# Patient Record
Sex: MALE | Race: WHITE | NOT HISPANIC OR LATINO | Employment: OTHER | URBAN - METROPOLITAN AREA
[De-identification: names, ages, dates, MRNs, and addresses within clinical notes are randomized per-mention and may not be internally consistent; named-entity substitution may affect disease eponyms.]

---

## 2017-05-23 ENCOUNTER — TELEPHONE (OUTPATIENT)
Dept: HEMATOLOGY/ONCOLOGY | Facility: CLINIC | Age: 57
End: 2017-05-23

## 2017-05-24 ENCOUNTER — TELEPHONE (OUTPATIENT)
Dept: HEMATOLOGY/ONCOLOGY | Facility: CLINIC | Age: 57
End: 2017-05-24

## 2017-05-24 DIAGNOSIS — I25.10 CORONARY ARTERY DISEASE, ANGINA PRESENCE UNSPECIFIED, UNSPECIFIED VESSEL OR LESION TYPE, UNSPECIFIED WHETHER NATIVE OR TRANSPLANTED HEART: Primary | ICD-10-CM

## 2017-05-24 RX ORDER — WARFARIN SODIUM 5 MG/1
5 TABLET ORAL DAILY
Qty: 30 TABLET | Refills: 2 | Status: SHIPPED | OUTPATIENT
Start: 2017-05-24 | End: 2021-02-22 | Stop reason: SDUPTHER

## 2017-05-24 RX ORDER — FENOFIBRATE 145 MG/1
TABLET, FILM COATED ORAL
Refills: 1 | COMMUNITY
Start: 2017-03-13 | End: 2020-01-28 | Stop reason: SDUPTHER

## 2017-05-24 RX ORDER — WARFARIN SODIUM 5 MG/1
TABLET ORAL
Refills: 2 | COMMUNITY
Start: 2017-04-27 | End: 2017-05-24 | Stop reason: SDUPTHER

## 2017-05-24 RX ORDER — DEXTROSE/VITAMIN D3 4 G-400
TABLET,CHEWABLE ORAL
Refills: 5 | COMMUNITY
Start: 2017-05-01

## 2017-05-24 RX ORDER — PEN NEEDLE, DIABETIC 31 GX5/16"
NEEDLE, DISPOSABLE MISCELLANEOUS
Refills: 4 | COMMUNITY
Start: 2017-03-07 | End: 2020-06-01 | Stop reason: SDUPTHER

## 2017-05-24 RX ORDER — INSULIN DETEMIR 100 [IU]/ML
INJECTION, SOLUTION SUBCUTANEOUS
Refills: 5 | COMMUNITY
Start: 2017-04-13 | End: 2019-12-26

## 2017-05-24 RX ORDER — CITALOPRAM 20 MG/1
TABLET, FILM COATED ORAL
Refills: 1 | COMMUNITY
Start: 2017-05-01 | End: 2019-12-26

## 2017-05-24 RX ORDER — ATORVASTATIN CALCIUM 80 MG/1
80 TABLET, FILM COATED ORAL DAILY
Refills: 1 | COMMUNITY
Start: 2017-03-07 | End: 2020-05-05 | Stop reason: SDUPTHER

## 2017-05-24 NOTE — TELEPHONE ENCOUNTER
Pt called wondering about rx refill request of warfarin. We received request and i routed it to nurse this morning.     Pt has been out for 3 days and needs rx     Please call pt once refill is sent.

## 2017-06-14 ENCOUNTER — TELEPHONE (OUTPATIENT)
Dept: HEMATOLOGY/ONCOLOGY | Facility: CLINIC | Age: 57
End: 2017-06-14

## 2017-06-14 RX ORDER — WARFARIN SODIUM 5 MG/1
5 TABLET ORAL
COMMUNITY
End: 2019-04-05 | Stop reason: SDUPTHER

## 2017-06-14 RX ORDER — WARFARIN SODIUM 5 MG/1
7.5 TABLET ORAL
COMMUNITY
End: 2018-01-05 | Stop reason: SDUPTHER

## 2017-07-29 LAB
INR PPP: 2.6
PROTHROMBIN TIME: 26.6 SEC (ref 9–11.5)

## 2017-08-01 ENCOUNTER — TELEPHONE (OUTPATIENT)
Dept: HEMATOLOGY/ONCOLOGY | Facility: CLINIC | Age: 57
End: 2017-08-01

## 2017-09-08 LAB
INR PPP: 2 (ref 0.8–1.2)
PROTHROMBIN TIME: 22.9 SECOND(S) (ref 12.3–14.7)

## 2017-09-20 ENCOUNTER — TELEPHONE (OUTPATIENT)
Dept: HEMATOLOGY/ONCOLOGY | Facility: CLINIC | Age: 57
End: 2017-09-20

## 2017-09-20 NOTE — TELEPHONE ENCOUNTER
----- Message from Ac Smith MD sent at 9/20/2017  4:53 AM CDT -----  INR is fine. No change. Recheck in one month.

## 2017-09-20 NOTE — TELEPHONE ENCOUNTER
----- Message from Princess Zavala sent at 9/20/2017 10:13 AM CDT -----  PAtient called in returning your call from this morning (see telephone encounters)   Please contact pt back at [phone]

## 2017-10-10 ENCOUNTER — OFFICE VISIT (OUTPATIENT)
Dept: HEMATOLOGY/ONCOLOGY | Facility: CLINIC | Age: 57
End: 2017-10-10
Payer: MEDICARE

## 2017-10-10 VITALS
TEMPERATURE: 98 F | WEIGHT: 240.69 LBS | HEIGHT: 70 IN | SYSTOLIC BLOOD PRESSURE: 133 MMHG | HEART RATE: 65 BPM | RESPIRATION RATE: 18 BRPM | DIASTOLIC BLOOD PRESSURE: 79 MMHG | BODY MASS INDEX: 34.46 KG/M2

## 2017-10-10 DIAGNOSIS — D68.51 HETEROZYGOUS FACTOR V LEIDEN MUTATION: Chronic | ICD-10-CM

## 2017-10-10 PROCEDURE — 99214 OFFICE O/P EST MOD 30 MIN: CPT | Mod: ,,, | Performed by: INTERNAL MEDICINE

## 2017-10-10 RX ORDER — HUMAN INSULIN 100 [USP'U]/ML
25 INJECTION, SUSPENSION SUBCUTANEOUS
COMMUNITY
Start: 2017-10-09 | End: 2020-02-11 | Stop reason: CLARIF

## 2017-10-10 RX ORDER — METFORMIN HYDROCHLORIDE 500 MG/1
TABLET ORAL
COMMUNITY
Start: 2017-09-21 | End: 2019-12-03 | Stop reason: SDUPTHER

## 2017-10-10 RX ORDER — BUSPIRONE HYDROCHLORIDE 10 MG/1
TABLET ORAL
COMMUNITY
Start: 2017-08-17 | End: 2019-12-26

## 2017-10-10 RX ORDER — SYRINGE AND NEEDLE,INSULIN,1ML 29 G X1/2"
SYRINGE, EMPTY DISPOSABLE MISCELLANEOUS
COMMUNITY
Start: 2017-10-09 | End: 2020-03-31 | Stop reason: SDUPTHER

## 2017-10-10 NOTE — PROGRESS NOTES
"                                                         PROGRESS NOTE    Subjective:       Patient ID: Luis Resendiz is a 57 y.o. male.    Chief Complaint:  Follow-up and Results (labs in epic and media)  follow up hypercoag syndroms    History of Present Illness:   Luis Resendiz is a 57 y.o. male who presents for routine follow up of hypercoag syndrome.  No new complaints.  No bleeding, no sob, no chest pain.       Family and Social history reviewed and is unchanged from 8/12/2013      ROS:  Review of Systems   Constitutional: Negative for fever and unexpected weight change.   HENT: Negative for nosebleeds.    Respiratory: Negative for chest tightness and shortness of breath.    Cardiovascular: Negative for chest pain.   Gastrointestinal: Negative for abdominal pain and blood in stool.   Genitourinary: Negative for hematuria.   Skin: Negative for rash.   Hematological: Does not bruise/bleed easily.          Current Outpatient Prescriptions:     alprazolam (XANAX) 0.5 MG tablet, Take 1 tablet (0.5 mg total) by mouth 3 (three) times daily., Disp: , Rfl:     aspirin 325 MG tablet, Take 325 mg by mouth once daily., Disp: , Rfl:     atenolol (TENORMIN) 50 MG tablet, Take 75 mg by mouth 2 (two) times daily. , Disp: , Rfl:     atorvastatin (LIPITOR) 40 MG tablet, , Disp: , Rfl: 1    BD INSULIN PEN NEEDLE UF MINI 31 gauge x 3/16" Ndle, , Disp: , Rfl: 4    fenofibrate (TRICOR) 145 MG tablet, , Disp: , Rfl: 1    glimepiride (AMARYL) 4 MG tablet, Take 4 mg by mouth before breakfast., Disp: , Rfl:     lisinopril (PRINIVIL,ZESTRIL) 2.5 MG tablet, Take 2.5 mg by mouth once daily., Disp: , Rfl:     metformin (GLUCOPHAGE) 500 MG tablet, , Disp: , Rfl:     NOVOLIN 70/30 100 unit/mL (70-30) injection, , Disp: , Rfl:     TRUEPLUS INSULIN 0.5 mL 29 gauge x 1/2" Syrg, , Disp: , Rfl: 5    ULTRA COMFORT INSULIN SYRINGE 1 mL 29 gauge x 1/2" Syrg, , Disp: , Rfl:     warfarin (COUMADIN) 5 MG tablet, Take 7.5 mg by " "mouth every Tuesday, Thursday, Saturday, Sunday., Disp: , Rfl:     busPIRone (BUSPAR) 10 MG tablet, , Disp: , Rfl:     citalopram (CELEXA) 10 MG tablet, Take 1 tablet (10 mg total) by mouth once daily., Disp: , Rfl:     citalopram (CELEXA) 20 MG tablet, , Disp: , Rfl: 1    fenofibrate 120 mg Tab, Take 145 mg by mouth once daily. , Disp: , Rfl:     hydrocodone-acetaminophen 5-325mg (NORCO) 5-325 mg per tablet, Take 1 tablet by mouth every 4 (four) hours as needed for Pain., Disp: 40 tablet, Rfl: 0    insulin aspart (NOVOLOG) 100 unit/mL injection, Inject into the skin 3 (three) times daily before meals. Per sliding scale, Disp: , Rfl:     insulin detemir (LEVEMIR) 100 unit/mL injection, Inject 22 Units into the skin every evening., Disp: , Rfl:     insulin lispro (HUMALOG) 100 unit/mL injection, Inject into the skin 3 (three) times daily before meals. Sliding scale, Disp: , Rfl:     LEVEMIR FLEXTOUCH 100 unit/mL (3 mL) InPn pen, , Disp: , Rfl: 5    omega-3 acid ethyl esters (LOVAZA) 1 gram capsule, Take 2 capsules (2 g total) by mouth 2 (two) times daily., Disp: 120 capsule, Rfl: 11    oxycodone-acetaminophen (PERCOCET) 5-325 mg per tablet, Take 1 tablet by mouth every 8 (eight) hours as needed for Pain., Disp: 12 tablet, Rfl: 0    ranolazine (RANEXA) 500 MG Tb12, Take 500 mg by mouth 2 (two) times daily., Disp: , Rfl:     thiamine 100 MG tablet, Take 1 tablet (100 mg total) by mouth once daily., Disp: 3 tablet, Rfl: 0    warfarin (COUMADIN) 5 MG tablet, Take 1 tablet (5 mg total) by mouth Daily., Disp: 30 tablet, Rfl: 2    warfarin (COUMADIN) 5 MG tablet, Take 5 mg by mouth every Mon, Wed, Fri., Disp: , Rfl:         Objective:       Physical Examination:     /79   Pulse 65   Temp 98.4 °F (36.9 °C)   Resp 18   Ht 5' 10" (1.778 m)   Wt 109.2 kg (240 lb 11.2 oz)   BMI 34.54 kg/m²     Physical Exam   Constitutional: He is oriented to person, place, and time. He appears well-developed and " well-nourished.   HENT:   Head: Normocephalic and atraumatic.   Right Ear: External ear normal.   Left Ear: External ear normal.   Mouth/Throat: Oropharynx is clear and moist.   Eyes: Conjunctivae are normal. Pupils are equal, round, and reactive to light. No scleral icterus.   Neck: Normal range of motion. Neck supple.   Cardiovascular: Normal rate, regular rhythm and normal heart sounds.  Exam reveals no gallop and no friction rub.    No murmur heard.  Pulmonary/Chest: Effort normal and breath sounds normal. No respiratory distress. He has no rales. He exhibits no tenderness.   Abdominal: Soft. Bowel sounds are normal. He exhibits no distension and no mass. There is no hepatosplenomegaly. There is no tenderness. There is no rebound and no guarding.   Musculoskeletal: He exhibits no edema.   Lymphadenopathy:        Head (right side): No tonsillar adenopathy present.        Head (left side): No tonsillar adenopathy present.     He has no cervical adenopathy.     He has no axillary adenopathy.        Right: No supraclavicular adenopathy present.        Left: No supraclavicular adenopathy present.   Neurological: He is alert and oriented to person, place, and time.   Psychiatric: He has a normal mood and affect. His behavior is normal. Judgment and thought content normal.   Vitals reviewed.      Labs:   No results found for this or any previous visit (from the past 336 hour(s)).  CMP  Sodium   Date Value Ref Range Status   10/13/2015 140 136 - 145 mmol/L Final     Potassium   Date Value Ref Range Status   10/13/2015 4.6 3.5 - 5.1 mmol/L Final     Chloride   Date Value Ref Range Status   10/13/2015 108 95 - 110 mmol/L Final     CO2   Date Value Ref Range Status   10/13/2015 25 23 - 29 mmol/L Final     Glucose   Date Value Ref Range Status   10/13/2015 193 (H) 70 - 110 mg/dL Final     BUN, Bld   Date Value Ref Range Status   10/13/2015 34 (H) 6 - 20 mg/dL Final     Creatinine   Date Value Ref Range Status   10/13/2015  0.9 0.5 - 1.4 mg/dL Final   06/21/2013 1.1 0.5 - 1.4 mg/dL Final     Calcium   Date Value Ref Range Status   10/13/2015 9.1 8.7 - 10.5 mg/dL Final   06/21/2013 9.3 8.7 - 10.5 mg/dL Final     Total Protein   Date Value Ref Range Status   10/12/2015 7.0 6.0 - 8.4 g/dL Final     Albumin   Date Value Ref Range Status   10/12/2015 2.4 (L) 3.5 - 5.2 g/dL Final     Total Bilirubin   Date Value Ref Range Status   10/12/2015 0.6 0.1 - 1.0 mg/dL Final     Comment:     For infants and newborns, interpretation of results should be based  on gestational age, weight and in agreement with clinical  observations.  Premature Infant recommended reference ranges:  Up to 24 hours.............<8.0 mg/dL  Up to 48 hours............<12.0 mg/dL  3-5 days..................<15.0 mg/dL  6-29 days.................<15.0 mg/dL       Alkaline Phosphatase   Date Value Ref Range Status   10/12/2015 43 (L) 55 - 135 U/L Final     AST   Date Value Ref Range Status   10/12/2015 146 (H) 10 - 40 U/L Final     ALT   Date Value Ref Range Status   10/12/2015 61 (H) 10 - 44 U/L Final     Anion Gap   Date Value Ref Range Status   10/13/2015 7 (L) 8 - 16 mmol/L Final   06/21/2013 13 5 - 15 meq/L Final     eGFR if    Date Value Ref Range Status   10/13/2015 >60 >60 mL/min/1.73 m^2 Final     eGFR if non    Date Value Ref Range Status   10/13/2015 >60 >60 mL/min/1.73 m^2 Final     Comment:     Calculation used to obtain the estimated glomerular filtration  rate (eGFR) is the CKD-EPI equation. Since race is unknown   in our information system, the eGFR values for   -American and Non--American patients are given   for each creatinine result.       No results found for: CEA  No results found for: PSA        Assessment/Plan:     Problem List Items Addressed This Visit     Heterozygous factor V Leiden mutation (Chronic)     Patient is doing well on coumadin at this time and INR has been in range.  Patient continues on  7.5mg MWF and 5mg TThSS.  No bleeding.  Continue this dose.            Other Visit Diagnoses    None.         Discussion:   High complexity due to high risk meds.   Return in about 6 months (around 4/10/2018).      Electronically signed by Ac Sofia

## 2017-10-10 NOTE — ASSESSMENT & PLAN NOTE
Patient is doing well on coumadin at this time and INR has been in range.  Patient continues on 7.5mg MWF and 5mg TThSS.  No bleeding.  Continue this dose.

## 2017-10-10 NOTE — LETTER
October 10, 2017      Luis Barboza MD  [address]  [address]  [facility] [place] [facility] [address]           [facility]  [address]  [address]  [place] [address]  Phone: [phone]  Fax: [phone]          Patient: Luis Resendiz   MR Number: [id number]   YOB: 1960   Date of Visit: 10/10/2017       Dear Dr. Luis Barboza:    Thank you for referring Luis Resendiz to me for evaluation. Attached you will find relevant portions of my assessment and plan of care.    If you have questions, please do not hesitate to call me. I look forward to following Luis Resendiz along with you.    Sincerely,    Ac Smith MD    Enclosure  CC:  No Recipients    If you would like to receive this communication electronically, please contact [email or web address] or [phone] to request more information on [facility] access.    For providers and/or their staff who would like to refer a patient to [facility], please contact us through our one-stop-shop provider referral line, [facility], at [phone].    If you feel you have received this communication in error or would no longer like to receive these types of communications, please e-mail [email or web address]

## 2017-10-18 LAB
INR PPP: 2.4
PROTHROMBIN TIME: 24.5 SEC (ref 9–11.5)

## 2017-11-16 LAB
INR PPP: 2.1
PROTHROMBIN TIME: 21.4 SEC (ref 9–11.5)

## 2017-11-17 ENCOUNTER — TELEPHONE (OUTPATIENT)
Dept: HEMATOLOGY/ONCOLOGY | Facility: CLINIC | Age: 57
End: 2017-11-17

## 2017-12-13 ENCOUNTER — TELEPHONE (OUTPATIENT)
Dept: HEMATOLOGY/ONCOLOGY | Facility: CLINIC | Age: 57
End: 2017-12-13

## 2017-12-13 NOTE — TELEPHONE ENCOUNTER
----- Message from Ac Smith MD sent at 12/13/2017  2:14 PM CST -----  Please call the patient regarding his abnormal result. No change. Recheck in one month.

## 2017-12-15 LAB
INR PPP: 1.6
PROTHROMBIN TIME: 16.2 SEC (ref 9–11.5)

## 2018-01-05 DIAGNOSIS — D68.59 HYPERCOAGULABLE STATE: Primary | ICD-10-CM

## 2018-01-08 RX ORDER — WARFARIN SODIUM 5 MG/1
7.5 TABLET ORAL
Qty: 45 TABLET | Refills: 3 | Status: SHIPPED | OUTPATIENT
Start: 2018-01-08 | End: 2018-06-07 | Stop reason: SDUPTHER

## 2018-01-24 LAB
INR PPP: 2.1
PROTHROMBIN TIME: 21.5 SEC (ref 9–11.5)

## 2018-01-25 ENCOUNTER — TELEPHONE (OUTPATIENT)
Dept: HEMATOLOGY/ONCOLOGY | Facility: CLINIC | Age: 58
End: 2018-01-25

## 2018-01-25 NOTE — TELEPHONE ENCOUNTER
----- Message from Mary Koo sent at 1/24/2018  4:30 PM CST -----  Patient called in requesting nurse please call him with BW results that were done yesterday at [facility]. Please advise and contact patient at [phone]. Thanks

## 2018-02-20 ENCOUNTER — TELEPHONE (OUTPATIENT)
Dept: HEMATOLOGY/ONCOLOGY | Facility: CLINIC | Age: 58
End: 2018-02-20

## 2018-02-20 NOTE — TELEPHONE ENCOUNTER
----- Message from Ac Smith MD sent at 2/20/2018  9:10 AM CST -----  Please call the patient regarding his abnormal result. No changes, repeat in one month.    I spoke to the patient on 1/25 with this result. He will recheck his INR 1 month from this lab.

## 2018-02-27 ENCOUNTER — TELEPHONE (OUTPATIENT)
Dept: HEMATOLOGY/ONCOLOGY | Facility: CLINIC | Age: 58
End: 2018-02-27

## 2018-02-27 DIAGNOSIS — D68.51 HETEROZYGOUS FACTOR V LEIDEN MUTATION: Primary | ICD-10-CM

## 2018-03-12 ENCOUNTER — TELEPHONE (OUTPATIENT)
Dept: HEMATOLOGY/ONCOLOGY | Facility: CLINIC | Age: 58
End: 2018-03-12

## 2018-03-12 NOTE — TELEPHONE ENCOUNTER
----- Message from Ac Smith MD sent at 3/12/2018  9:28 AM CDT -----  Please call the patient regarding his abnormal result. No change. Recheck in one month.      Spoke to patient with directions for coumadin

## 2018-04-18 ENCOUNTER — TELEPHONE (OUTPATIENT)
Dept: HEMATOLOGY/ONCOLOGY | Facility: CLINIC | Age: 58
End: 2018-04-18

## 2018-04-18 NOTE — TELEPHONE ENCOUNTER
----- Message from Ac Smith MD sent at 4/18/2018  9:00 AM CDT -----  Please call the patient regarding his abnormal result. No change, recheck in one month.      Spoke to patient. INR is 2.2 no changes on coumadin . Recheck INR in 4 weeks

## 2018-05-24 ENCOUNTER — TELEPHONE (OUTPATIENT)
Dept: HEMATOLOGY/ONCOLOGY | Facility: CLINIC | Age: 58
End: 2018-05-24

## 2018-05-24 LAB
INR PPP: 1.9
PROTHROMBIN TIME: 19.1 SEC (ref 9–11.5)

## 2018-05-24 NOTE — TELEPHONE ENCOUNTER
----- Message from Ac Smith MD sent at 5/24/2018  8:23 AM CDT -----  Please call the patient regarding his abnormal result. No change, recheck in one month.

## 2018-06-07 DIAGNOSIS — D68.59 HYPERCOAGULABLE STATE: ICD-10-CM

## 2018-06-07 RX ORDER — WARFARIN SODIUM 5 MG/1
7.5 TABLET ORAL
Qty: 45 TABLET | Refills: 3 | Status: SHIPPED | OUTPATIENT
Start: 2018-06-08 | End: 2018-10-18 | Stop reason: SDUPTHER

## 2018-06-07 NOTE — TELEPHONE ENCOUNTER
----- Message from Uma Diez sent at 6/7/2018 12:08 PM CDT -----  Pt called to ask nurse to call him about labs he had done 2 weeks ago at [facility].  Also needs refill of warfarin sent to [facility] on [address].    # [phone]    Thanks,  Uma      Spoke to patient with coumadin instructions. Recheck around 6/23/18. He voiced understanding.

## 2018-07-02 ENCOUNTER — TELEPHONE (OUTPATIENT)
Dept: HEMATOLOGY/ONCOLOGY | Facility: CLINIC | Age: 58
End: 2018-07-02

## 2018-07-02 NOTE — TELEPHONE ENCOUNTER
----- Message from Ac Smith MD sent at 7/2/2018  5:46 AM CDT -----  Please call the patient regarding his abnormal result. No change.  Recheck in one month.      Left message

## 2018-07-31 ENCOUNTER — TELEPHONE (OUTPATIENT)
Dept: HEMATOLOGY/ONCOLOGY | Facility: CLINIC | Age: 58
End: 2018-07-31

## 2018-07-31 NOTE — TELEPHONE ENCOUNTER
----- Message from Ac Smith MD sent at 7/31/2018  8:09 AM CDT -----  Please call the patient regarding his abnormal result. No change.  Recheck one month.      Left message about INR and continue dame dose of coumadin

## 2018-08-30 ENCOUNTER — TELEPHONE (OUTPATIENT)
Dept: HEMATOLOGY/ONCOLOGY | Facility: CLINIC | Age: 58
End: 2018-08-30

## 2018-08-30 NOTE — TELEPHONE ENCOUNTER
----- Message from Ac Smith MD sent at 8/29/2018  4:16 PM CDT -----  Please call the patient regarding his abnormal result. No change. Recheck one month.        Spoke to patient. He will cont same dose coumadin and recheck INR in 4 weeks.

## 2018-09-27 ENCOUNTER — TELEPHONE (OUTPATIENT)
Dept: HEMATOLOGY/ONCOLOGY | Facility: CLINIC | Age: 58
End: 2018-09-27

## 2018-09-27 NOTE — TELEPHONE ENCOUNTER
----- Message from Ac Smith MD sent at 9/27/2018  1:43 PM CDT -----  Please call the patient regarding his abnormal result. Double dose for 2 days then the same.  Repeat test in 2 weeks.      INR is 1.6. Patient is to double dose x 2 days then resume same dose and recheck his INR I 2 weeks. Patient notified.

## 2018-10-18 DIAGNOSIS — D68.59 HYPERCOAGULABLE STATE: ICD-10-CM

## 2018-10-18 RX ORDER — WARFARIN SODIUM 5 MG/1
7.5 TABLET ORAL
Qty: 45 TABLET | Refills: 3 | Status: SHIPPED | OUTPATIENT
Start: 2018-10-19 | End: 2020-02-13 | Stop reason: SDUPTHER

## 2018-10-18 NOTE — TELEPHONE ENCOUNTER
----- Message from Uma Diez sent at 10/18/2018  2:11 PM CDT -----  Pt had labs done and would like a nurse to call them back with results. Michael, last Wednesday    CB# [phone]      Thanks,  Uma      Spoke to Luis about INR which was 1.9. He will continue same dose of 7.5mg 3 days a week and 5mg the other 4 days. Recheck INR in 4 weeks. Refill for coumadin 5mg sent to  to sign.

## 2018-12-10 ENCOUNTER — TELEPHONE (OUTPATIENT)
Dept: HEMATOLOGY/ONCOLOGY | Facility: CLINIC | Age: 58
End: 2018-12-10

## 2018-12-10 NOTE — TELEPHONE ENCOUNTER
----- Message from Uma Diez sent at 12/10/2018  9:22 AM CST -----  Pt had labs done and would like a nurse to call them back with results.  [facility]  PT/INR    # [phone]      Thanks,  Uma      I called Anita. The last INR was on 11/20 and was at 2.5. I told him to continue same dose which is 7.5mg M, W, F and 5mg other days. He was asked to recheck @ 12/20 this month. He voiced understanding

## 2018-12-21 ENCOUNTER — TELEPHONE (OUTPATIENT)
Dept: HEMATOLOGY/ONCOLOGY | Facility: CLINIC | Age: 58
End: 2018-12-21

## 2018-12-21 NOTE — TELEPHONE ENCOUNTER
Spoke to Luis. Hels coumadin times 1 day then resumed same dose. INR was 3.2 and he is taking 7.5mg M,W,F and 5 mg other days. Recheck INR 2 weeks.  He voiced understanding

## 2019-01-03 ENCOUNTER — TELEPHONE (OUTPATIENT)
Dept: HEMATOLOGY/ONCOLOGY | Facility: CLINIC | Age: 59
End: 2019-01-03

## 2019-01-03 NOTE — TELEPHONE ENCOUNTER
----- Message from Ac Smith MD sent at 1/3/2019  7:39 AM CST -----  Please call the patient regarding his abnormal result. Hold one day then same.  Recheck in one month.      Spoke to Luis with the above info.INR 3.2. Hold dose 1 day then resume at same dose which is 7.5mg on M,W, F and 5mg on other days.  Also scheduled him for a follow up. 1/24/19 at 1300.

## 2019-02-12 ENCOUNTER — TELEPHONE (OUTPATIENT)
Dept: HEMATOLOGY/ONCOLOGY | Facility: CLINIC | Age: 59
End: 2019-02-12

## 2019-02-12 NOTE — TELEPHONE ENCOUNTER
Spoke to Luis. He will continue same dose of coumadin and will recheck in 4 weeks. INR is at 1.9. He takes 7.5mg M, W, F, and 5 mg the other days. He will recheck his INR in 4 weeks. Clinic appt is this Thursday.

## 2019-02-14 ENCOUNTER — OFFICE VISIT (OUTPATIENT)
Dept: HEMATOLOGY/ONCOLOGY | Facility: CLINIC | Age: 59
End: 2019-02-14
Payer: MEDICARE

## 2019-02-14 VITALS
BODY MASS INDEX: 33.79 KG/M2 | HEART RATE: 63 BPM | TEMPERATURE: 98 F | RESPIRATION RATE: 20 BRPM | WEIGHT: 235.5 LBS | DIASTOLIC BLOOD PRESSURE: 84 MMHG | SYSTOLIC BLOOD PRESSURE: 143 MMHG

## 2019-02-14 DIAGNOSIS — D68.51 HETEROZYGOUS FACTOR V LEIDEN MUTATION: Chronic | ICD-10-CM

## 2019-02-14 PROCEDURE — 3008F BODY MASS INDEX DOCD: CPT | Mod: ,,, | Performed by: INTERNAL MEDICINE

## 2019-02-14 PROCEDURE — 3079F DIAST BP 80-89 MM HG: CPT | Mod: ,,, | Performed by: INTERNAL MEDICINE

## 2019-02-14 PROCEDURE — 3008F PR BODY MASS INDEX (BMI) DOCUMENTED: ICD-10-PCS | Mod: ,,, | Performed by: INTERNAL MEDICINE

## 2019-02-14 PROCEDURE — 99213 PR OFFICE/OUTPT VISIT, EST, LEVL III, 20-29 MIN: ICD-10-PCS | Mod: ,,, | Performed by: INTERNAL MEDICINE

## 2019-02-14 PROCEDURE — 99213 OFFICE O/P EST LOW 20 MIN: CPT | Mod: ,,, | Performed by: INTERNAL MEDICINE

## 2019-02-14 PROCEDURE — 3077F PR MOST RECENT SYSTOLIC BLOOD PRESSURE >= 140 MM HG: ICD-10-PCS | Mod: ,,, | Performed by: INTERNAL MEDICINE

## 2019-02-14 PROCEDURE — 3079F PR MOST RECENT DIASTOLIC BLOOD PRESSURE 80-89 MM HG: ICD-10-PCS | Mod: ,,, | Performed by: INTERNAL MEDICINE

## 2019-02-14 PROCEDURE — 3077F SYST BP >= 140 MM HG: CPT | Mod: ,,, | Performed by: INTERNAL MEDICINE

## 2019-02-14 NOTE — PROGRESS NOTES
"                                                         PROGRESS NOTE    Subjective:       Patient ID: Luis Resendiz is a 58 y.o. male.    Chief Complaint:  Follow-up and Results  follow up hypercoag syndroms    History of Present Illness:   Luis Resendiz is a 58 y.o. male who presents for routine follow up of hypercoag syndrome.  Patient has no new complaints at this time.  No bleeding.      Family and Social history reviewed and is unchanged from 8/12/2013      ROS:  Review of Systems   Constitutional: Negative for fever and unexpected weight change.   HENT: Negative for nosebleeds.    Respiratory: Negative for chest tightness and shortness of breath.    Cardiovascular: Negative for chest pain.   Gastrointestinal: Negative for abdominal pain and blood in stool.   Genitourinary: Negative for hematuria.   Skin: Negative for rash.   Hematological: Does not bruise/bleed easily.          Current Outpatient Medications:     alprazolam (XANAX) 0.5 MG tablet, Take 1 tablet (0.5 mg total) by mouth 3 (three) times daily., Disp: , Rfl:     atenolol (TENORMIN) 50 MG tablet, Take 75 mg by mouth 2 (two) times daily. , Disp: , Rfl:     atorvastatin (LIPITOR) 40 MG tablet, , Disp: , Rfl: 1    fenofibrate (TRICOR) 145 MG tablet, , Disp: , Rfl: 1    glimepiride (AMARYL) 4 MG tablet, Take 4 mg by mouth before breakfast., Disp: , Rfl:     insulin aspart (NOVOLOG) 100 unit/mL injection, Inject into the skin 3 (three) times daily before meals. Per sliding scale, Disp: , Rfl:     lisinopril (PRINIVIL,ZESTRIL) 2.5 MG tablet, Take 2.5 mg by mouth once daily., Disp: , Rfl:     metformin (GLUCOPHAGE) 500 MG tablet, , Disp: , Rfl:     aspirin 325 MG tablet, Take 325 mg by mouth once daily., Disp: , Rfl:     BD INSULIN PEN NEEDLE UF MINI 31 gauge x 3/16" Ndle, , Disp: , Rfl: 4    busPIRone (BUSPAR) 10 MG tablet, , Disp: , Rfl:     citalopram (CELEXA) 10 MG tablet, Take 1 tablet (10 mg total) by mouth once daily., " "Disp: , Rfl:     citalopram (CELEXA) 20 MG tablet, , Disp: , Rfl: 1    fenofibrate 120 mg Tab, Take 145 mg by mouth once daily. , Disp: , Rfl:     hydrocodone-acetaminophen 5-325mg (NORCO) 5-325 mg per tablet, Take 1 tablet by mouth every 4 (four) hours as needed for Pain., Disp: 40 tablet, Rfl: 0    insulin detemir (LEVEMIR) 100 unit/mL injection, Inject 22 Units into the skin every evening., Disp: , Rfl:     insulin lispro (HUMALOG) 100 unit/mL injection, Inject into the skin 3 (three) times daily before meals. Sliding scale, Disp: , Rfl:     LEVEMIR FLEXTOUCH 100 unit/mL (3 mL) InPn pen, , Disp: , Rfl: 5    NOVOLIN 70/30 100 unit/mL (70-30) injection, , Disp: , Rfl:     omega-3 acid ethyl esters (LOVAZA) 1 gram capsule, Take 2 capsules (2 g total) by mouth 2 (two) times daily., Disp: 120 capsule, Rfl: 11    oxycodone-acetaminophen (PERCOCET) 5-325 mg per tablet, Take 1 tablet by mouth every 8 (eight) hours as needed for Pain., Disp: 12 tablet, Rfl: 0    ranolazine (RANEXA) 500 MG Tb12, Take 500 mg by mouth 2 (two) times daily., Disp: , Rfl:     thiamine 100 MG tablet, Take 1 tablet (100 mg total) by mouth once daily., Disp: 3 tablet, Rfl: 0    TRUEPLUS INSULIN 0.5 mL 29 gauge x 1/2" Syrg, , Disp: , Rfl: 5    ULTRA COMFORT INSULIN SYRINGE 1 mL 29 gauge x 1/2" Syrg, , Disp: , Rfl:     warfarin (COUMADIN) 5 MG tablet, Take 1 tablet (5 mg total) by mouth Daily., Disp: 30 tablet, Rfl: 2    warfarin (COUMADIN) 5 MG tablet, Take 5 mg by mouth every Mon, Wed, Fri., Disp: , Rfl:     warfarin (COUMADIN) 5 MG tablet, Take 1.5 tablets (7.5 mg total) by mouth every Mon, Wed, Fri. And 5mg on the other 4 days, Disp: 45 tablet, Rfl: 3        Objective:       Physical Examination:     BP (!) 143/84   Pulse 63   Temp 98.3 °F (36.8 °C)   Resp 20   Wt 106.8 kg (235 lb 8 oz)   BMI 33.79 kg/m²     Physical Exam   Constitutional: He is oriented to person, place, and time. He appears well-developed and " well-nourished.   HENT:   Head: Normocephalic and atraumatic.   Right Ear: External ear normal.   Left Ear: External ear normal.   Mouth/Throat: Oropharynx is clear and moist.   Eyes: Conjunctivae are normal. Pupils are equal, round, and reactive to light. No scleral icterus.   Neck: Normal range of motion. Neck supple.   Cardiovascular: Normal rate, regular rhythm and normal heart sounds. Exam reveals no gallop and no friction rub.   No murmur heard.  Pulmonary/Chest: Effort normal and breath sounds normal. No respiratory distress. He has no rales. He exhibits no tenderness.   Abdominal: Soft. Bowel sounds are normal. He exhibits no distension and no mass. There is no hepatosplenomegaly. There is no tenderness. There is no rebound and no guarding.   Musculoskeletal: He exhibits no edema.   Lymphadenopathy:        Head (right side): No tonsillar adenopathy present.        Head (left side): No tonsillar adenopathy present.     He has no cervical adenopathy.     He has no axillary adenopathy.        Right: No supraclavicular adenopathy present.        Left: No supraclavicular adenopathy present.   Neurological: He is alert and oriented to person, place, and time.   Psychiatric: He has a normal mood and affect. His behavior is normal. Judgment and thought content normal.   Vitals reviewed.      Labs:   No results found for this or any previous visit (from the past 336 hour(s)).  CMP  Sodium   Date Value Ref Range Status   10/13/2015 140 136 - 145 mmol/L Final     Potassium   Date Value Ref Range Status   10/13/2015 4.6 3.5 - 5.1 mmol/L Final     Chloride   Date Value Ref Range Status   10/13/2015 108 95 - 110 mmol/L Final     CO2   Date Value Ref Range Status   10/13/2015 25 23 - 29 mmol/L Final     Glucose   Date Value Ref Range Status   10/13/2015 193 (H) 70 - 110 mg/dL Final     BUN, Bld   Date Value Ref Range Status   10/13/2015 34 (H) 6 - 20 mg/dL Final     Creatinine   Date Value Ref Range Status   10/13/2015 0.9  0.5 - 1.4 mg/dL Final   06/21/2013 1.1 0.5 - 1.4 mg/dL Final     Calcium   Date Value Ref Range Status   10/13/2015 9.1 8.7 - 10.5 mg/dL Final   06/21/2013 9.3 8.7 - 10.5 mg/dL Final     Total Protein   Date Value Ref Range Status   10/12/2015 7.0 6.0 - 8.4 g/dL Final     Albumin   Date Value Ref Range Status   10/12/2015 2.4 (L) 3.5 - 5.2 g/dL Final     Total Bilirubin   Date Value Ref Range Status   10/12/2015 0.6 0.1 - 1.0 mg/dL Final     Comment:     For infants and newborns, interpretation of results should be based  on gestational age, weight and in agreement with clinical  observations.  Premature Infant recommended reference ranges:  Up to 24 hours.............<8.0 mg/dL  Up to 48 hours............<12.0 mg/dL  3-5 days..................<15.0 mg/dL  6-29 days.................<15.0 mg/dL       Alkaline Phosphatase   Date Value Ref Range Status   10/12/2015 43 (L) 55 - 135 U/L Final     AST   Date Value Ref Range Status   10/12/2015 146 (H) 10 - 40 U/L Final     ALT   Date Value Ref Range Status   10/12/2015 61 (H) 10 - 44 U/L Final     Anion Gap   Date Value Ref Range Status   10/13/2015 7 (L) 8 - 16 mmol/L Final   06/21/2013 13 5 - 15 meq/L Final     eGFR if    Date Value Ref Range Status   10/13/2015 >60 >60 mL/min/1.73 m^2 Final     eGFR if non    Date Value Ref Range Status   10/13/2015 >60 >60 mL/min/1.73 m^2 Final     Comment:     Calculation used to obtain the estimated glomerular filtration  rate (eGFR) is the CKD-EPI equation. Since race is unknown   in our information system, the eGFR values for   -American and Non--American patients are given   for each creatinine result.       No results found for: CEA  No results found for: PSA        Assessment/Plan:     Problem List Items Addressed This Visit     Heterozygous factor V Leiden mutation (Chronic)     Patient is doing well at this time and remains on coumadin.  He is currently taking 7.5mg MWF and 5mg  TThSS.  No bleeding is noted, INR currently 1.9.  Will continue this dose and continue monthly monitoring.  Will get labs from Dr. Barboza to evaluate CBC.                 Discussion:   High complexity due to high risk meds.   Follow-up in about 1 year (around 2/14/2020).      Electronically signed by Ac Sofia

## 2019-02-14 NOTE — ASSESSMENT & PLAN NOTE
Patient is doing well at this time and remains on coumadin.  He is currently taking 7.5mg MWF and 5mg TThSS.  No bleeding is noted, INR currently 1.9.  Will continue this dose and continue monthly monitoring.  Will get labs from Dr. Barboza to evaluate CBC.

## 2019-02-14 NOTE — LETTER
February 14, 2019      Luis Barboza MD  [address]  [address]  [facility]  [place] [address]           [facility] - Hematology Oncology  [address]  [address]  [place] [address]  Phone: [phone]  Fax: [phone]          Patient: Luis Resendiz   MR Number: [id number]   YOB: 1960   Date of Visit: 2/14/2019       Dear Dr. Luis Barboza:    Thank you for referring Luis Resendiz to me for evaluation. Attached you will find relevant portions of my assessment and plan of care.    If you have questions, please do not hesitate to call me. I look forward to following Luis Resendiz along with you.    Sincerely,    Ac Smith MD    Enclosure  CC:  No Recipients    If you would like to receive this communication electronically, please contact [email or web address] or [phone] to request more information on [facility] [employer] Link access.    For providers and/or their staff who would like to refer a patient to [facility], please contact us through our one-stop-shop provider referral line, [facility], at [phone].    If you feel you have received this communication in error or would no longer like to receive these types of communications, please e-mail [email or web address]

## 2019-03-14 ENCOUNTER — TELEPHONE (OUTPATIENT)
Dept: HEMATOLOGY/ONCOLOGY | Facility: CLINIC | Age: 59
End: 2019-03-14

## 2019-03-14 NOTE — TELEPHONE ENCOUNTER
----- Message from Uma Diez sent at 3/14/2019  1:59 PM CDT -----  Pt had labs done and would like a nurse to call them back with results.    CB# [phone]      Thanks,  Uma      Spoke to Luis. He is to continue same dose of coumadin and will recheck his INR in 4 weeks.

## 2019-04-01 DIAGNOSIS — D68.51 HETEROZYGOUS FACTOR V LEIDEN MUTATION: Primary | Chronic | ICD-10-CM

## 2019-04-01 RX ORDER — WARFARIN SODIUM 5 MG/1
TABLET ORAL
Qty: 45 TABLET | Refills: 1 | Status: SHIPPED | OUTPATIENT
Start: 2019-04-01 | End: 2019-04-05 | Stop reason: SDUPTHER

## 2019-04-05 DIAGNOSIS — D68.51 HETEROZYGOUS FACTOR V LEIDEN MUTATION: Primary | ICD-10-CM

## 2019-04-05 RX ORDER — WARFARIN SODIUM 5 MG/1
5 TABLET ORAL
Qty: 45 TABLET | Refills: 2 | Status: SHIPPED | OUTPATIENT
Start: 2019-04-05 | End: 2021-02-22 | Stop reason: SDUPTHER

## 2019-04-05 RX ORDER — WARFARIN SODIUM 5 MG/1
TABLET ORAL
Qty: 45 TABLET | Refills: 2 | Status: SHIPPED | OUTPATIENT
Start: 2019-04-05 | End: 2021-02-22 | Stop reason: SDUPTHER

## 2019-04-23 ENCOUNTER — TELEPHONE (OUTPATIENT)
Dept: HEMATOLOGY/ONCOLOGY | Facility: CLINIC | Age: 59
End: 2019-04-23

## 2019-04-23 LAB
INR PPP: 2.1
PROTHROMBIN TIME: 21 SEC (ref 9–11.5)

## 2019-04-23 NOTE — TELEPHONE ENCOUNTER
----- Message from Ac Smith MD sent at 4/23/2019  6:08 AM CDT -----  Please call the patient regarding his abnormal result. No change. Recheck in one month.

## 2019-05-02 ENCOUNTER — TELEPHONE (OUTPATIENT)
Dept: HEMATOLOGY/ONCOLOGY | Facility: CLINIC | Age: 59
End: 2019-05-02

## 2019-05-02 NOTE — TELEPHONE ENCOUNTER
----- Message from Uma Diez sent at 5/2/2019 10:40 AM CDT -----  Pt had labs done and would like a nurse to call them back with results.  [facility] last thursday  CB# [phone]      Thanks,  Uma

## 2019-05-29 LAB
INR PPP: 1.9
PROTHROMBIN TIME: 18.9 SEC (ref 9–11.5)

## 2019-05-30 ENCOUNTER — TELEPHONE (OUTPATIENT)
Dept: HEMATOLOGY/ONCOLOGY | Facility: CLINIC | Age: 59
End: 2019-05-30

## 2019-05-30 NOTE — TELEPHONE ENCOUNTER
----- Message from Uma Diez sent at 5/30/2019 10:06 AM CDT -----  Pt had labs done and would like a nurse to call them back with results.  Michael  @ 5/28  #   [phone]    Thanks,  Uma      Spoke to Luis. INR is at 1.9. Continue same dose of 7.5mg on M,W,F and 5mg on the other days. rechck INR in 4 weeks. He voiced understanding

## 2019-06-10 DIAGNOSIS — D68.51 HETEROZYGOUS FACTOR V LEIDEN MUTATION: Chronic | ICD-10-CM

## 2019-06-11 DIAGNOSIS — D68.51 HETEROZYGOUS FACTOR V LEIDEN MUTATION: Chronic | ICD-10-CM

## 2019-06-11 RX ORDER — WARFARIN SODIUM 5 MG/1
TABLET ORAL
Qty: 45 TABLET | Refills: 1 | Status: SHIPPED | OUTPATIENT
Start: 2019-06-11 | End: 2021-04-26

## 2019-06-11 RX ORDER — WARFARIN SODIUM 5 MG/1
TABLET ORAL
Qty: 45 TABLET | Refills: 1 | Status: SHIPPED | OUTPATIENT
Start: 2019-06-11 | End: 2021-04-26 | Stop reason: SDUPTHER

## 2019-07-04 LAB
INR PPP: 1.9
PROTHROMBIN TIME: 18.8 SEC (ref 9–11.5)

## 2019-07-08 ENCOUNTER — TELEPHONE (OUTPATIENT)
Dept: HEMATOLOGY/ONCOLOGY | Facility: CLINIC | Age: 59
End: 2019-07-08

## 2019-07-08 NOTE — TELEPHONE ENCOUNTER
Called patient let him know inr is good  Recheck inr in 1 month ----- Message from Ac Smith MD sent at 7/6/2019 10:35 AM CDT -----  Please call the patient regarding his abnormal result. No change,  Recheck in one month.

## 2019-07-08 NOTE — TELEPHONE ENCOUNTER
----- Message from Ac Smith MD sent at 7/6/2019 10:35 AM CDT -----  Please call the patient regarding his abnormal result. No change,  Recheck in one month.      spoke to Luis. Same dose. Recheck 1 month

## 2019-08-17 LAB
INR PPP: 2.8
PROTHROMBIN TIME: 27.8 SEC (ref 9–11.5)

## 2019-08-19 ENCOUNTER — TELEPHONE (OUTPATIENT)
Dept: HEMATOLOGY/ONCOLOGY | Facility: CLINIC | Age: 59
End: 2019-08-19

## 2019-08-19 NOTE — TELEPHONE ENCOUNTER
----- Message from Ac Smith MD sent at 8/19/2019  9:23 AM CDT -----  Please call the patient regarding his abnormal result. No changes.  Recheck next month.        Spoke to Manuel with coumadin instructions. 2.8 INR cont same dose of coumadin. 7.5 on M,W, F and 5mg other days. Recheck INR 4 weeks.

## 2019-09-25 LAB
INR PPP: 1.7
PROTHROMBIN TIME: 17.2 SEC (ref 9–11.5)

## 2019-10-03 ENCOUNTER — TELEPHONE (OUTPATIENT)
Dept: FAMILY MEDICINE | Facility: CLINIC | Age: 59
End: 2019-10-03

## 2019-10-03 NOTE — TELEPHONE ENCOUNTER
----- Message from Savanah Rowell sent at 10/3/2019 10:28 AM CDT -----  Contact: SASKIA  The patient saw Marianna about 3 weeks ago. The patient said his back is getting worse. Last night it would hurt when he would take a deep breath. He was doing good with his back and this happen last night . pts # [phone]

## 2019-10-03 NOTE — TELEPHONE ENCOUNTER
Please call pt and ask if he's having any SOB, cough, chest pain and where exactly pain is located- does it hurt more with movement or just a deep breath? If he's having pain with a deep breath then I'd be concerned about PE given his history but if this is just his normal MSK back pain then recommend xrays and can refer to PT

## 2019-10-03 NOTE — TELEPHONE ENCOUNTER
"After hours:  2nd message.   Patient calling back about back pain - pain is below right shoulder.  Cannot take a deep breath.  Shallow breathing.   "Flairs up" every now and then.  Has not see a pain dr or ortho for several years.   Taking tylenol for pain.  Cannot take anything else due to blood thinners that he is on.    Asking for pain medication.     Patient instructed to answer the phone tomorrow because Marianna tried calling back today and he did not answer.    "

## 2019-10-04 ENCOUNTER — TELEPHONE (OUTPATIENT)
Dept: HEMATOLOGY/ONCOLOGY | Facility: CLINIC | Age: 59
End: 2019-10-04

## 2019-10-04 NOTE — TELEPHONE ENCOUNTER
----- Message from Dona Cole sent at 10/4/2019  2:42 PM CDT -----  The patient called to get the results of his PT INR from last week. Please call him back at [phone].      Patient notified to continue same dose coumadin. Recheck INR in 4 weeks

## 2019-10-04 NOTE — TELEPHONE ENCOUNTER
Spoke with pt - states his pain is only when he takes a deep breath. I let him know that Marianna is concerned about a PE and he needs to go to the ER to be evaluated. Pt was very reluctant and mentioned multiple times that he does not want to go due to cost. I expressed the importance of imaging to rule out something severe and pt agrees to go today and call for f/u

## 2019-11-04 ENCOUNTER — TELEPHONE (OUTPATIENT)
Dept: HEMATOLOGY/ONCOLOGY | Facility: CLINIC | Age: 59
End: 2019-11-04

## 2019-11-04 DIAGNOSIS — D68.51 HETEROZYGOUS FACTOR V LEIDEN MUTATION: Primary | ICD-10-CM

## 2019-11-04 DIAGNOSIS — Z79.01 LONG TERM (CURRENT) USE OF ANTICOAGULANTS: ICD-10-CM

## 2019-11-05 LAB
INR PPP: 1.7
PROTHROMBIN TIME: 17.9 SEC (ref 9–11.5)

## 2019-11-06 ENCOUNTER — TELEPHONE (OUTPATIENT)
Dept: HEMATOLOGY/ONCOLOGY | Facility: CLINIC | Age: 59
End: 2019-11-06

## 2019-11-06 NOTE — TELEPHONE ENCOUNTER
----- Message from Ac Smith MD sent at 11/5/2019  2:12 PM CST -----  Please call the patient regarding his abnormal result.  Double today and tomorrow then same.  Repeat one month.        Spoke to Anita. He is to double his dose x 2 days. 15mg today and 10 mg tomorrow. Then back to the same dose and recheck his INR in 4 weeks

## 2019-11-06 NOTE — TELEPHONE ENCOUNTER
----- Message from Ac Smith MD sent at 11/5/2019  2:12 PM CST -----  Please call the patient regarding his abnormal result.  Double today and tomorrow then same.  Repeat one month.

## 2019-11-18 ENCOUNTER — TELEPHONE (OUTPATIENT)
Dept: FAMILY MEDICINE | Facility: CLINIC | Age: 59
End: 2019-11-18

## 2019-11-18 DIAGNOSIS — I10 ESSENTIAL HYPERTENSION: ICD-10-CM

## 2019-11-18 DIAGNOSIS — E11.9 TYPE 2 DIABETES MELLITUS WITHOUT COMPLICATION, WITHOUT LONG-TERM CURRENT USE OF INSULIN: ICD-10-CM

## 2019-11-18 DIAGNOSIS — F41.9 ANXIETY: Primary | ICD-10-CM

## 2019-11-18 RX ORDER — ALPRAZOLAM 0.5 MG/1
0.5 TABLET ORAL 3 TIMES DAILY
Qty: 90 TABLET | Refills: 1 | Status: SHIPPED | OUTPATIENT
Start: 2019-11-18 | End: 2019-11-20

## 2019-11-18 RX ORDER — LISINOPRIL 2.5 MG/1
2.5 TABLET ORAL DAILY
Qty: 90 TABLET | Refills: 0 | Status: SHIPPED | OUTPATIENT
Start: 2019-11-18 | End: 2020-01-28 | Stop reason: SDUPTHER

## 2019-11-18 RX ORDER — GLIMEPIRIDE 4 MG/1
4 TABLET ORAL 2 TIMES DAILY
Qty: 180 TABLET | Refills: 0 | Status: SHIPPED | OUTPATIENT
Start: 2019-11-18 | End: 2020-02-11 | Stop reason: SDUPTHER

## 2019-11-18 NOTE — TELEPHONE ENCOUNTER
----- Message from [facility], [facility] sent at 11/18/2019  2:29 PM CST -----  Needs refill on Lisinopril 2.5mg and Xanax 1mg. [facility] [address]. Also will be out of Glimeperide and wants to be sure he has refills on that.

## 2019-11-20 RX ORDER — ALPRAZOLAM 1 MG/1
1 TABLET ORAL 2 TIMES DAILY
Qty: 60 TABLET | Refills: 2 | Status: SHIPPED | OUTPATIENT
Start: 2019-11-20 | End: 2020-02-11 | Stop reason: SDUPTHER

## 2019-11-20 NOTE — TELEPHONE ENCOUNTER
----- Message from Trav Shirley sent at 11/20/2019  3:19 PM CST -----  Pt has questions about his hydrocodone amount and mg  Pt [phone]

## 2019-11-20 NOTE — TELEPHONE ENCOUNTER
Spoke with pt, it wasn't norco he needs his xanax sent in correctly. Rx set up for you to send in.

## 2019-12-03 RX ORDER — METFORMIN HYDROCHLORIDE 500 MG/1
1000 TABLET ORAL 2 TIMES DAILY WITH MEALS
Qty: 360 TABLET | Refills: 1 | Status: SHIPPED | OUTPATIENT
Start: 2019-12-03 | End: 2020-05-05 | Stop reason: SDUPTHER

## 2019-12-03 NOTE — TELEPHONE ENCOUNTER
----- Message from Marianna Valderrama sent at 12/3/2019 11:39 AM CST -----  Refill for Metformin. [facility] [address]. Pt #[phone]

## 2019-12-11 ENCOUNTER — TELEPHONE (OUTPATIENT)
Dept: HEMATOLOGY/ONCOLOGY | Facility: CLINIC | Age: 59
End: 2019-12-11

## 2019-12-11 NOTE — TELEPHONE ENCOUNTER
----- Message from Ac Smith MD sent at 12/11/2019 11:30 AM CST -----  Chelsie, increase his dose by 25% and recheck in 2 weeks.        Will increase to 7.5mg every day and recheck INR in 2 weeks

## 2019-12-13 ENCOUNTER — TELEPHONE (OUTPATIENT)
Dept: FAMILY MEDICINE | Facility: CLINIC | Age: 59
End: 2019-12-13

## 2019-12-13 DIAGNOSIS — Z79.899 ENCOUNTER FOR LONG-TERM (CURRENT) USE OF OTHER MEDICATIONS: Primary | ICD-10-CM

## 2019-12-13 DIAGNOSIS — E11.42 DIABETIC POLYNEUROPATHY ASSOCIATED WITH TYPE 2 DIABETES MELLITUS: ICD-10-CM

## 2019-12-26 ENCOUNTER — OFFICE VISIT (OUTPATIENT)
Dept: FAMILY MEDICINE | Facility: CLINIC | Age: 59
End: 2019-12-26
Payer: MEDICARE

## 2019-12-26 ENCOUNTER — TELEPHONE (OUTPATIENT)
Dept: HEMATOLOGY/ONCOLOGY | Facility: CLINIC | Age: 59
End: 2019-12-26

## 2019-12-26 VITALS
BODY MASS INDEX: 33.41 KG/M2 | HEART RATE: 80 BPM | WEIGHT: 233.38 LBS | HEIGHT: 70 IN | SYSTOLIC BLOOD PRESSURE: 134 MMHG | DIASTOLIC BLOOD PRESSURE: 78 MMHG

## 2019-12-26 DIAGNOSIS — E78.5 DYSLIPIDEMIA: ICD-10-CM

## 2019-12-26 DIAGNOSIS — I25.10 CORONARY ARTERY DISEASE INVOLVING NATIVE CORONARY ARTERY OF NATIVE HEART WITHOUT ANGINA PECTORIS: Chronic | ICD-10-CM

## 2019-12-26 DIAGNOSIS — E11.69 DM TYPE 2 WITH DIABETIC DYSLIPIDEMIA: Primary | ICD-10-CM

## 2019-12-26 DIAGNOSIS — D68.51 HETEROZYGOUS FACTOR V LEIDEN MUTATION: Chronic | ICD-10-CM

## 2019-12-26 DIAGNOSIS — E78.5 DM TYPE 2 WITH DIABETIC DYSLIPIDEMIA: Primary | ICD-10-CM

## 2019-12-26 DIAGNOSIS — M51.36 DDD (DEGENERATIVE DISC DISEASE), LUMBAR: ICD-10-CM

## 2019-12-26 PROCEDURE — 3008F PR BODY MASS INDEX (BMI) DOCUMENTED: ICD-10-PCS | Mod: S$GLB,,, | Performed by: NURSE PRACTITIONER

## 2019-12-26 PROCEDURE — 3075F PR MOST RECENT SYSTOLIC BLOOD PRESS GE 130-139MM HG: ICD-10-PCS | Mod: S$GLB,,, | Performed by: NURSE PRACTITIONER

## 2019-12-26 PROCEDURE — 99214 OFFICE O/P EST MOD 30 MIN: CPT | Mod: S$GLB,,, | Performed by: NURSE PRACTITIONER

## 2019-12-26 PROCEDURE — 99214 PR OFFICE/OUTPT VISIT, EST, LEVL IV, 30-39 MIN: ICD-10-PCS | Mod: S$GLB,,, | Performed by: NURSE PRACTITIONER

## 2019-12-26 PROCEDURE — 3075F SYST BP GE 130 - 139MM HG: CPT | Mod: S$GLB,,, | Performed by: NURSE PRACTITIONER

## 2019-12-26 PROCEDURE — 3078F PR MOST RECENT DIASTOLIC BLOOD PRESSURE < 80 MM HG: ICD-10-PCS | Mod: S$GLB,,, | Performed by: NURSE PRACTITIONER

## 2019-12-26 PROCEDURE — 3078F DIAST BP <80 MM HG: CPT | Mod: S$GLB,,, | Performed by: NURSE PRACTITIONER

## 2019-12-26 PROCEDURE — 3008F BODY MASS INDEX DOCD: CPT | Mod: S$GLB,,, | Performed by: NURSE PRACTITIONER

## 2019-12-26 RX ORDER — TIZANIDINE 4 MG/1
4 TABLET ORAL NIGHTLY PRN
Qty: 30 TABLET | Refills: 2 | Status: SHIPPED | OUTPATIENT
Start: 2019-12-26 | End: 2019-12-26

## 2019-12-26 RX ORDER — TIZANIDINE 4 MG/1
4 TABLET ORAL NIGHTLY PRN
Qty: 30 TABLET | Refills: 2 | Status: SHIPPED | OUTPATIENT
Start: 2019-12-26 | End: 2020-01-05

## 2019-12-26 RX ORDER — DULOXETIN HYDROCHLORIDE 30 MG/1
30 CAPSULE, DELAYED RELEASE ORAL 2 TIMES DAILY
COMMUNITY
End: 2021-09-15

## 2019-12-26 NOTE — TELEPHONE ENCOUNTER
----- Message from Ac Smith MD sent at 12/26/2019  9:55 AM CST -----  Please call the patient regarding his abnormal result. Hold coumadin.  Recheck tomorrow. Any bleeding?

## 2019-12-27 ENCOUNTER — TELEPHONE (OUTPATIENT)
Dept: HEMATOLOGY/ONCOLOGY | Facility: CLINIC | Age: 59
End: 2019-12-27

## 2020-01-02 ENCOUNTER — TELEPHONE (OUTPATIENT)
Dept: FAMILY MEDICINE | Facility: CLINIC | Age: 60
End: 2020-01-02

## 2020-01-02 NOTE — TELEPHONE ENCOUNTER
We have that he is taking both metformin and glimeperide, not glipizide twice a day so AM and PM. This has nothing to do with seeing blood in his urine and he needs to be seen for hematuria- also ask when his last INR was drawn

## 2020-01-02 NOTE — TELEPHONE ENCOUNTER
Patient had labs drawn today. Encouraged to be seen in clinic for hematuria. Verbalized understanding.

## 2020-01-02 NOTE — TELEPHONE ENCOUNTER
----- Message from Trav Shirley sent at 1/2/2020  3:05 PM CST -----  Pt is needing to know should he be taking metformin or glipizide and at night he takes insulin   Pt had blood in urine wants to know should he take the medication at 4pm and midnight   Pt [phone]

## 2020-01-03 ENCOUNTER — TELEPHONE (OUTPATIENT)
Dept: HEMATOLOGY/ONCOLOGY | Facility: CLINIC | Age: 60
End: 2020-01-03

## 2020-01-03 LAB
ALBUMIN SERPL-MCNC: 4.3 G/DL (ref 3.6–5.1)
ALBUMIN/GLOB SERPL: 1.5 (CALC) (ref 1–2.5)
ALP SERPL-CCNC: 43 U/L (ref 40–115)
ALT SERPL-CCNC: 54 U/L (ref 9–46)
AST SERPL-CCNC: 53 U/L (ref 10–35)
BILIRUB SERPL-MCNC: 0.8 MG/DL (ref 0.2–1.2)
BUN SERPL-MCNC: 15 MG/DL (ref 7–25)
BUN/CREAT SERPL: ABNORMAL (CALC) (ref 6–22)
CALCIUM SERPL-MCNC: 9.2 MG/DL (ref 8.6–10.3)
CHLORIDE SERPL-SCNC: 102 MMOL/L (ref 98–110)
CHOLEST SERPL-MCNC: 181 MG/DL
CHOLEST/HDLC SERPL: 3.8 (CALC)
CO2 SERPL-SCNC: 24 MMOL/L (ref 20–32)
CREAT SERPL-MCNC: 0.96 MG/DL (ref 0.7–1.25)
GFRSERPLBLD MDRD-ARVRAT: 85 ML/MIN/1.73M2
GLOBULIN SER CALC-MCNC: 2.9 G/DL (CALC) (ref 1.9–3.7)
GLUCOSE SERPL-MCNC: 180 MG/DL (ref 65–139)
HBA1C MFR BLD: 6.6 % OF TOTAL HGB
HDLC SERPL-MCNC: 48 MG/DL
LDLC SERPL CALC-MCNC: 96 MG/DL (CALC)
NONHDLC SERPL-MCNC: 133 MG/DL (CALC)
POTASSIUM SERPL-SCNC: 5.4 MMOL/L (ref 3.5–5.3)
PROT SERPL-MCNC: 7.2 G/DL (ref 6.1–8.1)
SODIUM SERPL-SCNC: 138 MMOL/L (ref 135–146)
TRIGL SERPL-MCNC: 256 MG/DL

## 2020-01-03 NOTE — TELEPHONE ENCOUNTER
Returned the call of the patient and instructed him that his INR is 2.2.  He said that Chelsie already told him how to take his Coumadin.  4 days take 7.5 mg and 3 days take 5 mg.

## 2020-01-06 ENCOUNTER — TELEPHONE (OUTPATIENT)
Dept: HEMATOLOGY/ONCOLOGY | Facility: CLINIC | Age: 60
End: 2020-01-06

## 2020-01-06 ENCOUNTER — TELEPHONE (OUTPATIENT)
Dept: FAMILY MEDICINE | Facility: CLINIC | Age: 60
End: 2020-01-06

## 2020-01-06 NOTE — TELEPHONE ENCOUNTER
Spoke to patient with results verbatim per Dr Barboza. Verbalized understanding. Remind me created for lab.

## 2020-01-06 NOTE — TELEPHONE ENCOUNTER
----- Message from Ac Smith MD sent at 1/6/2020 11:42 AM CST -----  Please call the patient regarding his abnormal result. Recheck in one month.      spoke to Luis and let him know results of INR and confirmed  medication doses. &.5mg 4 days a  Week and 5mg the other 3 days. Recheck INR in 4 weeks. He voiced understanding

## 2020-01-06 NOTE — TELEPHONE ENCOUNTER
----- Message from Ac Smith MD sent at 1/6/2020 11:42 AM CST -----  Please call the patient regarding his abnormal result. Recheck in one month.

## 2020-01-21 ENCOUNTER — TELEPHONE (OUTPATIENT)
Dept: HEMATOLOGY/ONCOLOGY | Facility: CLINIC | Age: 60
End: 2020-01-21

## 2020-01-21 NOTE — TELEPHONE ENCOUNTER
Manuel called today and said his hemrroids are bleeding and he is wants to hold his warfarin for 1 day to see if it helps stop the bleeding. I asked him to go get his INR rechecked( last one was 1/2/20)  and he said he would in the next few days. He takes 7.5 mg warfarin 4 days a week and 5mg the other 3 days each week. I advised him that he should go see a Gastro doctor about it and he said he did have a colonoscopy many years ago by Dr. Cason at [facility]. Manuel said everything was fine at that time. He said he needs to wait to see a GI doctor due to finances. So Manuel is going to hold his warfarin for 1 day and then go have his INR rechecked in next few days to see that level. He will keep me informed of the situation.

## 2020-01-24 ENCOUNTER — TELEPHONE (OUTPATIENT)
Dept: HEMATOLOGY/ONCOLOGY | Facility: CLINIC | Age: 60
End: 2020-01-24

## 2020-01-24 NOTE — TELEPHONE ENCOUNTER
Pt called back and I informed him that his INR is 1.8. He is to stay on same dose of 5mg 3 days a week and 7 1/2 4 days a week and then recheck his labs in 1 month

## 2020-01-24 NOTE — TELEPHONE ENCOUNTER
----- Message from Ac Smith MD sent at 1/24/2020  1:43 PM CST -----  Please call the patient regarding his abnormal result. Same dose.  Recheck in one month

## 2020-01-24 NOTE — TELEPHONE ENCOUNTER
----- Message from Ac Smith MD sent at 1/24/2020  1:43 PM CST -----  Please call the patient regarding his abnormal result. Same dose.  Recheck in one month        Left message for patient to continue same dose of coumadin and recheck INR in 4 weeks

## 2020-01-28 DIAGNOSIS — I10 ESSENTIAL HYPERTENSION: ICD-10-CM

## 2020-01-28 DIAGNOSIS — E78.5 DYSLIPIDEMIA: Primary | ICD-10-CM

## 2020-01-28 RX ORDER — LISINOPRIL 2.5 MG/1
2.5 TABLET ORAL DAILY
Qty: 90 TABLET | Refills: 0 | Status: SHIPPED | OUTPATIENT
Start: 2020-01-28 | End: 2020-02-11 | Stop reason: SDUPTHER

## 2020-01-28 RX ORDER — FENOFIBRATE 145 MG/1
145 TABLET, FILM COATED ORAL DAILY
Qty: 90 TABLET | Refills: 1 | Status: SHIPPED | OUTPATIENT
Start: 2020-01-28 | End: 2020-08-12 | Stop reason: SDUPTHER

## 2020-01-28 NOTE — TELEPHONE ENCOUNTER
----- Message from Trav Shirley sent at 1/28/2020 10:02 AM CST -----  Refills on lisinopril  , fenofierate,   Pharm [facility] [address]  Pt [phone]

## 2020-02-11 ENCOUNTER — TELEPHONE (OUTPATIENT)
Dept: FAMILY MEDICINE | Facility: CLINIC | Age: 60
End: 2020-02-11

## 2020-02-11 DIAGNOSIS — E11.9 TYPE 2 DIABETES MELLITUS WITHOUT COMPLICATION, WITHOUT LONG-TERM CURRENT USE OF INSULIN: ICD-10-CM

## 2020-02-11 DIAGNOSIS — I10 ESSENTIAL HYPERTENSION: ICD-10-CM

## 2020-02-11 RX ORDER — LISINOPRIL 2.5 MG/1
2.5 TABLET ORAL DAILY
Qty: 90 TABLET | Refills: 1 | Status: SHIPPED | OUTPATIENT
Start: 2020-02-11 | End: 2020-08-12 | Stop reason: DRUGHIGH

## 2020-02-11 RX ORDER — GLIMEPIRIDE 4 MG/1
4 TABLET ORAL 2 TIMES DAILY
Qty: 180 TABLET | Refills: 1 | Status: SHIPPED | OUTPATIENT
Start: 2020-02-11 | End: 2020-05-05 | Stop reason: SDUPTHER

## 2020-02-11 RX ORDER — ALPRAZOLAM 1 MG/1
1 TABLET ORAL 2 TIMES DAILY
Qty: 60 TABLET | Refills: 2 | Status: SHIPPED | OUTPATIENT
Start: 2020-02-15 | End: 2020-05-06 | Stop reason: SDUPTHER

## 2020-02-11 NOTE — TELEPHONE ENCOUNTER
----- Message from Daisha Sequeira sent at 2/11/2020  9:35 AM CST -----  Pt needs refill on lisinopril, glymperide, xanax, and his insulin which is no longer covered. they want him to use humulin instead of novolin  [facility] [address]  [phone]

## 2020-02-11 NOTE — TELEPHONE ENCOUNTER
Spoke with pt - refused to bring bottles back, states he lives in [place] and was just in [place] yesterday. States he will bring them to the next visit in March.   Lisinopril 2.5mg QD  Glimepiride 4mg BID  Xanax 1mg BID     No longer covering 70/30 insulin. Using 32 units at night, but has been skipping the day time dose some time due to price. If he does have to take it in the day time he only uses about 15 units. Wants to replace with Humulin.

## 2020-02-13 DIAGNOSIS — D68.59 HYPERCOAGULABLE STATE: ICD-10-CM

## 2020-02-13 RX ORDER — WARFARIN SODIUM 5 MG/1
7.5 TABLET ORAL
Qty: 45 TABLET | Refills: 3 | Status: SHIPPED | OUTPATIENT
Start: 2020-02-14 | End: 2021-04-21 | Stop reason: SDUPTHER

## 2020-02-24 ENCOUNTER — TELEPHONE (OUTPATIENT)
Dept: HEMATOLOGY/ONCOLOGY | Facility: CLINIC | Age: 60
End: 2020-02-24

## 2020-02-24 NOTE — TELEPHONE ENCOUNTER
----- Message from Ac Smith MD sent at 2/24/2020  7:42 AM CST -----  Please call the patient regarding his abnormal result. No change. Recheck 4 weeks.        Continue same dose and recheck INR in 4 weeks

## 2020-02-24 NOTE — TELEPHONE ENCOUNTER
----- Message from Ac Smith MD sent at 2/24/2020  7:42 AM CST -----  Please call the patient regarding his abnormal result. No change. Recheck 4 weeks.

## 2020-03-04 ENCOUNTER — TELEPHONE (OUTPATIENT)
Dept: FAMILY MEDICINE | Facility: CLINIC | Age: 60
End: 2020-03-04

## 2020-03-04 RX ORDER — TIZANIDINE 4 MG/1
8 TABLET ORAL NIGHTLY PRN
Qty: 180 TABLET | Refills: 1 | Status: SHIPPED | OUTPATIENT
Start: 2020-03-04 | End: 2020-03-14

## 2020-03-04 RX ORDER — ATENOLOL 50 MG/1
75 TABLET ORAL 2 TIMES DAILY
Qty: 135 TABLET | Refills: 1 | Status: SHIPPED | OUTPATIENT
Start: 2020-03-04 | End: 2020-06-01 | Stop reason: SDUPTHER

## 2020-03-04 NOTE — TELEPHONE ENCOUNTER
----- Message from Nirmal Ayala sent at 3/4/2020  3:41 PM CST -----  Contact: Luis Astrid  Needs refill on Atenelol 50 MG  Send to [facility] in [place]  Pt# [phone]  Tizanidine will needs to be changed on the directions because 1 at night time hasn't been helping and he's been doubling on it at night. For the next refill he will need the dosage to be changed to take 2 at night.

## 2020-03-30 ENCOUNTER — TELEPHONE (OUTPATIENT)
Dept: HEMATOLOGY/ONCOLOGY | Facility: CLINIC | Age: 60
End: 2020-03-30

## 2020-03-30 NOTE — TELEPHONE ENCOUNTER
----- Message from Ac Smith MD sent at 3/30/2020  7:14 AM CDT -----  Please call the patient regarding his abnormal result. Hold for one day then the same.  Recheck in one monht.          spoke to Luis and he is to hold coumadin x 1 day for his INR of 3.0. Then he will resume same dose of 7.5mg 4 days a week alternating with 5mg the other 3 days a week. He is to recheck his INR in 4 weeks.

## 2020-03-31 DIAGNOSIS — E11.9 TYPE 2 DIABETES MELLITUS WITHOUT COMPLICATION, WITHOUT LONG-TERM CURRENT USE OF INSULIN: Primary | ICD-10-CM

## 2020-03-31 RX ORDER — SYRINGE AND NEEDLE,INSULIN,1ML 29 G X1/2"
25 SYRINGE, EMPTY DISPOSABLE MISCELLANEOUS 2 TIMES DAILY
Qty: 180 EACH | Refills: 3 | Status: SHIPPED | OUTPATIENT
Start: 2020-03-31 | End: 2020-06-01 | Stop reason: SDUPTHER

## 2020-03-31 NOTE — TELEPHONE ENCOUNTER
----- Message from Nirmal Ayala sent at 3/31/2020  3:26 PM CDT -----  Contact: Luis Resendiz  Pt needs a refill on his on his syringe needles for his insulin  [facility] to [facility] on in [place]  Pt# [phone]

## 2020-04-07 ENCOUNTER — TELEPHONE (OUTPATIENT)
Dept: FAMILY MEDICINE | Facility: CLINIC | Age: 60
End: 2020-04-07

## 2020-04-07 NOTE — TELEPHONE ENCOUNTER
"Per Dr Barboza: "postpone until May". Spoke to son, Luis, with this information. Updated remind me. Also encouraged use of portal and MyChart. He said that his dad doesn't have internet and has a flip phone, asked if he would be able to set it up for him if he had a smart phone and he said yes. Son mentioned to me that he has his own MyChart and is familiar with it. Informed him of option for virtual visit for his dad if need be and warranted during this time. Said he will look into setting up for his dad if needed.  "

## 2020-04-13 ENCOUNTER — OFFICE VISIT (OUTPATIENT)
Dept: HEMATOLOGY/ONCOLOGY | Facility: CLINIC | Age: 60
End: 2020-04-13
Payer: MEDICARE

## 2020-04-13 DIAGNOSIS — D68.51 HETEROZYGOUS FACTOR V LEIDEN MUTATION: Chronic | ICD-10-CM

## 2020-04-13 PROCEDURE — 99441 PR PHYSICIAN TELEPHONE EVALUATION 5-10 MIN: CPT | Mod: 95,,, | Performed by: INTERNAL MEDICINE

## 2020-04-13 PROCEDURE — 99441 PR PHYSICIAN TELEPHONE EVALUATION 5-10 MIN: ICD-10-PCS | Mod: 95,,, | Performed by: INTERNAL MEDICINE

## 2020-04-13 NOTE — PROGRESS NOTES
Subjective:       Patient ID: Luis Resendiz is a 59 y.o. male.    Chief Complaint: hypercoag syndrome.     HPI:  Patient is doing well and has no new complaints at this time.     All medications and past medical and surgical history have been reviewed.     The patient location is: Home  Visit type: Virtual visit with audio due to covid 19 pandemic crisis    Review of patient's allergies indicates:   Allergen Reactions    Ancef [cefazolin] Anxiety       ROS  GEN:   normal without any fever, night sweats or weight loss  HEENT:  normal with no HA's,  changes in vision  CV:   normal with no CP, SOB  PULM:  normal with no SOB, cough  GI:   normal with no abdominal pain, nausea, vomiting  :   normal with no hematuria, dysuria  SKIN:   normal with no rash      Previous FAMHX and SOCHX information reviewed and remains unchanged         Objective:        Physical Exam  There were no vitals taken for this visit.  Deferred.           All lab results and imaging results have been reviewed and discussed with the patient  No results found for this or any previous visit (from the past 336 hour(s)).  CMP  Sodium   Date Value Ref Range Status   02/15/2020 145 136 - 145 mmol/L Final     Potassium   Date Value Ref Range Status   02/15/2020 5.0 3.5 - 5.1 mmol/L Final     Chloride   Date Value Ref Range Status   02/15/2020 109 95 - 110 mmol/L Final     CO2   Date Value Ref Range Status   02/15/2020 27 22 - 31 mmol/L Final     Glucose   Date Value Ref Range Status   02/15/2020 158 (H) 70 - 110 mg/dL Final     Comment:     The ADA recommends the following guidelines for fasting glucose:  Normal:       less than 100 mg/dL  Prediabetes:  100 mg/dL to 125 mg/dL  Diabetes:     126 mg/dL or higher       BUN, Bld   Date Value Ref Range Status   02/15/2020 21 9 - 21 mg/dL Final     Creatinine   Date Value Ref Range Status   02/15/2020 1.09 0.50 - 1.40 mg/dL Final   06/21/2013 1.1 0.5 - 1.4 mg/dL Final     Calcium   Date Value Ref Range  Status   02/15/2020 9.2 8.4 - 10.2 mg/dL Final   06/21/2013 9.3 8.7 - 10.5 mg/dL Final     Total Protein   Date Value Ref Range Status   02/15/2020 7.9 6.0 - 8.4 g/dL Final     Albumin   Date Value Ref Range Status   02/15/2020 4.6 3.5 - 5.2 g/dL Final     Total Bilirubin   Date Value Ref Range Status   02/15/2020 0.5 0.2 - 1.3 mg/dL Final     Alkaline Phosphatase   Date Value Ref Range Status   02/15/2020 69 38 - 145 U/L Final     AST   Date Value Ref Range Status   02/15/2020 46 17 - 59 U/L Final     ALT   Date Value Ref Range Status   02/15/2020 54 (H) 0 - 50 U/L Final     Anion Gap   Date Value Ref Range Status   02/15/2020 9 8 - 16 mmol/L Final   06/21/2013 13 5 - 15 meq/L Final     eGFR if    Date Value Ref Range Status   02/15/2020 >60 >60 mL/min/1.73 m^2 Final     eGFR if non    Date Value Ref Range Status   02/15/2020 >60 >60 mL/min/1.73 m^2 Final     Comment:     Calculation used to obtain the estimated glomerular filtration  rate (eGFR) is the CKD-EPI equation.          Total time spent with patient: 7 minutes.   Assessment:      1. Heterozygous factor V Leiden mutation      Problem List Items Addressed This Visit     Heterozygous factor V Leiden mutation (Chronic)     Patient is doing well and has no new issues at this time.  I discussed with him that his INR is now 3.0.  Will continue coumadin and continue to make adjustments as needed.  Will see him again with me in six months.                 Plan:   As Above in Assessment      The plan was discussed with the patient and all questions/concerns have been answered to the patient's satisfaction.          Thank you for allowing me to participate in this pleasant patient's care. Please call with any questions or concerns.

## 2020-05-04 ENCOUNTER — TELEPHONE (OUTPATIENT)
Dept: FAMILY MEDICINE | Facility: CLINIC | Age: 60
End: 2020-05-04

## 2020-05-04 NOTE — TELEPHONE ENCOUNTER
----- Message from Ame Wolf sent at 5/4/2020  1:38 PM CDT -----  vm @ 1:37 needing refills no details left  # [phone]

## 2020-05-05 ENCOUNTER — OFFICE VISIT (OUTPATIENT)
Dept: FAMILY MEDICINE | Facility: CLINIC | Age: 60
End: 2020-05-05
Payer: MEDICARE

## 2020-05-05 VITALS — SYSTOLIC BLOOD PRESSURE: 115 MMHG | DIASTOLIC BLOOD PRESSURE: 86 MMHG

## 2020-05-05 DIAGNOSIS — E66.01 MORBID (SEVERE) OBESITY DUE TO EXCESS CALORIES: ICD-10-CM

## 2020-05-05 DIAGNOSIS — I25.10 CORONARY ARTERY DISEASE INVOLVING NATIVE CORONARY ARTERY OF NATIVE HEART WITHOUT ANGINA PECTORIS: Primary | Chronic | ICD-10-CM

## 2020-05-05 DIAGNOSIS — E11.69 DM TYPE 2 WITH DIABETIC DYSLIPIDEMIA: ICD-10-CM

## 2020-05-05 DIAGNOSIS — F33.41 RECURRENT MAJOR DEPRESSIVE DISORDER, IN PARTIAL REMISSION: ICD-10-CM

## 2020-05-05 DIAGNOSIS — Z12.5 SPECIAL SCREENING FOR MALIGNANT NEOPLASM OF PROSTATE: ICD-10-CM

## 2020-05-05 DIAGNOSIS — E78.2 MIXED HYPERLIPIDEMIA: ICD-10-CM

## 2020-05-05 DIAGNOSIS — E11.649 TYPE 2 DIABETES MELLITUS WITH HYPOGLYCEMIA WITHOUT COMA, WITH LONG-TERM CURRENT USE OF INSULIN: ICD-10-CM

## 2020-05-05 DIAGNOSIS — Z95.1 S/P CABG (CORONARY ARTERY BYPASS GRAFT): Chronic | ICD-10-CM

## 2020-05-05 DIAGNOSIS — F13.20 SEDATIVE, HYPNOTIC OR ANXIOLYTIC DEPENDENCE, UNCOMPLICATED: ICD-10-CM

## 2020-05-05 DIAGNOSIS — E78.5 DM TYPE 2 WITH DIABETIC DYSLIPIDEMIA: ICD-10-CM

## 2020-05-05 DIAGNOSIS — E11.9 TYPE 2 DIABETES MELLITUS WITHOUT COMPLICATION, WITHOUT LONG-TERM CURRENT USE OF INSULIN: ICD-10-CM

## 2020-05-05 DIAGNOSIS — D68.51 HETEROZYGOUS FACTOR V LEIDEN MUTATION: Chronic | ICD-10-CM

## 2020-05-05 DIAGNOSIS — Z79.4 TYPE 2 DIABETES MELLITUS WITH HYPOGLYCEMIA WITHOUT COMA, WITH LONG-TERM CURRENT USE OF INSULIN: ICD-10-CM

## 2020-05-05 PROCEDURE — 3074F SYST BP LT 130 MM HG: CPT | Mod: 95,,, | Performed by: FAMILY MEDICINE

## 2020-05-05 PROCEDURE — 3044F PR MOST RECENT HEMOGLOBIN A1C LEVEL <7.0%: ICD-10-PCS | Mod: 95,,, | Performed by: FAMILY MEDICINE

## 2020-05-05 PROCEDURE — 3044F HG A1C LEVEL LT 7.0%: CPT | Mod: 95,,, | Performed by: FAMILY MEDICINE

## 2020-05-05 PROCEDURE — 3074F PR MOST RECENT SYSTOLIC BLOOD PRESSURE < 130 MM HG: ICD-10-PCS | Mod: 95,,, | Performed by: FAMILY MEDICINE

## 2020-05-05 PROCEDURE — 99442 PR PHYSICIAN TELEPHONE EVALUATION 11-20 MIN: ICD-10-PCS | Mod: 95,,, | Performed by: FAMILY MEDICINE

## 2020-05-05 PROCEDURE — 99442 PR PHYSICIAN TELEPHONE EVALUATION 11-20 MIN: CPT | Mod: 95,,, | Performed by: FAMILY MEDICINE

## 2020-05-05 PROCEDURE — 3079F DIAST BP 80-89 MM HG: CPT | Mod: 95,,, | Performed by: FAMILY MEDICINE

## 2020-05-05 PROCEDURE — 3079F PR MOST RECENT DIASTOLIC BLOOD PRESSURE 80-89 MM HG: ICD-10-PCS | Mod: 95,,, | Performed by: FAMILY MEDICINE

## 2020-05-05 RX ORDER — ATORVASTATIN CALCIUM 80 MG/1
80 TABLET, FILM COATED ORAL DAILY
Qty: 90 TABLET | Refills: 1 | Status: SHIPPED | OUTPATIENT
Start: 2020-05-05 | End: 2020-11-10 | Stop reason: SDUPTHER

## 2020-05-05 RX ORDER — GLIMEPIRIDE 4 MG/1
4 TABLET ORAL 2 TIMES DAILY
Qty: 180 TABLET | Refills: 1 | Status: SHIPPED | OUTPATIENT
Start: 2020-05-05 | End: 2020-11-10 | Stop reason: SDUPTHER

## 2020-05-05 RX ORDER — METFORMIN HYDROCHLORIDE 500 MG/1
1000 TABLET ORAL 2 TIMES DAILY WITH MEALS
Qty: 360 TABLET | Refills: 1 | Status: SHIPPED | OUTPATIENT
Start: 2020-05-05 | End: 2020-06-01 | Stop reason: SDUPTHER

## 2020-05-05 NOTE — PROGRESS NOTES
Subjective:        The chief complaint leading to consultation is:  Diabetes mellitus  The patient location is:  Home  Visit type: Virtual visit with synchronous audio/video or audio only  This was a phone conversation in lieu of in-person visit due to the coronavirus emergency. Patient acknowledged and agreed to the telephone encounter.     This is a telemedicine visit audio only for a 59-year-old male with diabetes mellitus.  He states he is self isolating at home due to the COVID-19 virus crisis.  His groceries are delivered to him by friends and family.    Patient states he uses the 70/30 insulin 15 u q  Am 30 u q  Pm.  He has had only 1 hypoglycemic spell recently yearly is blood sugars are in the 90s in the morning all way up to 170s postprandial.    He sees Dr. Crow cardiology had a recent stress test which was deemed normal.  He continues atorvastatin 80 mg daily for hyperlipidemia.  He denies any recent chest pains or shortness of breath.  He denies upper respiratory infection symptoms such as fever cough or shortness of breath.      Past Surgical History:   Procedure Laterality Date    BACK SURGERY      cabg      CARDIAC SURGERY      FOOT SURGERY      FRACTURE SURGERY      ROTATOR CUFF REPAIR Left      Past Medical History:   Diagnosis Date    Anticoagulant long-term use     Arthritis     Cervical spine pain     Clotting disorder     Coronary artery disease     Diabetes mellitus     Encounter for blood transfusion     Hypertension      Family History   Problem Relation Age of Onset    Heart disease Mother     Cancer Mother     Heart disease Father     Alcohol abuse Father     No Known Problems Sister     No Known Problems Brother     No Known Problems Daughter     No Known Problems Son         Social History:   Marital Status:   Alcohol History:  reports that he drinks alcohol.  Tobacco History:  reports that he quit smoking about 25 years ago. He has a 0.25 pack-year  "smoking history. He has never used smokeless tobacco.  Drug History:  reports that he does not use drugs.    Review of patient's allergies indicates:   Allergen Reactions    Ancef [cefazolin] Anxiety       Current Outpatient Medications   Medication Sig Dispense Refill    ALPRAZolam (XANAX) 1 MG tablet Take 1 tablet (1 mg total) by mouth 2 (two) times daily. 60 tablet 2    aspirin 325 MG tablet Take 325 mg by mouth once daily.      atenoloL (TENORMIN) 50 MG tablet Take 1.5 tablets (75 mg total) by mouth 2 (two) times daily. 2 tabs in AM and 1 tab in  tablet 1    atorvastatin (LIPITOR) 80 MG tablet Take 80 mg by mouth once daily.   1    BD INSULIN PEN NEEDLE UF MINI 31 gauge x 3/16" Ndle   4    DULoxetine (CYMBALTA) 30 MG capsule Take 30 mg by mouth 2 (two) times daily.      fenofibrate (TRICOR) 145 MG tablet Take 1 tablet (145 mg total) by mouth once daily. 90 tablet 1    glimepiride (AMARYL) 4 MG tablet Take 1 tablet (4 mg total) by mouth 2 (two) times daily. 180 tablet 1    HYDROcodone-acetaminophen (NORCO) 5-325 mg per tablet Take 1 tablet by mouth every 6 (six) hours as needed for Pain. 9 tablet 0    insulin NPH-insulin regular, 70/30, (HUMULIN 70/30 U-100 INSULIN) 100 unit/mL (70-30) injection Inject 20-25 Units into the skin 2 (two) times daily. 1 vial 5    lisinopril (PRINIVIL,ZESTRIL) 2.5 MG tablet Take 1 tablet (2.5 mg total) by mouth once daily. 90 tablet 1    metFORMIN (GLUCOPHAGE) 500 MG tablet Take 2 tablets (1,000 mg total) by mouth 2 (two) times daily with meals. 360 tablet 1    omega-3 acid ethyl esters (LOVAZA) 1 gram capsule Take 2 capsules (2 g total) by mouth 2 (two) times daily. 120 capsule 11    thiamine 100 MG tablet Take 1 tablet (100 mg total) by mouth once daily. (Patient not taking: Reported on 12/26/2019) 3 tablet 0    TRUEPLUS INSULIN 0.5 mL 29 gauge x 1/2" Syrg   5    ULTRA COMFORT INSULIN SYRINGE 1 mL 29 gauge x 1/2" Syrg Inject 25 Units as directed 2 (two) times " daily. 180 each 3    warfarin (COUMADIN) 5 MG tablet Take 1 tablet (5 mg total) by mouth Daily. 30 tablet 2    warfarin (COUMADIN) 5 MG tablet TAKE 1 & 1/2 TABLETS BY MOUTH ON MONDAY, WEDNESDAY AND FRIDAY AND TAKE 1 TABLET ON SATURDAY, SUNDAY, TUESDAY AND THURSDAY 45 tablet 2    warfarin (COUMADIN) 5 MG tablet Take 1 tablet (5 mg total) by mouth every Mon, Wed, Fri. 1 1/2 tablets the other days of week for 7.5mg 45 tablet 2    warfarin (COUMADIN) 5 MG tablet TAKE 1 & 1/2 (ONE & ONE-HALF) TABLETS BY MOUTH ON MODAY, WEDNESDAY, AND FRIDAY. TAKE 1 TABLET ON SATURDAY, SUNDAY, TUESDAY AND THURSDAY 45 tablet 1    warfarin (COUMADIN) 5 MG tablet TAKE 1 & 1/2 (ONE & ONE-HALF) TABLETS BY MOUTH ON MODAY, WEDNESDAY, AND FRIDAY. TAKE 1 TABLET ON SATURDAY, SUNDAY, TUESDAY AND THURSDAY 45 tablet 1    warfarin (COUMADIN) 5 MG tablet Take 1.5 tablets (7.5 mg total) by mouth every Mon, Wed, Fri. And 5mg on the other 4 days 45 tablet 3     No current facility-administered medications for this visit.        Review of Systems   Constitutional: Negative for appetite change, chills, fatigue, fever and unexpected weight change.   HENT: Negative for congestion, ear pain and trouble swallowing.    Eyes: Negative for pain, discharge and visual disturbance.   Respiratory: Negative for apnea, cough, shortness of breath and wheezing.    Cardiovascular: Negative for chest pain and leg swelling.        Saw Dr Crow for stress test-did OK   Gastrointestinal: Negative for abdominal pain, blood in stool, constipation, diarrhea, nausea and vomiting.   Endocrine: Negative for cold intolerance, heat intolerance and polydipsia.   Genitourinary: Positive for hematuria (hematuria when INR was elevated 4 mo  ago). Negative for dysuria, testicular pain and urgency.   Musculoskeletal: Negative for gait problem, joint swelling and myalgias.   Neurological: Negative for dizziness, seizures and numbness (feet have been numb x yrs, hands get numb).    Psychiatric/Behavioral: Negative for agitation, behavioral problems and hallucinations. The patient is not nervous/anxious.          Objective:        Physical Exam:   Physical Exam         Assessment:       No diagnosis found.  Plan:   There are no diagnoses linked to this encounter.  No follow-ups on file.    Total time spent with patient:  20 min    Each patient to whom he or she provides medical services by telemedicine is:  (1) informed of the relationship between the physician and patient and the respective role of any other health care provider with respect to management of the patient; and (2) notified that he or she may decline to receive medical services by telemedicine and may withdraw from such care at any time.    This note was created using [employer] voice recognition software that occasionally misinterprets phrases or words. Established Patient - Audio Only Telehealth Visit                             This service was not originating from a related E/M service provided within the previous 7 days nor will  to an E/M service or procedure within the next 24 hours or my soonest available appointment.  Prevailing standard of care was able to be met in this audio-only visit.

## 2020-05-06 ENCOUNTER — TELEPHONE (OUTPATIENT)
Dept: HEMATOLOGY/ONCOLOGY | Facility: CLINIC | Age: 60
End: 2020-05-06

## 2020-05-06 ENCOUNTER — TELEPHONE (OUTPATIENT)
Dept: FAMILY MEDICINE | Facility: CLINIC | Age: 60
End: 2020-05-06

## 2020-05-06 RX ORDER — ALPRAZOLAM 1 MG/1
1 TABLET ORAL 2 TIMES DAILY
Qty: 60 TABLET | Refills: 2 | Status: SHIPPED | OUTPATIENT
Start: 2020-05-06 | End: 2020-06-01 | Stop reason: SDUPTHER

## 2020-05-06 NOTE — TELEPHONE ENCOUNTER
----- Message from Trav Shirley sent at 5/6/2020 11:11 AM CDT -----  Xanax  Pharm [facility]   Pt [phone]

## 2020-05-06 NOTE — TELEPHONE ENCOUNTER
----- Message from Luis Barboza MD sent at 5/5/2020  8:51 PM CDT -----  Call patient set up of 3 months office visit with Marianna with labs.

## 2020-05-06 NOTE — TELEPHONE ENCOUNTER
----- Message from Ac Smith MD sent at 5/6/2020  6:54 AM CDT -----  Please call the patient regarding his abnormal result. No change.  Repeat one month        INR is 2.0 . He is to continue 7.5mg 4 days weekly and 5mg daily on the other 3 days each week. Repeat INR in 4 weeks

## 2020-05-28 ENCOUNTER — TELEPHONE (OUTPATIENT)
Dept: FAMILY MEDICINE | Facility: CLINIC | Age: 60
End: 2020-05-28

## 2020-05-28 NOTE — TELEPHONE ENCOUNTER
Spoke to patient. States that dr Barboza told him he could wait until prior to next ov for lab. Updated remind me.

## 2020-05-28 NOTE — TELEPHONE ENCOUNTER
----- Message from [facility], RT sent at 1/6/2020 10:41 AM CST -----  Regarding: Lab due  Luis Barboza MD on 1/4/2020 at 3:38 PM CST  Call patient.  Blood sugar was 180, A1c was fine at 6.6, kidney function are normal liver enzymes are slightly elevated, cholesterol looks good at 181 triglycerides slightly elevated  256, is time to reduce the carbs in bread in your diet.  Reduce potatoes rice and pasta.  Recheck A1c and CMP in 3 months.

## 2020-06-01 DIAGNOSIS — E78.5 DM TYPE 2 WITH DIABETIC DYSLIPIDEMIA: ICD-10-CM

## 2020-06-01 DIAGNOSIS — E11.69 DM TYPE 2 WITH DIABETIC DYSLIPIDEMIA: ICD-10-CM

## 2020-06-01 DIAGNOSIS — E11.9 TYPE 2 DIABETES MELLITUS WITHOUT COMPLICATION, WITHOUT LONG-TERM CURRENT USE OF INSULIN: ICD-10-CM

## 2020-06-01 RX ORDER — SYRINGE AND NEEDLE,INSULIN,1ML 29 G X1/2"
25 SYRINGE, EMPTY DISPOSABLE MISCELLANEOUS 2 TIMES DAILY
Qty: 180 EACH | Refills: 3 | Status: SHIPPED | OUTPATIENT
Start: 2020-06-01 | End: 2021-06-14 | Stop reason: SDUPTHER

## 2020-06-01 RX ORDER — PEN NEEDLE, DIABETIC 31 GX5/16"
1 NEEDLE, DISPOSABLE MISCELLANEOUS DAILY
Qty: 100 EACH | Refills: 1 | Status: SHIPPED | OUTPATIENT
Start: 2020-06-01

## 2020-06-01 RX ORDER — METFORMIN HYDROCHLORIDE 500 MG/1
1000 TABLET ORAL 2 TIMES DAILY WITH MEALS
Qty: 360 TABLET | Refills: 1 | Status: SHIPPED | OUTPATIENT
Start: 2020-06-01 | End: 2020-11-16 | Stop reason: SDUPTHER

## 2020-06-01 RX ORDER — ATENOLOL 50 MG/1
75 TABLET ORAL 2 TIMES DAILY
Qty: 135 TABLET | Refills: 1 | Status: SHIPPED | OUTPATIENT
Start: 2020-06-01 | End: 2020-06-03 | Stop reason: SDUPTHER

## 2020-06-01 RX ORDER — ALPRAZOLAM 1 MG/1
1 TABLET ORAL 2 TIMES DAILY
Qty: 60 TABLET | Refills: 2 | Status: SHIPPED | OUTPATIENT
Start: 2020-06-01 | End: 2020-08-27 | Stop reason: SDUPTHER

## 2020-06-01 NOTE — TELEPHONE ENCOUNTER
----- Message from Nirmal Ayala sent at 6/1/2020  3:38 PM CDT -----  Contact: Luis Griffin refill on Atenolol 50mg, Metformin 500 Mg, and Insulin needles 31 gauge 6mm 15/64 ##He needs 1 half ML syringe##   he says anything longer than the 1 half is too long for his when he sticks himself as if he's drawing blood.   Send [facility] in [place]  Pt# [phone]

## 2020-06-03 ENCOUNTER — TELEPHONE (OUTPATIENT)
Dept: FAMILY MEDICINE | Facility: CLINIC | Age: 60
End: 2020-06-03

## 2020-06-03 RX ORDER — ATENOLOL 50 MG/1
75 TABLET ORAL 2 TIMES DAILY
Qty: 270 TABLET | Refills: 1 | Status: SHIPPED | OUTPATIENT
Start: 2020-06-03 | End: 2021-04-20

## 2020-06-03 NOTE — TELEPHONE ENCOUNTER
----- Message from Marianna Valderrama sent at 6/3/2020 11:21 AM CDT -----  [facility]  [facility] to [facility] the Atenolol 50 mg for 270 to make it for [facility] days. [facility] @[phone]

## 2020-06-03 NOTE — TELEPHONE ENCOUNTER
----- Message from Dahlia Smith MA sent at 6/3/2020 11:08 AM CDT -----  [facility] pharmacy called in to verify the correct dosing instructions on the following medication.  They state 2 different instructions were sent with Rx    Atenolol       [facility] - [phone]

## 2020-06-03 NOTE — TELEPHONE ENCOUNTER
Received a fax from the pharmacy asking to clarify the directions on the Atenolol. Has two sets of directions on them. Re-Set up with the correct directions.

## 2020-06-08 ENCOUNTER — TELEPHONE (OUTPATIENT)
Dept: FAMILY MEDICINE | Facility: CLINIC | Age: 60
End: 2020-06-08

## 2020-06-08 NOTE — TELEPHONE ENCOUNTER
----- Message from Marianna Valderrama sent at 6/8/2020 10:58 AM CDT -----  Pt states that the rx for Humulin 70/30 is not working for him.  The Rx for Novolin 70/30 worked better. Pt states that he was told there is something we can do to assist financially with the $100 co-pay for Novolin, which is what the pt would liketo take instead of the Humulin. Please advise. Pt#[phone]

## 2020-06-15 ENCOUNTER — TELEPHONE (OUTPATIENT)
Dept: HEMATOLOGY/ONCOLOGY | Facility: CLINIC | Age: 60
End: 2020-06-15

## 2020-06-15 NOTE — TELEPHONE ENCOUNTER
----- Message from Nirmal Ayala sent at 6/15/2020  9:51 AM CDT -----  Regarding: Needs call back from nurse  Contact: Luis Resendiz  Pt is calling he would like to speak with nurse in regards to his diabetic insulin please. He says that his numbers are really high and would like to switch back to Novolin .   Pt# [phone]

## 2020-06-16 NOTE — TELEPHONE ENCOUNTER
Spoke with pt he states his sugars are in the 180-200. Was taking the humulin 70/30. He bought the [facility] Novloin 70/30 for $25. Has been taking this for the past few days and his numbers are coming down. He wants a script for the [facility] Novloin 70/30 20units in the am 30 units at night. [facility] in [place].       Printed

## 2020-07-07 ENCOUNTER — TELEPHONE (OUTPATIENT)
Dept: FAMILY MEDICINE | Facility: CLINIC | Age: 60
End: 2020-07-07

## 2020-07-07 NOTE — TELEPHONE ENCOUNTER
----- Message from Trav Shirley sent at 7/7/2020 11:13 AM CDT -----  Regarding: er hosp  Pt had to go to the er over the weekend. Dx diverticulitis. Pt is needing er f.u appt . Pt hasnt had a bm in a week but he is on stool softener to help. And pt is in pain still a little   Pt [phone]

## 2020-07-07 NOTE — TELEPHONE ENCOUNTER
Spoke to pt. Not wanting to schedule an appt at the moment. His pain from the diverticulitis is easing away but he hasnt had a BM. He's taking Miralax and Dulcolax. He wants to wait another day or two and see if it helps and will call us back if no improvement.

## 2020-07-08 ENCOUNTER — TELEPHONE (OUTPATIENT)
Dept: FAMILY MEDICINE | Facility: CLINIC | Age: 60
End: 2020-07-08

## 2020-07-08 DIAGNOSIS — E11.69 DM TYPE 2 WITH DIABETIC DYSLIPIDEMIA: Primary | ICD-10-CM

## 2020-07-08 DIAGNOSIS — E78.5 DM TYPE 2 WITH DIABETIC DYSLIPIDEMIA: Primary | ICD-10-CM

## 2020-07-08 NOTE — TELEPHONE ENCOUNTER
Let him know I can switch it to Humulin 70/30 which is covered by [employer] at tier 3 $45 copay. However I believe the novolin is still $25 a vial at [employer] price if he wanted to go that route.

## 2020-07-08 NOTE — TELEPHONE ENCOUNTER
Spoke to pt. He is wanting to stay on the Novolin because it works better. But he wants us to do a PA / Appeal because he needs 2 vials a month and would like to pay less than $50-$55 cash price if possible.

## 2020-07-08 NOTE — TELEPHONE ENCOUNTER
----- Message from Trav Shirley sent at 7/8/2020  9:22 AM CDT -----  Regarding: new not covered  People is saying that [employer] isnt covering novolin anymore and he is needing something else   Pt [phone]

## 2020-07-15 ENCOUNTER — TELEPHONE (OUTPATIENT)
Dept: HEMATOLOGY/ONCOLOGY | Facility: CLINIC | Age: 60
End: 2020-07-15

## 2020-07-15 NOTE — TELEPHONE ENCOUNTER
----- Message from Ac Smith MD sent at 7/15/2020  1:10 PM CDT -----  Please call the patient regarding his abnormal result. Double dose for 2 days then same dose.  Repeat in 2 weeks.        Recalled patient and let him know that Sarah decided he wants him to double his coumadin dose for 2 days then resume same dose and recheck his INR in 2 weeks instead of 4. Luis voiced understanding

## 2020-07-15 NOTE — TELEPHONE ENCOUNTER
----- Message from Dona Cole sent at 7/15/2020 11:51 AM CDT -----  The patient would like the results of his PT INR done yesterday at [facility]. Please call him back at [phone].

## 2020-07-27 ENCOUNTER — TELEPHONE (OUTPATIENT)
Dept: FAMILY MEDICINE | Facility: CLINIC | Age: 60
End: 2020-07-27

## 2020-07-27 NOTE — TELEPHONE ENCOUNTER
From overdue results-lab due. LMOR that fasting lab is due prior to office visit, but he does not need CBC or CMP and to let the lab know this, as these were drawn for another provider in July already. Asked him to call with any questions, that orders are at [facility] and he can come too our office, wear a mask.

## 2020-07-27 NOTE — TELEPHONE ENCOUNTER
----- Message from Ame Wolf sent at 7/27/2020 10:44 AM CDT -----  [facility] is at [facility] right now and they are having trouble with there electronic system and are needing the orders faxed to fax [phone]

## 2020-07-28 LAB
ALBUMIN SERPL-MCNC: 4.6 G/DL (ref 3.6–5.1)
ALBUMIN/GLOB SERPL: 1.6 (CALC) (ref 1–2.5)
ALP SERPL-CCNC: 49 U/L (ref 35–144)
ALT SERPL-CCNC: 60 U/L (ref 9–46)
AST SERPL-CCNC: 62 U/L (ref 10–35)
BASOPHILS # BLD AUTO: 58 CELLS/UL (ref 0–200)
BASOPHILS NFR BLD AUTO: 1.1 %
BILIRUB SERPL-MCNC: 0.7 MG/DL (ref 0.2–1.2)
BUN SERPL-MCNC: 34 MG/DL (ref 7–25)
BUN/CREAT SERPL: 30 (CALC) (ref 6–22)
CALCIUM SERPL-MCNC: 9.4 MG/DL (ref 8.6–10.3)
CHLORIDE SERPL-SCNC: 105 MMOL/L (ref 98–110)
CHOLEST SERPL-MCNC: 163 MG/DL
CHOLEST/HDLC SERPL: 4.5 (CALC)
CO2 SERPL-SCNC: 21 MMOL/L (ref 20–32)
CREAT SERPL-MCNC: 1.14 MG/DL (ref 0.7–1.25)
EOSINOPHIL # BLD AUTO: 159 CELLS/UL (ref 15–500)
EOSINOPHIL NFR BLD AUTO: 3 %
ERYTHROCYTE [DISTWIDTH] IN BLOOD BY AUTOMATED COUNT: 13.8 % (ref 11–15)
GFRSERPLBLD MDRD-ARVRAT: 69 ML/MIN/1.73M2
GLOBULIN SER CALC-MCNC: 2.9 G/DL (CALC) (ref 1.9–3.7)
GLUCOSE SERPL-MCNC: 141 MG/DL (ref 65–99)
HBA1C MFR BLD: 7.1 % OF TOTAL HGB
HCT VFR BLD AUTO: 45.2 % (ref 38.5–50)
HDLC SERPL-MCNC: 36 MG/DL
HGB BLD-MCNC: 14.4 G/DL (ref 13.2–17.1)
LDLC SERPL CALC-MCNC: 93 MG/DL (CALC)
LYMPHOCYTES # BLD AUTO: 1988 CELLS/UL (ref 850–3900)
LYMPHOCYTES NFR BLD AUTO: 37.5 %
MCH RBC QN AUTO: 30.5 PG (ref 27–33)
MCHC RBC AUTO-ENTMCNC: 31.9 G/DL (ref 32–36)
MCV RBC AUTO: 95.8 FL (ref 80–100)
MONOCYTES # BLD AUTO: 424 CELLS/UL (ref 200–950)
MONOCYTES NFR BLD AUTO: 8 %
NEUTROPHILS # BLD AUTO: 2671 CELLS/UL (ref 1500–7800)
NEUTROPHILS NFR BLD AUTO: 50.4 %
NONHDLC SERPL-MCNC: 127 MG/DL (CALC)
PLATELET # BLD AUTO: 235 THOUSAND/UL (ref 140–400)
PMV BLD REES-ECKER: 10.7 FL (ref 7.5–12.5)
POTASSIUM SERPL-SCNC: 4.9 MMOL/L (ref 3.5–5.3)
PROT SERPL-MCNC: 7.5 G/DL (ref 6.1–8.1)
PSA SERPL-MCNC: 0.4 NG/ML
RBC # BLD AUTO: 4.72 MILLION/UL (ref 4.2–5.8)
SODIUM SERPL-SCNC: 140 MMOL/L (ref 135–146)
TRIGL SERPL-MCNC: 243 MG/DL
WBC # BLD AUTO: 5.3 THOUSAND/UL (ref 3.8–10.8)

## 2020-07-29 ENCOUNTER — TELEPHONE (OUTPATIENT)
Dept: HEMATOLOGY/ONCOLOGY | Facility: CLINIC | Age: 60
End: 2020-07-29

## 2020-07-29 NOTE — TELEPHONE ENCOUNTER
----- Message from Ac Smith MD sent at 7/29/2020 12:05 PM CDT -----  Please call the patient regarding his abnormal result. No change.  Recheck in one month        Spoke to Manuel. Continue same dose 7.5mg  X 4 days and then 5mg  On the other 3 days for an INR of 1.9. recheck INR in 4 weeks

## 2020-08-12 ENCOUNTER — OFFICE VISIT (OUTPATIENT)
Dept: FAMILY MEDICINE | Facility: CLINIC | Age: 60
End: 2020-08-12
Payer: MEDICARE

## 2020-08-12 VITALS
SYSTOLIC BLOOD PRESSURE: 180 MMHG | HEIGHT: 70 IN | BODY MASS INDEX: 33.93 KG/M2 | HEART RATE: 72 BPM | WEIGHT: 237 LBS | DIASTOLIC BLOOD PRESSURE: 100 MMHG

## 2020-08-12 DIAGNOSIS — E78.5 DYSLIPIDEMIA: ICD-10-CM

## 2020-08-12 DIAGNOSIS — I25.10 CORONARY ARTERY DISEASE INVOLVING NATIVE CORONARY ARTERY OF NATIVE HEART WITHOUT ANGINA PECTORIS: Chronic | ICD-10-CM

## 2020-08-12 DIAGNOSIS — E11.69 DM TYPE 2 WITH DIABETIC DYSLIPIDEMIA: ICD-10-CM

## 2020-08-12 DIAGNOSIS — G56.90 NEUROPATHY OF HAND, UNSPECIFIED LATERALITY: ICD-10-CM

## 2020-08-12 DIAGNOSIS — E78.2 MIXED HYPERLIPIDEMIA: ICD-10-CM

## 2020-08-12 DIAGNOSIS — I10 ESSENTIAL HYPERTENSION: Primary | ICD-10-CM

## 2020-08-12 DIAGNOSIS — E78.5 DM TYPE 2 WITH DIABETIC DYSLIPIDEMIA: ICD-10-CM

## 2020-08-12 DIAGNOSIS — D68.51 HETEROZYGOUS FACTOR V LEIDEN MUTATION: ICD-10-CM

## 2020-08-12 PROCEDURE — 99214 PR OFFICE/OUTPT VISIT, EST, LEVL IV, 30-39 MIN: ICD-10-PCS | Mod: S$GLB,,, | Performed by: NURSE PRACTITIONER

## 2020-08-12 PROCEDURE — 3080F DIAST BP >= 90 MM HG: CPT | Mod: S$GLB,,, | Performed by: NURSE PRACTITIONER

## 2020-08-12 PROCEDURE — 3077F PR MOST RECENT SYSTOLIC BLOOD PRESSURE >= 140 MM HG: ICD-10-PCS | Mod: S$GLB,,, | Performed by: NURSE PRACTITIONER

## 2020-08-12 PROCEDURE — 3051F HG A1C>EQUAL 7.0%<8.0%: CPT | Mod: S$GLB,,, | Performed by: NURSE PRACTITIONER

## 2020-08-12 PROCEDURE — 3080F PR MOST RECENT DIASTOLIC BLOOD PRESSURE >= 90 MM HG: ICD-10-PCS | Mod: S$GLB,,, | Performed by: NURSE PRACTITIONER

## 2020-08-12 PROCEDURE — 99214 OFFICE O/P EST MOD 30 MIN: CPT | Mod: S$GLB,,, | Performed by: NURSE PRACTITIONER

## 2020-08-12 PROCEDURE — 3008F BODY MASS INDEX DOCD: CPT | Mod: S$GLB,,, | Performed by: NURSE PRACTITIONER

## 2020-08-12 PROCEDURE — 3008F PR BODY MASS INDEX (BMI) DOCUMENTED: ICD-10-PCS | Mod: S$GLB,,, | Performed by: NURSE PRACTITIONER

## 2020-08-12 PROCEDURE — 3077F SYST BP >= 140 MM HG: CPT | Mod: S$GLB,,, | Performed by: NURSE PRACTITIONER

## 2020-08-12 PROCEDURE — 3051F PR MOST RECENT HEMOGLOBIN A1C LEVEL 7.0 - < 8.0%: ICD-10-PCS | Mod: S$GLB,,, | Performed by: NURSE PRACTITIONER

## 2020-08-12 RX ORDER — LISINOPRIL 10 MG/1
TABLET ORAL
Qty: 90 TABLET | Refills: 1 | Status: SHIPPED | OUTPATIENT
Start: 2020-08-12 | End: 2020-09-16 | Stop reason: SDUPTHER

## 2020-08-12 RX ORDER — FENOFIBRATE 145 MG/1
145 TABLET, FILM COATED ORAL DAILY
Qty: 90 TABLET | Refills: 1 | Status: SHIPPED | OUTPATIENT
Start: 2020-08-12 | End: 2021-01-19 | Stop reason: SDUPTHER

## 2020-08-12 NOTE — PROGRESS NOTES
SUBJECTIVE:    Patient ID: Luis Resendiz is a 59 y.o. male.    Chief Complaint: Diabetes (no bottles, Eye exam pt will sched, wants to talk about [place] BP check// SW)    Pt here for regular f/u. Since last visit pt has had 2 ER visit- last month for acute diverticulits and most recently for elevated BP. Brings in BP log with BP ranging 150-190/. Had initially checked BP at his brother's house and noted it to be high- wasn't having any symptoms. Went to ER and BP was elevated but improved without treatment.  Pt reports had gross hematuria a few months ago for a couple days but was notified by Dr. Sofia's office his INR was high, held dose a couple days and it resolved. UA in ER in July was negative  Reports had stress test with Dr. Crow 6mos and told it was okay      Admission on 07/05/2020, Discharged on 07/05/2020   Component Date Value Ref Range Status    WBC 07/05/2020 7.59  3.90 - 12.70 K/uL Final    RBC 07/05/2020 4.43* 4.60 - 6.20 M/uL Final    Hemoglobin 07/05/2020 13.7* 14.0 - 18.0 g/dL Final    Hematocrit 07/05/2020 41.5  40.0 - 54.0 % Final    Mean Corpuscular Volume 07/05/2020 94  82 - 98 fL Final    Mean Corpuscular Hemoglobin 07/05/2020 30.9  27.0 - 31.0 pg Final    Mean Corpuscular Hemoglobin Conc 07/05/2020 33.0  32.0 - 36.0 g/dL Final    RDW 07/05/2020 13.2  11.5 - 14.5 % Final    Platelets 07/05/2020 199  150 - 350 K/uL Final    MPV 07/05/2020 10.0  9.2 - 12.9 fL Final    Immature Granulocytes 07/05/2020 0.3  0.0 - 0.5 % Final    Gran # (ANC) 07/05/2020 4.4  1.8 - 7.7 K/uL Final    Immature Grans (Abs) 07/05/2020 0.02  0.00 - 0.04 K/uL Final    Lymph # 07/05/2020 2.2  1.0 - 4.8 K/uL Final    Mono # 07/05/2020 0.7  0.3 - 1.0 K/uL Final    Eos # 07/05/2020 0.2  0.0 - 0.5 K/uL Final    Baso # 07/05/2020 0.07  0.00 - 0.20 K/uL Final    nRBC 07/05/2020 0  0 /100 WBC Final    Gran% 07/05/2020 58.3  38.0 - 73.0 % Final    Lymph% 07/05/2020 28.5  18.0 - 48.0 % Final     Mono% 07/05/2020 9.5  4.0 - 15.0 % Final    Eosinophil% 07/05/2020 2.5  0.0 - 8.0 % Final    Basophil% 07/05/2020 0.9  0.0 - 1.9 % Final    Differential Method 07/05/2020 Automated   Final    Sodium 07/05/2020 138  136 - 145 mmol/L Final    Potassium 07/05/2020 4.4  3.5 - 5.1 mmol/L Final    Chloride 07/05/2020 106  95 - 110 mmol/L Final    CO2 07/05/2020 23  22 - 31 mmol/L Final    Glucose 07/05/2020 126* 70 - 110 mg/dL Final    BUN, Bld 07/05/2020 27* 9 - 21 mg/dL Final    Creatinine 07/05/2020 1.12  0.50 - 1.40 mg/dL Final    Calcium 07/05/2020 9.5  8.4 - 10.2 mg/dL Final    Total Protein 07/05/2020 8.3  6.0 - 8.4 g/dL Final    Albumin 07/05/2020 4.8  3.5 - 5.2 g/dL Final    Total Bilirubin 07/05/2020 0.5  0.2 - 1.3 mg/dL Final    Alkaline Phosphatase 07/05/2020 73  38 - 145 U/L Final    AST 07/05/2020 51  17 - 59 U/L Final    ALT 07/05/2020 59* 0 - 50 U/L Final    Anion Gap 07/05/2020 9  8 - 16 mmol/L Final    eGFR if African American 07/05/2020 >60  >60 mL/min/1.73 m^2 Final    eGFR if non African American 07/05/2020 >60  >60 mL/min/1.73 m^2 Final    Lipase Result 07/05/2020 81  23 - 300 U/L Final    Specimen UA 07/05/2020 Urine, Clean Catch   Final    Color, UA 07/05/2020 Yellow  Yellow, Straw, [place] Final    Appearance, UA 07/05/2020 Clear  Clear Final    pH, UA 07/05/2020 5.0  5.0 - 8.0 Final    Specific Gravity, UA 07/05/2020 >=1.030* 1.005 - 1.030 Final    Protein, UA 07/05/2020 Negative  Negative Final    Glucose, UA 07/05/2020 Negative  Negative Final    Ketones, UA 07/05/2020 Negative  Negative Final    Bilirubin (UA) 07/05/2020 Negative  Negative Final    Occult Blood UA 07/05/2020 Negative  Negative Final    Nitrite, UA 07/05/2020 Negative  Negative Final    Urobilinogen, UA 07/05/2020 0.2  <2.0 EU/dL Final    Leukocytes, UA 07/05/2020 Negative  Negative Final    Occult Blood 07/05/2020 Negative  Negative Final   Office Visit on 05/05/2020   Component Date Value Ref  Range Status    Cholesterol 07/27/2020 163  <200 mg/dL Final    HDL 07/27/2020 36* > OR = 40 mg/dL Final    Triglycerides 07/27/2020 243* <150 mg/dL Final    LDL Cholesterol 07/27/2020 93  mg/dL (calc) Final    Hdl/Cholesterol Ratio 07/27/2020 4.5  <5.0 (calc) Final    Non HDL Chol. (LDL+VLDL) 07/27/2020 127  <130 mg/dL (calc) Final    Hemoglobin A1C 07/27/2020 7.1* <5.7 % of total Hgb Final    Glucose 07/27/2020 141* 65 - 99 mg/dL Final    BUN, Bld 07/27/2020 34* 7 - 25 mg/dL Final    Creatinine 07/27/2020 1.14  0.70 - 1.25 mg/dL Final    eGFR if non African American 07/27/2020 69  > OR = 60 mL/min/1.73m2 Final    eGFR if African American 07/27/2020 80  > OR = 60 mL/min/1.73m2 Final    BUN/Creatinine Ratio 07/27/2020 30* 6 - 22 (calc) Final    Sodium 07/27/2020 140  135 - 146 mmol/L Final    Potassium 07/27/2020 4.9  3.5 - 5.3 mmol/L Final    Chloride 07/27/2020 105  98 - 110 mmol/L Final    CO2 07/27/2020 21  20 - 32 mmol/L Final    Calcium 07/27/2020 9.4  8.6 - 10.3 mg/dL Final    Total Protein 07/27/2020 7.5  6.1 - 8.1 g/dL Final    Albumin 07/27/2020 4.6  3.6 - 5.1 g/dL Final    Globulin, Total 07/27/2020 2.9  1.9 - 3.7 g/dL (calc) Final    Albumin/Globulin Ratio 07/27/2020 1.6  1.0 - 2.5 (calc) Final    Total Bilirubin 07/27/2020 0.7  0.2 - 1.2 mg/dL Final    Alkaline Phosphatase 07/27/2020 49  35 - 144 U/L Final    AST 07/27/2020 62* 10 - 35 U/L Final    ALT 07/27/2020 60* 9 - 46 U/L Final    PROSTATE SPECIFIC ANTIGEN, SCR - Q* 07/27/2020 0.4  < OR = 4.0 ng/mL Final    WBC 07/27/2020 5.3  3.8 - 10.8 Thousand/uL Final    RBC 07/27/2020 4.72  4.20 - 5.80 Million/uL Final    Hemoglobin 07/27/2020 14.4  13.2 - 17.1 g/dL Final    Hematocrit 07/27/2020 45.2  38.5 - 50.0 % Final    Mean Corpuscular Volume 07/27/2020 95.8  80.0 - 100.0 fL Final    Mean Corpuscular Hemoglobin 07/27/2020 30.5  27.0 - 33.0 pg Final    Mean Corpuscular Hemoglobin Conc 07/27/2020 31.9* 32.0 - 36.0 g/dL  Final    RDW 07/27/2020 13.8  11.0 - 15.0 % Final    Platelets 07/27/2020 235  140 - 400 Thousand/uL Final    MPV 07/27/2020 10.7  7.5 - 12.5 fL Final    Neutrophils Absolute 07/27/2020 2,671  1,500 - 7,800 cells/uL Final    Lymph # 07/27/2020 1,988  850 - 3,900 cells/uL Final    Mono # 07/27/2020 424  200 - 950 cells/uL Final    Eos # 07/27/2020 159  15 - 500 cells/uL Final    Baso # 07/27/2020 58  0 - 200 cells/uL Final    Neutrophils Relative 07/27/2020 50.4  % Final    Lymph% 07/27/2020 37.5  % Final    Mono% 07/27/2020 8.0  % Final    Eosinophil% 07/27/2020 3.0  % Final    Basophil% 07/27/2020 1.1  % Final   Ancillary Orders on 04/24/2020   Component Date Value Ref Range Status    INR 05/04/2020 2.0*  Final    Prothrombin Time 05/04/2020 20.5* 9.0 - 11.5 sec Final   Ancillary Orders on 04/17/2020   Component Date Value Ref Range Status    INR 06/09/2020 2.5*  Final    Prothrombin Time 06/09/2020 25.3* 9.0 - 11.5 sec Final   Ancillary Orders on 04/10/2020   Component Date Value Ref Range Status    INR 07/13/2020 1.5*  Final    Prothrombin Time 07/13/2020 15.3* 9.0 - 11.5 sec Final   Ancillary Orders on 04/03/2020   Component Date Value Ref Range Status    INR 07/27/2020 1.9*  Final    Prothrombin Time 07/27/2020 19.7* 9.0 - 11.5 sec Final   Ancillary Orders on 03/20/2020   Component Date Value Ref Range Status    INR 03/26/2020 3.0*  Final    Prothrombin Time 03/26/2020 30.8* 9.0 - 11.5 sec Final   Admission on 02/15/2020, Discharged on 02/15/2020   Component Date Value Ref Range Status    WBC 02/15/2020 6.60  3.90 - 12.70 K/uL Final    RBC 02/15/2020 4.20* 4.60 - 6.20 M/uL Final    Hemoglobin 02/15/2020 13.2* 14.0 - 18.0 g/dL Final    Hematocrit 02/15/2020 40.5  40.0 - 54.0 % Final    Mean Corpuscular Volume 02/15/2020 96  82 - 98 fL Final    Mean Corpuscular Hemoglobin 02/15/2020 31.4* 27.0 - 31.0 pg Final    Mean Corpuscular Hemoglobin Conc 02/15/2020 32.6  32.0 - 36.0 g/dL Final     RDW 02/15/2020 13.6  11.5 - 14.5 % Final    Platelets 02/15/2020 203  150 - 350 K/uL Final    MPV 02/15/2020 9.9  9.2 - 12.9 fL Final    Immature Granulocytes 02/15/2020 0.2  0.0 - 0.5 % Final    Gran # (ANC) 02/15/2020 3.6  1.8 - 7.7 K/uL Final    Immature Grans (Abs) 02/15/2020 0.01  0.00 - 0.04 K/uL Final    Lymph # 02/15/2020 2.1  1.0 - 4.8 K/uL Final    Mono # 02/15/2020 0.7  0.3 - 1.0 K/uL Final    Eos # 02/15/2020 0.2  0.0 - 0.5 K/uL Final    Baso # 02/15/2020 0.06  0.00 - 0.20 K/uL Final    nRBC 02/15/2020 0  0 /100 WBC Final    Gran% 02/15/2020 54.8  38.0 - 73.0 % Final    Lymph% 02/15/2020 31.5  18.0 - 48.0 % Final    Mono% 02/15/2020 10.2  4.0 - 15.0 % Final    Eosinophil% 02/15/2020 2.4  0.0 - 8.0 % Final    Basophil% 02/15/2020 0.9  0.0 - 1.9 % Final    Differential Method 02/15/2020 Automated   Final    Sodium 02/15/2020 145  136 - 145 mmol/L Final    Potassium 02/15/2020 5.0  3.5 - 5.1 mmol/L Final    Chloride 02/15/2020 109  95 - 110 mmol/L Final    CO2 02/15/2020 27  22 - 31 mmol/L Final    Glucose 02/15/2020 158* 70 - 110 mg/dL Final    BUN, Bld 02/15/2020 21  9 - 21 mg/dL Final    Creatinine 02/15/2020 1.09  0.50 - 1.40 mg/dL Final    Calcium 02/15/2020 9.2  8.4 - 10.2 mg/dL Final    Total Protein 02/15/2020 7.9  6.0 - 8.4 g/dL Final    Albumin 02/15/2020 4.6  3.5 - 5.2 g/dL Final    Total Bilirubin 02/15/2020 0.5  0.2 - 1.3 mg/dL Final    Alkaline Phosphatase 02/15/2020 69  38 - 145 U/L Final    AST 02/15/2020 46  17 - 59 U/L Final    ALT 02/15/2020 54* 0 - 50 U/L Final    Anion Gap 02/15/2020 9  8 - 16 mmol/L Final    eGFR if African American 02/15/2020 >60  >60 mL/min/1.73 m^2 Final    eGFR if non African American 02/15/2020 >60  >60 mL/min/1.73 m^2 Final    PT 02/15/2020 22.3* 11.8 - 14.7 sec Final    INR 02/15/2020 2.1   Final       Past Medical History:   Diagnosis Date    Anticoagulant long-term use     Arthritis     Cervical spine pain      Clotting disorder     Coronary artery disease     Diabetes mellitus     Encounter for blood transfusion     Hypertension      Past Surgical History:   Procedure Laterality Date    BACK SURGERY      cabg      CARDIAC SURGERY      FOOT SURGERY      FRACTURE SURGERY      ROTATOR CUFF REPAIR Left      Family History   Problem Relation Age of Onset    Heart disease Mother     Cancer Mother     Heart disease Father     Alcohol abuse Father     No Known Problems Sister     No Known Problems Brother     No Known Problems Daughter     No Known Problems Son        Marital Status:   Alcohol History:  reports current alcohol use.  Tobacco History:  reports that he quit smoking about 26 years ago. He has a 0.25 pack-year smoking history. He has never used smokeless tobacco.  Drug History:  reports no history of drug use.    Health Maintenance Topics with due status: Not Due       Topic Last Completion Date    Influenza Vaccine 12/26/2019    High Dose Statin 05/05/2020    Colorectal Cancer Screening 07/05/2020    Lipid Panel 07/27/2020    Hemoglobin A1c 07/27/2020     Immunization History   Administered Date(s) Administered    Influenza - Trivalent (ADULT) 10/01/2014    Influenza - Trivalent - Recombinant - PF 09/25/2018       Review of patient's allergies indicates:   Allergen Reactions    Ancef [cefazolin] Anxiety       Current Outpatient Medications:     ALPRAZolam (XANAX) 1 MG tablet, Take 1 tablet (1 mg total) by mouth 2 (two) times daily., Disp: 60 tablet, Rfl: 2    aspirin 325 MG tablet, Take 325 mg by mouth once daily., Disp: , Rfl:     atenoloL (TENORMIN) 50 MG tablet, Take 1.5 tablets (75 mg total) by mouth 2 (two) times daily. (Patient taking differently: Take 100 mg by mouth 2 (two) times daily. 2 tablets in AM and 1 tablet in pm), Disp: 270 tablet, Rfl: 1    atorvastatin (LIPITOR) 80 MG tablet, Take 1 tablet (80 mg total) by mouth once daily., Disp: 90 tablet, Rfl: 1    BD ULTRA-FINE  "MINI PEN NEEDLE 31 gauge x 3/16" Ndle, 1 each by In Vitro route once daily., Disp: 100 each, Rfl: 1    DULoxetine (CYMBALTA) 30 MG capsule, Take 30 mg by mouth 2 (two) times daily., Disp: , Rfl:     fenofibrate (TRICOR) 145 MG tablet, Take 1 tablet (145 mg total) by mouth once daily., Disp: 90 tablet, Rfl: 1    glimepiride (AMARYL) 4 MG tablet, Take 1 tablet (4 mg total) by mouth 2 (two) times daily., Disp: 180 tablet, Rfl: 1    HYDROcodone-acetaminophen (NORCO) 7.5-325 mg per tablet, Take 1 tablet by mouth every 4 (four) hours as needed for Pain., Disp: 12 tablet, Rfl: 0    insulin NPH-insulin regular, 70/30, (NOVOLIN 70/30 U-100 INSULIN) 100 unit/mL (70-30) injection, 20 units in the morning, 30 units at night., Disp: 2 vial, Rfl: 5    lisinopriL 10 MG tablet, Take one tablet daily for high blood pressure. If BP remains >140/90 then increase to 2 tablets daily, Disp: 90 tablet, Rfl: 1    metFORMIN (GLUCOPHAGE) 500 MG tablet, Take 2 tablets (1,000 mg total) by mouth 2 (two) times daily with meals., Disp: 360 tablet, Rfl: 1    omega-3 acid ethyl esters (LOVAZA) 1 gram capsule, Take 2 capsules (2 g total) by mouth 2 (two) times daily., Disp: 120 capsule, Rfl: 11    ondansetron (ZOFRAN-ODT) 8 MG TbDL, Take 1 tablet (8 mg total) by mouth every 6 (six) hours as needed., Disp: 12 tablet, Rfl: 0    thiamine 100 MG tablet, Take 1 tablet (100 mg total) by mouth once daily. (Patient not taking: Reported on 12/26/2019), Disp: 3 tablet, Rfl: 0    TRUEPLUS INSULIN 0.5 mL 29 gauge x 1/2" Syrg, , Disp: , Rfl: 5    ULTRA COMFORT INSULIN SYRINGE 1 mL 29 gauge x 1/2" Syrg, Inject 25 Units as directed 2 (two) times daily., Disp: 180 each, Rfl: 3    warfarin (COUMADIN) 5 MG tablet, Take 1 tablet (5 mg total) by mouth Daily., Disp: 30 tablet, Rfl: 2    warfarin (COUMADIN) 5 MG tablet, TAKE 1 & 1/2 TABLETS BY MOUTH ON MONDAY, WEDNESDAY AND FRIDAY AND TAKE 1 TABLET ON SATURDAY, SUNDAY, TUESDAY AND THURSDAY, Disp: 45 tablet, " "Rfl: 2    warfarin (COUMADIN) 5 MG tablet, Take 1 tablet (5 mg total) by mouth every Mon, Wed, Fri. 1 1/2 tablets the other days of week for 7.5mg, Disp: 45 tablet, Rfl: 2    warfarin (COUMADIN) 5 MG tablet, TAKE 1 & 1/2 (ONE & ONE-HALF) TABLETS BY MOUTH ON MODAY, WEDNESDAY, AND FRIDAY. TAKE 1 TABLET ON SATURDAY, SUNDAY, TUESDAY AND THURSDAY, Disp: 45 tablet, Rfl: 1    warfarin (COUMADIN) 5 MG tablet, TAKE 1 & 1/2 (ONE & ONE-HALF) TABLETS BY MOUTH ON MODAY, WEDNESDAY, AND FRIDAY. TAKE 1 TABLET ON SATURDAY, SUNDAY, TUESDAY AND THURSDAY, Disp: 45 tablet, Rfl: 1    warfarin (COUMADIN) 5 MG tablet, Take 1.5 tablets (7.5 mg total) by mouth every Mon, Wed, Fri. And 5mg on the other 4 days, Disp: 45 tablet, Rfl: 3    Review of Systems   Constitutional: Negative for chills and fever.   HENT: Negative for sore throat.    Respiratory: Negative for cough, shortness of breath and wheezing.    Cardiovascular: Negative for chest pain, palpitations and leg swelling.   Gastrointestinal: Negative for abdominal pain, constipation and diarrhea.   Genitourinary: Negative for dysuria and hematuria.   Musculoskeletal: Positive for arthralgias, back pain and neck pain. Negative for gait problem.   Skin: Negative for rash.   Neurological: Positive for numbness (numbness to fingertips, worse on left hand). Negative for dizziness and headaches.   Psychiatric/Behavioral: Negative for dysphoric mood.          Objective:      Vitals:    08/12/20 1047 08/12/20 1049   BP: (!) 160/94 (!) 180/100   Pulse: 72    Weight: 107.5 kg (237 lb)    Height: 5' 10" (1.778 m)      Physical Exam  Vitals signs and nursing note reviewed.   Constitutional:       General: He is not in acute distress.     Appearance: He is well-developed. He is obese.   HENT:      Head: Normocephalic and atraumatic.      Right Ear: Tympanic membrane and ear canal normal.      Left Ear: Tympanic membrane and ear canal normal.      Mouth/Throat:      Pharynx: No posterior " oropharyngeal erythema.   Neck:      Musculoskeletal: Neck supple.      Vascular: No carotid bruit.   Cardiovascular:      Rate and Rhythm: Normal rate and regular rhythm.      Heart sounds: No murmur. No friction rub. No gallop.    Pulmonary:      Effort: Pulmonary effort is normal. No respiratory distress.      Breath sounds: Normal breath sounds. No wheezing or rales.   Abdominal:      General: There is no distension.      Palpations: Abdomen is soft.      Tenderness: There is no abdominal tenderness.   Musculoskeletal:      Cervical back: He exhibits tenderness (paraspinous muscles bilat). He exhibits no bony tenderness.      Lumbar back: He exhibits no bony tenderness.      Right lower leg: No edema.      Left lower leg: No edema.      Comments: Chronic purplish discoloration of bilat ankles/feet   Lymphadenopathy:      Cervical: No cervical adenopathy.   Skin:     General: Skin is warm and dry.      Findings: No rash.   Neurological:      General: No focal deficit present.      Mental Status: He is alert and oriented to person, place, and time.      Gait: Gait normal.           Assessment:       1. Essential hypertension    2. Coronary artery disease involving native coronary artery of native heart without angina pectoris    3. Neuropathy of hand, unspecified laterality    4. DM type 2 with diabetic dyslipidemia    5. Mixed hyperlipidemia    6. Heterozygous factor V Leiden mutation           Plan:       Essential hypertension  Comments:  pt advised we'll increase lisinopril to 10mg daily though if BP remains elevated increase to 20mg- send in BP log in 2 weeks or call with problems  Orders:  -     lisinopriL 10 MG tablet; Take one tablet daily for high blood pressure. If BP remains >140/90 then increase to 2 tablets daily  Dispense: 90 tablet; Refill: 1    Coronary artery disease involving native coronary artery of native heart without angina pectoris  Comments:  stable, denies angina- reports had stress test  6mos with Dr. Crow    Neuropathy of hand, unspecified laterality  Comments:  pt reports chronic numbness to fingertips, seems to be getting a little worse.Ddx includes neuropathy vs cervical vs CTS-discussed EMG which he declines for now    DM type 2 with diabetic dyslipidemia  Comments:  stable, A1C 7.1%  Orders:  -     Hemoglobin A1C; Future; Expected date: 08/12/2020    Mixed hyperlipidemia  Comments:  TG slightly elevated though LDL controlled    Heterozygous factor V Leiden mutation  Comments:  warfarin managed by Dr. Sofia      Follow up in about 3 months (around 11/12/2020) for HTN, Diabetes; call in 2 weeks with BP readings, Labs before next visit.        8/12/2020 Marianna Hdez NP

## 2020-08-12 NOTE — PATIENT INSTRUCTIONS

## 2020-08-12 NOTE — TELEPHONE ENCOUNTER
----- Message from Danni Lorenzana sent at 8/12/2020 12:15 PM CDT -----  Pt was just here and also needs fenofibrate 145 mg he is totally out.

## 2020-08-27 RX ORDER — ALPRAZOLAM 1 MG/1
1 TABLET ORAL 2 TIMES DAILY
Qty: 60 TABLET | Refills: 2 | Status: SHIPPED | OUTPATIENT
Start: 2020-08-27 | End: 2020-11-16 | Stop reason: SDUPTHER

## 2020-08-27 NOTE — TELEPHONE ENCOUNTER
----- Message from Marianna Valderrama sent at 8/27/2020 11:39 AM CDT -----  [place], pt says the BP pills are working good. Pt takes two 10 mg per day. Refill for Xanax. [facility] [address] in [place]. Pt #[phone]

## 2020-09-02 ENCOUNTER — TELEPHONE (OUTPATIENT)
Dept: HEMATOLOGY/ONCOLOGY | Facility: CLINIC | Age: 60
End: 2020-09-02

## 2020-09-02 NOTE — TELEPHONE ENCOUNTER
----- Message from Angela Ag, Patient Care Assistant sent at 9/2/2020 11:57 AM CDT -----  Patient called in stating he need to speak to someone regarding switching his meds. Due to him getting a tooth pulled. He can be reached at [phone]    He said he believes he may need a tooth pulled. He was questioning holding the coumadin. He said he can't afford the lovenox. I suggested he go see the dentist and discuss with him what is needed. He is going to let him know about the coumadin he is on and finf out what the dentist advises and will then call me back.

## 2020-09-10 ENCOUNTER — TELEPHONE (OUTPATIENT)
Dept: HEMATOLOGY/ONCOLOGY | Facility: CLINIC | Age: 60
End: 2020-09-10

## 2020-09-10 NOTE — TELEPHONE ENCOUNTER
----- Message from Ac Smith MD sent at 9/10/2020  8:47 AM CDT -----  Please call the patient regarding his abnormal result. Hold for 2 days then resume same dose.  Recheck in 2 weeks.      Spoke to Luis. He is on antibiotics for infected teeth. I told him to hold coumadin x 2 days then to resume at the 5mg daily. He is to recheck his labs on the 23rd or 24th.

## 2020-09-10 NOTE — TELEPHONE ENCOUNTER
----- Message from Ac Smith MD sent at 9/10/2020  8:47 AM CDT -----  Please call the patient regarding his abnormal result. Hold for 2 days then resume same dose.  Recheck in 2 weeks.

## 2020-09-16 ENCOUNTER — OFFICE VISIT (OUTPATIENT)
Dept: HEMATOLOGY/ONCOLOGY | Facility: CLINIC | Age: 60
End: 2020-09-16
Payer: MEDICARE

## 2020-09-16 ENCOUNTER — TELEPHONE (OUTPATIENT)
Dept: HEMATOLOGY/ONCOLOGY | Facility: CLINIC | Age: 60
End: 2020-09-16

## 2020-09-16 VITALS
HEART RATE: 66 BPM | BODY MASS INDEX: 33.6 KG/M2 | WEIGHT: 234.19 LBS | RESPIRATION RATE: 18 BRPM | TEMPERATURE: 96 F | SYSTOLIC BLOOD PRESSURE: 183 MMHG | DIASTOLIC BLOOD PRESSURE: 87 MMHG

## 2020-09-16 DIAGNOSIS — I10 ESSENTIAL HYPERTENSION: ICD-10-CM

## 2020-09-16 DIAGNOSIS — D68.51 HETEROZYGOUS FACTOR V LEIDEN MUTATION: Chronic | ICD-10-CM

## 2020-09-16 PROCEDURE — 3079F PR MOST RECENT DIASTOLIC BLOOD PRESSURE 80-89 MM HG: ICD-10-PCS | Mod: S$GLB,,, | Performed by: INTERNAL MEDICINE

## 2020-09-16 PROCEDURE — 3008F BODY MASS INDEX DOCD: CPT | Mod: S$GLB,,, | Performed by: INTERNAL MEDICINE

## 2020-09-16 PROCEDURE — 99213 OFFICE O/P EST LOW 20 MIN: CPT | Mod: S$GLB,,, | Performed by: INTERNAL MEDICINE

## 2020-09-16 PROCEDURE — 3079F DIAST BP 80-89 MM HG: CPT | Mod: S$GLB,,, | Performed by: INTERNAL MEDICINE

## 2020-09-16 PROCEDURE — 3077F PR MOST RECENT SYSTOLIC BLOOD PRESSURE >= 140 MM HG: ICD-10-PCS | Mod: S$GLB,,, | Performed by: INTERNAL MEDICINE

## 2020-09-16 PROCEDURE — 99213 PR OFFICE/OUTPT VISIT, EST, LEVL III, 20-29 MIN: ICD-10-PCS | Mod: S$GLB,,, | Performed by: INTERNAL MEDICINE

## 2020-09-16 PROCEDURE — 3077F SYST BP >= 140 MM HG: CPT | Mod: S$GLB,,, | Performed by: INTERNAL MEDICINE

## 2020-09-16 PROCEDURE — 3008F PR BODY MASS INDEX (BMI) DOCUMENTED: ICD-10-PCS | Mod: S$GLB,,, | Performed by: INTERNAL MEDICINE

## 2020-09-16 RX ORDER — TIZANIDINE 4 MG/1
4 TABLET ORAL 2 TIMES DAILY PRN
COMMUNITY
End: 2020-09-16 | Stop reason: SDUPTHER

## 2020-09-16 RX ORDER — TIZANIDINE 4 MG/1
4 TABLET ORAL 2 TIMES DAILY PRN
Qty: 180 TABLET | Refills: 0 | Status: SHIPPED | OUTPATIENT
Start: 2020-09-16 | End: 2021-01-19 | Stop reason: SDUPTHER

## 2020-09-16 RX ORDER — LISINOPRIL 20 MG/1
20 TABLET ORAL 2 TIMES DAILY
Qty: 180 TABLET | Refills: 1 | Status: SHIPPED | OUTPATIENT
Start: 2020-09-16 | End: 2021-02-22 | Stop reason: SDUPTHER

## 2020-09-16 NOTE — PROGRESS NOTES
Subjective:       Patient ID: Luis Resendiz is a 60 y.o. male.    Chief Complaint: hypercoag syndrome.     HPI:  Patient presents for follow up of hypercoag syndrome.      Patient states he was treated for diverticulitis and has been having med increase for htn by his PCP.      All medications and past medical and surgical history have been reviewed.         Review of patient's allergies indicates:   Allergen Reactions    Ancef [cefazolin] Anxiety       ROS  GEN:   normal without any fever, night sweats or weight loss  HEENT:  normal with no HA's,  changes in vision  CV:   normal with no CP, SOB  PULM:  normal with no SOB, cough  GI:   normal with no abdominal pain, nausea, vomiting  :   normal with no hematuria, dysuria  SKIN:   normal with no rash      Previous FAMHX and SOCHX information reviewed and remains unchanged         Objective:        Physical Exam  There were no vitals taken for this visit.  Deferred.           All lab results and imaging results have been reviewed and discussed with the patient  No results found for this or any previous visit (from the past 336 hour(s)).  CMP  Sodium   Date Value Ref Range Status   07/27/2020 140 135 - 146 mmol/L Final     Potassium   Date Value Ref Range Status   07/27/2020 4.9 3.5 - 5.3 mmol/L Final     Chloride   Date Value Ref Range Status   07/27/2020 105 98 - 110 mmol/L Final     CO2   Date Value Ref Range Status   07/27/2020 21 20 - 32 mmol/L Final     Glucose   Date Value Ref Range Status   07/27/2020 141 (H) 65 - 99 mg/dL Final     Comment:                   Fasting reference interval     For someone without known diabetes, a glucose  value >125 mg/dL indicates that they may have  diabetes and this should be confirmed with a  follow-up test.          BUN, Bld   Date Value Ref Range Status   07/27/2020 34 (H) 7 - 25 mg/dL Final     Creatinine   Date Value Ref Range Status   07/27/2020 1.14 0.70 - 1.25 mg/dL Final     Comment:     For patients >49 years  of age, the reference limit  for Creatinine is approximately 13% higher for people  identified as -American.        06/21/2013 1.1 0.5 - 1.4 mg/dL Final     Calcium   Date Value Ref Range Status   07/27/2020 9.4 8.6 - 10.3 mg/dL Final   06/21/2013 9.3 8.7 - 10.5 mg/dL Final     Total Protein   Date Value Ref Range Status   07/27/2020 7.5 6.1 - 8.1 g/dL Final     Albumin   Date Value Ref Range Status   07/27/2020 4.6 3.6 - 5.1 g/dL Final     Total Bilirubin   Date Value Ref Range Status   07/27/2020 0.7 0.2 - 1.2 mg/dL Final     Alkaline Phosphatase   Date Value Ref Range Status   07/27/2020 49 35 - 144 U/L Final     AST   Date Value Ref Range Status   07/27/2020 62 (H) 10 - 35 U/L Final     ALT   Date Value Ref Range Status   07/27/2020 60 (H) 9 - 46 U/L Final     Anion Gap   Date Value Ref Range Status   07/05/2020 9 8 - 16 mmol/L Final   06/21/2013 13 5 - 15 meq/L Final     eGFR if    Date Value Ref Range Status   07/27/2020 80 > OR = 60 mL/min/1.73m2 Final     eGFR if non    Date Value Ref Range Status   07/27/2020 69 > OR = 60 mL/min/1.73m2 Final       .   Assessment:      1. Heterozygous factor V Leiden mutation      Problem List Items Addressed This Visit     Heterozygous factor V Leiden mutation (Chronic)     Patient is doing well at this time and remains on life-long coumadin.  He will need a tooth extraction next week and will put him on lovenox bridge.  Will continue to see him every six months and discussed this today.                 Plan:   As Above in Assessment      The plan was discussed with the patient and all questions/concerns have been answered to the patient's satisfaction.

## 2020-09-16 NOTE — TELEPHONE ENCOUNTER
Luis was in office today. He is going to be getting a tooth pulled. 40mg lovenox inj sent to [facility]. He has been giving instructions.he is to start lovenox today. 9/16, 9/17, 9/18, 9/19, and 9/20. Nothing day of tooth extraction. On Tuesday he is to resume lovenox for 2 more days. And he is to start back on coumadin 5mg daily. He is to recheck his INR on Monday 9/28. He voiced understanding

## 2020-09-16 NOTE — TELEPHONE ENCOUNTER
Spoke to pt regarding message below. Let pt know med was not on his list pt stated he has been taking tizanidine 4 mg bid prn for awhile now and that alda sent it in for his last.     Pt also stated that his bp has been running high this morning at his doctors appt his bp was 183/92. Pt stated the last couple of days he has been feeling dizzy, light headed. Pt is taking lisinopril 10mg bid.

## 2020-09-16 NOTE — TELEPHONE ENCOUNTER
Please let patient know to increase lisinopril further to 20 mg twice a day and continue to monitor blood pressure.  Send in blood pressure readings in 1-2 weeks or call if he is having any problems.  Tizanidine refill sent

## 2020-09-16 NOTE — TELEPHONE ENCOUNTER
----- Message from Nirmal Ayala sent at 9/16/2020  9:15 AM CDT -----  Regarding: Refill  Contact: Luis Resendiz  Needs refill on Tizanidine  Send to [facility] in [place]   Pt# [phone]

## 2020-09-16 NOTE — ASSESSMENT & PLAN NOTE
Patient is doing well at this time and remains on life-long coumadin.  He will need a tooth extraction next week and will put him on lovenox bridge.  Will continue to see him every six months and discussed this today.

## 2020-09-29 ENCOUNTER — TELEPHONE (OUTPATIENT)
Dept: FAMILY MEDICINE | Facility: CLINIC | Age: 60
End: 2020-09-29

## 2020-09-29 NOTE — TELEPHONE ENCOUNTER
----- Message from Nirmal Ayala sent at 9/29/2020  2:12 PM CDT -----  Regarding: Needs call back from nurse  Contact: Elvis Resendiz  Pt would like the nurse a call back to discuss his b/p reading from these past 2 weeks. Please give him a call back # [phone]

## 2020-09-30 ENCOUNTER — TELEPHONE (OUTPATIENT)
Dept: FAMILY MEDICINE | Facility: CLINIC | Age: 60
End: 2020-09-30

## 2020-09-30 DIAGNOSIS — M51.36 DDD (DEGENERATIVE DISC DISEASE), LUMBAR: Primary | ICD-10-CM

## 2020-09-30 RX ORDER — TRAMADOL HYDROCHLORIDE 50 MG/1
50 TABLET ORAL EVERY 6 HOURS PRN
Qty: 20 TABLET | Refills: 0 | Status: SHIPPED | OUTPATIENT
Start: 2020-09-30 | End: 2020-10-09 | Stop reason: SDUPTHER

## 2020-09-30 NOTE — TELEPHONE ENCOUNTER
----- Message from Ame Wolf sent at 9/30/2020  9:48 AM CDT -----  Pt called to cancel his appt for his BP yasmani said there is no way her can come in that he is a lot of pain in his back.   # [phone]

## 2020-09-30 NOTE — TELEPHONE ENCOUNTER
Please call patient and ask if he is taking the tizanidine twice a day.  I will send in a small quantity of tramadol however I recommend he establish with a new pain management doctor.  I have previously referred him to Dr. Porter for his chronic back pain

## 2020-09-30 NOTE — TELEPHONE ENCOUNTER
Spoke with pt yesterday he said his BP was all over the place. I scheduled him for a BP NV today. Pt canceled his appt saying he can't get out of bed bc his back is hurting so bad. Pt was advised to go to UC/[facility] if he is in so much pain. He refused.  Please advise.

## 2020-10-02 ENCOUNTER — TELEPHONE (OUTPATIENT)
Dept: HEMATOLOGY/ONCOLOGY | Facility: CLINIC | Age: 60
End: 2020-10-02

## 2020-10-02 NOTE — TELEPHONE ENCOUNTER
----- Message from Sia Reyes sent at 10/2/2020  1:09 PM CDT -----  Double for 2 days then resume-recheck in 2 weeks. 10 mg x's 2 then 7.5, 5, 7.5...  ----- Message -----  From: Dona Cole  Sent: 9/30/2020   3:04 PM CDT  To: Sarah Shen Staff    The patient needs his lab results to know if he needs to adjust his blood thinners. [phone]

## 2020-10-09 ENCOUNTER — OFFICE VISIT (OUTPATIENT)
Dept: FAMILY MEDICINE | Facility: CLINIC | Age: 60
End: 2020-10-09
Payer: MEDICARE

## 2020-10-09 ENCOUNTER — TELEPHONE (OUTPATIENT)
Dept: FAMILY MEDICINE | Facility: CLINIC | Age: 60
End: 2020-10-09

## 2020-10-09 VITALS
SYSTOLIC BLOOD PRESSURE: 162 MMHG | TEMPERATURE: 97 F | HEART RATE: 68 BPM | WEIGHT: 236 LBS | DIASTOLIC BLOOD PRESSURE: 92 MMHG | HEIGHT: 70 IN | BODY MASS INDEX: 33.79 KG/M2

## 2020-10-09 DIAGNOSIS — I10 ESSENTIAL HYPERTENSION: Primary | ICD-10-CM

## 2020-10-09 DIAGNOSIS — M51.36 DDD (DEGENERATIVE DISC DISEASE), LUMBAR: ICD-10-CM

## 2020-10-09 PROCEDURE — 3008F PR BODY MASS INDEX (BMI) DOCUMENTED: ICD-10-PCS | Mod: S$GLB,,, | Performed by: NURSE PRACTITIONER

## 2020-10-09 PROCEDURE — 3077F SYST BP >= 140 MM HG: CPT | Mod: S$GLB,,, | Performed by: NURSE PRACTITIONER

## 2020-10-09 PROCEDURE — 3078F PR MOST RECENT DIASTOLIC BLOOD PRESSURE < 80 MM HG: ICD-10-PCS | Mod: S$GLB,,, | Performed by: NURSE PRACTITIONER

## 2020-10-09 PROCEDURE — 99214 OFFICE O/P EST MOD 30 MIN: CPT | Mod: S$GLB,,, | Performed by: NURSE PRACTITIONER

## 2020-10-09 PROCEDURE — 3008F BODY MASS INDEX DOCD: CPT | Mod: S$GLB,,, | Performed by: NURSE PRACTITIONER

## 2020-10-09 PROCEDURE — 3077F PR MOST RECENT SYSTOLIC BLOOD PRESSURE >= 140 MM HG: ICD-10-PCS | Mod: S$GLB,,, | Performed by: NURSE PRACTITIONER

## 2020-10-09 PROCEDURE — 3078F DIAST BP <80 MM HG: CPT | Mod: S$GLB,,, | Performed by: NURSE PRACTITIONER

## 2020-10-09 PROCEDURE — 99214 PR OFFICE/OUTPT VISIT, EST, LEVL IV, 30-39 MIN: ICD-10-PCS | Mod: S$GLB,,, | Performed by: NURSE PRACTITIONER

## 2020-10-09 RX ORDER — AMLODIPINE BESYLATE 5 MG/1
5 TABLET ORAL DAILY
Qty: 30 TABLET | Refills: 5 | Status: SHIPPED | OUTPATIENT
Start: 2020-10-09 | End: 2021-02-22 | Stop reason: SDUPTHER

## 2020-10-09 RX ORDER — TRAMADOL HYDROCHLORIDE 50 MG/1
50 TABLET ORAL EVERY 6 HOURS PRN
Qty: 20 TABLET | Refills: 0 | Status: SHIPPED | OUTPATIENT
Start: 2020-10-09 | End: 2021-11-18

## 2020-10-09 NOTE — PROGRESS NOTES
SUBJECTIVE:    Patient ID: Luis Resendiz is a 60 y.o. male.    Chief Complaint: Hypertension (no bottles, Eye exam pt will sched// SW)    Pt here for sick visit- comes in reporting BP tends to be trending back up. After last visit we had increased lisinopril up to 40mg daily and BP had improved to 120-130s however over past 1-2 weeks BP back up to 170-180/. Reports he has cut way back on his salt intake.  C/oing of increase in LBP the past few weeks to where at times he can barely move- pain midline lower back and will radiate into left buttock and occas down left leg- taking tizanidine.  Has long history of lumbar DDD status post surgery and previous epidurals though has been many years.  Used to be on chronic opioids however these were weaned off and has been off all opioids last couple years.  States the pharmacy didn't fill the tramadol I sent in on 9/30. Was referred to Dr. Porter in Dec 2019 for lower back pain complaints but reports he never heard from them and he never called.  States physical therapy has never helped him in the past and he really does not want to get back on chronic opioids.  Reports had follow-up with Dr. Crow earlier in the year and had stress test which was normal.          Admission on 07/05/2020, Discharged on 07/05/2020   Component Date Value Ref Range Status    WBC 07/05/2020 7.59  3.90 - 12.70 K/uL Final    RBC 07/05/2020 4.43* 4.60 - 6.20 M/uL Final    Hemoglobin 07/05/2020 13.7* 14.0 - 18.0 g/dL Final    Hematocrit 07/05/2020 41.5  40.0 - 54.0 % Final    Mean Corpuscular Volume 07/05/2020 94  82 - 98 fL Final    Mean Corpuscular Hemoglobin 07/05/2020 30.9  27.0 - 31.0 pg Final    Mean Corpuscular Hemoglobin Conc 07/05/2020 33.0  32.0 - 36.0 g/dL Final    RDW 07/05/2020 13.2  11.5 - 14.5 % Final    Platelets 07/05/2020 199  150 - 350 K/uL Final    MPV 07/05/2020 10.0  9.2 - 12.9 fL Final    Immature Granulocytes 07/05/2020 0.3  0.0 - 0.5 % Final     Gran # (ANC) 07/05/2020 4.4  1.8 - 7.7 K/uL Final    Immature Grans (Abs) 07/05/2020 0.02  0.00 - 0.04 K/uL Final    Lymph # 07/05/2020 2.2  1.0 - 4.8 K/uL Final    Mono # 07/05/2020 0.7  0.3 - 1.0 K/uL Final    Eos # 07/05/2020 0.2  0.0 - 0.5 K/uL Final    Baso # 07/05/2020 0.07  0.00 - 0.20 K/uL Final    nRBC 07/05/2020 0  0 /100 WBC Final    Gran% 07/05/2020 58.3  38.0 - 73.0 % Final    Lymph% 07/05/2020 28.5  18.0 - 48.0 % Final    Mono% 07/05/2020 9.5  4.0 - 15.0 % Final    Eosinophil% 07/05/2020 2.5  0.0 - 8.0 % Final    Basophil% 07/05/2020 0.9  0.0 - 1.9 % Final    Differential Method 07/05/2020 Automated   Final    Sodium 07/05/2020 138  136 - 145 mmol/L Final    Potassium 07/05/2020 4.4  3.5 - 5.1 mmol/L Final    Chloride 07/05/2020 106  95 - 110 mmol/L Final    CO2 07/05/2020 23  22 - 31 mmol/L Final    Glucose 07/05/2020 126* 70 - 110 mg/dL Final    BUN, Bld 07/05/2020 27* 9 - 21 mg/dL Final    Creatinine 07/05/2020 1.12  0.50 - 1.40 mg/dL Final    Calcium 07/05/2020 9.5  8.4 - 10.2 mg/dL Final    Total Protein 07/05/2020 8.3  6.0 - 8.4 g/dL Final    Albumin 07/05/2020 4.8  3.5 - 5.2 g/dL Final    Total Bilirubin 07/05/2020 0.5  0.2 - 1.3 mg/dL Final    Alkaline Phosphatase 07/05/2020 73  38 - 145 U/L Final    AST 07/05/2020 51  17 - 59 U/L Final    ALT 07/05/2020 59* 0 - 50 U/L Final    Anion Gap 07/05/2020 9  8 - 16 mmol/L Final    eGFR if African American 07/05/2020 >60  >60 mL/min/1.73 m^2 Final    eGFR if non African American 07/05/2020 >60  >60 mL/min/1.73 m^2 Final    Lipase Result 07/05/2020 81  23 - 300 U/L Final    Specimen UA 07/05/2020 Urine, Clean Catch   Final    Color, UA 07/05/2020 Yellow  Yellow, Straw, Laney Final    Appearance, UA 07/05/2020 Clear  Clear Final    pH, UA 07/05/2020 5.0  5.0 - 8.0 Final    Specific Gravity, UA 07/05/2020 >=1.030* 1.005 - 1.030 Final    Protein, UA 07/05/2020 Negative  Negative Final    Glucose, UA 07/05/2020 Negative   Negative Final    Ketones, UA 07/05/2020 Negative  Negative Final    Bilirubin (UA) 07/05/2020 Negative  Negative Final    Occult Blood UA 07/05/2020 Negative  Negative Final    Nitrite, UA 07/05/2020 Negative  Negative Final    Urobilinogen, UA 07/05/2020 0.2  <2.0 EU/dL Final    Leukocytes, UA 07/05/2020 Negative  Negative Final    Occult Blood 07/05/2020 Negative  Negative Final   Office Visit on 05/05/2020   Component Date Value Ref Range Status    Cholesterol 07/27/2020 163  <200 mg/dL Final    HDL 07/27/2020 36* > OR = 40 mg/dL Final    Triglycerides 07/27/2020 243* <150 mg/dL Final    LDL Cholesterol 07/27/2020 93  mg/dL (calc) Final    Hdl/Cholesterol Ratio 07/27/2020 4.5  <5.0 (calc) Final    Non HDL Chol. (LDL+VLDL) 07/27/2020 127  <130 mg/dL (calc) Final    Hemoglobin A1C 07/27/2020 7.1* <5.7 % of total Hgb Final    Glucose 07/27/2020 141* 65 - 99 mg/dL Final    BUN, Bld 07/27/2020 34* 7 - 25 mg/dL Final    Creatinine 07/27/2020 1.14  0.70 - 1.25 mg/dL Final    eGFR if non African American 07/27/2020 69  > OR = 60 mL/min/1.73m2 Final    eGFR if African American 07/27/2020 80  > OR = 60 mL/min/1.73m2 Final    BUN/Creatinine Ratio 07/27/2020 30* 6 - 22 (calc) Final    Sodium 07/27/2020 140  135 - 146 mmol/L Final    Potassium 07/27/2020 4.9  3.5 - 5.3 mmol/L Final    Chloride 07/27/2020 105  98 - 110 mmol/L Final    CO2 07/27/2020 21  20 - 32 mmol/L Final    Calcium 07/27/2020 9.4  8.6 - 10.3 mg/dL Final    Total Protein 07/27/2020 7.5  6.1 - 8.1 g/dL Final    Albumin 07/27/2020 4.6  3.6 - 5.1 g/dL Final    Globulin, Total 07/27/2020 2.9  1.9 - 3.7 g/dL (calc) Final    Albumin/Globulin Ratio 07/27/2020 1.6  1.0 - 2.5 (calc) Final    Total Bilirubin 07/27/2020 0.7  0.2 - 1.2 mg/dL Final    Alkaline Phosphatase 07/27/2020 49  35 - 144 U/L Final    AST 07/27/2020 62* 10 - 35 U/L Final    ALT 07/27/2020 60* 9 - 46 U/L Final    PROSTATE SPECIFIC ANTIGEN, SCR - Q* 07/27/2020 0.4   < OR = 4.0 ng/mL Final    WBC 07/27/2020 5.3  3.8 - 10.8 Thousand/uL Final    RBC 07/27/2020 4.72  4.20 - 5.80 Million/uL Final    Hemoglobin 07/27/2020 14.4  13.2 - 17.1 g/dL Final    Hematocrit 07/27/2020 45.2  38.5 - 50.0 % Final    Mean Corpuscular Volume 07/27/2020 95.8  80.0 - 100.0 fL Final    Mean Corpuscular Hemoglobin 07/27/2020 30.5  27.0 - 33.0 pg Final    Mean Corpuscular Hemoglobin Conc 07/27/2020 31.9* 32.0 - 36.0 g/dL Final    RDW 07/27/2020 13.8  11.0 - 15.0 % Final    Platelets 07/27/2020 235  140 - 400 Thousand/uL Final    MPV 07/27/2020 10.7  7.5 - 12.5 fL Final    Neutrophils Absolute 07/27/2020 2,671  1,500 - 7,800 cells/uL Final    Lymph # 07/27/2020 1,988  850 - 3,900 cells/uL Final    Mono # 07/27/2020 424  200 - 950 cells/uL Final    Eos # 07/27/2020 159  15 - 500 cells/uL Final    Baso # 07/27/2020 58  0 - 200 cells/uL Final    Neutrophils Relative 07/27/2020 50.4  % Final    Lymph% 07/27/2020 37.5  % Final    Mono% 07/27/2020 8.0  % Final    Eosinophil% 07/27/2020 3.0  % Final    Basophil% 07/27/2020 1.1  % Final   Ancillary Orders on 04/24/2020   Component Date Value Ref Range Status    INR 05/04/2020 2.0*  Final    Prothrombin Time 05/04/2020 20.5* 9.0 - 11.5 sec Final   Ancillary Orders on 04/17/2020   Component Date Value Ref Range Status    INR 06/09/2020 2.5*  Final    Prothrombin Time 06/09/2020 25.3* 9.0 - 11.5 sec Final   Ancillary Orders on 04/10/2020   Component Date Value Ref Range Status    INR 07/13/2020 1.5*  Final    Prothrombin Time 07/13/2020 15.3* 9.0 - 11.5 sec Final       Past Medical History:   Diagnosis Date    Anticoagulant long-term use     Arthritis     Cervical spine pain     Clotting disorder     Coronary artery disease     Diabetes mellitus     Encounter for blood transfusion     Hypertension      Past Surgical History:   Procedure Laterality Date    BACK SURGERY      cabg      CARDIAC SURGERY      FOOT SURGERY       "FRACTURE SURGERY      ROTATOR CUFF REPAIR Left      Family History   Problem Relation Age of Onset    Heart disease Mother     Cancer Mother     Heart disease Father     Alcohol abuse Father     No Known Problems Sister     No Known Problems Brother     No Known Problems Daughter     No Known Problems Son        Marital Status:   Alcohol History:  reports current alcohol use.  Tobacco History:  reports that he quit smoking about 26 years ago. He has a 0.25 pack-year smoking history. He has never used smokeless tobacco.  Drug History:  reports no history of drug use.    Health Maintenance Topics with due status: Not Due       Topic Last Completion Date    Lipid Panel 07/27/2020    Hemoglobin A1c 07/27/2020    Colorectal Cancer Screening 08/12/2020    High Dose Statin 09/16/2020     Immunization History   Administered Date(s) Administered    Influenza - Trivalent (ADULT) 10/01/2014    Influenza - Trivalent - Recombinant - PF 09/25/2018       Review of patient's allergies indicates:   Allergen Reactions    Ancef [cefazolin] Anxiety       Current Outpatient Medications:     ALPRAZolam (XANAX) 1 MG tablet, Take 1 tablet (1 mg total) by mouth 2 (two) times daily., Disp: 60 tablet, Rfl: 2    amLODIPine (NORVASC) 5 MG tablet, Take 1 tablet (5 mg total) by mouth once daily., Disp: 30 tablet, Rfl: 5    aspirin 325 MG tablet, Take 325 mg by mouth once daily., Disp: , Rfl:     atenoloL (TENORMIN) 50 MG tablet, Take 1.5 tablets (75 mg total) by mouth 2 (two) times daily. (Patient taking differently: Take 100 mg by mouth 2 (two) times daily. 2 tablets in AM and 1 tablet in pm), Disp: 270 tablet, Rfl: 1    atorvastatin (LIPITOR) 80 MG tablet, Take 1 tablet (80 mg total) by mouth once daily., Disp: 90 tablet, Rfl: 1    BD ULTRA-FINE MINI PEN NEEDLE 31 gauge x 3/16" Ndle, 1 each by In Vitro route once daily., Disp: 100 each, Rfl: 1    DULoxetine (CYMBALTA) 30 MG capsule, Take 30 mg by mouth 2 (two) times " "daily., Disp: , Rfl:     fenofibrate (TRICOR) 145 MG tablet, Take 1 tablet (145 mg total) by mouth once daily., Disp: 90 tablet, Rfl: 1    glimepiride (AMARYL) 4 MG tablet, Take 1 tablet (4 mg total) by mouth 2 (two) times daily., Disp: 180 tablet, Rfl: 1    HYDROcodone-acetaminophen (NORCO) 7.5-325 mg per tablet, Take 1 tablet by mouth every 4 (four) hours as needed for Pain., Disp: 12 tablet, Rfl: 0    insulin NPH-insulin regular, 70/30, (NOVOLIN 70/30 U-100 INSULIN) 100 unit/mL (70-30) injection, 20 units in the morning, 30 units at night., Disp: 2 vial, Rfl: 5    lisinopriL (PRINIVIL,ZESTRIL) 20 MG tablet, Take 1 tablet (20 mg total) by mouth 2 (two) times daily. (Patient taking differently: Take 40 mg by mouth 2 (two) times daily. ), Disp: 180 tablet, Rfl: 1    metFORMIN (GLUCOPHAGE) 500 MG tablet, Take 2 tablets (1,000 mg total) by mouth 2 (two) times daily with meals., Disp: 360 tablet, Rfl: 1    omega-3 acid ethyl esters (LOVAZA) 1 gram capsule, Take 2 capsules (2 g total) by mouth 2 (two) times daily., Disp: 120 capsule, Rfl: 11    ondansetron (ZOFRAN-ODT) 8 MG TbDL, Take 1 tablet (8 mg total) by mouth every 6 (six) hours as needed., Disp: 12 tablet, Rfl: 0    thiamine 100 MG tablet, Take 1 tablet (100 mg total) by mouth once daily., Disp: 3 tablet, Rfl: 0    tiZANidine (ZANAFLEX) 4 MG tablet, Take 1 tablet (4 mg total) by mouth 2 (two) times daily as needed., Disp: 180 tablet, Rfl: 0    traMADoL (ULTRAM) 50 mg tablet, Take 1 tablet (50 mg total) by mouth every 6 (six) hours as needed for Pain., Disp: 20 tablet, Rfl: 0    TRUEPLUS INSULIN 0.5 mL 29 gauge x 1/2" Syrg, , Disp: , Rfl: 5    ULTRA COMFORT INSULIN SYRINGE 1 mL 29 gauge x 1/2" Syrg, Inject 25 Units as directed 2 (two) times daily., Disp: 180 each, Rfl: 3    warfarin (COUMADIN) 5 MG tablet, Take 1 tablet (5 mg total) by mouth Daily., Disp: 30 tablet, Rfl: 2    warfarin (COUMADIN) 5 MG tablet, TAKE 1 & 1/2 TABLETS BY MOUTH ON MONDAY, " "WEDNESDAY AND FRIDAY AND TAKE 1 TABLET ON SATURDAY, SUNDAY, TUESDAY AND THURSDAY, Disp: 45 tablet, Rfl: 2    warfarin (COUMADIN) 5 MG tablet, Take 1 tablet (5 mg total) by mouth every Mon, Wed, Fri. 1 1/2 tablets the other days of week for 7.5mg, Disp: 45 tablet, Rfl: 2    warfarin (COUMADIN) 5 MG tablet, TAKE 1 & 1/2 (ONE & ONE-HALF) TABLETS BY MOUTH ON MODAY, WEDNESDAY, AND FRIDAY. TAKE 1 TABLET ON SATURDAY, SUNDAY, TUESDAY AND THURSDAY, Disp: 45 tablet, Rfl: 1    warfarin (COUMADIN) 5 MG tablet, TAKE 1 & 1/2 (ONE & ONE-HALF) TABLETS BY MOUTH ON MODAY, WEDNESDAY, AND FRIDAY. TAKE 1 TABLET ON SATURDAY, SUNDAY, TUESDAY AND THURSDAY, Disp: 45 tablet, Rfl: 1    warfarin (COUMADIN) 5 MG tablet, Take 1.5 tablets (7.5 mg total) by mouth every Mon, Wed, Fri. And 5mg on the other 4 days, Disp: 45 tablet, Rfl: 3    Review of Systems   Constitutional: Negative for chills and fever.   Respiratory: Negative for cough, shortness of breath and wheezing.    Cardiovascular: Negative for chest pain, palpitations and leg swelling.   Gastrointestinal: Negative for abdominal pain, constipation and diarrhea.   Genitourinary: Negative for dysuria and hematuria.   Musculoskeletal: Positive for back pain. Negative for gait problem.   Skin: Negative for rash.   Neurological: Negative for dizziness and headaches.          Objective:      Vitals:    10/09/20 1000 10/09/20 1002 10/09/20 1047   BP: (!) 164/98 (!) 160/64 (!) 162/92   Pulse: 68     Temp: 97.1 °F (36.2 °C)     Weight: 107 kg (236 lb)     Height: 5' 10" (1.778 m)       Physical Exam  Vitals signs and nursing note reviewed.   Constitutional:       General: He is not in acute distress.     Appearance: He is well-developed. He is obese.   HENT:      Head: Normocephalic and atraumatic.   Neck:      Musculoskeletal: Neck supple.      Vascular: No carotid bruit.   Cardiovascular:      Rate and Rhythm: Normal rate and regular rhythm.      Heart sounds: No murmur. No friction rub. No " gallop.    Pulmonary:      Effort: Pulmonary effort is normal. No respiratory distress.      Breath sounds: Normal breath sounds. No wheezing or rales.   Abdominal:      General: There is no distension.      Palpations: Abdomen is soft.      Tenderness: There is no abdominal tenderness.   Musculoskeletal:      Lumbar back: He exhibits decreased range of motion (Pain with forward flexion and limited to 45°), tenderness and spasm. He exhibits no bony tenderness.        Back:       Right lower leg: No edema.      Left lower leg: No edema.      Comments: Chronic purplish discoloration of bilat ankles/feet   Lymphadenopathy:      Cervical: No cervical adenopathy.   Skin:     General: Skin is warm and dry.      Findings: No rash.   Neurological:      General: No focal deficit present.      Mental Status: He is alert and oriented to person, place, and time.      Gait: Gait normal.           Assessment:       1. Essential hypertension    2. DDD (degenerative disc disease), lumbar           Plan:       Essential hypertension  Comments:  add amlodipine 5 mg though cautioned patient on risk of lower extremity swelling specially given his varicose issues.  Continue to monitor BP at home and follow-up next month as scheduled  Orders:  -     amLODIPine (NORVASC) 5 MG tablet; Take 1 tablet (5 mg total) by mouth once daily.  Dispense: 30 tablet; Refill: 5    DDD (degenerative disc disease), lumbar  Comments:  Discussed treatment options including PT, referral to pain management for possible LULU or rhizotomy though he is not interested in any at this time.  Advised I will okay new tramadol small quantity though we will not be prescribing chronic pain medication  Orders:  -     traMADoL (ULTRAM) 50 mg tablet; Take 1 tablet (50 mg total) by mouth every 6 (six) hours as needed for Pain.  Dispense: 20 tablet; Refill: 0      Follow up for as scheduled.        10/9/2020 Marianna Hdez NP

## 2020-10-09 NOTE — TELEPHONE ENCOUNTER
"Spoke with pt, states his blood pressure has been "inching" back up. 182/101 this morning. He takes his blood pressure medication at 4am, took BP at 7am. Coming today.  "

## 2020-10-09 NOTE — PATIENT INSTRUCTIONS
Back Exercise to Keep Fit for Low Back Pain  To help in your recovery and to prevent further back problems, keep your back, abdominal muscles and legs strong. Walk daily as soon as you can. Gradually add other physical activities such as swimming and biking, which can help improve lower back strength. Begin as soon as you can do them comfortably. Do not do any exercises that make your pain a lot worse. The following are some back exercises that can help relieve low back pain.     Pelvic tilt   Repeat 5-10 times, twice per day.  Lie flat on your back (or stand with your back to a wall), knees bent, feet flat on the floor, body relaxed. Tighten your abdominal and buttock muscles, and tilt your pelvis. The curve of the small of your back should flatten towards the floor (or wall). Hold 10 seconds and then relax.       Knee raise     Repeat 5-10 times, twice per day.    Lie flat on your back, knees bent. Bring one knee slowly to your chest. Hug your knee gently. Then lower your leg toward the floor, keeping your knee bent. Do not straighten your legs. Repeat exercise with your other leg.              Partial press-up     Lie face down on a soft, firm surface. Do not turn your head to either side. Rest your arms bent at the elbows alongside your body. Relax for a few minutes. Then raise your upper body enough to lean on your elbows. Relax your lower back and legs as much as possible. Hold this position for 30 seconds at first. Gradually work up to five minutes. Or try slow press-ups. Hold each for five seconds, and repeat five to six times.              Copyright © 2012 by [employer]   Leila MARTÍNEZ, Sidra JOHNSON, Elgin DELUNA, Sailaja SHINE, Chung R, Jesse B, Nahun K, Kaleb C, Jaime B, Aubrey S, Leno RAMOS, Aubree R. [employer]. Adult Acute and Subacute Low Back Pain. Updated November 2012.

## 2020-10-09 NOTE — TELEPHONE ENCOUNTER
----- Message from Savanah Rowell sent at 10/9/2020  8:40 AM CDT -----  VM 8:15   He is having trouble with H/B/P. Please call pts # [phone] GH

## 2020-10-16 ENCOUNTER — TELEPHONE (OUTPATIENT)
Dept: HEMATOLOGY/ONCOLOGY | Facility: CLINIC | Age: 60
End: 2020-10-16

## 2020-10-16 DIAGNOSIS — D68.51 HETEROZYGOUS FACTOR V LEIDEN MUTATION: Primary | ICD-10-CM

## 2020-10-16 RX ORDER — WARFARIN 1 MG/1
1 TABLET ORAL DAILY
Qty: 90 TABLET | Refills: 3 | Status: SHIPPED | OUTPATIENT
Start: 2020-10-16 | End: 2021-09-15 | Stop reason: SDUPTHER

## 2020-10-16 NOTE — TELEPHONE ENCOUNTER
Notified patient his INR is 3.7 and 35.4. Dr. Smith would like him to hold his coumadin for 2 days then resume at same dose. He will recheck his labs on Monday. He stated he has not started any new medications or has been on any antibiotics.

## 2020-10-16 NOTE — TELEPHONE ENCOUNTER
----- Message from Ac Smith MD sent at 10/16/2020  2:33 PM CDT -----  Ok.  Tell him to hold for one day and put him on 6mg daily.  Recheck 2 weeks.   ----- Message -----  From: ANALI Weiss  Sent: 10/15/2020   2:58 PM CDT  To: Ac Smith MD, Sia Reyes      Double for 2 days then resume-recheck in 2 weeks. 10 mg x's 2 then 7.5, 5, 7.5...  So he did 10mg for 2 days then resumed at 7.5, 5 etc...    Miri    ----- Message -----  From: Ac Smith MD  Sent: 10/15/2020   1:22 PM CDT  To: Sia Reyes, Miri Duff, KAJAL-C    Miri,  What did I have him do last time with the coumadin?

## 2020-10-16 NOTE — TELEPHONE ENCOUNTER
----- Message from Dona Cole sent at 10/16/2020 11:51 AM CDT -----  The patient wants the results of his PT INR from this week. Please call him at [phone].

## 2020-10-19 ENCOUNTER — TELEPHONE (OUTPATIENT)
Dept: HEMATOLOGY/ONCOLOGY | Facility: CLINIC | Age: 60
End: 2020-10-19

## 2020-10-19 NOTE — TELEPHONE ENCOUNTER
----- Message from Ac Smith MD sent at 9/30/2020 11:47 AM CDT -----  Please call the patient regarding his abnormal result. Double for 2 days then same.  Recheck in 2 weeks.

## 2020-10-30 ENCOUNTER — TELEPHONE (OUTPATIENT)
Dept: HEMATOLOGY/ONCOLOGY | Facility: CLINIC | Age: 60
End: 2020-10-30

## 2020-10-30 NOTE — TELEPHONE ENCOUNTER
----- Message from Angela Ag, Patient Care Assistant sent at 10/30/2020 11:39 AM CDT -----  Regarding: Lab Results  Patient called in for  PT-INR results . He can be reached at [phone]

## 2020-10-30 NOTE — TELEPHONE ENCOUNTER
Per Dr. Smith patient to continue at the same dose and recheck in 1 month. Patient is on 6 mg po daily and verbalized understanding.

## 2020-11-02 ENCOUNTER — TELEPHONE (OUTPATIENT)
Dept: HEMATOLOGY/ONCOLOGY | Facility: CLINIC | Age: 60
End: 2020-11-02

## 2020-11-02 NOTE — TELEPHONE ENCOUNTER
----- Message from Ac Smith MD sent at 10/30/2020  2:22 PM CDT -----  Please call the patient regarding his abnormal result. Continue same dose and recheck in one month.

## 2020-11-03 ENCOUNTER — TELEPHONE (OUTPATIENT)
Dept: FAMILY MEDICINE | Facility: CLINIC | Age: 60
End: 2020-11-03

## 2020-11-10 DIAGNOSIS — E78.5 DM TYPE 2 WITH DIABETIC DYSLIPIDEMIA: ICD-10-CM

## 2020-11-10 DIAGNOSIS — E11.9 TYPE 2 DIABETES MELLITUS WITHOUT COMPLICATION, WITHOUT LONG-TERM CURRENT USE OF INSULIN: ICD-10-CM

## 2020-11-10 DIAGNOSIS — I25.10 CORONARY ARTERY DISEASE INVOLVING NATIVE CORONARY ARTERY OF NATIVE HEART WITHOUT ANGINA PECTORIS: Chronic | ICD-10-CM

## 2020-11-10 DIAGNOSIS — E11.69 DM TYPE 2 WITH DIABETIC DYSLIPIDEMIA: ICD-10-CM

## 2020-11-10 RX ORDER — ATORVASTATIN CALCIUM 80 MG/1
80 TABLET, FILM COATED ORAL DAILY
Qty: 90 TABLET | Refills: 1 | Status: SHIPPED | OUTPATIENT
Start: 2020-11-10 | End: 2021-02-15

## 2020-11-10 RX ORDER — GLIMEPIRIDE 4 MG/1
4 TABLET ORAL 2 TIMES DAILY
Qty: 180 TABLET | Refills: 1 | Status: SHIPPED | OUTPATIENT
Start: 2020-11-10 | End: 2021-05-20 | Stop reason: SDUPTHER

## 2020-11-10 NOTE — TELEPHONE ENCOUNTER
----- Message from Nirmal Ayala sent at 11/10/2020  2:04 PM CST -----  Regarding: Refill  Contact: Luis Resendiz  Needs refill Glimiperide, Atorvastatin  Send to [facility] in [place]  Pt# [phone]

## 2020-11-14 LAB — HBA1C MFR BLD: 7.5 % OF TOTAL HGB

## 2020-11-16 ENCOUNTER — OFFICE VISIT (OUTPATIENT)
Dept: FAMILY MEDICINE | Facility: CLINIC | Age: 60
End: 2020-11-16
Payer: MEDICARE

## 2020-11-16 VITALS
SYSTOLIC BLOOD PRESSURE: 134 MMHG | HEART RATE: 56 BPM | DIASTOLIC BLOOD PRESSURE: 74 MMHG | HEIGHT: 70 IN | WEIGHT: 231 LBS | TEMPERATURE: 97 F | BODY MASS INDEX: 33.07 KG/M2

## 2020-11-16 DIAGNOSIS — E11.42 DIABETIC POLYNEUROPATHY ASSOCIATED WITH TYPE 2 DIABETES MELLITUS: Primary | ICD-10-CM

## 2020-11-16 DIAGNOSIS — I10 ESSENTIAL HYPERTENSION: ICD-10-CM

## 2020-11-16 DIAGNOSIS — M51.36 DDD (DEGENERATIVE DISC DISEASE), LUMBAR: ICD-10-CM

## 2020-11-16 DIAGNOSIS — E78.5 DM TYPE 2 WITH DIABETIC DYSLIPIDEMIA: ICD-10-CM

## 2020-11-16 DIAGNOSIS — Z23 NEED FOR INFLUENZA VACCINATION: ICD-10-CM

## 2020-11-16 DIAGNOSIS — E11.69 DM TYPE 2 WITH DIABETIC DYSLIPIDEMIA: ICD-10-CM

## 2020-11-16 DIAGNOSIS — E78.2 MIXED HYPERLIPIDEMIA: ICD-10-CM

## 2020-11-16 DIAGNOSIS — D68.51 HETEROZYGOUS FACTOR V LEIDEN MUTATION: ICD-10-CM

## 2020-11-16 DIAGNOSIS — F41.9 ANXIETY: ICD-10-CM

## 2020-11-16 DIAGNOSIS — I25.10 CORONARY ARTERY DISEASE INVOLVING NATIVE CORONARY ARTERY OF NATIVE HEART WITHOUT ANGINA PECTORIS: ICD-10-CM

## 2020-11-16 PROCEDURE — 99214 OFFICE O/P EST MOD 30 MIN: CPT | Mod: 25,S$GLB,, | Performed by: NURSE PRACTITIONER

## 2020-11-16 PROCEDURE — 3008F PR BODY MASS INDEX (BMI) DOCUMENTED: ICD-10-PCS | Mod: S$GLB,,, | Performed by: NURSE PRACTITIONER

## 2020-11-16 PROCEDURE — 3075F SYST BP GE 130 - 139MM HG: CPT | Mod: S$GLB,,, | Performed by: NURSE PRACTITIONER

## 2020-11-16 PROCEDURE — 90682 FLU VACCINE - QUADRIVALENT (RECOMBINANT) PRESERVATIVE FREE: ICD-10-PCS | Mod: S$GLB,,, | Performed by: NURSE PRACTITIONER

## 2020-11-16 PROCEDURE — 3075F PR MOST RECENT SYSTOLIC BLOOD PRESS GE 130-139MM HG: ICD-10-PCS | Mod: S$GLB,,, | Performed by: NURSE PRACTITIONER

## 2020-11-16 PROCEDURE — 90682 RIV4 VACC RECOMBINANT DNA IM: CPT | Mod: S$GLB,,, | Performed by: NURSE PRACTITIONER

## 2020-11-16 PROCEDURE — 3051F HG A1C>EQUAL 7.0%<8.0%: CPT | Mod: S$GLB,,, | Performed by: NURSE PRACTITIONER

## 2020-11-16 PROCEDURE — 3008F BODY MASS INDEX DOCD: CPT | Mod: S$GLB,,, | Performed by: NURSE PRACTITIONER

## 2020-11-16 PROCEDURE — 3078F PR MOST RECENT DIASTOLIC BLOOD PRESSURE < 80 MM HG: ICD-10-PCS | Mod: S$GLB,,, | Performed by: NURSE PRACTITIONER

## 2020-11-16 PROCEDURE — 99214 PR OFFICE/OUTPT VISIT, EST, LEVL IV, 30-39 MIN: ICD-10-PCS | Mod: 25,S$GLB,, | Performed by: NURSE PRACTITIONER

## 2020-11-16 PROCEDURE — 3078F DIAST BP <80 MM HG: CPT | Mod: S$GLB,,, | Performed by: NURSE PRACTITIONER

## 2020-11-16 PROCEDURE — 3051F PR MOST RECENT HEMOGLOBIN A1C LEVEL 7.0 - < 8.0%: ICD-10-PCS | Mod: S$GLB,,, | Performed by: NURSE PRACTITIONER

## 2020-11-16 PROCEDURE — G0008 FLU VACCINE - QUADRIVALENT (RECOMBINANT) PRESERVATIVE FREE: ICD-10-PCS | Mod: S$GLB,,, | Performed by: NURSE PRACTITIONER

## 2020-11-16 PROCEDURE — G0008 ADMIN INFLUENZA VIRUS VAC: HCPCS | Mod: S$GLB,,, | Performed by: NURSE PRACTITIONER

## 2020-11-16 RX ORDER — ALPRAZOLAM 1 MG/1
1 TABLET ORAL 2 TIMES DAILY
Qty: 60 TABLET | Refills: 2 | Status: SHIPPED | OUTPATIENT
Start: 2020-11-16 | End: 2021-02-22 | Stop reason: SDUPTHER

## 2020-11-16 RX ORDER — METFORMIN HYDROCHLORIDE 500 MG/1
1000 TABLET ORAL 2 TIMES DAILY WITH MEALS
Qty: 360 TABLET | Refills: 1 | Status: SHIPPED | OUTPATIENT
Start: 2020-11-16 | End: 2021-06-14 | Stop reason: SDUPTHER

## 2020-11-16 NOTE — PROGRESS NOTES
SUBJECTIVE:    Patient ID: Luis Resendiz is a 60 y.o. male.    Chief Complaint: Diabetes (no bottles, Eye exam pt will sched, Flu wants// SW)    Pt here for regular f/u. Reports since last visit here BP better controlled after addition of amlodipine. Reports hasn't felt the flushing or dizziness he was having when his BP was elevated.  Reports has felt like blood sugar is on the low side but doesn't always check his sugar because he can tell when sugar is down to 50-60's. Still has fairly irregular sleep/eat schedule- takes 20-25u insulin in the AM around 11-12 but doesn't eat his first meal till about 3pm, then evening insulin dose about 30units around 11-12midnight  Follows regularly with Dr. Crow.  INR monitored by Dr. Sofia      Orders Only on 11/13/2020   Component Date Value Ref Range Status    Hemoglobin A1C 11/13/2020 7.5* <5.7 % of total Hgb Final   Admission on 07/05/2020, Discharged on 07/05/2020   Component Date Value Ref Range Status    WBC 07/05/2020 7.59  3.90 - 12.70 K/uL Final    RBC 07/05/2020 4.43* 4.60 - 6.20 M/uL Final    Hemoglobin 07/05/2020 13.7* 14.0 - 18.0 g/dL Final    Hematocrit 07/05/2020 41.5  40.0 - 54.0 % Final    MCV 07/05/2020 94  82 - 98 fL Final    MCH 07/05/2020 30.9  27.0 - 31.0 pg Final    MCHC 07/05/2020 33.0  32.0 - 36.0 g/dL Final    RDW 07/05/2020 13.2  11.5 - 14.5 % Final    Platelets 07/05/2020 199  150 - 350 K/uL Final    MPV 07/05/2020 10.0  9.2 - 12.9 fL Final    Immature Granulocytes 07/05/2020 0.3  0.0 - 0.5 % Final    Gran # (ANC) 07/05/2020 4.4  1.8 - 7.7 K/uL Final    Immature Grans (Abs) 07/05/2020 0.02  0.00 - 0.04 K/uL Final    Lymph # 07/05/2020 2.2  1.0 - 4.8 K/uL Final    Mono # 07/05/2020 0.7  0.3 - 1.0 K/uL Final    Eos # 07/05/2020 0.2  0.0 - 0.5 K/uL Final    Baso # 07/05/2020 0.07  0.00 - 0.20 K/uL Final    nRBC 07/05/2020 0  0 /100 WBC Final    Gran % 07/05/2020 58.3  38.0 - 73.0 % Final    Lymph % 07/05/2020 28.5   18.0 - 48.0 % Final    Mono % 07/05/2020 9.5  4.0 - 15.0 % Final    Eosinophil % 07/05/2020 2.5  0.0 - 8.0 % Final    Basophil % 07/05/2020 0.9  0.0 - 1.9 % Final    Differential Method 07/05/2020 Automated   Final    Sodium 07/05/2020 138  136 - 145 mmol/L Final    Potassium 07/05/2020 4.4  3.5 - 5.1 mmol/L Final    Chloride 07/05/2020 106  95 - 110 mmol/L Final    CO2 07/05/2020 23  22 - 31 mmol/L Final    Glucose 07/05/2020 126* 70 - 110 mg/dL Final    BUN 07/05/2020 27* 9 - 21 mg/dL Final    Creatinine 07/05/2020 1.12  0.50 - 1.40 mg/dL Final    Calcium 07/05/2020 9.5  8.4 - 10.2 mg/dL Final    Total Protein 07/05/2020 8.3  6.0 - 8.4 g/dL Final    Albumin 07/05/2020 4.8  3.5 - 5.2 g/dL Final    Total Bilirubin 07/05/2020 0.5  0.2 - 1.3 mg/dL Final    Alkaline Phosphatase 07/05/2020 73  38 - 145 U/L Final    AST 07/05/2020 51  17 - 59 U/L Final    ALT 07/05/2020 59* 0 - 50 U/L Final    Anion Gap 07/05/2020 9  8 - 16 mmol/L Final    eGFR if African American 07/05/2020 >60  >60 mL/min/1.73 m^2 Final    eGFR if non African American 07/05/2020 >60  >60 mL/min/1.73 m^2 Final    Lipase Result 07/05/2020 81  23 - 300 U/L Final    Specimen UA 07/05/2020 Urine, Clean Catch   Final    Color, UA 07/05/2020 Yellow  Yellow, Straw, Laney Final    Appearance, UA 07/05/2020 Clear  Clear Final    pH, UA 07/05/2020 5.0  5.0 - 8.0 Final    Specific Gravity, UA 07/05/2020 >=1.030* 1.005 - 1.030 Final    Protein, UA 07/05/2020 Negative  Negative Final    Glucose, UA 07/05/2020 Negative  Negative Final    Ketones, UA 07/05/2020 Negative  Negative Final    Bilirubin (UA) 07/05/2020 Negative  Negative Final    Occult Blood UA 07/05/2020 Negative  Negative Final    Nitrite, UA 07/05/2020 Negative  Negative Final    Urobilinogen, UA 07/05/2020 0.2  <2.0 EU/dL Final    Leukocytes, UA 07/05/2020 Negative  Negative Final    Occult Blood 07/05/2020 Negative  Negative Final       Past Medical History:    Diagnosis Date    Anticoagulant long-term use     Arthritis     Cervical spine pain     Clotting disorder     Coronary artery disease     Diabetes mellitus     Encounter for blood transfusion     Hypertension      Past Surgical History:   Procedure Laterality Date    BACK SURGERY      cabg      CARDIAC SURGERY      FOOT SURGERY      FRACTURE SURGERY      ROTATOR CUFF REPAIR Left      Family History   Problem Relation Age of Onset    Heart disease Mother     Cancer Mother     Heart disease Father     Alcohol abuse Father     No Known Problems Sister     No Known Problems Brother     No Known Problems Daughter     No Known Problems Son        Marital Status:   Alcohol History:  reports current alcohol use.  Tobacco History:  reports that he quit smoking about 26 years ago. He has a 0.25 pack-year smoking history. He has never used smokeless tobacco.  Drug History:  reports no history of drug use.    Health Maintenance Topics with due status: Not Due       Topic Last Completion Date    Lipid Panel 07/27/2020    Colorectal Cancer Screening 08/12/2020    High Dose Statin 11/10/2020    Hemoglobin A1c 11/13/2020     Immunization History   Administered Date(s) Administered    Influenza (FLUBLOK) - Quadrivalent - Recombinant - PF *Preferred* (egg allergy) 11/16/2020    Influenza - Trivalent (ADULT) 10/01/2014    Influenza - Trivalent - Recombinant - PF 09/25/2018       Review of patient's allergies indicates:   Allergen Reactions    Ancef [cefazolin] Anxiety       Current Outpatient Medications:     ALPRAZolam (XANAX) 1 MG tablet, Take 1 tablet (1 mg total) by mouth 2 (two) times daily., Disp: 60 tablet, Rfl: 2    amLODIPine (NORVASC) 5 MG tablet, Take 1 tablet (5 mg total) by mouth once daily., Disp: 30 tablet, Rfl: 5    aspirin 325 MG tablet, Take 325 mg by mouth once daily., Disp: , Rfl:     atenoloL (TENORMIN) 50 MG tablet, Take 1.5 tablets (75 mg total) by mouth 2 (two) times  "daily. (Patient taking differently: Take 100 mg by mouth 2 (two) times daily. 2 tablets in AM and 1 tablet in pm), Disp: 270 tablet, Rfl: 1    atorvastatin (LIPITOR) 80 MG tablet, Take 1 tablet (80 mg total) by mouth once daily., Disp: 90 tablet, Rfl: 1    BD ULTRA-FINE MINI PEN NEEDLE 31 gauge x 3/16" Ndle, 1 each by In Vitro route once daily., Disp: 100 each, Rfl: 1    DULoxetine (CYMBALTA) 30 MG capsule, Take 30 mg by mouth 2 (two) times daily., Disp: , Rfl:     fenofibrate (TRICOR) 145 MG tablet, Take 1 tablet (145 mg total) by mouth once daily., Disp: 90 tablet, Rfl: 1    glimepiride (AMARYL) 4 MG tablet, Take 1 tablet (4 mg total) by mouth 2 (two) times daily., Disp: 180 tablet, Rfl: 1    HYDROcodone-acetaminophen (NORCO) 7.5-325 mg per tablet, Take 1 tablet by mouth every 4 (four) hours as needed for Pain., Disp: 12 tablet, Rfl: 0    insulin NPH-insulin regular, 70/30, (NOVOLIN 70/30 U-100 INSULIN) 100 unit/mL (70-30) injection, 20 units in the morning, 30 units at night., Disp: 2 vial, Rfl: 5    lisinopriL (PRINIVIL,ZESTRIL) 20 MG tablet, Take 1 tablet (20 mg total) by mouth 2 (two) times daily. (Patient taking differently: Take 40 mg by mouth 2 (two) times daily. ), Disp: 180 tablet, Rfl: 1    metFORMIN (GLUCOPHAGE) 500 MG tablet, Take 2 tablets (1,000 mg total) by mouth 2 (two) times daily with meals., Disp: 360 tablet, Rfl: 1    omega-3 acid ethyl esters (LOVAZA) 1 gram capsule, Take 2 capsules (2 g total) by mouth 2 (two) times daily., Disp: 120 capsule, Rfl: 11    ondansetron (ZOFRAN-ODT) 8 MG TbDL, Take 1 tablet (8 mg total) by mouth every 6 (six) hours as needed., Disp: 12 tablet, Rfl: 0    thiamine 100 MG tablet, Take 1 tablet (100 mg total) by mouth once daily., Disp: 3 tablet, Rfl: 0    tiZANidine (ZANAFLEX) 4 MG tablet, Take 1 tablet (4 mg total) by mouth 2 (two) times daily as needed., Disp: 180 tablet, Rfl: 0    traMADoL (ULTRAM) 50 mg tablet, Take 1 tablet (50 mg total) by mouth " "every 6 (six) hours as needed for Pain., Disp: 20 tablet, Rfl: 0    TRUEPLUS INSULIN 0.5 mL 29 gauge x 1/2" Syrg, , Disp: , Rfl: 5    ULTRA COMFORT INSULIN SYRINGE 1 mL 29 gauge x 1/2" Syrg, Inject 25 Units as directed 2 (two) times daily., Disp: 180 each, Rfl: 3    warfarin (COUMADIN) 1 MG tablet, Take 1 tablet (1 mg total) by mouth Daily., Disp: 90 tablet, Rfl: 3    warfarin (COUMADIN) 5 MG tablet, Take 1 tablet (5 mg total) by mouth Daily., Disp: 30 tablet, Rfl: 2    warfarin (COUMADIN) 5 MG tablet, TAKE 1 & 1/2 TABLETS BY MOUTH ON MONDAY, WEDNESDAY AND FRIDAY AND TAKE 1 TABLET ON SATURDAY, SUNDAY, TUESDAY AND THURSDAY, Disp: 45 tablet, Rfl: 2    warfarin (COUMADIN) 5 MG tablet, Take 1 tablet (5 mg total) by mouth every Mon, Wed, Fri. 1 1/2 tablets the other days of week for 7.5mg, Disp: 45 tablet, Rfl: 2    warfarin (COUMADIN) 5 MG tablet, TAKE 1 & 1/2 (ONE & ONE-HALF) TABLETS BY MOUTH ON MODAY, WEDNESDAY, AND FRIDAY. TAKE 1 TABLET ON SATURDAY, SUNDAY, TUESDAY AND THURSDAY, Disp: 45 tablet, Rfl: 1    warfarin (COUMADIN) 5 MG tablet, TAKE 1 & 1/2 (ONE & ONE-HALF) TABLETS BY MOUTH ON MODAY, WEDNESDAY, AND FRIDAY. TAKE 1 TABLET ON SATURDAY, SUNDAY, TUESDAY AND THURSDAY, Disp: 45 tablet, Rfl: 1    warfarin (COUMADIN) 5 MG tablet, Take 1.5 tablets (7.5 mg total) by mouth every Mon, Wed, Fri. And 5mg on the other 4 days, Disp: 45 tablet, Rfl: 3    Review of Systems   Constitutional: Negative for chills and fever.   Eyes: Negative for visual disturbance.   Respiratory: Negative for cough, shortness of breath and wheezing.    Cardiovascular: Negative for chest pain, palpitations and leg swelling.   Gastrointestinal: Negative for abdominal pain, constipation and diarrhea.   Genitourinary: Negative for dysuria and hematuria.   Musculoskeletal: Positive for back pain. Negative for gait problem.   Skin: Negative for rash.   Neurological: Positive for numbness (bilat hands and legs). Negative for dizziness and " "headaches.   Psychiatric/Behavioral: Negative for dysphoric mood. The patient is not nervous/anxious (stable on xanax).           Objective:      Vitals:    11/16/20 1111   BP: 134/74   Pulse: (!) 56   Temp: 97.1 °F (36.2 °C)   Weight: 104.8 kg (231 lb)   Height: 5' 10" (1.778 m)     Physical Exam  Vitals signs and nursing note reviewed.   Constitutional:       General: He is not in acute distress.     Appearance: He is well-developed. He is obese.   HENT:      Head: Normocephalic and atraumatic.      Right Ear: Tympanic membrane and ear canal normal.      Left Ear: Tympanic membrane and ear canal normal.   Neck:      Musculoskeletal: Neck supple.      Vascular: No carotid bruit.   Cardiovascular:      Rate and Rhythm: Normal rate and regular rhythm.      Heart sounds: No murmur. No friction rub. No gallop.    Pulmonary:      Effort: Pulmonary effort is normal. No respiratory distress.      Breath sounds: Normal breath sounds. No wheezing or rales.   Abdominal:      General: There is no distension.      Palpations: Abdomen is soft.      Tenderness: There is no abdominal tenderness.   Musculoskeletal:      Right lower leg: No edema.      Left lower leg: No edema.      Comments: Chronic purplish discoloration of bilat ankles/feet   Feet:      Right foot:      Protective Sensation: 6 sites tested. 3 sites sensed.      Skin integrity: Dry skin present. No ulcer, erythema or callus.      Left foot:      Protective Sensation: 6 sites tested. 3 sites sensed.      Skin integrity: Dry skin present. No ulcer, erythema or callus.   Lymphadenopathy:      Cervical: No cervical adenopathy.   Skin:     General: Skin is warm and dry.      Findings: No rash.   Neurological:      General: No focal deficit present.      Mental Status: He is alert and oriented to person, place, and time.      Gait: Gait normal.           Assessment:       1. Diabetic polyneuropathy associated with type 2 diabetes mellitus    2. DM type 2 with diabetic " dyslipidemia    3. Essential hypertension    4. Mixed hyperlipidemia    5. Coronary artery disease involving native coronary artery of native heart without angina pectoris    6. DDD (degenerative disc disease), lumbar    7. Heterozygous factor V Leiden mutation    8. Need for influenza vaccination    9. Anxiety           Plan:       Diabetic polyneuropathy associated with type 2 diabetes mellitus  Comments:  A1C up some to 7.5%- pt advised I recommend he take higher dose of insulin in AM and reduce evening dose d/t hypoglycemia. encouraged to monitor sugar 2-3/day  Orders:  -     Hemoglobin A1C; Future; Expected date: 11/16/2020  -     CBC Auto Differential; Future; Expected date: 11/16/2020  -     Comprehensive Metabolic Panel; Future; Expected date: 11/16/2020  -     Lipid Panel; Future; Expected date: 11/16/2020  -     TSH w/reflex to FT4; Future; Expected date: 11/16/2020  -     Urinalysis, Reflex to Urine Culture Urine, Clean Catch; Future; Expected date: 11/16/2020  -     Microalbumin/Creatinine Ratio, Urine; Future; Expected date: 11/16/2020    DM type 2 with diabetic dyslipidemia  -     metFORMIN (GLUCOPHAGE) 500 MG tablet; Take 2 tablets (1,000 mg total) by mouth 2 (two) times daily with meals.  Dispense: 360 tablet; Refill: 1    Essential hypertension  Comments:  BP improved with addition of amlodipine    Mixed hyperlipidemia  Comments:  TG slightly elevated on last labs, LDL not quite at goal- encouraged to improve diet    Coronary artery disease involving native coronary artery of native heart without angina pectoris  Comments:  stable, denies angina, f/u with cards as scheduled    DDD (degenerative disc disease), lumbar  Comments:  stable, denies any recent falls    Heterozygous factor V Leiden mutation  Comments:  stable on warfarin, managed by Dr. Sofia    Need for influenza vaccination  -     Influenza - Quadrivalent (Recombinant) (PF)    Anxiety  Comments:  stable on med  Orders:  -      ALPRAZolam (XANAX) 1 MG tablet; Take 1 tablet (1 mg total) by mouth 2 (two) times daily.  Dispense: 60 tablet; Refill: 2      Follow up in about 3 months (around 2/16/2021) for Diabetes, HTN, Labs before next visit.        11/16/2020 Marianna Hdez NP

## 2020-12-07 ENCOUNTER — TELEPHONE (OUTPATIENT)
Dept: HEMATOLOGY/ONCOLOGY | Facility: CLINIC | Age: 60
End: 2020-12-07

## 2020-12-07 DIAGNOSIS — Z79.01 LONG TERM (CURRENT) USE OF ANTICOAGULANTS: Primary | ICD-10-CM

## 2020-12-07 NOTE — TELEPHONE ENCOUNTER
Spoke to Luis. His INR was 2.8. he is on 6mg and he will continue this same dose and recheck his INR in 4 weeks.

## 2021-01-05 ENCOUNTER — TELEPHONE (OUTPATIENT)
Dept: HEMATOLOGY/ONCOLOGY | Facility: CLINIC | Age: 61
End: 2021-01-05

## 2021-01-19 DIAGNOSIS — M79.10 MUSCLE PAIN: Primary | ICD-10-CM

## 2021-01-19 DIAGNOSIS — E78.5 DYSLIPIDEMIA: ICD-10-CM

## 2021-01-19 RX ORDER — FENOFIBRATE 145 MG/1
145 TABLET, FILM COATED ORAL DAILY
Qty: 90 TABLET | Refills: 1 | Status: SHIPPED | OUTPATIENT
Start: 2021-01-19 | End: 2021-07-01 | Stop reason: SDUPTHER

## 2021-01-19 RX ORDER — TIZANIDINE 4 MG/1
4 TABLET ORAL 2 TIMES DAILY PRN
Qty: 180 TABLET | Refills: 1 | Status: SHIPPED | OUTPATIENT
Start: 2021-01-19 | End: 2021-07-01 | Stop reason: SDUPTHER

## 2021-02-02 DIAGNOSIS — Z79.4 TYPE 2 DIABETES MELLITUS WITH HYPOGLYCEMIA WITHOUT COMA, WITH LONG-TERM CURRENT USE OF INSULIN: Primary | ICD-10-CM

## 2021-02-02 DIAGNOSIS — E11.649 TYPE 2 DIABETES MELLITUS WITH HYPOGLYCEMIA WITHOUT COMA, WITH LONG-TERM CURRENT USE OF INSULIN: Primary | ICD-10-CM

## 2021-02-02 RX ORDER — SYRINGE AND NEEDLE,INSULIN,1ML 31GX15/64"
SYRINGE, EMPTY DISPOSABLE MISCELLANEOUS
Qty: 180 SYRINGE | Refills: 3 | Status: SHIPPED | OUTPATIENT
Start: 2021-02-02 | End: 2021-07-01 | Stop reason: SDUPTHER

## 2021-02-02 RX ORDER — SYRINGE AND NEEDLE,INSULIN,1ML 31GX15/64"
SYRINGE, EMPTY DISPOSABLE MISCELLANEOUS
COMMUNITY
Start: 2020-12-10 | End: 2021-02-02 | Stop reason: SDUPTHER

## 2021-02-03 DIAGNOSIS — E11.42 DIABETIC POLYNEUROPATHY ASSOCIATED WITH TYPE 2 DIABETES MELLITUS: Primary | ICD-10-CM

## 2021-02-04 ENCOUNTER — TELEPHONE (OUTPATIENT)
Dept: HEMATOLOGY/ONCOLOGY | Facility: CLINIC | Age: 61
End: 2021-02-04

## 2021-02-04 LAB
ALBUMIN SERPL-MCNC: 4.4 G/DL (ref 3.6–5.1)
ALBUMIN/CREAT UR: 77 MCG/MG CREAT
ALBUMIN/GLOB SERPL: 1.6 (CALC) (ref 1–2.5)
ALP SERPL-CCNC: 44 U/L (ref 35–144)
ALT SERPL-CCNC: 62 U/L (ref 9–46)
APPEARANCE UR: CLEAR
AST SERPL-CCNC: 64 U/L (ref 10–35)
BACTERIA #/AREA URNS HPF: ABNORMAL /HPF
BACTERIA UR CULT: ABNORMAL
BASOPHILS # BLD AUTO: 72 CELLS/UL (ref 0–200)
BASOPHILS NFR BLD AUTO: 1.3 %
BILIRUB SERPL-MCNC: 0.6 MG/DL (ref 0.2–1.2)
BILIRUB UR QL STRIP: NEGATIVE
BUN SERPL-MCNC: 18 MG/DL (ref 7–25)
BUN/CREAT SERPL: ABNORMAL (CALC) (ref 6–22)
CALCIUM SERPL-MCNC: 9.2 MG/DL (ref 8.6–10.3)
CHLORIDE SERPL-SCNC: 104 MMOL/L (ref 98–110)
CHOLEST SERPL-MCNC: 188 MG/DL
CHOLEST/HDLC SERPL: 4.4 (CALC)
CO2 SERPL-SCNC: 24 MMOL/L (ref 20–32)
COLOR UR: ABNORMAL
CREAT SERPL-MCNC: 0.98 MG/DL (ref 0.7–1.25)
CREAT UR-MCNC: 265 MG/DL (ref 20–320)
EOSINOPHIL # BLD AUTO: 209 CELLS/UL (ref 15–500)
EOSINOPHIL NFR BLD AUTO: 3.8 %
ERYTHROCYTE [DISTWIDTH] IN BLOOD BY AUTOMATED COUNT: 13.4 % (ref 11–15)
GFRSERPLBLD MDRD-ARVRAT: 82 ML/MIN/1.73M2
GLOBULIN SER CALC-MCNC: 2.7 G/DL (CALC) (ref 1.9–3.7)
GLUCOSE SERPL-MCNC: 189 MG/DL (ref 65–99)
GLUCOSE UR QL STRIP: NEGATIVE
HBA1C MFR BLD: 7.2 % OF TOTAL HGB
HCT VFR BLD AUTO: 41.3 % (ref 38.5–50)
HDLC SERPL-MCNC: 43 MG/DL
HGB BLD-MCNC: 13.2 G/DL (ref 13.2–17.1)
HGB UR QL STRIP: NEGATIVE
HYALINE CASTS #/AREA URNS LPF: ABNORMAL /LPF
KETONES UR QL STRIP: ABNORMAL
LDLC SERPL CALC-MCNC: 109 MG/DL (CALC)
LEUKOCYTE ESTERASE UR QL STRIP: NEGATIVE
LYMPHOCYTES # BLD AUTO: 1678 CELLS/UL (ref 850–3900)
LYMPHOCYTES NFR BLD AUTO: 30.5 %
MCH RBC QN AUTO: 30.8 PG (ref 27–33)
MCHC RBC AUTO-ENTMCNC: 32 G/DL (ref 32–36)
MCV RBC AUTO: 96.5 FL (ref 80–100)
MICROALBUMIN UR-MCNC: 20.4 MG/DL
MONOCYTES # BLD AUTO: 539 CELLS/UL (ref 200–950)
MONOCYTES NFR BLD AUTO: 9.8 %
NEUTROPHILS # BLD AUTO: 3003 CELLS/UL (ref 1500–7800)
NEUTROPHILS NFR BLD AUTO: 54.6 %
NITRITE UR QL STRIP: NEGATIVE
NONHDLC SERPL-MCNC: 145 MG/DL (CALC)
PH UR STRIP: 5.5 [PH] (ref 5–8)
PLATELET # BLD AUTO: 220 THOUSAND/UL (ref 140–400)
PMV BLD REES-ECKER: 10.6 FL (ref 7.5–12.5)
POTASSIUM SERPL-SCNC: 4.7 MMOL/L (ref 3.5–5.3)
PROT SERPL-MCNC: 7.1 G/DL (ref 6.1–8.1)
PROT UR QL STRIP: ABNORMAL
RBC # BLD AUTO: 4.28 MILLION/UL (ref 4.2–5.8)
RBC #/AREA URNS HPF: ABNORMAL /HPF
SODIUM SERPL-SCNC: 138 MMOL/L (ref 135–146)
SP GR UR STRIP: 1.03 (ref 1–1.03)
SQUAMOUS #/AREA URNS HPF: ABNORMAL /HPF
TRIGL SERPL-MCNC: 235 MG/DL
TSH SERPL-ACNC: 2.46 MIU/L (ref 0.4–4.5)
WBC # BLD AUTO: 5.5 THOUSAND/UL (ref 3.8–10.8)
WBC #/AREA URNS HPF: ABNORMAL /HPF

## 2021-02-15 DIAGNOSIS — Z79.899 ENCOUNTER FOR LONG-TERM (CURRENT) USE OF OTHER MEDICATIONS: Primary | ICD-10-CM

## 2021-02-15 DIAGNOSIS — E78.2 MIXED HYPERLIPIDEMIA: ICD-10-CM

## 2021-02-15 RX ORDER — ROSUVASTATIN CALCIUM 20 MG/1
20 TABLET, COATED ORAL DAILY
Qty: 90 TABLET | Refills: 1 | Status: SHIPPED | OUTPATIENT
Start: 2021-02-15 | End: 2021-02-22 | Stop reason: SDUPTHER

## 2021-02-15 RX ORDER — ROSUVASTATIN CALCIUM 20 MG/1
20 TABLET, COATED ORAL DAILY
COMMUNITY
End: 2021-02-15 | Stop reason: SDUPTHER

## 2021-02-15 RX ORDER — ROSUVASTATIN CALCIUM 20 MG/1
20 TABLET, COATED ORAL DAILY
Qty: 30 TABLET | Refills: 0 | Status: SHIPPED | OUTPATIENT
Start: 2021-02-15 | End: 2021-02-22 | Stop reason: SDUPTHER

## 2021-02-22 ENCOUNTER — TELEPHONE (OUTPATIENT)
Dept: FAMILY MEDICINE | Facility: CLINIC | Age: 61
End: 2021-02-22

## 2021-02-22 ENCOUNTER — OFFICE VISIT (OUTPATIENT)
Dept: FAMILY MEDICINE | Facility: CLINIC | Age: 61
End: 2021-02-22
Payer: MEDICARE

## 2021-02-22 VITALS
DIASTOLIC BLOOD PRESSURE: 76 MMHG | HEIGHT: 70 IN | SYSTOLIC BLOOD PRESSURE: 126 MMHG | WEIGHT: 234 LBS | BODY MASS INDEX: 33.5 KG/M2 | HEART RATE: 74 BPM

## 2021-02-22 DIAGNOSIS — E11.42 DIABETIC POLYNEUROPATHY ASSOCIATED WITH TYPE 2 DIABETES MELLITUS: Primary | ICD-10-CM

## 2021-02-22 DIAGNOSIS — I25.10 CORONARY ARTERY DISEASE INVOLVING NATIVE CORONARY ARTERY OF NATIVE HEART WITHOUT ANGINA PECTORIS: ICD-10-CM

## 2021-02-22 DIAGNOSIS — F13.20 SEDATIVE, HYPNOTIC OR ANXIOLYTIC DEPENDENCE, UNCOMPLICATED: ICD-10-CM

## 2021-02-22 DIAGNOSIS — E11.69 DM TYPE 2 WITH DIABETIC DYSLIPIDEMIA: ICD-10-CM

## 2021-02-22 DIAGNOSIS — E78.2 MIXED HYPERLIPIDEMIA: ICD-10-CM

## 2021-02-22 DIAGNOSIS — E78.5 DM TYPE 2 WITH DIABETIC DYSLIPIDEMIA: ICD-10-CM

## 2021-02-22 DIAGNOSIS — F41.9 ANXIETY: ICD-10-CM

## 2021-02-22 DIAGNOSIS — D68.51 HETEROZYGOUS FACTOR V LEIDEN MUTATION: ICD-10-CM

## 2021-02-22 DIAGNOSIS — I10 ESSENTIAL HYPERTENSION: ICD-10-CM

## 2021-02-22 PROCEDURE — 3008F PR BODY MASS INDEX (BMI) DOCUMENTED: ICD-10-PCS | Mod: S$GLB,,, | Performed by: NURSE PRACTITIONER

## 2021-02-22 PROCEDURE — 3051F PR MOST RECENT HEMOGLOBIN A1C LEVEL 7.0 - < 8.0%: ICD-10-PCS | Mod: S$GLB,,, | Performed by: NURSE PRACTITIONER

## 2021-02-22 PROCEDURE — 3074F SYST BP LT 130 MM HG: CPT | Mod: S$GLB,,, | Performed by: NURSE PRACTITIONER

## 2021-02-22 PROCEDURE — 3051F HG A1C>EQUAL 7.0%<8.0%: CPT | Mod: S$GLB,,, | Performed by: NURSE PRACTITIONER

## 2021-02-22 PROCEDURE — 99214 PR OFFICE/OUTPT VISIT, EST, LEVL IV, 30-39 MIN: ICD-10-PCS | Mod: S$GLB,,, | Performed by: NURSE PRACTITIONER

## 2021-02-22 PROCEDURE — 3078F PR MOST RECENT DIASTOLIC BLOOD PRESSURE < 80 MM HG: ICD-10-PCS | Mod: S$GLB,,, | Performed by: NURSE PRACTITIONER

## 2021-02-22 PROCEDURE — 3078F DIAST BP <80 MM HG: CPT | Mod: S$GLB,,, | Performed by: NURSE PRACTITIONER

## 2021-02-22 PROCEDURE — 3008F BODY MASS INDEX DOCD: CPT | Mod: S$GLB,,, | Performed by: NURSE PRACTITIONER

## 2021-02-22 PROCEDURE — 3074F PR MOST RECENT SYSTOLIC BLOOD PRESSURE < 130 MM HG: ICD-10-PCS | Mod: S$GLB,,, | Performed by: NURSE PRACTITIONER

## 2021-02-22 PROCEDURE — 99214 OFFICE O/P EST MOD 30 MIN: CPT | Mod: S$GLB,,, | Performed by: NURSE PRACTITIONER

## 2021-02-22 RX ORDER — ROSUVASTATIN CALCIUM 20 MG/1
20 TABLET, COATED ORAL DAILY
Qty: 90 TABLET | Refills: 1 | Status: SHIPPED | OUTPATIENT
Start: 2021-02-22 | End: 2021-07-01 | Stop reason: SDUPTHER

## 2021-02-22 RX ORDER — ALPRAZOLAM 1 MG/1
1 TABLET ORAL 2 TIMES DAILY
Qty: 60 TABLET | Refills: 2 | Status: SHIPPED | OUTPATIENT
Start: 2021-02-22 | End: 2021-05-20 | Stop reason: SDUPTHER

## 2021-02-22 RX ORDER — LISINOPRIL 20 MG/1
20 TABLET ORAL 2 TIMES DAILY
Qty: 180 TABLET | Refills: 1 | Status: SHIPPED | OUTPATIENT
Start: 2021-02-22 | End: 2021-08-24 | Stop reason: SDUPTHER

## 2021-02-22 RX ORDER — AMLODIPINE BESYLATE 5 MG/1
5 TABLET ORAL DAILY
Qty: 90 TABLET | Refills: 1 | Status: SHIPPED | OUTPATIENT
Start: 2021-02-22 | End: 2021-08-24 | Stop reason: SDUPTHER

## 2021-02-25 ENCOUNTER — TELEPHONE (OUTPATIENT)
Dept: FAMILY MEDICINE | Facility: CLINIC | Age: 61
End: 2021-02-25

## 2021-02-25 DIAGNOSIS — F41.9 ANXIETY: ICD-10-CM

## 2021-03-08 ENCOUNTER — TELEPHONE (OUTPATIENT)
Dept: HEMATOLOGY/ONCOLOGY | Facility: CLINIC | Age: 61
End: 2021-03-08

## 2021-04-07 ENCOUNTER — TELEPHONE (OUTPATIENT)
Dept: HEMATOLOGY/ONCOLOGY | Facility: CLINIC | Age: 61
End: 2021-04-07

## 2021-04-07 LAB
INR PPP: 1.8
PROTHROMBIN TIME: 17.6 SEC (ref 9–11.5)

## 2021-04-20 DIAGNOSIS — E11.42 DIABETIC POLYNEUROPATHY ASSOCIATED WITH TYPE 2 DIABETES MELLITUS: Primary | ICD-10-CM

## 2021-04-20 DIAGNOSIS — I10 ESSENTIAL HYPERTENSION: Primary | ICD-10-CM

## 2021-04-20 RX ORDER — LANCETS
EACH MISCELLANEOUS
Qty: 360 EACH | Refills: 3 | Status: SHIPPED | OUTPATIENT
Start: 2021-04-20

## 2021-04-20 RX ORDER — ATENOLOL 50 MG/1
TABLET ORAL
Qty: 270 TABLET | Refills: 1 | Status: SHIPPED | OUTPATIENT
Start: 2021-04-20 | End: 2021-08-24 | Stop reason: SDUPTHER

## 2021-04-20 RX ORDER — INSULIN PUMP SYRINGE, 3 ML
EACH MISCELLANEOUS
Qty: 1 EACH | Refills: 0 | Status: SHIPPED | OUTPATIENT
Start: 2021-04-20 | End: 2023-03-21

## 2021-04-21 DIAGNOSIS — D68.59 HYPERCOAGULABLE STATE: ICD-10-CM

## 2021-04-21 RX ORDER — WARFARIN SODIUM 5 MG/1
7.5 TABLET ORAL
Qty: 45 TABLET | Refills: 3 | Status: SHIPPED | OUTPATIENT
Start: 2021-04-21 | End: 2021-04-26

## 2021-04-26 DIAGNOSIS — D68.51 HETEROZYGOUS FACTOR V LEIDEN MUTATION: Chronic | ICD-10-CM

## 2021-04-26 RX ORDER — WARFARIN SODIUM 5 MG/1
TABLET ORAL
Qty: 45 TABLET | Refills: 3 | Status: SHIPPED | OUTPATIENT
Start: 2021-04-26 | End: 2021-09-15 | Stop reason: SDUPTHER

## 2021-04-29 ENCOUNTER — TELEPHONE (OUTPATIENT)
Dept: FAMILY MEDICINE | Facility: CLINIC | Age: 61
End: 2021-04-29

## 2021-05-05 ENCOUNTER — TELEPHONE (OUTPATIENT)
Dept: HEMATOLOGY/ONCOLOGY | Facility: CLINIC | Age: 61
End: 2021-05-05

## 2021-05-12 ENCOUNTER — TELEPHONE (OUTPATIENT)
Dept: FAMILY MEDICINE | Facility: CLINIC | Age: 61
End: 2021-05-12

## 2021-05-19 LAB
ALBUMIN SERPL-MCNC: 4.5 G/DL (ref 3.6–5.1)
ALBUMIN/GLOB SERPL: 1.6 (CALC) (ref 1–2.5)
ALP SERPL-CCNC: 38 U/L (ref 35–144)
ALT SERPL-CCNC: 70 U/L (ref 9–46)
AST SERPL-CCNC: 98 U/L (ref 10–35)
BILIRUB SERPL-MCNC: 0.8 MG/DL (ref 0.2–1.2)
BUN SERPL-MCNC: 27 MG/DL (ref 7–25)
BUN/CREAT SERPL: 21 (CALC) (ref 6–22)
CALCIUM SERPL-MCNC: 9.6 MG/DL (ref 8.6–10.3)
CHLORIDE SERPL-SCNC: 104 MMOL/L (ref 98–110)
CHOLEST SERPL-MCNC: 175 MG/DL
CHOLEST/HDLC SERPL: 4.2 (CALC)
CO2 SERPL-SCNC: 26 MMOL/L (ref 20–32)
CREAT SERPL-MCNC: 1.31 MG/DL (ref 0.7–1.25)
GLOBULIN SER CALC-MCNC: 2.8 G/DL (CALC) (ref 1.9–3.7)
GLUCOSE SERPL-MCNC: 118 MG/DL (ref 65–99)
HBA1C MFR BLD: 7.3 % OF TOTAL HGB
HDLC SERPL-MCNC: 42 MG/DL
LDLC SERPL CALC-MCNC: 99 MG/DL (CALC)
NONHDLC SERPL-MCNC: 133 MG/DL (CALC)
POTASSIUM SERPL-SCNC: 4.9 MMOL/L (ref 3.5–5.3)
PROT SERPL-MCNC: 7.3 G/DL (ref 6.1–8.1)
SODIUM SERPL-SCNC: 140 MMOL/L (ref 135–146)
TRIGL SERPL-MCNC: 215 MG/DL

## 2021-05-20 DIAGNOSIS — E11.69 DM TYPE 2 WITH DIABETIC DYSLIPIDEMIA: ICD-10-CM

## 2021-05-20 DIAGNOSIS — E11.9 TYPE 2 DIABETES MELLITUS WITHOUT COMPLICATION, WITHOUT LONG-TERM CURRENT USE OF INSULIN: ICD-10-CM

## 2021-05-20 DIAGNOSIS — F41.9 ANXIETY: ICD-10-CM

## 2021-05-20 DIAGNOSIS — E78.5 DM TYPE 2 WITH DIABETIC DYSLIPIDEMIA: ICD-10-CM

## 2021-05-20 RX ORDER — GLIMEPIRIDE 4 MG/1
4 TABLET ORAL 2 TIMES DAILY
Qty: 180 TABLET | Refills: 1 | Status: SHIPPED | OUTPATIENT
Start: 2021-05-20 | End: 2021-11-17 | Stop reason: SDUPTHER

## 2021-05-20 RX ORDER — ALPRAZOLAM 1 MG/1
1 TABLET ORAL 2 TIMES DAILY
Qty: 60 TABLET | Refills: 2 | Status: SHIPPED | OUTPATIENT
Start: 2021-05-20 | End: 2021-08-24 | Stop reason: SDUPTHER

## 2021-05-21 ENCOUNTER — OFFICE VISIT (OUTPATIENT)
Dept: FAMILY MEDICINE | Facility: CLINIC | Age: 61
End: 2021-05-21
Payer: MEDICARE

## 2021-05-21 VITALS
SYSTOLIC BLOOD PRESSURE: 134 MMHG | DIASTOLIC BLOOD PRESSURE: 84 MMHG | HEIGHT: 70 IN | HEART RATE: 66 BPM | WEIGHT: 236 LBS | BODY MASS INDEX: 33.79 KG/M2 | OXYGEN SATURATION: 97 %

## 2021-05-21 DIAGNOSIS — R79.89 ELEVATED LFTS: ICD-10-CM

## 2021-05-21 DIAGNOSIS — E78.5 DM TYPE 2 WITH DIABETIC DYSLIPIDEMIA: Primary | ICD-10-CM

## 2021-05-21 DIAGNOSIS — I10 ESSENTIAL HYPERTENSION: ICD-10-CM

## 2021-05-21 DIAGNOSIS — I25.10 CORONARY ARTERY DISEASE INVOLVING NATIVE CORONARY ARTERY OF NATIVE HEART WITHOUT ANGINA PECTORIS: ICD-10-CM

## 2021-05-21 DIAGNOSIS — E11.69 DM TYPE 2 WITH DIABETIC DYSLIPIDEMIA: Primary | ICD-10-CM

## 2021-05-21 DIAGNOSIS — E78.2 MIXED HYPERLIPIDEMIA: ICD-10-CM

## 2021-05-21 DIAGNOSIS — N18.31 STAGE 3A CHRONIC KIDNEY DISEASE: ICD-10-CM

## 2021-05-21 LAB
EKG 12-LEAD: NORMAL
PR INTERVAL: NORMAL
PRT AXES: NORMAL
QRS DURATION: NORMAL
QT/QTC: NORMAL
VENTRICULAR RATE: NORMAL

## 2021-05-21 PROCEDURE — 3008F PR BODY MASS INDEX (BMI) DOCUMENTED: ICD-10-PCS | Mod: S$GLB,,, | Performed by: NURSE PRACTITIONER

## 2021-05-21 PROCEDURE — 3079F DIAST BP 80-89 MM HG: CPT | Mod: S$GLB,,, | Performed by: NURSE PRACTITIONER

## 2021-05-21 PROCEDURE — 3051F HG A1C>EQUAL 7.0%<8.0%: CPT | Mod: S$GLB,,, | Performed by: NURSE PRACTITIONER

## 2021-05-21 PROCEDURE — 99214 PR OFFICE/OUTPT VISIT, EST, LEVL IV, 30-39 MIN: ICD-10-PCS | Mod: 25,S$GLB,, | Performed by: NURSE PRACTITIONER

## 2021-05-21 PROCEDURE — 99214 OFFICE O/P EST MOD 30 MIN: CPT | Mod: 25,S$GLB,, | Performed by: NURSE PRACTITIONER

## 2021-05-21 PROCEDURE — 3051F PR MOST RECENT HEMOGLOBIN A1C LEVEL 7.0 - < 8.0%: ICD-10-PCS | Mod: S$GLB,,, | Performed by: NURSE PRACTITIONER

## 2021-05-21 PROCEDURE — 3075F PR MOST RECENT SYSTOLIC BLOOD PRESS GE 130-139MM HG: ICD-10-PCS | Mod: S$GLB,,, | Performed by: NURSE PRACTITIONER

## 2021-05-21 PROCEDURE — 3075F SYST BP GE 130 - 139MM HG: CPT | Mod: S$GLB,,, | Performed by: NURSE PRACTITIONER

## 2021-05-21 PROCEDURE — 3008F BODY MASS INDEX DOCD: CPT | Mod: S$GLB,,, | Performed by: NURSE PRACTITIONER

## 2021-05-21 PROCEDURE — 93000 POCT EKG 12-LEAD: ICD-10-PCS | Mod: S$GLB,,, | Performed by: NURSE PRACTITIONER

## 2021-05-21 PROCEDURE — 93000 ELECTROCARDIOGRAM COMPLETE: CPT | Mod: S$GLB,,, | Performed by: NURSE PRACTITIONER

## 2021-05-21 PROCEDURE — 3079F PR MOST RECENT DIASTOLIC BLOOD PRESSURE 80-89 MM HG: ICD-10-PCS | Mod: S$GLB,,, | Performed by: NURSE PRACTITIONER

## 2021-05-21 RX ORDER — EPINEPHRINE 0.22MG
100 AEROSOL WITH ADAPTER (ML) INHALATION 2 TIMES DAILY
COMMUNITY
End: 2023-09-22

## 2021-05-24 ENCOUNTER — TELEPHONE (OUTPATIENT)
Dept: FAMILY MEDICINE | Facility: CLINIC | Age: 61
End: 2021-05-24

## 2021-05-24 DIAGNOSIS — Z79.899 ENCOUNTER FOR LONG-TERM (CURRENT) USE OF OTHER MEDICATIONS: Primary | ICD-10-CM

## 2021-05-25 ENCOUNTER — TELEPHONE (OUTPATIENT)
Dept: FAMILY MEDICINE | Facility: CLINIC | Age: 61
End: 2021-05-25

## 2021-06-08 ENCOUNTER — TELEPHONE (OUTPATIENT)
Dept: HEMATOLOGY/ONCOLOGY | Facility: CLINIC | Age: 61
End: 2021-06-08

## 2021-06-14 DIAGNOSIS — E11.69 DM TYPE 2 WITH DIABETIC DYSLIPIDEMIA: ICD-10-CM

## 2021-06-14 DIAGNOSIS — E78.5 DM TYPE 2 WITH DIABETIC DYSLIPIDEMIA: ICD-10-CM

## 2021-06-14 DIAGNOSIS — E11.9 TYPE 2 DIABETES MELLITUS WITHOUT COMPLICATION, WITHOUT LONG-TERM CURRENT USE OF INSULIN: ICD-10-CM

## 2021-06-14 RX ORDER — SYRINGE AND NEEDLE,INSULIN,1ML 29 G X1/2"
25 SYRINGE, EMPTY DISPOSABLE MISCELLANEOUS 2 TIMES DAILY
Qty: 180 EACH | Refills: 3 | Status: SHIPPED | OUTPATIENT
Start: 2021-06-14

## 2021-06-14 RX ORDER — METFORMIN HYDROCHLORIDE 500 MG/1
1000 TABLET ORAL 2 TIMES DAILY WITH MEALS
Qty: 360 TABLET | Refills: 1 | Status: SHIPPED | OUTPATIENT
Start: 2021-06-14 | End: 2021-11-17 | Stop reason: SDUPTHER

## 2021-06-22 ENCOUNTER — TELEPHONE (OUTPATIENT)
Dept: FAMILY MEDICINE | Facility: CLINIC | Age: 61
End: 2021-06-22

## 2021-06-25 LAB
ALBUMIN SERPL-MCNC: 4.4 G/DL (ref 3.6–5.1)
ALBUMIN/GLOB SERPL: 1.6 (CALC) (ref 1–2.5)
ALP SERPL-CCNC: 46 U/L (ref 35–144)
ALT SERPL-CCNC: 55 U/L (ref 9–46)
AST SERPL-CCNC: 57 U/L (ref 10–35)
BILIRUB SERPL-MCNC: 0.5 MG/DL (ref 0.2–1.2)
BUN SERPL-MCNC: 26 MG/DL (ref 7–25)
BUN/CREAT SERPL: 23 (CALC) (ref 6–22)
CALCIUM SERPL-MCNC: 9.2 MG/DL (ref 8.6–10.3)
CHLORIDE SERPL-SCNC: 103 MMOL/L (ref 98–110)
CO2 SERPL-SCNC: 22 MMOL/L (ref 20–32)
CREAT SERPL-MCNC: 1.13 MG/DL (ref 0.7–1.25)
GLOBULIN SER CALC-MCNC: 2.8 G/DL (CALC) (ref 1.9–3.7)
GLUCOSE SERPL-MCNC: 201 MG/DL (ref 65–99)
POTASSIUM SERPL-SCNC: 4.9 MMOL/L (ref 3.5–5.3)
PROT SERPL-MCNC: 7.2 G/DL (ref 6.1–8.1)
SODIUM SERPL-SCNC: 137 MMOL/L (ref 135–146)

## 2021-06-28 ENCOUNTER — TELEPHONE (OUTPATIENT)
Dept: FAMILY MEDICINE | Facility: CLINIC | Age: 61
End: 2021-06-28

## 2021-07-01 DIAGNOSIS — E11.649 TYPE 2 DIABETES MELLITUS WITH HYPOGLYCEMIA WITHOUT COMA, WITH LONG-TERM CURRENT USE OF INSULIN: ICD-10-CM

## 2021-07-01 DIAGNOSIS — E78.2 MIXED HYPERLIPIDEMIA: ICD-10-CM

## 2021-07-01 DIAGNOSIS — M79.10 MUSCLE PAIN: ICD-10-CM

## 2021-07-01 DIAGNOSIS — E78.5 DYSLIPIDEMIA: ICD-10-CM

## 2021-07-01 DIAGNOSIS — Z79.4 TYPE 2 DIABETES MELLITUS WITH HYPOGLYCEMIA WITHOUT COMA, WITH LONG-TERM CURRENT USE OF INSULIN: ICD-10-CM

## 2021-07-01 RX ORDER — SYRINGE AND NEEDLE,INSULIN,1ML 31GX15/64"
SYRINGE, EMPTY DISPOSABLE MISCELLANEOUS
Qty: 180 SYRINGE | Refills: 3 | Status: SHIPPED | OUTPATIENT
Start: 2021-07-01 | End: 2021-07-01

## 2021-07-01 RX ORDER — FENOFIBRATE 145 MG/1
145 TABLET, FILM COATED ORAL DAILY
Qty: 90 TABLET | Refills: 1 | Status: SHIPPED | OUTPATIENT
Start: 2021-07-01 | End: 2021-11-17 | Stop reason: SDUPTHER

## 2021-07-01 RX ORDER — ROSUVASTATIN CALCIUM 20 MG/1
20 TABLET, COATED ORAL DAILY
Qty: 90 TABLET | Refills: 1 | Status: SHIPPED | OUTPATIENT
Start: 2021-07-01 | End: 2021-11-17 | Stop reason: SDUPTHER

## 2021-07-01 RX ORDER — TIZANIDINE 4 MG/1
4 TABLET ORAL 2 TIMES DAILY PRN
Qty: 180 TABLET | Refills: 1 | Status: SHIPPED | OUTPATIENT
Start: 2021-07-01 | End: 2021-11-17 | Stop reason: SDUPTHER

## 2021-07-02 DIAGNOSIS — Z79.4 TYPE 2 DIABETES MELLITUS WITH HYPOGLYCEMIA WITHOUT COMA, WITH LONG-TERM CURRENT USE OF INSULIN: ICD-10-CM

## 2021-07-02 DIAGNOSIS — E11.649 TYPE 2 DIABETES MELLITUS WITH HYPOGLYCEMIA WITHOUT COMA, WITH LONG-TERM CURRENT USE OF INSULIN: ICD-10-CM

## 2021-07-02 RX ORDER — SYRINGE AND NEEDLE,INSULIN,1ML 31GX15/64"
SYRINGE, EMPTY DISPOSABLE MISCELLANEOUS
Qty: 180 SYRINGE | Refills: 3 | Status: SHIPPED | OUTPATIENT
Start: 2021-07-02 | End: 2021-07-15 | Stop reason: SDUPTHER

## 2021-07-12 ENCOUNTER — TELEPHONE (OUTPATIENT)
Dept: HEMATOLOGY/ONCOLOGY | Facility: CLINIC | Age: 61
End: 2021-07-12

## 2021-07-15 DIAGNOSIS — Z79.4 TYPE 2 DIABETES MELLITUS WITH HYPOGLYCEMIA WITHOUT COMA, WITH LONG-TERM CURRENT USE OF INSULIN: ICD-10-CM

## 2021-07-15 DIAGNOSIS — E11.649 TYPE 2 DIABETES MELLITUS WITH HYPOGLYCEMIA WITHOUT COMA, WITH LONG-TERM CURRENT USE OF INSULIN: ICD-10-CM

## 2021-07-15 RX ORDER — SYRINGE AND NEEDLE,INSULIN,1ML 31GX15/64"
SYRINGE, EMPTY DISPOSABLE MISCELLANEOUS
Qty: 180 SYRINGE | Refills: 3 | Status: SHIPPED | OUTPATIENT
Start: 2021-07-15 | End: 2021-10-18 | Stop reason: SDUPTHER

## 2021-08-07 LAB
ALBUMIN SERPL-MCNC: 4.5 G/DL (ref 3.6–5.1)
ALBUMIN/GLOB SERPL: 1.6 (CALC) (ref 1–2.5)
ALP SERPL-CCNC: 51 U/L (ref 35–144)
ALT SERPL-CCNC: 42 U/L (ref 9–46)
AST SERPL-CCNC: 39 U/L (ref 10–35)
BILIRUB SERPL-MCNC: 0.5 MG/DL (ref 0.2–1.2)
BUN SERPL-MCNC: 27 MG/DL (ref 7–25)
BUN/CREAT SERPL: 22 (CALC) (ref 6–22)
CALCIUM SERPL-MCNC: 9.3 MG/DL (ref 8.6–10.3)
CHLORIDE SERPL-SCNC: 103 MMOL/L (ref 98–110)
CO2 SERPL-SCNC: 24 MMOL/L (ref 20–32)
CREAT SERPL-MCNC: 1.23 MG/DL (ref 0.7–1.25)
GLOBULIN SER CALC-MCNC: 2.9 G/DL (CALC) (ref 1.9–3.7)
GLUCOSE SERPL-MCNC: 171 MG/DL (ref 65–139)
HBA1C MFR BLD: 7.3 % OF TOTAL HGB
POTASSIUM SERPL-SCNC: 4.7 MMOL/L (ref 3.5–5.3)
PROT SERPL-MCNC: 7.4 G/DL (ref 6.1–8.1)
SODIUM SERPL-SCNC: 138 MMOL/L (ref 135–146)

## 2021-08-10 ENCOUNTER — TELEPHONE (OUTPATIENT)
Dept: HEMATOLOGY/ONCOLOGY | Facility: CLINIC | Age: 61
End: 2021-08-10

## 2021-08-24 ENCOUNTER — OFFICE VISIT (OUTPATIENT)
Dept: FAMILY MEDICINE | Facility: CLINIC | Age: 61
End: 2021-08-24
Payer: MEDICARE

## 2021-08-24 VITALS
SYSTOLIC BLOOD PRESSURE: 178 MMHG | HEART RATE: 88 BPM | WEIGHT: 232 LBS | BODY MASS INDEX: 33.21 KG/M2 | DIASTOLIC BLOOD PRESSURE: 102 MMHG | HEIGHT: 70 IN

## 2021-08-24 DIAGNOSIS — E11.69 DM TYPE 2 WITH DIABETIC DYSLIPIDEMIA: ICD-10-CM

## 2021-08-24 DIAGNOSIS — M51.36 DDD (DEGENERATIVE DISC DISEASE), LUMBAR: ICD-10-CM

## 2021-08-24 DIAGNOSIS — M50.30 DDD (DEGENERATIVE DISC DISEASE), CERVICAL: ICD-10-CM

## 2021-08-24 DIAGNOSIS — E78.5 DM TYPE 2 WITH DIABETIC DYSLIPIDEMIA: ICD-10-CM

## 2021-08-24 DIAGNOSIS — F41.9 ANXIETY: ICD-10-CM

## 2021-08-24 DIAGNOSIS — I25.10 CORONARY ARTERY DISEASE INVOLVING NATIVE CORONARY ARTERY OF NATIVE HEART WITHOUT ANGINA PECTORIS: ICD-10-CM

## 2021-08-24 DIAGNOSIS — I10 ESSENTIAL HYPERTENSION: Primary | ICD-10-CM

## 2021-08-24 PROCEDURE — 1160F PR REVIEW ALL MEDS BY PRESCRIBER/CLIN PHARMACIST DOCUMENTED: ICD-10-PCS | Mod: S$GLB,,, | Performed by: NURSE PRACTITIONER

## 2021-08-24 PROCEDURE — 3008F PR BODY MASS INDEX (BMI) DOCUMENTED: ICD-10-PCS | Mod: S$GLB,,, | Performed by: NURSE PRACTITIONER

## 2021-08-24 PROCEDURE — 3080F DIAST BP >= 90 MM HG: CPT | Mod: S$GLB,,, | Performed by: NURSE PRACTITIONER

## 2021-08-24 PROCEDURE — 3051F PR MOST RECENT HEMOGLOBIN A1C LEVEL 7.0 - < 8.0%: ICD-10-PCS | Mod: S$GLB,,, | Performed by: NURSE PRACTITIONER

## 2021-08-24 PROCEDURE — 3080F PR MOST RECENT DIASTOLIC BLOOD PRESSURE >= 90 MM HG: ICD-10-PCS | Mod: S$GLB,,, | Performed by: NURSE PRACTITIONER

## 2021-08-24 PROCEDURE — 3008F BODY MASS INDEX DOCD: CPT | Mod: S$GLB,,, | Performed by: NURSE PRACTITIONER

## 2021-08-24 PROCEDURE — 3051F HG A1C>EQUAL 7.0%<8.0%: CPT | Mod: S$GLB,,, | Performed by: NURSE PRACTITIONER

## 2021-08-24 PROCEDURE — 1160F RVW MEDS BY RX/DR IN RCRD: CPT | Mod: S$GLB,,, | Performed by: NURSE PRACTITIONER

## 2021-08-24 PROCEDURE — 3077F PR MOST RECENT SYSTOLIC BLOOD PRESSURE >= 140 MM HG: ICD-10-PCS | Mod: S$GLB,,, | Performed by: NURSE PRACTITIONER

## 2021-08-24 PROCEDURE — 99214 PR OFFICE/OUTPT VISIT, EST, LEVL IV, 30-39 MIN: ICD-10-PCS | Mod: S$GLB,,, | Performed by: NURSE PRACTITIONER

## 2021-08-24 PROCEDURE — 3077F SYST BP >= 140 MM HG: CPT | Mod: S$GLB,,, | Performed by: NURSE PRACTITIONER

## 2021-08-24 PROCEDURE — 99214 OFFICE O/P EST MOD 30 MIN: CPT | Mod: S$GLB,,, | Performed by: NURSE PRACTITIONER

## 2021-08-24 RX ORDER — LISINOPRIL 20 MG/1
20 TABLET ORAL 2 TIMES DAILY
Qty: 180 TABLET | Refills: 1 | Status: SHIPPED | OUTPATIENT
Start: 2021-08-24 | End: 2022-03-15 | Stop reason: SDUPTHER

## 2021-08-24 RX ORDER — ALPRAZOLAM 1 MG/1
1 TABLET ORAL 2 TIMES DAILY
Qty: 60 TABLET | Refills: 2 | Status: SHIPPED | OUTPATIENT
Start: 2021-08-24 | End: 2021-11-17 | Stop reason: SDUPTHER

## 2021-08-24 RX ORDER — AMLODIPINE BESYLATE 5 MG/1
5 TABLET ORAL DAILY
Qty: 90 TABLET | Refills: 1 | Status: SHIPPED | OUTPATIENT
Start: 2021-08-24 | End: 2021-11-17 | Stop reason: SDUPTHER

## 2021-08-24 RX ORDER — ATENOLOL 50 MG/1
TABLET ORAL
Qty: 270 TABLET | Refills: 1 | Status: SHIPPED | OUTPATIENT
Start: 2021-08-24 | End: 2021-10-18 | Stop reason: SDUPTHER

## 2021-09-15 ENCOUNTER — OFFICE VISIT (OUTPATIENT)
Dept: OPHTHALMOLOGY | Facility: CLINIC | Age: 61
End: 2021-09-15
Payer: MEDICARE

## 2021-09-15 DIAGNOSIS — H25.13 AGE-RELATED NUCLEAR CATARACT OF BOTH EYES: Primary | ICD-10-CM

## 2021-09-15 DIAGNOSIS — H52.7 REFRACTIVE ERROR: ICD-10-CM

## 2021-09-15 DIAGNOSIS — D68.51 HETEROZYGOUS FACTOR V LEIDEN MUTATION: ICD-10-CM

## 2021-09-15 PROCEDURE — 1159F PR MEDICATION LIST DOCUMENTED IN MEDICAL RECORD: ICD-10-PCS | Mod: CPTII,S$GLB,, | Performed by: OPHTHALMOLOGY

## 2021-09-15 PROCEDURE — 3051F HG A1C>EQUAL 7.0%<8.0%: CPT | Mod: CPTII,S$GLB,, | Performed by: OPHTHALMOLOGY

## 2021-09-15 PROCEDURE — 3060F PR POS MICROALBUMINURIA RESULT DOCUMENTED/REVIEW: ICD-10-PCS | Mod: CPTII,S$GLB,, | Performed by: OPHTHALMOLOGY

## 2021-09-15 PROCEDURE — 2023F DILAT RTA XM W/O RTNOPTHY: CPT | Mod: CPTII,S$GLB,, | Performed by: OPHTHALMOLOGY

## 2021-09-15 PROCEDURE — 4010F ACE/ARB THERAPY RXD/TAKEN: CPT | Mod: CPTII,S$GLB,, | Performed by: OPHTHALMOLOGY

## 2021-09-15 PROCEDURE — 2023F PR DILATED RETINAL EXAM W/O EVID OF RETINOPATHY: ICD-10-PCS | Mod: CPTII,S$GLB,, | Performed by: OPHTHALMOLOGY

## 2021-09-15 PROCEDURE — 3066F NEPHROPATHY DOC TX: CPT | Mod: CPTII,S$GLB,, | Performed by: OPHTHALMOLOGY

## 2021-09-15 PROCEDURE — 3051F PR MOST RECENT HEMOGLOBIN A1C LEVEL 7.0 - < 8.0%: ICD-10-PCS | Mod: CPTII,S$GLB,, | Performed by: OPHTHALMOLOGY

## 2021-09-15 PROCEDURE — 99999 PR PBB SHADOW E&M-NEW PATIENT-LVL IV: CPT | Mod: PBBFAC,,, | Performed by: OPHTHALMOLOGY

## 2021-09-15 PROCEDURE — 92015 PR REFRACTION: ICD-10-PCS | Mod: S$GLB,,, | Performed by: OPHTHALMOLOGY

## 2021-09-15 PROCEDURE — 4010F PR ACE/ARB THEARPY RXD/TAKEN: ICD-10-PCS | Mod: CPTII,S$GLB,, | Performed by: OPHTHALMOLOGY

## 2021-09-15 PROCEDURE — 99999 PR PBB SHADOW E&M-NEW PATIENT-LVL IV: ICD-10-PCS | Mod: PBBFAC,,, | Performed by: OPHTHALMOLOGY

## 2021-09-15 PROCEDURE — 92004 COMPRE OPH EXAM NEW PT 1/>: CPT | Mod: S$GLB,,, | Performed by: OPHTHALMOLOGY

## 2021-09-15 PROCEDURE — 92004 PR EYE EXAM, NEW PATIENT,COMPREHESV: ICD-10-PCS | Mod: S$GLB,,, | Performed by: OPHTHALMOLOGY

## 2021-09-15 PROCEDURE — 3066F PR DOCUMENTATION OF TREATMENT FOR NEPHROPATHY: ICD-10-PCS | Mod: CPTII,S$GLB,, | Performed by: OPHTHALMOLOGY

## 2021-09-15 PROCEDURE — 1160F RVW MEDS BY RX/DR IN RCRD: CPT | Mod: CPTII,S$GLB,, | Performed by: OPHTHALMOLOGY

## 2021-09-15 PROCEDURE — 3060F POS MICROALBUMINURIA REV: CPT | Mod: CPTII,S$GLB,, | Performed by: OPHTHALMOLOGY

## 2021-09-15 PROCEDURE — 1159F MED LIST DOCD IN RCRD: CPT | Mod: CPTII,S$GLB,, | Performed by: OPHTHALMOLOGY

## 2021-09-15 PROCEDURE — 92015 DETERMINE REFRACTIVE STATE: CPT | Mod: S$GLB,,, | Performed by: OPHTHALMOLOGY

## 2021-09-15 PROCEDURE — 1160F PR REVIEW ALL MEDS BY PRESCRIBER/CLIN PHARMACIST DOCUMENTED: ICD-10-PCS | Mod: CPTII,S$GLB,, | Performed by: OPHTHALMOLOGY

## 2021-09-15 RX ORDER — WARFARIN SODIUM 5 MG/1
TABLET ORAL
Qty: 45 TABLET | Refills: 3 | Status: SHIPPED | OUTPATIENT
Start: 2021-09-15 | End: 2023-04-18 | Stop reason: SDUPTHER

## 2021-09-15 RX ORDER — WARFARIN 1 MG/1
1 TABLET ORAL DAILY
Qty: 90 TABLET | Refills: 3 | Status: SHIPPED | OUTPATIENT
Start: 2021-09-15 | End: 2022-11-08

## 2021-10-06 ENCOUNTER — CLINICAL SUPPORT (OUTPATIENT)
Dept: FAMILY MEDICINE | Facility: CLINIC | Age: 61
End: 2021-10-06

## 2021-10-06 VITALS — DIASTOLIC BLOOD PRESSURE: 76 MMHG | SYSTOLIC BLOOD PRESSURE: 136 MMHG

## 2021-10-13 ENCOUNTER — TELEPHONE (OUTPATIENT)
Dept: HEMATOLOGY/ONCOLOGY | Facility: CLINIC | Age: 61
End: 2021-10-13

## 2021-10-18 DIAGNOSIS — E11.649 TYPE 2 DIABETES MELLITUS WITH HYPOGLYCEMIA WITHOUT COMA, WITH LONG-TERM CURRENT USE OF INSULIN: ICD-10-CM

## 2021-10-18 DIAGNOSIS — I10 ESSENTIAL HYPERTENSION: ICD-10-CM

## 2021-10-18 DIAGNOSIS — Z79.4 TYPE 2 DIABETES MELLITUS WITH HYPOGLYCEMIA WITHOUT COMA, WITH LONG-TERM CURRENT USE OF INSULIN: ICD-10-CM

## 2021-10-18 RX ORDER — ATENOLOL 50 MG/1
TABLET ORAL
Qty: 270 TABLET | Refills: 1 | Status: SHIPPED | OUTPATIENT
Start: 2021-10-18 | End: 2022-03-15 | Stop reason: SDUPTHER

## 2021-10-18 RX ORDER — SYRINGE AND NEEDLE,INSULIN,1ML 31GX15/64"
SYRINGE, EMPTY DISPOSABLE MISCELLANEOUS
Qty: 180 SYRINGE | Refills: 3 | Status: SHIPPED | OUTPATIENT
Start: 2021-10-18

## 2021-10-26 ENCOUNTER — TELEPHONE (OUTPATIENT)
Dept: HEMATOLOGY/ONCOLOGY | Facility: CLINIC | Age: 61
End: 2021-10-26
Payer: MEDICARE

## 2021-10-26 LAB — HBA1C MFR BLD: 7.4 % OF TOTAL HGB

## 2021-11-04 ENCOUNTER — TELEPHONE (OUTPATIENT)
Dept: FAMILY MEDICINE | Facility: CLINIC | Age: 61
End: 2021-11-04
Payer: MEDICARE

## 2021-11-04 ENCOUNTER — OFFICE VISIT (OUTPATIENT)
Dept: HEMATOLOGY/ONCOLOGY | Facility: CLINIC | Age: 61
End: 2021-11-04
Payer: MEDICARE

## 2021-11-04 VITALS
DIASTOLIC BLOOD PRESSURE: 98 MMHG | HEART RATE: 67 BPM | RESPIRATION RATE: 18 BRPM | SYSTOLIC BLOOD PRESSURE: 180 MMHG | WEIGHT: 240 LBS | BODY MASS INDEX: 34.44 KG/M2

## 2021-11-04 DIAGNOSIS — D68.51 HETEROZYGOUS FACTOR V LEIDEN MUTATION: Chronic | ICD-10-CM

## 2021-11-04 DIAGNOSIS — I10 PRIMARY HYPERTENSION: ICD-10-CM

## 2021-11-04 PROCEDURE — 4010F ACE/ARB THERAPY RXD/TAKEN: CPT | Mod: S$GLB,,, | Performed by: INTERNAL MEDICINE

## 2021-11-04 PROCEDURE — 3077F SYST BP >= 140 MM HG: CPT | Mod: S$GLB,,, | Performed by: INTERNAL MEDICINE

## 2021-11-04 PROCEDURE — 3008F BODY MASS INDEX DOCD: CPT | Mod: S$GLB,,, | Performed by: INTERNAL MEDICINE

## 2021-11-04 PROCEDURE — 3008F PR BODY MASS INDEX (BMI) DOCUMENTED: ICD-10-PCS | Mod: S$GLB,,, | Performed by: INTERNAL MEDICINE

## 2021-11-04 PROCEDURE — 99213 PR OFFICE/OUTPT VISIT, EST, LEVL III, 20-29 MIN: ICD-10-PCS | Mod: S$GLB,,, | Performed by: INTERNAL MEDICINE

## 2021-11-04 PROCEDURE — 1160F RVW MEDS BY RX/DR IN RCRD: CPT | Mod: S$GLB,,, | Performed by: INTERNAL MEDICINE

## 2021-11-04 PROCEDURE — 3060F PR POS MICROALBUMINURIA RESULT DOCUMENTED/REVIEW: ICD-10-PCS | Mod: S$GLB,,, | Performed by: INTERNAL MEDICINE

## 2021-11-04 PROCEDURE — 99213 OFFICE O/P EST LOW 20 MIN: CPT | Mod: S$GLB,,, | Performed by: INTERNAL MEDICINE

## 2021-11-04 PROCEDURE — 3066F NEPHROPATHY DOC TX: CPT | Mod: S$GLB,,, | Performed by: INTERNAL MEDICINE

## 2021-11-04 PROCEDURE — 4010F PR ACE/ARB THEARPY RXD/TAKEN: ICD-10-PCS | Mod: S$GLB,,, | Performed by: INTERNAL MEDICINE

## 2021-11-04 PROCEDURE — 3080F PR MOST RECENT DIASTOLIC BLOOD PRESSURE >= 90 MM HG: ICD-10-PCS | Mod: S$GLB,,, | Performed by: INTERNAL MEDICINE

## 2021-11-04 PROCEDURE — 3060F POS MICROALBUMINURIA REV: CPT | Mod: S$GLB,,, | Performed by: INTERNAL MEDICINE

## 2021-11-04 PROCEDURE — 1160F PR REVIEW ALL MEDS BY PRESCRIBER/CLIN PHARMACIST DOCUMENTED: ICD-10-PCS | Mod: S$GLB,,, | Performed by: INTERNAL MEDICINE

## 2021-11-04 PROCEDURE — 3080F DIAST BP >= 90 MM HG: CPT | Mod: S$GLB,,, | Performed by: INTERNAL MEDICINE

## 2021-11-04 PROCEDURE — 3066F PR DOCUMENTATION OF TREATMENT FOR NEPHROPATHY: ICD-10-PCS | Mod: S$GLB,,, | Performed by: INTERNAL MEDICINE

## 2021-11-04 PROCEDURE — 3077F PR MOST RECENT SYSTOLIC BLOOD PRESSURE >= 140 MM HG: ICD-10-PCS | Mod: S$GLB,,, | Performed by: INTERNAL MEDICINE

## 2021-11-04 PROCEDURE — 3051F HG A1C>EQUAL 7.0%<8.0%: CPT | Mod: S$GLB,,, | Performed by: INTERNAL MEDICINE

## 2021-11-04 PROCEDURE — 3051F PR MOST RECENT HEMOGLOBIN A1C LEVEL 7.0 - < 8.0%: ICD-10-PCS | Mod: S$GLB,,, | Performed by: INTERNAL MEDICINE

## 2021-11-17 ENCOUNTER — OFFICE VISIT (OUTPATIENT)
Dept: FAMILY MEDICINE | Facility: CLINIC | Age: 61
End: 2021-11-17
Payer: MEDICARE

## 2021-11-17 VITALS
BODY MASS INDEX: 33.53 KG/M2 | SYSTOLIC BLOOD PRESSURE: 132 MMHG | HEIGHT: 70 IN | DIASTOLIC BLOOD PRESSURE: 74 MMHG | WEIGHT: 234.19 LBS | HEART RATE: 80 BPM

## 2021-11-17 DIAGNOSIS — D68.51 HETEROZYGOUS FACTOR V LEIDEN MUTATION: ICD-10-CM

## 2021-11-17 DIAGNOSIS — M51.36 DDD (DEGENERATIVE DISC DISEASE), LUMBAR: ICD-10-CM

## 2021-11-17 DIAGNOSIS — F41.9 ANXIETY: ICD-10-CM

## 2021-11-17 DIAGNOSIS — I10 ESSENTIAL HYPERTENSION: ICD-10-CM

## 2021-11-17 DIAGNOSIS — Z23 NEEDS FLU SHOT: ICD-10-CM

## 2021-11-17 DIAGNOSIS — E78.2 MIXED HYPERLIPIDEMIA: ICD-10-CM

## 2021-11-17 DIAGNOSIS — E11.69 DM TYPE 2 WITH DIABETIC DYSLIPIDEMIA: Primary | ICD-10-CM

## 2021-11-17 DIAGNOSIS — Z12.5 PROSTATE CANCER SCREENING: ICD-10-CM

## 2021-11-17 DIAGNOSIS — E78.5 DM TYPE 2 WITH DIABETIC DYSLIPIDEMIA: Primary | ICD-10-CM

## 2021-11-17 DIAGNOSIS — I25.10 CORONARY ARTERY DISEASE INVOLVING NATIVE CORONARY ARTERY OF NATIVE HEART WITHOUT ANGINA PECTORIS: ICD-10-CM

## 2021-11-17 PROCEDURE — 3008F PR BODY MASS INDEX (BMI) DOCUMENTED: ICD-10-PCS | Mod: S$GLB,,, | Performed by: NURSE PRACTITIONER

## 2021-11-17 PROCEDURE — 3078F DIAST BP <80 MM HG: CPT | Mod: S$GLB,,, | Performed by: NURSE PRACTITIONER

## 2021-11-17 PROCEDURE — 3051F PR MOST RECENT HEMOGLOBIN A1C LEVEL 7.0 - < 8.0%: ICD-10-PCS | Mod: S$GLB,,, | Performed by: NURSE PRACTITIONER

## 2021-11-17 PROCEDURE — 90682 FLU VACCINE - QUADRIVALENT (RECOMBINANT) PRESERVATIVE FREE: ICD-10-PCS | Mod: S$GLB,,, | Performed by: NURSE PRACTITIONER

## 2021-11-17 PROCEDURE — 4010F ACE/ARB THERAPY RXD/TAKEN: CPT | Mod: S$GLB,,, | Performed by: NURSE PRACTITIONER

## 2021-11-17 PROCEDURE — 3051F HG A1C>EQUAL 7.0%<8.0%: CPT | Mod: S$GLB,,, | Performed by: NURSE PRACTITIONER

## 2021-11-17 PROCEDURE — 3060F PR POS MICROALBUMINURIA RESULT DOCUMENTED/REVIEW: ICD-10-PCS | Mod: S$GLB,,, | Performed by: NURSE PRACTITIONER

## 2021-11-17 PROCEDURE — 3075F PR MOST RECENT SYSTOLIC BLOOD PRESS GE 130-139MM HG: ICD-10-PCS | Mod: S$GLB,,, | Performed by: NURSE PRACTITIONER

## 2021-11-17 PROCEDURE — 90682 RIV4 VACC RECOMBINANT DNA IM: CPT | Mod: S$GLB,,, | Performed by: NURSE PRACTITIONER

## 2021-11-17 PROCEDURE — 1160F RVW MEDS BY RX/DR IN RCRD: CPT | Mod: S$GLB,,, | Performed by: NURSE PRACTITIONER

## 2021-11-17 PROCEDURE — 3008F BODY MASS INDEX DOCD: CPT | Mod: S$GLB,,, | Performed by: NURSE PRACTITIONER

## 2021-11-17 PROCEDURE — 1160F PR REVIEW ALL MEDS BY PRESCRIBER/CLIN PHARMACIST DOCUMENTED: ICD-10-PCS | Mod: S$GLB,,, | Performed by: NURSE PRACTITIONER

## 2021-11-17 PROCEDURE — 3078F PR MOST RECENT DIASTOLIC BLOOD PRESSURE < 80 MM HG: ICD-10-PCS | Mod: S$GLB,,, | Performed by: NURSE PRACTITIONER

## 2021-11-17 PROCEDURE — 3075F SYST BP GE 130 - 139MM HG: CPT | Mod: S$GLB,,, | Performed by: NURSE PRACTITIONER

## 2021-11-17 PROCEDURE — 3060F POS MICROALBUMINURIA REV: CPT | Mod: S$GLB,,, | Performed by: NURSE PRACTITIONER

## 2021-11-17 PROCEDURE — G0008 ADMIN INFLUENZA VIRUS VAC: HCPCS | Mod: S$GLB,,, | Performed by: NURSE PRACTITIONER

## 2021-11-17 PROCEDURE — 3066F NEPHROPATHY DOC TX: CPT | Mod: S$GLB,,, | Performed by: NURSE PRACTITIONER

## 2021-11-17 PROCEDURE — G0008 FLU VACCINE - QUADRIVALENT (RECOMBINANT) PRESERVATIVE FREE: ICD-10-PCS | Mod: S$GLB,,, | Performed by: NURSE PRACTITIONER

## 2021-11-17 PROCEDURE — 99214 PR OFFICE/OUTPT VISIT, EST, LEVL IV, 30-39 MIN: ICD-10-PCS | Mod: 25,S$GLB,, | Performed by: NURSE PRACTITIONER

## 2021-11-17 PROCEDURE — 3066F PR DOCUMENTATION OF TREATMENT FOR NEPHROPATHY: ICD-10-PCS | Mod: S$GLB,,, | Performed by: NURSE PRACTITIONER

## 2021-11-17 PROCEDURE — 99214 OFFICE O/P EST MOD 30 MIN: CPT | Mod: 25,S$GLB,, | Performed by: NURSE PRACTITIONER

## 2021-11-17 PROCEDURE — 4010F PR ACE/ARB THEARPY RXD/TAKEN: ICD-10-PCS | Mod: S$GLB,,, | Performed by: NURSE PRACTITIONER

## 2021-11-17 RX ORDER — FENOFIBRATE 145 MG/1
145 TABLET, FILM COATED ORAL DAILY
Qty: 90 TABLET | Refills: 1 | Status: SHIPPED | OUTPATIENT
Start: 2021-11-17 | End: 2022-05-26 | Stop reason: SDUPTHER

## 2021-11-17 RX ORDER — ROSUVASTATIN CALCIUM 20 MG/1
20 TABLET, COATED ORAL DAILY
Qty: 90 TABLET | Refills: 1 | Status: SHIPPED | OUTPATIENT
Start: 2021-11-17 | End: 2022-05-26 | Stop reason: SDUPTHER

## 2021-11-17 RX ORDER — AMLODIPINE BESYLATE 10 MG/1
10 TABLET ORAL DAILY
Qty: 90 TABLET | Refills: 1 | Status: SHIPPED | OUTPATIENT
Start: 2021-11-17 | End: 2022-05-26 | Stop reason: SDUPTHER

## 2021-11-17 RX ORDER — SYRINGE AND NEEDLE,INSULIN,1ML 31GX15/64"
SYRINGE, EMPTY DISPOSABLE MISCELLANEOUS
COMMUNITY
Start: 2021-10-18

## 2021-11-17 RX ORDER — METFORMIN HYDROCHLORIDE 500 MG/1
1000 TABLET ORAL 2 TIMES DAILY WITH MEALS
Qty: 360 TABLET | Refills: 1 | Status: SHIPPED | OUTPATIENT
Start: 2021-11-17 | End: 2022-05-26 | Stop reason: SDUPTHER

## 2021-11-17 RX ORDER — ALPRAZOLAM 1 MG/1
1 TABLET ORAL 2 TIMES DAILY
Qty: 60 TABLET | Refills: 2 | Status: SHIPPED | OUTPATIENT
Start: 2021-11-17 | End: 2022-03-25 | Stop reason: SDUPTHER

## 2021-11-17 RX ORDER — TIZANIDINE 4 MG/1
4 TABLET ORAL 2 TIMES DAILY PRN
Qty: 180 TABLET | Refills: 1 | Status: SHIPPED | OUTPATIENT
Start: 2021-11-17 | End: 2022-03-03 | Stop reason: SDUPTHER

## 2021-11-17 RX ORDER — GLIMEPIRIDE 4 MG/1
4 TABLET ORAL 2 TIMES DAILY
Qty: 180 TABLET | Refills: 1 | Status: SHIPPED | OUTPATIENT
Start: 2021-11-17 | End: 2022-05-26 | Stop reason: SDUPTHER

## 2021-12-06 ENCOUNTER — TELEPHONE (OUTPATIENT)
Dept: HEMATOLOGY/ONCOLOGY | Facility: CLINIC | Age: 61
End: 2021-12-06
Payer: MEDICARE

## 2021-12-09 ENCOUNTER — TELEPHONE (OUTPATIENT)
Dept: FAMILY MEDICINE | Facility: CLINIC | Age: 61
End: 2021-12-09
Payer: MEDICARE

## 2021-12-09 RX ORDER — CIPROFLOXACIN 500 MG/1
500 TABLET ORAL EVERY 12 HOURS
Qty: 14 TABLET | Refills: 0 | Status: SHIPPED | OUTPATIENT
Start: 2021-12-09 | End: 2022-04-21 | Stop reason: SDUPTHER

## 2021-12-09 RX ORDER — METRONIDAZOLE 500 MG/1
500 TABLET ORAL EVERY 8 HOURS
Qty: 21 TABLET | Refills: 0 | Status: SHIPPED | OUTPATIENT
Start: 2021-12-09 | End: 2022-03-30

## 2021-12-13 ENCOUNTER — TELEPHONE (OUTPATIENT)
Dept: HEMATOLOGY/ONCOLOGY | Facility: CLINIC | Age: 61
End: 2021-12-13
Payer: MEDICARE

## 2021-12-14 ENCOUNTER — TELEPHONE (OUTPATIENT)
Dept: HEMATOLOGY/ONCOLOGY | Facility: CLINIC | Age: 61
End: 2021-12-14
Payer: MEDICARE

## 2021-12-21 ENCOUNTER — TELEPHONE (OUTPATIENT)
Dept: HEMATOLOGY/ONCOLOGY | Facility: CLINIC | Age: 61
End: 2021-12-21
Payer: MEDICARE

## 2022-01-04 ENCOUNTER — TELEPHONE (OUTPATIENT)
Dept: HEMATOLOGY/ONCOLOGY | Facility: CLINIC | Age: 62
End: 2022-01-04
Payer: MEDICARE

## 2022-01-04 DIAGNOSIS — D68.51 HETEROZYGOUS FACTOR V LEIDEN MUTATION: Primary | ICD-10-CM

## 2022-01-04 DIAGNOSIS — Z79.01 LONG TERM (CURRENT) USE OF ANTICOAGULANTS: ICD-10-CM

## 2022-01-05 ENCOUNTER — TELEPHONE (OUTPATIENT)
Dept: HEMATOLOGY/ONCOLOGY | Facility: CLINIC | Age: 62
End: 2022-01-05
Payer: MEDICARE

## 2022-01-05 LAB
INR PPP: 1.9
PROTHROMBIN TIME: 18.9 SEC (ref 9–11.5)

## 2022-02-09 ENCOUNTER — TELEPHONE (OUTPATIENT)
Dept: HEMATOLOGY/ONCOLOGY | Facility: CLINIC | Age: 62
End: 2022-02-09
Payer: MEDICARE

## 2022-02-09 NOTE — TELEPHONE ENCOUNTER
INR adequate at 2.1. Per Dr. Smith, pt is to continue on same dose of Coumadin and re-check labs in one month. Pt verbalized understanding.

## 2022-03-03 DIAGNOSIS — M51.36 DDD (DEGENERATIVE DISC DISEASE), LUMBAR: ICD-10-CM

## 2022-03-03 DIAGNOSIS — E11.42 DIABETIC POLYNEUROPATHY ASSOCIATED WITH TYPE 2 DIABETES MELLITUS: ICD-10-CM

## 2022-03-03 RX ORDER — TIZANIDINE 4 MG/1
4 TABLET ORAL 2 TIMES DAILY PRN
Qty: 180 TABLET | Refills: 1 | Status: SHIPPED | OUTPATIENT
Start: 2022-03-03 | End: 2022-08-23 | Stop reason: SDUPTHER

## 2022-03-03 NOTE — TELEPHONE ENCOUNTER
----- Message from Ame Wolf sent at 3/3/2022 12:47 PM CST -----  Pt calling for refills on Humalin 70/30 Insulin and Tizanidine to [facility]  cb # [phone]

## 2022-03-09 ENCOUNTER — TELEPHONE (OUTPATIENT)
Dept: HEMATOLOGY/ONCOLOGY | Facility: CLINIC | Age: 62
End: 2022-03-09
Payer: MEDICARE

## 2022-03-09 NOTE — TELEPHONE ENCOUNTER
INR adequate at 2.2. Continue with same dose of Coumadin and re-check in one month. Pt notified and verbalized understanding.

## 2022-03-15 DIAGNOSIS — I10 ESSENTIAL HYPERTENSION: ICD-10-CM

## 2022-03-15 RX ORDER — LISINOPRIL 20 MG/1
20 TABLET ORAL 2 TIMES DAILY
Qty: 180 TABLET | Refills: 1 | Status: SHIPPED | OUTPATIENT
Start: 2022-03-15 | End: 2022-08-23 | Stop reason: SDUPTHER

## 2022-03-15 RX ORDER — ATENOLOL 50 MG/1
TABLET ORAL
Qty: 270 TABLET | Refills: 1 | Status: SHIPPED | OUTPATIENT
Start: 2022-03-15 | End: 2022-03-30 | Stop reason: SDUPTHER

## 2022-03-15 NOTE — TELEPHONE ENCOUNTER
----- Message from mAe Wolf sent at 3/15/2022 11:34 AM CDT -----  Pt calling for refills on Lisinopril and Atenolol to [place] [id number] in [facility] # [phone]

## 2022-03-25 DIAGNOSIS — F41.9 ANXIETY: ICD-10-CM

## 2022-03-25 RX ORDER — ALPRAZOLAM 1 MG/1
1 TABLET ORAL 2 TIMES DAILY
Qty: 60 TABLET | Refills: 0 | Status: SHIPPED | OUTPATIENT
Start: 2022-03-25 | End: 2022-04-27 | Stop reason: SDUPTHER

## 2022-03-25 NOTE — TELEPHONE ENCOUNTER
----- Message from Cass Barnard sent at 3/25/2022  8:52 AM CDT -----  Patient called and stated that he need a refill of  his alprazolam called into [facility] in [place] if any questions please give him a call at [phone]

## 2022-03-29 ENCOUNTER — TELEPHONE (OUTPATIENT)
Dept: HEMATOLOGY/ONCOLOGY | Facility: CLINIC | Age: 62
End: 2022-03-29
Payer: MEDICARE

## 2022-03-29 DIAGNOSIS — D68.51 HETEROZYGOUS FACTOR V LEIDEN MUTATION: Primary | ICD-10-CM

## 2022-03-29 LAB
ALBUMIN SERPL-MCNC: 4 G/DL (ref 3.6–5.1)
ALBUMIN/CREAT UR: 35 MCG/MG CREAT
ALBUMIN/GLOB SERPL: 1.4 (CALC) (ref 1–2.5)
ALP SERPL-CCNC: 44 U/L (ref 35–144)
ALT SERPL-CCNC: 42 U/L (ref 9–46)
AST SERPL-CCNC: 32 U/L (ref 10–35)
BASOPHILS # BLD AUTO: 38 CELLS/UL (ref 0–200)
BASOPHILS NFR BLD AUTO: 0.8 %
BILIRUB SERPL-MCNC: 0.4 MG/DL (ref 0.2–1.2)
BUN SERPL-MCNC: 29 MG/DL (ref 7–25)
BUN/CREAT SERPL: 27 (CALC) (ref 6–22)
CALCIUM SERPL-MCNC: 9.4 MG/DL (ref 8.6–10.3)
CHLORIDE SERPL-SCNC: 105 MMOL/L (ref 98–110)
CHOLEST SERPL-MCNC: 157 MG/DL
CHOLEST/HDLC SERPL: 4.4 (CALC)
CO2 SERPL-SCNC: 23 MMOL/L (ref 20–32)
CREAT SERPL-MCNC: 1.07 MG/DL (ref 0.7–1.25)
CREAT UR-MCNC: 121 MG/DL (ref 20–320)
EOSINOPHIL # BLD AUTO: 158 CELLS/UL (ref 15–500)
EOSINOPHIL NFR BLD AUTO: 3.3 %
ERYTHROCYTE [DISTWIDTH] IN BLOOD BY AUTOMATED COUNT: 13.1 % (ref 11–15)
GLOBULIN SER CALC-MCNC: 2.9 G/DL (CALC) (ref 1.9–3.7)
GLUCOSE SERPL-MCNC: 216 MG/DL (ref 65–99)
HBA1C MFR BLD: 7.8 % OF TOTAL HGB
HCT VFR BLD AUTO: 37.3 % (ref 38.5–50)
HDLC SERPL-MCNC: 36 MG/DL
HGB BLD-MCNC: 11.8 G/DL (ref 13.2–17.1)
LDLC SERPL CALC-MCNC: ABNORMAL MG/DL (CALC)
LYMPHOCYTES # BLD AUTO: 1598 CELLS/UL (ref 850–3900)
LYMPHOCYTES NFR BLD AUTO: 33.3 %
MCH RBC QN AUTO: 30.3 PG (ref 27–33)
MCHC RBC AUTO-ENTMCNC: 31.6 G/DL (ref 32–36)
MCV RBC AUTO: 95.9 FL (ref 80–100)
MICROALBUMIN UR-MCNC: 4.2 MG/DL
MONOCYTES # BLD AUTO: 461 CELLS/UL (ref 200–950)
MONOCYTES NFR BLD AUTO: 9.6 %
NEUTROPHILS # BLD AUTO: 2544 CELLS/UL (ref 1500–7800)
NEUTROPHILS NFR BLD AUTO: 53 %
NONHDLC SERPL-MCNC: 121 MG/DL (CALC)
PLATELET # BLD AUTO: 180 THOUSAND/UL (ref 140–400)
PMV BLD REES-ECKER: 10.4 FL (ref 7.5–12.5)
POTASSIUM SERPL-SCNC: 4.7 MMOL/L (ref 3.5–5.3)
PROT SERPL-MCNC: 6.9 G/DL (ref 6.1–8.1)
PSA SERPL-MCNC: 0.35 NG/ML
RBC # BLD AUTO: 3.89 MILLION/UL (ref 4.2–5.8)
SODIUM SERPL-SCNC: 136 MMOL/L (ref 135–146)
TRIGL SERPL-MCNC: 409 MG/DL
TSH SERPL-ACNC: 1.28 MIU/L (ref 0.4–4.5)
WBC # BLD AUTO: 4.8 THOUSAND/UL (ref 3.8–10.8)

## 2022-03-29 NOTE — TELEPHONE ENCOUNTER
Spoke to pt about INR at 3.8. No new medications and no diet changes. Current dose of coumadin is 6 mg. Per Dr. Smith, hold for 2 days and re-check labs Thursday. Pt verbalized understanding.

## 2022-03-30 ENCOUNTER — OFFICE VISIT (OUTPATIENT)
Dept: FAMILY MEDICINE | Facility: CLINIC | Age: 62
End: 2022-03-30
Payer: MEDICARE

## 2022-03-30 ENCOUNTER — TELEPHONE (OUTPATIENT)
Dept: FAMILY MEDICINE | Facility: CLINIC | Age: 62
End: 2022-03-30
Payer: MEDICARE

## 2022-03-30 VITALS
HEIGHT: 70 IN | SYSTOLIC BLOOD PRESSURE: 148 MMHG | OXYGEN SATURATION: 95 % | HEART RATE: 75 BPM | BODY MASS INDEX: 34.22 KG/M2 | WEIGHT: 239 LBS | DIASTOLIC BLOOD PRESSURE: 88 MMHG

## 2022-03-30 DIAGNOSIS — E11.65 UNCONTROLLED TYPE 2 DIABETES MELLITUS WITH HYPERGLYCEMIA: Primary | ICD-10-CM

## 2022-03-30 DIAGNOSIS — M51.36 DDD (DEGENERATIVE DISC DISEASE), LUMBAR: ICD-10-CM

## 2022-03-30 DIAGNOSIS — I25.10 CORONARY ARTERY DISEASE INVOLVING NATIVE CORONARY ARTERY OF NATIVE HEART WITHOUT ANGINA PECTORIS: ICD-10-CM

## 2022-03-30 DIAGNOSIS — E78.2 MIXED HYPERLIPIDEMIA: ICD-10-CM

## 2022-03-30 DIAGNOSIS — E11.69 DM TYPE 2 WITH DIABETIC DYSLIPIDEMIA: ICD-10-CM

## 2022-03-30 DIAGNOSIS — E78.5 DM TYPE 2 WITH DIABETIC DYSLIPIDEMIA: ICD-10-CM

## 2022-03-30 DIAGNOSIS — I10 ESSENTIAL HYPERTENSION: ICD-10-CM

## 2022-03-30 PROCEDURE — 4010F PR ACE/ARB THEARPY RXD/TAKEN: ICD-10-PCS | Mod: S$GLB,,, | Performed by: NURSE PRACTITIONER

## 2022-03-30 PROCEDURE — 3060F PR POS MICROALBUMINURIA RESULT DOCUMENTED/REVIEW: ICD-10-PCS | Mod: S$GLB,,, | Performed by: NURSE PRACTITIONER

## 2022-03-30 PROCEDURE — 1160F PR REVIEW ALL MEDS BY PRESCRIBER/CLIN PHARMACIST DOCUMENTED: ICD-10-PCS | Mod: S$GLB,,, | Performed by: NURSE PRACTITIONER

## 2022-03-30 PROCEDURE — 99214 OFFICE O/P EST MOD 30 MIN: CPT | Mod: S$GLB,,, | Performed by: NURSE PRACTITIONER

## 2022-03-30 PROCEDURE — 3066F NEPHROPATHY DOC TX: CPT | Mod: S$GLB,,, | Performed by: NURSE PRACTITIONER

## 2022-03-30 PROCEDURE — 3051F PR MOST RECENT HEMOGLOBIN A1C LEVEL 7.0 - < 8.0%: ICD-10-PCS | Mod: S$GLB,,, | Performed by: NURSE PRACTITIONER

## 2022-03-30 PROCEDURE — 3077F PR MOST RECENT SYSTOLIC BLOOD PRESSURE >= 140 MM HG: ICD-10-PCS | Mod: S$GLB,,, | Performed by: NURSE PRACTITIONER

## 2022-03-30 PROCEDURE — 3051F HG A1C>EQUAL 7.0%<8.0%: CPT | Mod: S$GLB,,, | Performed by: NURSE PRACTITIONER

## 2022-03-30 PROCEDURE — 3079F PR MOST RECENT DIASTOLIC BLOOD PRESSURE 80-89 MM HG: ICD-10-PCS | Mod: S$GLB,,, | Performed by: NURSE PRACTITIONER

## 2022-03-30 PROCEDURE — 3072F PR LOW RISK FOR RETINOPATHY: ICD-10-PCS | Mod: S$GLB,,, | Performed by: NURSE PRACTITIONER

## 2022-03-30 PROCEDURE — 3079F DIAST BP 80-89 MM HG: CPT | Mod: S$GLB,,, | Performed by: NURSE PRACTITIONER

## 2022-03-30 PROCEDURE — 4010F ACE/ARB THERAPY RXD/TAKEN: CPT | Mod: S$GLB,,, | Performed by: NURSE PRACTITIONER

## 2022-03-30 PROCEDURE — 99214 PR OFFICE/OUTPT VISIT, EST, LEVL IV, 30-39 MIN: ICD-10-PCS | Mod: S$GLB,,, | Performed by: NURSE PRACTITIONER

## 2022-03-30 PROCEDURE — 1160F RVW MEDS BY RX/DR IN RCRD: CPT | Mod: S$GLB,,, | Performed by: NURSE PRACTITIONER

## 2022-03-30 PROCEDURE — 3008F PR BODY MASS INDEX (BMI) DOCUMENTED: ICD-10-PCS | Mod: S$GLB,,, | Performed by: NURSE PRACTITIONER

## 2022-03-30 PROCEDURE — 3077F SYST BP >= 140 MM HG: CPT | Mod: S$GLB,,, | Performed by: NURSE PRACTITIONER

## 2022-03-30 PROCEDURE — 3060F POS MICROALBUMINURIA REV: CPT | Mod: S$GLB,,, | Performed by: NURSE PRACTITIONER

## 2022-03-30 PROCEDURE — 3008F BODY MASS INDEX DOCD: CPT | Mod: S$GLB,,, | Performed by: NURSE PRACTITIONER

## 2022-03-30 PROCEDURE — 3066F PR DOCUMENTATION OF TREATMENT FOR NEPHROPATHY: ICD-10-PCS | Mod: S$GLB,,, | Performed by: NURSE PRACTITIONER

## 2022-03-30 PROCEDURE — 3072F LOW RISK FOR RETINOPATHY: CPT | Mod: S$GLB,,, | Performed by: NURSE PRACTITIONER

## 2022-03-30 RX ORDER — SEMAGLUTIDE 1.34 MG/ML
INJECTION, SOLUTION SUBCUTANEOUS
Qty: 2 PEN | Refills: 0 | COMMUNITY
Start: 2022-03-30 | End: 2024-02-21

## 2022-03-30 RX ORDER — ATENOLOL 50 MG/1
100 TABLET ORAL 2 TIMES DAILY
Qty: 270 TABLET | Refills: 1
Start: 2022-03-30 | End: 2022-03-30

## 2022-03-30 RX ORDER — ATENOLOL 50 MG/1
100 TABLET ORAL 2 TIMES DAILY
Qty: 270 TABLET | Refills: 1
Start: 2022-03-30 | End: 2022-08-23 | Stop reason: SDUPTHER

## 2022-03-30 NOTE — PROGRESS NOTES
SUBJECTIVE:    Patient ID: Luis Resendiz is a 61 y.o. male.    Chief Complaint: No chief complaint on file.    Pt here for regular diabetes follow-up.  Patient reports his blood sugars have been running higher and he has been increasing his insulin dose, sometimes taking an additional 30 units a day.  Blood sugars 180-200s in the morning.  He denies any hypoglycemic episodes.  Patient admits not really following diabetic diet.  Is also fairly inactive due to chronic back pain.  His weight is up 5 lb since last visit.        Ancillary Orders on 03/25/2022   Component Date Value Ref Range Status    INR 03/28/2022 3.8 (A)  Final    Prothrombin Time 03/28/2022 34.7 (A) 9.0 - 11.5 sec Final   Ancillary Orders on 03/04/2022   Component Date Value Ref Range Status    INR 03/08/2022 2.2 (A)  Final    Prothrombin Time 03/08/2022 21.0 (A) 9.0 - 11.5 sec Final   Ancillary Orders on 02/04/2022   Component Date Value Ref Range Status    INR 02/08/2022 2.1 (A)  Final    Prothrombin Time 02/08/2022 20.2 (A) 9.0 - 11.5 sec Final   Telephone on 01/04/2022   Component Date Value Ref Range Status    INR 01/04/2022 1.9 (A)  Final    Prothrombin Time 01/04/2022 18.9 (A) 9.0 - 11.5 sec Final   Office Visit on 11/17/2021   Component Date Value Ref Range Status    WBC 03/28/2022 4.8  3.8 - 10.8 Thousand/uL Final    RBC 03/28/2022 3.89 (A) 4.20 - 5.80 Million/uL Final    Hemoglobin 03/28/2022 11.8 (A) 13.2 - 17.1 g/dL Final    Hematocrit 03/28/2022 37.3 (A) 38.5 - 50.0 % Final    MCV 03/28/2022 95.9  80.0 - 100.0 fL Final    MCH 03/28/2022 30.3  27.0 - 33.0 pg Final    MCHC 03/28/2022 31.6 (A) 32.0 - 36.0 g/dL Final    RDW 03/28/2022 13.1  11.0 - 15.0 % Final    Platelets 03/28/2022 180  140 - 400 Thousand/uL Final    MPV 03/28/2022 10.4  7.5 - 12.5 fL Final    Neutrophils, Abs 03/28/2022 2,544  1,500 - 7,800 cells/uL Final    Lymph # 03/28/2022 1,598  850 - 3,900 cells/uL Final    Mono # 03/28/2022 461  200 -  950 cells/uL Final    Eos # 03/28/2022 158  15 - 500 cells/uL Final    Baso # 03/28/2022 38  0 - 200 cells/uL Final    Neutrophils Relative 03/28/2022 53  % Final    Lymph % 03/28/2022 33.3  % Final    Mono % 03/28/2022 9.6  % Final    Eosinophil % 03/28/2022 3.3  % Final    Basophil % 03/28/2022 0.8  % Final    Glucose 03/28/2022 216 (A) 65 - 99 mg/dL Final    BUN 03/28/2022 29 (A) 7 - 25 mg/dL Final    Creatinine 03/28/2022 1.07  0.70 - 1.25 mg/dL Final    eGFR if non African American 03/28/2022 73  > OR = 60 mL/min/1.73m2 Final    eGFR if  03/28/2022 85  > OR = 60 mL/min/1.73m2 Final    BUN/Creatinine Ratio 03/28/2022 27 (A) 6 - 22 (calc) Final    Sodium 03/28/2022 136  135 - 146 mmol/L Final    Potassium 03/28/2022 4.7  3.5 - 5.3 mmol/L Final    Chloride 03/28/2022 105  98 - 110 mmol/L Final    CO2 03/28/2022 23  20 - 32 mmol/L Final    Calcium 03/28/2022 9.4  8.6 - 10.3 mg/dL Final    Total Protein 03/28/2022 6.9  6.1 - 8.1 g/dL Final    Albumin 03/28/2022 4.0  3.6 - 5.1 g/dL Final    Globulin, Total 03/28/2022 2.9  1.9 - 3.7 g/dL (calc) Final    Albumin/Globulin Ratio 03/28/2022 1.4  1.0 - 2.5 (calc) Final    Total Bilirubin 03/28/2022 0.4  0.2 - 1.2 mg/dL Final    Alkaline Phosphatase 03/28/2022 44  35 - 144 U/L Final    AST 03/28/2022 32  10 - 35 U/L Final    ALT 03/28/2022 42  9 - 46 U/L Final    Cholesterol 03/28/2022 157  <200 mg/dL Final    HDL 03/28/2022 36 (A) > OR = 40 mg/dL Final    Triglycerides 03/28/2022 409 (A) <150 mg/dL Final    LDL Cholesterol 03/28/2022   mg/dL (calc) Final    HDL/Cholesterol Ratio 03/28/2022 4.4  <5.0 (calc) Final    Non HDL Chol. (LDL+VLDL) 03/28/2022 121  <130 mg/dL (calc) Final    Creatinine, Urine 03/28/2022 121  20 - 320 mg/dL Final    Microalb, Ur 03/28/2022 4.2  See Note: mg/dL Final    Microalb/Creat Ratio 03/28/2022 35 (A) <30 mcg/mg creat Final    TSH w/reflex to FT4 03/28/2022 1.28  0.40 - 4.50 mIU/L Final     Hemoglobin A1C 03/28/2022 7.8 (A) <5.7 % of total Hgb Final    PROSTATE SPECIFIC ANTIGEN, SCR - Q* 03/28/2022 0.35  < OR = 4.00 ng/mL Final       Past Medical History:   Diagnosis Date    Anticoagulant long-term use     Arthritis     Cervical spine pain     Clotting disorder     Coronary artery disease     Diabetes mellitus     Encounter for blood transfusion     Hypertension      Past Surgical History:   Procedure Laterality Date    BACK SURGERY      cabg      CARDIAC SURGERY      FOOT SURGERY      FRACTURE SURGERY      ROTATOR CUFF REPAIR Left      Family History   Problem Relation Age of Onset    Heart disease Mother     Cancer Mother     Heart disease Father     Alcohol abuse Father     No Known Problems Sister     No Known Problems Brother     No Known Problems Daughter     No Known Problems Son     Cataracts Neg Hx     Glaucoma Neg Hx     Retinal detachment Neg Hx     Macular degeneration Neg Hx        The 10-year CVD risk score (Christiano, et al., 2008) is: 44.2%    Values used to calculate the score:      Age: 61 years      Sex: Male      Diabetic: Yes      Tobacco smoker: No      Systolic Blood Pressure: 148 mmHg      Is BP treated: Yes      HDL Cholesterol: 36 mg/dL      Total Cholesterol: 157 mg/dL     Marital Status:   Alcohol History:  reports current alcohol use.  Tobacco History:  reports that he quit smoking about 27 years ago. He has a 0.25 pack-year smoking history. He has never used smokeless tobacco.  Drug History:  reports no history of drug use.    Health Maintenance Topics with due status: Not Due       Topic Last Completion Date    Colorectal Cancer Screening 08/17/2021    Eye Exam 09/15/2021    TETANUS VACCINE 01/22/2022    Diabetes Urine Screening 03/28/2022    Lipid Panel 03/28/2022    Hemoglobin A1c 03/28/2022    High Dose Statin 03/30/2022     Immunization History   Administered Date(s) Administered    COVID-19, MRNA, LN-S, PF (Pfizer) (Purple Cap)  "03/25/2021, 04/14/2021    Influenza (FLUBLOK) - Quadrivalent - Recombinant - PF *Preferred* (egg allergy) 11/16/2020, 11/17/2021    Influenza - Quadrivalent - PF *Preferred* (6 months and older) 12/26/2019    Influenza - Trivalent (ADULT) 10/01/2014    Influenza - Trivalent - Recombinant - PF 09/25/2018    Tdap 01/22/2022       Review of patient's allergies indicates:   Allergen Reactions    Nsaids (non-steroidal anti-inflammatory drug) Nausea Only    Ancef [cefazolin] Anxiety       Current Outpatient Medications:     ALPRAZolam (XANAX) 1 MG tablet, Take 1 tablet (1 mg total) by mouth 2 (two) times daily., Disp: 60 tablet, Rfl: 0    amLODIPine (NORVASC) 10 MG tablet, Take 1 tablet (10 mg total) by mouth once daily., Disp: 90 tablet, Rfl: 1    aspirin 325 MG tablet, Take 325 mg by mouth once daily., Disp: , Rfl:     BD ULTRA-FINE MINI PEN NEEDLE 31 gauge x 3/16" Ndle, 1 each by In Vitro route once daily., Disp: 100 each, Rfl: 1    blood sugar diagnostic Strp, To check BG 3-4 times daily, to use with insurance preferred meter, Disp: 360 each, Rfl: 3    blood-glucose meter kit, To check BG 3-4 times daily, to use with insurance preferred meter, Disp: 1 each, Rfl: 0    coenzyme Q10 100 mg capsule, Take 100 mg by mouth 2 (two) times a day., Disp: , Rfl:     fenofibrate (TRICOR) 145 MG tablet, Take 1 tablet (145 mg total) by mouth once daily., Disp: 90 tablet, Rfl: 1    glimepiride (AMARYL) 4 MG tablet, Take 1 tablet (4 mg total) by mouth 2 (two) times daily., Disp: 180 tablet, Rfl: 1    insulin NPH-insulin regular, 70/30, (HUMULIN 70/30 U-100 INSULIN) 100 unit/mL (70-30) injection, 35 units in AM and 35 units in PM- may titrate upwards as needed to max of 40 units BID, Disp: 7 each, Rfl: 3    insulin syringe-needle U-100 0.3 mL 31 gauge x 15/64" Syrg, , Disp: , Rfl:     insulin syringe-needle U-100 1/2 mL 31 gauge x 15/64" Syrg, BID, Disp: 180 Syringe, Rfl: 3    lancets Misc, To check BG 3-4 times " "daily, to use with insurance preferred meter, Disp: 360 each, Rfl: 3    lisinopriL (PRINIVIL,ZESTRIL) 20 MG tablet, Take 1 tablet (20 mg total) by mouth 2 (two) times daily., Disp: 180 tablet, Rfl: 1    metFORMIN (GLUCOPHAGE) 500 MG tablet, Take 2 tablets (1,000 mg total) by mouth 2 (two) times daily with meals., Disp: 360 tablet, Rfl: 1    rosuvastatin (CRESTOR) 20 MG tablet, Take 1 tablet (20 mg total) by mouth once daily., Disp: 90 tablet, Rfl: 1    tiZANidine (ZANAFLEX) 4 MG tablet, Take 1 tablet (4 mg total) by mouth 2 (two) times daily as needed (back pain)., Disp: 180 tablet, Rfl: 1    TRUEPLUS INSULIN 0.5 mL 29 gauge x 1/2" Syrg, , Disp: , Rfl: 5    ULTRA COMFORT INSULIN SYRINGE 1 mL 29 gauge x 1/2" Syrg, Inject 25 Units as directed 2 (two) times daily., Disp: 180 each, Rfl: 3    warfarin (COUMADIN) 1 MG tablet, Take 1 tablet (1 mg total) by mouth Daily., Disp: 90 tablet, Rfl: 3    warfarin (COUMADIN) 5 MG tablet, TAKE 1 & 1/2 (ONE & ONE-HALF) TABLETS BY MOUTH ON MODAY, WEDNESDAY, AND FRIDAY. TAKE 1 TABLET ON SATURDAY, SUNDAY, TUESDAY AND THURSDAY, Disp: 45 tablet, Rfl: 3    atenoloL (TENORMIN) 50 MG tablet, Take 2 tablets (100 mg total) by mouth 2 (two) times a day., Disp: 270 tablet, Rfl: 1    semaglutide (OZEMPIC) 0.25 mg or 0.5 mg(2 mg/1.5 mL) pen injector, Start with 0.25mg weekly for 4 weeks then increase to 0.5mg weekly, Disp: 2 pen, Rfl: 0    Review of Systems   Constitutional: Negative for chills, fever and unexpected weight change (wt up 5lbs since last visit).   HENT: Negative for trouble swallowing.    Eyes: Negative for visual disturbance.   Respiratory: Negative for cough, shortness of breath and wheezing.    Cardiovascular: Negative for chest pain, palpitations and leg swelling.   Gastrointestinal: Negative for abdominal pain, constipation and diarrhea.   Genitourinary: Positive for frequency (nocturia 1-3 times/night). Negative for dysuria and hematuria.   Musculoskeletal: Positive " "for back pain (chronic, takes tylenol BID). Negative for gait problem.   Skin: Negative for rash.   Neurological: Positive for numbness (Left hand). Negative for dizziness and headaches.   Psychiatric/Behavioral: Negative for dysphoric mood. The patient is not nervous/anxious (stable on xanax).           Objective:      Vitals:    03/30/22 1224 03/30/22 1228   BP: (!) 146/88 (!) 148/88   Pulse: 75    SpO2: 95%    Weight: 108.4 kg (239 lb)    Height: 5' 10" (1.778 m)      Physical Exam  Vitals and nursing note reviewed.   Constitutional:       General: He is not in acute distress.     Appearance: He is well-developed. He is obese.   HENT:      Head: Normocephalic and atraumatic.      Right Ear: Tympanic membrane and ear canal normal.      Left Ear: Tympanic membrane and ear canal normal.   Neck:      Vascular: No carotid bruit.   Cardiovascular:      Rate and Rhythm: Normal rate and regular rhythm.      Heart sounds: No murmur heard.    No friction rub. No gallop.   Pulmonary:      Effort: Pulmonary effort is normal. No respiratory distress.      Breath sounds: Normal breath sounds. No wheezing or rales.   Abdominal:      General: There is no distension.      Palpations: Abdomen is soft.      Tenderness: There is no abdominal tenderness.   Musculoskeletal:      Cervical back: Neck supple.      Right lower leg: No edema.      Left lower leg: No edema.      Comments: Chronic purplish discoloration of bilat ankles/feet   Lymphadenopathy:      Cervical: No cervical adenopathy.   Skin:     General: Skin is warm and dry.      Findings: No rash.   Neurological:      General: No focal deficit present.      Mental Status: He is alert and oriented to person, place, and time.      Gait: Gait normal.      Comments: Pt appears more lethargic today           Assessment:       1. Uncontrolled type 2 diabetes mellitus with hyperglycemia    2. DM type 2 with diabetic dyslipidemia    3. Essential hypertension    4. Coronary artery " disease involving native coronary artery of native heart without angina pectoris    5. DDD (degenerative disc disease), lumbar    6. Mixed hyperlipidemia           Plan:       Uncontrolled type 2 diabetes mellitus with hyperglycemia  Comments:  DM uncontrolled with A1c up to 7.8%.  Recommend adding GLP 1 however pt has concerns regarding co-pay.  Given #2Samples of Ozempic to begin, f/u 8wks  Orders:  -     semaglutide (OZEMPIC) 0.25 mg or 0.5 mg(2 mg/1.5 mL) pen injector; Start with 0.25mg weekly for 4 weeks then increase to 0.5mg weekly  Dispense: 2 pen; Refill: 0  -     Hemoglobin A1C; Future; Expected date: 03/30/2022    DM type 2 with diabetic dyslipidemia    Essential hypertension  Comments:  BP elevated today, recommend increasing the atenolol and discussed importance of diet and exercise for weight loss which will improve BP and DM  Orders:  -     Discontinue: atenoloL (TENORMIN) 50 MG tablet; Take 2 tablets (100 mg total) by mouth 2 (two) times a day. Take 2 tablets in the morning and 1 tablet in the evening.  Dispense: 270 tablet; Refill: 1  -     atenoloL (TENORMIN) 50 MG tablet; Take 2 tablets (100 mg total) by mouth 2 (two) times a day.  Dispense: 270 tablet; Refill: 1    Coronary artery disease involving native coronary artery of native heart without angina pectoris  Comments:  Stable, denies angina.  Admits has not followed up with Dr. Crow regularly and encouraged him to do so    DDD (degenerative disc disease), lumbar  Comments:  Chronic back pain complaints continue- patient has declined referral to pain management as he does not feel they can offer him anything more.    Mixed hyperlipidemia  Comments:  Reviewed recent labs with triglycerides up to 409. Discussed importance of diet and weight loss.      Follow up in about 8 weeks (around 5/25/2022) for Labs before next visit, Diabetes.          Counseled on age and gender appropriate medical preventative services, including cancer screenings,  immunizations, overall nutritional health, need for a consistent exercise regimen and an overall push towards maintaining a vigorous and active lifestyle.      3/30/2022 Marianna Hdez NP

## 2022-03-30 NOTE — TELEPHONE ENCOUNTER
----- Message from Daisha Sequeira sent at 3/30/2022  3:50 PM CDT -----  - pt is calling to get clarification on orders he got today   [phone]

## 2022-04-04 ENCOUNTER — TELEPHONE (OUTPATIENT)
Dept: HEMATOLOGY/ONCOLOGY | Facility: CLINIC | Age: 62
End: 2022-04-04
Payer: MEDICARE

## 2022-04-04 NOTE — TELEPHONE ENCOUNTER
Spoke to pt. INR 1.4. Per Dr. Smith, resume current Coumadin dose (6 mg) and re-check labs in 2 weeks. Pt verbalized understanding.

## 2022-04-18 ENCOUNTER — TELEPHONE (OUTPATIENT)
Dept: HEMATOLOGY/ONCOLOGY | Facility: CLINIC | Age: 62
End: 2022-04-18

## 2022-04-18 NOTE — TELEPHONE ENCOUNTER
INR adequate at 1.9. Continue with same dose of Coumadin and re-check in one month. Pt notified and verbalized understanding.

## 2022-04-21 ENCOUNTER — TELEPHONE (OUTPATIENT)
Dept: FAMILY MEDICINE | Facility: CLINIC | Age: 62
End: 2022-04-21

## 2022-04-21 RX ORDER — CIPROFLOXACIN 500 MG/1
500 TABLET ORAL EVERY 12 HOURS
Qty: 14 TABLET | Refills: 0 | Status: SHIPPED | OUTPATIENT
Start: 2022-04-21 | End: 2022-04-28

## 2022-04-21 NOTE — TELEPHONE ENCOUNTER
----- Message from Ame Wolf sent at 4/21/2022  8:39 AM CDT -----  Pt calling requesting an abx for possible diverticulitis flare up said he has been up all night in pain. .  # [phone]

## 2022-04-21 NOTE — TELEPHONE ENCOUNTER
Spoke with pt , he states he have abd pain, no fever. Pt would like abx for diverticulitis . . Inform pt that we will send an abx . But if he feels worst or not feeling better by Monday to go to Er.

## 2022-04-21 NOTE — TELEPHONE ENCOUNTER
----- Message from Ame Wolf sent at 4/21/2022  8:39 AM CDT -----  Pt calling requesting an abx for possible diverticulitis flare up said he has been up all night in pain. .  # [phone]

## 2022-04-27 DIAGNOSIS — F41.9 ANXIETY: ICD-10-CM

## 2022-04-27 RX ORDER — ALPRAZOLAM 1 MG/1
1 TABLET ORAL 2 TIMES DAILY
Qty: 60 TABLET | Refills: 2 | Status: SHIPPED | OUTPATIENT
Start: 2022-04-27 | End: 2022-07-21 | Stop reason: SDUPTHER

## 2022-04-27 NOTE — TELEPHONE ENCOUNTER
----- Message from Daisha Sequeira sent at 4/27/2022  9:38 AM CDT -----  Pt needs refill on xanax  [facility]   [phone]

## 2022-05-12 ENCOUNTER — TELEPHONE (OUTPATIENT)
Dept: FAMILY MEDICINE | Facility: CLINIC | Age: 62
End: 2022-05-12

## 2022-05-24 ENCOUNTER — TELEPHONE (OUTPATIENT)
Dept: HEMATOLOGY/ONCOLOGY | Facility: CLINIC | Age: 62
End: 2022-05-24

## 2022-05-24 LAB — HBA1C MFR BLD: 7.7 % OF TOTAL HGB

## 2022-05-24 NOTE — TELEPHONE ENCOUNTER
----- Message from Ac Smith MD sent at 5/24/2022  1:36 PM CDT -----  Call patient, hold 2 days then resume the same dose.  Recheck in one month.

## 2022-05-24 NOTE — TELEPHONE ENCOUNTER
Spoke to pt and notified him that INR is 3.2. Per Dr. Smith, he is to hold current dose for 2 days and then resume same dose. Re-check in one month. Pt verbalized understanding.

## 2022-05-26 ENCOUNTER — OFFICE VISIT (OUTPATIENT)
Dept: FAMILY MEDICINE | Facility: CLINIC | Age: 62
End: 2022-05-26
Payer: MEDICARE

## 2022-05-26 VITALS
WEIGHT: 235 LBS | SYSTOLIC BLOOD PRESSURE: 130 MMHG | DIASTOLIC BLOOD PRESSURE: 82 MMHG | BODY MASS INDEX: 33.64 KG/M2 | HEIGHT: 70 IN | HEART RATE: 76 BPM

## 2022-05-26 DIAGNOSIS — M51.36 DDD (DEGENERATIVE DISC DISEASE), LUMBAR: ICD-10-CM

## 2022-05-26 DIAGNOSIS — I10 ESSENTIAL HYPERTENSION: ICD-10-CM

## 2022-05-26 DIAGNOSIS — E78.2 MIXED HYPERLIPIDEMIA: ICD-10-CM

## 2022-05-26 DIAGNOSIS — I25.10 CORONARY ARTERY DISEASE INVOLVING NATIVE CORONARY ARTERY OF NATIVE HEART WITHOUT ANGINA PECTORIS: ICD-10-CM

## 2022-05-26 DIAGNOSIS — E11.69 DM TYPE 2 WITH DIABETIC DYSLIPIDEMIA: Primary | ICD-10-CM

## 2022-05-26 DIAGNOSIS — E78.5 DM TYPE 2 WITH DIABETIC DYSLIPIDEMIA: Primary | ICD-10-CM

## 2022-05-26 DIAGNOSIS — K57.92 DIVERTICULITIS: ICD-10-CM

## 2022-05-26 PROCEDURE — 1159F MED LIST DOCD IN RCRD: CPT | Mod: CPTII,S$GLB,, | Performed by: NURSE PRACTITIONER

## 2022-05-26 PROCEDURE — 3060F PR POS MICROALBUMINURIA RESULT DOCUMENTED/REVIEW: ICD-10-PCS | Mod: CPTII,S$GLB,, | Performed by: NURSE PRACTITIONER

## 2022-05-26 PROCEDURE — 99214 OFFICE O/P EST MOD 30 MIN: CPT | Mod: S$GLB,,, | Performed by: NURSE PRACTITIONER

## 2022-05-26 PROCEDURE — 4010F PR ACE/ARB THEARPY RXD/TAKEN: ICD-10-PCS | Mod: CPTII,S$GLB,, | Performed by: NURSE PRACTITIONER

## 2022-05-26 PROCEDURE — 99214 PR OFFICE/OUTPT VISIT, EST, LEVL IV, 30-39 MIN: ICD-10-PCS | Mod: S$GLB,,, | Performed by: NURSE PRACTITIONER

## 2022-05-26 PROCEDURE — 3075F PR MOST RECENT SYSTOLIC BLOOD PRESS GE 130-139MM HG: ICD-10-PCS | Mod: CPTII,S$GLB,, | Performed by: NURSE PRACTITIONER

## 2022-05-26 PROCEDURE — 3066F NEPHROPATHY DOC TX: CPT | Mod: CPTII,S$GLB,, | Performed by: NURSE PRACTITIONER

## 2022-05-26 PROCEDURE — 3060F POS MICROALBUMINURIA REV: CPT | Mod: CPTII,S$GLB,, | Performed by: NURSE PRACTITIONER

## 2022-05-26 PROCEDURE — 3008F PR BODY MASS INDEX (BMI) DOCUMENTED: ICD-10-PCS | Mod: CPTII,S$GLB,, | Performed by: NURSE PRACTITIONER

## 2022-05-26 PROCEDURE — 4010F ACE/ARB THERAPY RXD/TAKEN: CPT | Mod: CPTII,S$GLB,, | Performed by: NURSE PRACTITIONER

## 2022-05-26 PROCEDURE — 3079F DIAST BP 80-89 MM HG: CPT | Mod: CPTII,S$GLB,, | Performed by: NURSE PRACTITIONER

## 2022-05-26 PROCEDURE — 1160F PR REVIEW ALL MEDS BY PRESCRIBER/CLIN PHARMACIST DOCUMENTED: ICD-10-PCS | Mod: CPTII,S$GLB,, | Performed by: NURSE PRACTITIONER

## 2022-05-26 PROCEDURE — 3072F LOW RISK FOR RETINOPATHY: CPT | Mod: CPTII,S$GLB,, | Performed by: NURSE PRACTITIONER

## 2022-05-26 PROCEDURE — 3066F PR DOCUMENTATION OF TREATMENT FOR NEPHROPATHY: ICD-10-PCS | Mod: CPTII,S$GLB,, | Performed by: NURSE PRACTITIONER

## 2022-05-26 PROCEDURE — 3075F SYST BP GE 130 - 139MM HG: CPT | Mod: CPTII,S$GLB,, | Performed by: NURSE PRACTITIONER

## 2022-05-26 PROCEDURE — 3079F PR MOST RECENT DIASTOLIC BLOOD PRESSURE 80-89 MM HG: ICD-10-PCS | Mod: CPTII,S$GLB,, | Performed by: NURSE PRACTITIONER

## 2022-05-26 PROCEDURE — 3072F PR LOW RISK FOR RETINOPATHY: ICD-10-PCS | Mod: CPTII,S$GLB,, | Performed by: NURSE PRACTITIONER

## 2022-05-26 PROCEDURE — 1159F PR MEDICATION LIST DOCUMENTED IN MEDICAL RECORD: ICD-10-PCS | Mod: CPTII,S$GLB,, | Performed by: NURSE PRACTITIONER

## 2022-05-26 PROCEDURE — 3051F HG A1C>EQUAL 7.0%<8.0%: CPT | Mod: CPTII,S$GLB,, | Performed by: NURSE PRACTITIONER

## 2022-05-26 PROCEDURE — 3051F PR MOST RECENT HEMOGLOBIN A1C LEVEL 7.0 - < 8.0%: ICD-10-PCS | Mod: CPTII,S$GLB,, | Performed by: NURSE PRACTITIONER

## 2022-05-26 PROCEDURE — 3008F BODY MASS INDEX DOCD: CPT | Mod: CPTII,S$GLB,, | Performed by: NURSE PRACTITIONER

## 2022-05-26 PROCEDURE — 1160F RVW MEDS BY RX/DR IN RCRD: CPT | Mod: CPTII,S$GLB,, | Performed by: NURSE PRACTITIONER

## 2022-05-26 RX ORDER — AMLODIPINE BESYLATE 10 MG/1
10 TABLET ORAL DAILY
Qty: 90 TABLET | Refills: 1 | Status: SHIPPED | OUTPATIENT
Start: 2022-05-26 | End: 2022-11-23 | Stop reason: SDUPTHER

## 2022-05-26 RX ORDER — GLIMEPIRIDE 4 MG/1
4 TABLET ORAL 2 TIMES DAILY
Qty: 180 TABLET | Refills: 1 | Status: SHIPPED | OUTPATIENT
Start: 2022-05-26 | End: 2022-11-08 | Stop reason: SDUPTHER

## 2022-05-26 RX ORDER — METFORMIN HYDROCHLORIDE 500 MG/1
1000 TABLET ORAL 2 TIMES DAILY WITH MEALS
Qty: 360 TABLET | Refills: 1 | Status: SHIPPED | OUTPATIENT
Start: 2022-05-26 | End: 2022-11-23 | Stop reason: SDUPTHER

## 2022-05-26 RX ORDER — FENOFIBRATE 145 MG/1
145 TABLET, FILM COATED ORAL DAILY
Qty: 90 TABLET | Refills: 1 | Status: SHIPPED | OUTPATIENT
Start: 2022-05-26 | End: 2022-11-23 | Stop reason: SDUPTHER

## 2022-05-26 RX ORDER — CIPROFLOXACIN 500 MG/1
500 TABLET ORAL 2 TIMES DAILY
Qty: 20 TABLET | Refills: 0 | Status: SHIPPED | OUTPATIENT
Start: 2022-05-26 | End: 2022-07-06 | Stop reason: SDUPTHER

## 2022-05-26 RX ORDER — METRONIDAZOLE 500 MG/1
500 TABLET ORAL EVERY 8 HOURS
Qty: 30 TABLET | Refills: 0 | Status: SHIPPED | OUTPATIENT
Start: 2022-05-26 | End: 2022-07-06 | Stop reason: SDUPTHER

## 2022-05-26 RX ORDER — ROSUVASTATIN CALCIUM 20 MG/1
20 TABLET, COATED ORAL DAILY
Qty: 90 TABLET | Refills: 1 | Status: SHIPPED | OUTPATIENT
Start: 2022-05-26 | End: 2022-08-23 | Stop reason: SDUPTHER

## 2022-05-26 NOTE — PROGRESS NOTES
SUBJECTIVE:    Patient ID: Luis Resendiz is a 61 y.o. male.    Chief Complaint: Diabetes (Did not bring bottles // Eye exam- Refused, thinks he had done but doesn't know the doctors name, refused eye exam in office // States he only needs refills on 4 meds ac )    Pt here for DM/lipids/hypertension/lumbar DDD f/u  Patient reports took the ozempic sample provided at last visit however only took 0.25mg weekly instead of increasing to 0.5mg, did not notice much of a change in his blood sugars or appetite/weight.. reports blood sugar still slightly labile, depending on how late he eats at night. FBS ranging , sometimes up to 140. Reports rare hypoglycemia overnight. Admits doesn't always check it later in the day. Eating schedule is somewhat erratic- at times won't eat lunch till around 3pm and does not need breakfast  Reports has been having pain left lower abd again the past couple days- has had several bouts of diverticulitis the past 6-12 months. Had cscope 8/2021 with Dr. Lo which showed numerous diverticulum throughout colon.  Patient denies fever/chills, no diarrhea, black/bloody stool.      Ancillary Orders on 05/20/2022   Component Date Value Ref Range Status    INR 05/23/2022 3.2 (A)  Final    Prothrombin Time 05/23/2022 29.2 (A) 9.0 - 11.5 sec Final   Ancillary Orders on 04/01/2022   Component Date Value Ref Range Status    INR 04/01/2022 1.4 (A)  Final    Prothrombin Time 04/01/2022 13.5 (A) 9.0 - 11.5 sec Final   Office Visit on 03/30/2022   Component Date Value Ref Range Status    Hemoglobin A1C 05/23/2022 7.7 (A) <5.7 % of total Hgb Final   Ancillary Orders on 03/25/2022   Component Date Value Ref Range Status    INR 03/28/2022 3.8 (A)  Final    Prothrombin Time 03/28/2022 34.7 (A) 9.0 - 11.5 sec Final   Ancillary Orders on 03/04/2022   Component Date Value Ref Range Status    INR 03/08/2022 2.2 (A)  Final    Prothrombin Time 03/08/2022 21.0 (A) 9.0 - 11.5 sec Final   Ancillary  Orders on 02/04/2022   Component Date Value Ref Range Status    INR 02/08/2022 2.1 (A)  Final    Prothrombin Time 02/08/2022 20.2 (A) 9.0 - 11.5 sec Final   Ancillary Orders on 01/07/2022   Component Date Value Ref Range Status    INR 04/14/2022 1.9 (A)  Final    Prothrombin Time 04/14/2022 18.7 (A) 9.0 - 11.5 sec Final   Telephone on 01/04/2022   Component Date Value Ref Range Status    INR 01/04/2022 1.9 (A)  Final    Prothrombin Time 01/04/2022 18.9 (A) 9.0 - 11.5 sec Final       Past Medical History:   Diagnosis Date    Anticoagulant long-term use     Arthritis     Cervical spine pain     Clotting disorder     Coronary artery disease     Diabetes mellitus     Encounter for blood transfusion     Hypertension      Past Surgical History:   Procedure Laterality Date    BACK SURGERY      cabg      CARDIAC SURGERY      FOOT SURGERY      FRACTURE SURGERY      ROTATOR CUFF REPAIR Left      Family History   Problem Relation Age of Onset    Heart disease Mother     Cancer Mother     Heart disease Father     Alcohol abuse Father     No Known Problems Sister     No Known Problems Brother     No Known Problems Daughter     No Known Problems Son     Cataracts Neg Hx     Glaucoma Neg Hx     Retinal detachment Neg Hx     Macular degeneration Neg Hx        The 10-year CVD risk score (Christiano, et al., 2008) is: 36.2%    Values used to calculate the score:      Age: 61 years      Sex: Male      Diabetic: Yes      Tobacco smoker: No      Systolic Blood Pressure: 130 mmHg      Is BP treated: Yes      HDL Cholesterol: 36 mg/dL      Total Cholesterol: 157 mg/dL     Marital Status:   Alcohol History:  reports current alcohol use.  Tobacco History:  reports that he quit smoking about 27 years ago. He has a 0.25 pack-year smoking history. He has never used smokeless tobacco.  Drug History:  reports no history of drug use.    Health Maintenance Topics with due status: Not Due       Topic Last  "Completion Date    Colorectal Cancer Screening 08/17/2021    Eye Exam 09/15/2021    TETANUS VACCINE 01/22/2022    Diabetes Urine Screening 03/28/2022    Lipid Panel 03/28/2022    Hemoglobin A1c 05/23/2022    High Dose Statin 05/26/2022    Foot Exam 05/26/2022     Immunization History   Administered Date(s) Administered    COVID-19, MRNA, LN-S, PF (Pfizer) (Purple Cap) 03/25/2021, 04/14/2021    Influenza (FLUBLOK) - Quadrivalent - Recombinant - PF *Preferred* (egg allergy) 11/16/2020, 11/17/2021    Influenza - Quadrivalent - PF *Preferred* (6 months and older) 12/26/2019    Influenza - Trivalent (ADULT) 10/01/2014    Influenza - Trivalent - Recombinant - PF 09/25/2018    Tdap 01/22/2022       Review of patient's allergies indicates:   Allergen Reactions    Nsaids (non-steroidal anti-inflammatory drug) Nausea Only    Ancef [cefazolin] Anxiety       Current Outpatient Medications:     ALPRAZolam (XANAX) 1 MG tablet, Take 1 tablet (1 mg total) by mouth 2 (two) times daily., Disp: 60 tablet, Rfl: 2    amLODIPine (NORVASC) 10 MG tablet, Take 1 tablet (10 mg total) by mouth once daily., Disp: 90 tablet, Rfl: 1    aspirin 325 MG tablet, Take 325 mg by mouth once daily., Disp: , Rfl:     atenoloL (TENORMIN) 50 MG tablet, Take 2 tablets (100 mg total) by mouth 2 (two) times a day., Disp: 270 tablet, Rfl: 1    BD ULTRA-FINE MINI PEN NEEDLE 31 gauge x 3/16" Ndle, 1 each by In Vitro route once daily., Disp: 100 each, Rfl: 1    blood sugar diagnostic Strp, To check BG 3-4 times daily, to use with insurance preferred meter, Disp: 360 each, Rfl: 3    blood-glucose meter kit, To check BG 3-4 times daily, to use with insurance preferred meter, Disp: 1 each, Rfl: 0    ciprofloxacin HCl (CIPRO) 500 MG tablet, Take 1 tablet (500 mg total) by mouth 2 (two) times daily., Disp: 20 tablet, Rfl: 0    coenzyme Q10 100 mg capsule, Take 100 mg by mouth 2 (two) times a day., Disp: , Rfl:     fenofibrate (TRICOR) 145 MG tablet, " "Take 1 tablet (145 mg total) by mouth once daily., Disp: 90 tablet, Rfl: 1    glimepiride (AMARYL) 4 MG tablet, Take 1 tablet (4 mg total) by mouth 2 (two) times daily., Disp: 180 tablet, Rfl: 1    insulin NPH-insulin regular, 70/30, (HUMULIN 70/30 U-100 INSULIN) 100 unit/mL (70-30) injection, 35 units in AM and 35 units in PM- may titrate upwards as needed to max of 40 units BID, Disp: 7 each, Rfl: 3    insulin syringe-needle U-100 0.3 mL 31 gauge x 15/64" Syrg, , Disp: , Rfl:     insulin syringe-needle U-100 1/2 mL 31 gauge x 15/64" Syrg, BID, Disp: 180 Syringe, Rfl: 3    lancets Misc, To check BG 3-4 times daily, to use with insurance preferred meter, Disp: 360 each, Rfl: 3    lisinopriL (PRINIVIL,ZESTRIL) 20 MG tablet, Take 1 tablet (20 mg total) by mouth 2 (two) times daily., Disp: 180 tablet, Rfl: 1    metFORMIN (GLUCOPHAGE) 500 MG tablet, Take 2 tablets (1,000 mg total) by mouth 2 (two) times daily with meals., Disp: 360 tablet, Rfl: 1    metroNIDAZOLE (FLAGYL) 500 MG tablet, Take 1 tablet (500 mg total) by mouth every 8 (eight) hours., Disp: 30 tablet, Rfl: 0    rosuvastatin (CRESTOR) 20 MG tablet, Take 1 tablet (20 mg total) by mouth once daily., Disp: 90 tablet, Rfl: 1    semaglutide (OZEMPIC) 0.25 mg or 0.5 mg(2 mg/1.5 mL) pen injector, Start with 0.25mg weekly for 4 weeks then increase to 0.5mg weekly (Patient not taking: Reported on 5/26/2022), Disp: 2 pen, Rfl: 0    tiZANidine (ZANAFLEX) 4 MG tablet, Take 1 tablet (4 mg total) by mouth 2 (two) times daily as needed (back pain)., Disp: 180 tablet, Rfl: 1    TRUEPLUS INSULIN 0.5 mL 29 gauge x 1/2" Syrg, , Disp: , Rfl: 5    ULTRA COMFORT INSULIN SYRINGE 1 mL 29 gauge x 1/2" Syrg, Inject 25 Units as directed 2 (two) times daily., Disp: 180 each, Rfl: 3    warfarin (COUMADIN) 1 MG tablet, Take 1 tablet (1 mg total) by mouth Daily., Disp: 90 tablet, Rfl: 3    warfarin (COUMADIN) 5 MG tablet, TAKE 1 & 1/2 (ONE & ONE-HALF) TABLETS BY MOUTH ON " "MODAY, WEDNESDAY, AND FRIDAY. TAKE 1 TABLET ON SATURDAY, SUNDAY, TUESDAY AND THURSDAY, Disp: 45 tablet, Rfl: 3    Review of Systems   Constitutional: Negative for chills, fever and unexpected weight change.   HENT: Negative for trouble swallowing.    Eyes: Negative for visual disturbance.   Respiratory: Negative for cough, shortness of breath and wheezing.    Cardiovascular: Negative for chest pain, palpitations and leg swelling.   Gastrointestinal: Positive for abdominal pain (LLQ pain the past 2 days). Negative for blood in stool, constipation (reports having only small amts of soft stool), diarrhea, nausea and vomiting.   Genitourinary: Positive for frequency (nocturia 1-3 times/night). Negative for dysuria and hematuria.   Musculoskeletal: Positive for back pain (chronic, takes tylenol BID). Negative for gait problem.   Skin: Negative for rash.   Neurological: Positive for numbness (Left hand). Negative for dizziness and headaches.   Psychiatric/Behavioral: Negative for dysphoric mood. The patient is not nervous/anxious (stable on xanax).           Objective:      Vitals:    05/26/22 1127   BP: 130/82   Pulse: 76   Weight: 106.6 kg (235 lb)   Height: 5' 10" (1.778 m)     Physical Exam  Vitals and nursing note reviewed.   Constitutional:       General: He is not in acute distress.     Appearance: He is well-developed. He is obese.   HENT:      Head: Normocephalic and atraumatic.      Right Ear: Tympanic membrane and ear canal normal.      Left Ear: Tympanic membrane and ear canal normal.   Neck:      Vascular: No carotid bruit.   Cardiovascular:      Rate and Rhythm: Normal rate and regular rhythm.      Pulses:           Dorsalis pedis pulses are 2+ on the right side and 2+ on the left side.      Heart sounds: No murmur heard.    No friction rub. No gallop.   Pulmonary:      Effort: Pulmonary effort is normal. No respiratory distress.      Breath sounds: Normal breath sounds. No wheezing or rales.   Abdominal:    "   General: There is no distension.      Palpations: Abdomen is soft.      Tenderness: There is abdominal tenderness (mild tenderness to deep palpation LLQ). There is no guarding or rebound.   Musculoskeletal:      Cervical back: Neck supple.      Right lower leg: No edema.      Left lower leg: No edema.      Comments: Chronic purplish discoloration of bilat ankles/feet   Feet:      Right foot:      Protective Sensation: 6 sites sensed.      Skin integrity: Dry skin present. No ulcer, erythema or callus.      Toenail Condition: Right toenails are abnormally thick and long.      Left foot:      Protective Sensation: 0 sites tested. 6 sites sensed.      Skin integrity: Dry skin present. No ulcer, erythema or callus.      Toenail Condition: Left toenails are abnormally thick and long.   Lymphadenopathy:      Cervical: No cervical adenopathy.   Skin:     General: Skin is warm and dry.      Findings: No rash.   Neurological:      General: No focal deficit present.      Mental Status: He is alert and oriented to person, place, and time.      Gait: Gait normal.      Comments: Pt appears more lethargic today           Assessment:       1. DM type 2 with diabetic dyslipidemia    2. Diverticulitis    3. Mixed hyperlipidemia    4. Essential hypertension    5. Coronary artery disease involving native coronary artery of native heart without angina pectoris    6. DDD (degenerative disc disease), lumbar           Plan:       DM type 2 with diabetic dyslipidemia  Comments:  A1C remains elevated at 7.7%, reviewed goal is less than 7% and discussed importance of regular schedule of eating and insulin schedule as well as healthy diabetic diet.  Recommend Farxiga given history CAD however patient states his finances are extremely limited and he cannot by afford any brand name co-pays.  Orders:  -     metFORMIN (GLUCOPHAGE) 500 MG tablet; Take 2 tablets (1,000 mg total) by mouth 2 (two) times daily with meals.  Dispense: 360 tablet;  Refill: 1  -     glimepiride (AMARYL) 4 MG tablet; Take 1 tablet (4 mg total) by mouth 2 (two) times daily.  Dispense: 180 tablet; Refill: 1  -     Foot Exam Performed  -     Hemoglobin A1C; Future; Expected date: 05/26/2022  -     Lipid Panel; Future; Expected date: 05/26/2022  -     Comprehensive Metabolic Panel; Future; Expected date: 05/26/2022    Diverticulitis  Comments:  Will treat with antibiotics however patient advised if he has recurrent pain after this episode I would recommend CT scan.  Also cautioned on INR and Cipro use and initially advised him to reduce warfarin dose they he is reluctant to change dose as Dr. Sofia adjusts his warfarin. Encouraged him to call DR. Sofia and advise him of antibiotic use and/or have repeat INR drawn on Monday.  Orders:  -     metroNIDAZOLE (FLAGYL) 500 MG tablet; Take 1 tablet (500 mg total) by mouth every 8 (eight) hours.  Dispense: 30 tablet; Refill: 0  -     ciprofloxacin HCl (CIPRO) 500 MG tablet; Take 1 tablet (500 mg total) by mouth 2 (two) times daily.  Dispense: 20 tablet; Refill: 0    Mixed hyperlipidemia  Comments:  Reviewed last lipids with , discussed importance of diet and will recheck before next visit  Orders:  -     fenofibrate (TRICOR) 145 MG tablet; Take 1 tablet (145 mg total) by mouth once daily.  Dispense: 90 tablet; Refill: 1  -     rosuvastatin (CRESTOR) 20 MG tablet; Take 1 tablet (20 mg total) by mouth once daily.  Dispense: 90 tablet; Refill: 1  -     Lipid Panel; Future; Expected date: 05/26/2022    Essential hypertension  Comments:  BP well controlled    Coronary artery disease involving native coronary artery of native heart without angina pectoris  Comments:  Stable, denies angina    DDD (degenerative disc disease), lumbar  Comments:  Stable      Follow up in about 3 months (around 8/26/2022) for Diabetes, Labs before next visit.          Counseled on age and gender appropriate medical preventative services, including cancer  screenings, immunizations, overall nutritional health, need for a consistent exercise regimen and an overall push towards maintaining a vigorous and active lifestyle.      5/27/2022 Marianna Hdez NP

## 2022-05-26 NOTE — TELEPHONE ENCOUNTER
----- Message from Savanah Rowell sent at 5/26/2022  3:19 PM CDT -----  The patient saw Marianna today. He forgot to say he will need a refill on Amlodipine. [facility] in [place].pt's # [phone] GH

## 2022-06-23 ENCOUNTER — TELEPHONE (OUTPATIENT)
Dept: HEMATOLOGY/ONCOLOGY | Facility: CLINIC | Age: 62
End: 2022-06-23

## 2022-06-23 NOTE — TELEPHONE ENCOUNTER
INR 2.1. No change in Coumadin dose, re-check in one month. Pt notified and verbalized understanding.

## 2022-06-23 NOTE — TELEPHONE ENCOUNTER
----- Message from Ac Smith MD sent at 6/23/2022  9:11 AM CDT -----  Call patient , INR good. No change.  Repeat one month

## 2022-07-06 ENCOUNTER — TELEPHONE (OUTPATIENT)
Dept: FAMILY MEDICINE | Facility: CLINIC | Age: 62
End: 2022-07-06

## 2022-07-06 DIAGNOSIS — R10.32 LEFT LOWER QUADRANT PAIN: ICD-10-CM

## 2022-07-06 DIAGNOSIS — K57.92 DIVERTICULITIS: ICD-10-CM

## 2022-07-06 RX ORDER — METRONIDAZOLE 500 MG/1
500 TABLET ORAL EVERY 8 HOURS
Qty: 30 TABLET | Refills: 0 | Status: SHIPPED | OUTPATIENT
Start: 2022-07-06 | End: 2022-08-23 | Stop reason: SDUPTHER

## 2022-07-06 RX ORDER — CIPROFLOXACIN 500 MG/1
500 TABLET ORAL 2 TIMES DAILY
Qty: 20 TABLET | Refills: 0 | Status: SHIPPED | OUTPATIENT
Start: 2022-07-06 | End: 2022-08-23

## 2022-07-06 NOTE — TELEPHONE ENCOUNTER
Pt Ct has been scheduled tomorrow at 11:30. Pt aware to be at imaging center for 9:30 and fast for 6 hours before. Pt states understanding.

## 2022-07-06 NOTE — TELEPHONE ENCOUNTER
Please call [facility] and see if we can get stat CT abdomen/pelvis today rule out diverticulitis.  Let patient know I will send in prescriptions for antibiotics to begin- he will need to have his INR monitored more closely given antibiotic

## 2022-07-06 NOTE — TELEPHONE ENCOUNTER
----- Message from Daisha Sequeira sent at 7/6/2022  8:32 AM CDT -----  Pt diverticulitis has flared up. He can barley get up out of bed. He has someone there that can go get an rx for him if we can call something in. Would like an antibiotic called in. If he needs the ct scan he will do it as he can not get around because of the pain   Omar dnun   [phone]

## 2022-07-07 ENCOUNTER — TELEPHONE (OUTPATIENT)
Dept: FAMILY MEDICINE | Facility: CLINIC | Age: 62
End: 2022-07-07

## 2022-07-07 ENCOUNTER — HOSPITAL ENCOUNTER (OUTPATIENT)
Dept: RADIOLOGY | Facility: HOSPITAL | Age: 62
Discharge: HOME | End: 2022-07-07
Attending: NURSE PRACTITIONER
Payer: MEDICARE

## 2022-07-07 DIAGNOSIS — R10.32 LEFT LOWER QUADRANT PAIN: ICD-10-CM

## 2022-07-07 LAB
CREAT SERPL-MCNC: 1.2 MG/DL (ref 0.5–1.4)
SAMPLE: NORMAL

## 2022-07-07 PROCEDURE — 82565 ASSAY OF CREATININE: CPT | Mod: PO

## 2022-07-07 PROCEDURE — 25500020 PHARM REV CODE 255: Mod: PO | Performed by: NURSE PRACTITIONER

## 2022-07-07 RX ADMIN — IOHEXOL 100 ML: 350 INJECTION, SOLUTION INTRAVENOUS at 11:07

## 2022-07-07 NOTE — TELEPHONE ENCOUNTER
Spoke with pt in regards recent Ct scan results. Verbalized per Marianna that his CT scan shows mild diverticulitis of the descending colon However no abscess or perforation.  Continue with antibiotics as prescribed yesterday and make sure he is monitoring his INR on antibiotics. Pt acknowledge understanding.

## 2022-07-07 NOTE — TELEPHONE ENCOUNTER
----- Message from Marianna Hdez NP sent at 7/7/2022  2:50 PM CDT -----  Please call patient and let him know CT scan shows mild diverticulitis of the descending colon However no abscess or perforation.  Continue with antibiotics as prescribed yesterday and make sure he is monitoring his INR on antibiotics

## 2022-07-08 ENCOUNTER — TELEPHONE (OUTPATIENT)
Dept: FAMILY MEDICINE | Facility: CLINIC | Age: 62
End: 2022-07-08

## 2022-07-08 NOTE — TELEPHONE ENCOUNTER
----- Message from Cass Barnard sent at 7/8/2022 10:01 AM CDT -----  Patient called and stated that he would like to get a copy of his test results from yesterday  he would like to have it mailed to him if any questions please give him a call at [phone]

## 2022-07-08 NOTE — TELEPHONE ENCOUNTER
Spoke with pt in regards to message sent. Verbalized to the pt that we could print a copy of his Ct. Scan and put in it in the front office to be . Pt acknowledge understanding.

## 2022-07-21 ENCOUNTER — TELEPHONE (OUTPATIENT)
Dept: HEMATOLOGY/ONCOLOGY | Facility: CLINIC | Age: 62
End: 2022-07-21

## 2022-07-21 DIAGNOSIS — F41.9 ANXIETY: ICD-10-CM

## 2022-07-21 RX ORDER — ALPRAZOLAM 1 MG/1
1 TABLET ORAL 2 TIMES DAILY
Qty: 60 TABLET | Refills: 2 | Status: SHIPPED | OUTPATIENT
Start: 2022-07-21 | End: 2022-10-18 | Stop reason: SDUPTHER

## 2022-07-21 NOTE — TELEPHONE ENCOUNTER
Pt is needing a refill on his xanax. Last office visit 05/26/2022. Next office visit 08/23/2022. Last dispense 06/22/2022.  No UDS on file.

## 2022-07-21 NOTE — TELEPHONE ENCOUNTER
----- Message from ANALI Weiss sent at 7/20/2022  6:11 PM CDT -----  Cont current dose and recheck in 4 weeks

## 2022-07-21 NOTE — TELEPHONE ENCOUNTER
----- Message from Terrence Oliva MA sent at 7/21/2022  2:27 PM CDT -----  Pt calling for a refill on his alprazolam.

## 2022-07-21 NOTE — TELEPHONE ENCOUNTER
INR adequate at 1.9. Spoke to pt and advised him that per Miri, he is to continue same dose and re-check in one month. He verbalized understanding.

## 2022-07-29 ENCOUNTER — TELEPHONE (OUTPATIENT)
Dept: HEMATOLOGY/ONCOLOGY | Facility: CLINIC | Age: 62
End: 2022-07-29

## 2022-07-29 NOTE — TELEPHONE ENCOUNTER
Spoke to pt. Per Dr. Smith, he is to hold Coumadin 5 days prior to having tooth pulled and resume the day after. Pt verbalized understanding.

## 2022-07-29 NOTE — TELEPHONE ENCOUNTER
----- Message from Dona Cole sent at 7/29/2022 10:36 AM CDT -----  The patient is on warfarin and needs to have a tooth pulled. He wants to know about the need for lovenox. # [phone]

## 2022-08-18 ENCOUNTER — TELEPHONE (OUTPATIENT)
Dept: FAMILY MEDICINE | Facility: CLINIC | Age: 62
End: 2022-08-18

## 2022-08-18 NOTE — TELEPHONE ENCOUNTER
Nursing Transfer Note    Receiving Transfer Note    6/27/2019 4:00 PM  Received in transfer from PACU to [facility] Acute care  Report received as documented in PER Handoff on Doc Flowsheet.  See Doc Flowsheet for VS's and complete assessment.  Continuous EKG monitoring in place Yes  Chart received with patient: Yes  What Caregiver / Guardian was Notified of Arrival: Mother  Patient and / or caregiver / guardian oriented to room and nurse call system.  KATI Chang  6/27/2019 4:00 PM         Spoke to patient that fasting lab is due a week prior to visit. Said he came today.

## 2022-08-19 LAB
ALBUMIN SERPL-MCNC: 4.5 G/DL (ref 3.6–5.1)
ALBUMIN/GLOB SERPL: 1.6 (CALC) (ref 1–2.5)
ALP SERPL-CCNC: 39 U/L (ref 35–144)
ALT SERPL-CCNC: 42 U/L (ref 9–46)
AST SERPL-CCNC: 42 U/L (ref 10–35)
BILIRUB SERPL-MCNC: 0.6 MG/DL (ref 0.2–1.2)
BUN SERPL-MCNC: 33 MG/DL (ref 7–25)
BUN/CREAT SERPL: 24 (CALC) (ref 6–22)
CALCIUM SERPL-MCNC: 9.5 MG/DL (ref 8.6–10.3)
CHLORIDE SERPL-SCNC: 106 MMOL/L (ref 98–110)
CHOLEST SERPL-MCNC: 168 MG/DL
CHOLEST/HDLC SERPL: 4.4 (CALC)
CO2 SERPL-SCNC: 22 MMOL/L (ref 20–32)
CREAT SERPL-MCNC: 1.38 MG/DL (ref 0.7–1.35)
EGFR: 57 ML/MIN/1.73M2
GLOBULIN SER CALC-MCNC: 2.9 G/DL (CALC) (ref 1.9–3.7)
GLUCOSE SERPL-MCNC: 118 MG/DL (ref 65–99)
HBA1C MFR BLD: 7.5 % OF TOTAL HGB
HDLC SERPL-MCNC: 38 MG/DL
LDLC SERPL CALC-MCNC: 91 MG/DL (CALC)
NONHDLC SERPL-MCNC: 130 MG/DL (CALC)
POTASSIUM SERPL-SCNC: 4.7 MMOL/L (ref 3.5–5.3)
PROT SERPL-MCNC: 7.4 G/DL (ref 6.1–8.1)
SODIUM SERPL-SCNC: 139 MMOL/L (ref 135–146)
TRIGL SERPL-MCNC: 296 MG/DL

## 2022-08-22 ENCOUNTER — TELEPHONE (OUTPATIENT)
Dept: HEMATOLOGY/ONCOLOGY | Facility: CLINIC | Age: 62
End: 2022-08-22

## 2022-08-22 NOTE — TELEPHONE ENCOUNTER
INR adequate at 2.5. Per Dr. Smith, continue on same dose and re-check in one month. Pt notified and verbalized understanding.

## 2022-08-22 NOTE — TELEPHONE ENCOUNTER
----- Message from Ac Smith MD sent at 8/22/2022  5:40 AM CDT -----  Call patient, INR good.  Recheck one month

## 2022-08-23 ENCOUNTER — TELEPHONE (OUTPATIENT)
Dept: FAMILY MEDICINE | Facility: CLINIC | Age: 62
End: 2022-08-23

## 2022-08-23 ENCOUNTER — OFFICE VISIT (OUTPATIENT)
Dept: FAMILY MEDICINE | Facility: CLINIC | Age: 62
End: 2022-08-23
Payer: MEDICARE

## 2022-08-23 VITALS
HEIGHT: 70 IN | DIASTOLIC BLOOD PRESSURE: 76 MMHG | BODY MASS INDEX: 33.16 KG/M2 | SYSTOLIC BLOOD PRESSURE: 136 MMHG | WEIGHT: 231.63 LBS | HEART RATE: 60 BPM | OXYGEN SATURATION: 96 %

## 2022-08-23 DIAGNOSIS — E11.69 DM TYPE 2 WITH DIABETIC DYSLIPIDEMIA: Primary | ICD-10-CM

## 2022-08-23 DIAGNOSIS — N18.31 STAGE 3A CHRONIC KIDNEY DISEASE: ICD-10-CM

## 2022-08-23 DIAGNOSIS — E78.5 DM TYPE 2 WITH DIABETIC DYSLIPIDEMIA: Primary | ICD-10-CM

## 2022-08-23 DIAGNOSIS — K57.92 DIVERTICULITIS: ICD-10-CM

## 2022-08-23 DIAGNOSIS — I25.10 CORONARY ARTERY DISEASE INVOLVING NATIVE CORONARY ARTERY OF NATIVE HEART WITHOUT ANGINA PECTORIS: ICD-10-CM

## 2022-08-23 DIAGNOSIS — I10 ESSENTIAL HYPERTENSION: ICD-10-CM

## 2022-08-23 DIAGNOSIS — M51.36 DDD (DEGENERATIVE DISC DISEASE), LUMBAR: ICD-10-CM

## 2022-08-23 DIAGNOSIS — D68.51 HETEROZYGOUS FACTOR V LEIDEN MUTATION: ICD-10-CM

## 2022-08-23 DIAGNOSIS — E78.2 MIXED HYPERLIPIDEMIA: ICD-10-CM

## 2022-08-23 PROCEDURE — 1159F MED LIST DOCD IN RCRD: CPT | Mod: CPTII,S$GLB,, | Performed by: NURSE PRACTITIONER

## 2022-08-23 PROCEDURE — 3072F PR LOW RISK FOR RETINOPATHY: ICD-10-PCS | Mod: CPTII,S$GLB,, | Performed by: NURSE PRACTITIONER

## 2022-08-23 PROCEDURE — 1160F PR REVIEW ALL MEDS BY PRESCRIBER/CLIN PHARMACIST DOCUMENTED: ICD-10-PCS | Mod: CPTII,S$GLB,, | Performed by: NURSE PRACTITIONER

## 2022-08-23 PROCEDURE — 3072F LOW RISK FOR RETINOPATHY: CPT | Mod: CPTII,S$GLB,, | Performed by: NURSE PRACTITIONER

## 2022-08-23 PROCEDURE — 3066F NEPHROPATHY DOC TX: CPT | Mod: CPTII,S$GLB,, | Performed by: NURSE PRACTITIONER

## 2022-08-23 PROCEDURE — 3060F PR POS MICROALBUMINURIA RESULT DOCUMENTED/REVIEW: ICD-10-PCS | Mod: CPTII,S$GLB,, | Performed by: NURSE PRACTITIONER

## 2022-08-23 PROCEDURE — 3066F PR DOCUMENTATION OF TREATMENT FOR NEPHROPATHY: ICD-10-PCS | Mod: CPTII,S$GLB,, | Performed by: NURSE PRACTITIONER

## 2022-08-23 PROCEDURE — 3008F BODY MASS INDEX DOCD: CPT | Mod: CPTII,S$GLB,, | Performed by: NURSE PRACTITIONER

## 2022-08-23 PROCEDURE — 3060F POS MICROALBUMINURIA REV: CPT | Mod: CPTII,S$GLB,, | Performed by: NURSE PRACTITIONER

## 2022-08-23 PROCEDURE — 99214 OFFICE O/P EST MOD 30 MIN: CPT | Mod: S$GLB,,, | Performed by: NURSE PRACTITIONER

## 2022-08-23 PROCEDURE — 4010F PR ACE/ARB THEARPY RXD/TAKEN: ICD-10-PCS | Mod: CPTII,S$GLB,, | Performed by: NURSE PRACTITIONER

## 2022-08-23 PROCEDURE — 3008F PR BODY MASS INDEX (BMI) DOCUMENTED: ICD-10-PCS | Mod: CPTII,S$GLB,, | Performed by: NURSE PRACTITIONER

## 2022-08-23 PROCEDURE — 3078F PR MOST RECENT DIASTOLIC BLOOD PRESSURE < 80 MM HG: ICD-10-PCS | Mod: CPTII,S$GLB,, | Performed by: NURSE PRACTITIONER

## 2022-08-23 PROCEDURE — 3051F HG A1C>EQUAL 7.0%<8.0%: CPT | Mod: CPTII,S$GLB,, | Performed by: NURSE PRACTITIONER

## 2022-08-23 PROCEDURE — 3078F DIAST BP <80 MM HG: CPT | Mod: CPTII,S$GLB,, | Performed by: NURSE PRACTITIONER

## 2022-08-23 PROCEDURE — 1160F RVW MEDS BY RX/DR IN RCRD: CPT | Mod: CPTII,S$GLB,, | Performed by: NURSE PRACTITIONER

## 2022-08-23 PROCEDURE — 3051F PR MOST RECENT HEMOGLOBIN A1C LEVEL 7.0 - < 8.0%: ICD-10-PCS | Mod: CPTII,S$GLB,, | Performed by: NURSE PRACTITIONER

## 2022-08-23 PROCEDURE — 3075F PR MOST RECENT SYSTOLIC BLOOD PRESS GE 130-139MM HG: ICD-10-PCS | Mod: CPTII,S$GLB,, | Performed by: NURSE PRACTITIONER

## 2022-08-23 PROCEDURE — 3075F SYST BP GE 130 - 139MM HG: CPT | Mod: CPTII,S$GLB,, | Performed by: NURSE PRACTITIONER

## 2022-08-23 PROCEDURE — 4010F ACE/ARB THERAPY RXD/TAKEN: CPT | Mod: CPTII,S$GLB,, | Performed by: NURSE PRACTITIONER

## 2022-08-23 PROCEDURE — 99214 PR OFFICE/OUTPT VISIT, EST, LEVL IV, 30-39 MIN: ICD-10-PCS | Mod: S$GLB,,, | Performed by: NURSE PRACTITIONER

## 2022-08-23 PROCEDURE — 1159F PR MEDICATION LIST DOCUMENTED IN MEDICAL RECORD: ICD-10-PCS | Mod: CPTII,S$GLB,, | Performed by: NURSE PRACTITIONER

## 2022-08-23 RX ORDER — TIZANIDINE 4 MG/1
4 TABLET ORAL 2 TIMES DAILY PRN
Qty: 180 TABLET | Refills: 1 | Status: SHIPPED | OUTPATIENT
Start: 2022-08-23 | End: 2022-11-23 | Stop reason: SDUPTHER

## 2022-08-23 RX ORDER — METRONIDAZOLE 500 MG/1
500 TABLET ORAL EVERY 8 HOURS
Qty: 30 TABLET | Refills: 0 | Status: SHIPPED | OUTPATIENT
Start: 2022-08-23 | End: 2023-09-22

## 2022-08-23 RX ORDER — ATENOLOL 50 MG/1
100 TABLET ORAL 2 TIMES DAILY
Qty: 360 TABLET | Refills: 1 | Status: SHIPPED | OUTPATIENT
Start: 2022-08-23 | End: 2023-02-09 | Stop reason: SDUPTHER

## 2022-08-23 RX ORDER — ROSUVASTATIN CALCIUM 40 MG/1
40 TABLET, COATED ORAL DAILY
Qty: 90 TABLET | Refills: 1 | Status: SHIPPED | OUTPATIENT
Start: 2022-08-23 | End: 2022-10-27 | Stop reason: SDUPTHER

## 2022-08-23 RX ORDER — CIPROFLOXACIN 500 MG/1
500 TABLET ORAL 2 TIMES DAILY
Qty: 20 TABLET | Refills: 0 | Status: SHIPPED | OUTPATIENT
Start: 2022-08-23 | End: 2023-09-22

## 2022-08-23 RX ORDER — LISINOPRIL 20 MG/1
20 TABLET ORAL 2 TIMES DAILY
Qty: 180 TABLET | Refills: 1 | Status: SHIPPED | OUTPATIENT
Start: 2022-08-23 | End: 2023-02-16 | Stop reason: SDUPTHER

## 2022-08-23 NOTE — Clinical Note
Please call pt and let him know I didn't review his kidney function labs with him at visit- kidney function has declined just slightly so recommend he increase his water intake and make sure he's not taking any NSAIDs. Repeat his labs before next visit

## 2022-08-23 NOTE — PROGRESS NOTES
SUBJECTIVE:    Patient ID: Luis Resendiz is a 61 y.o. male.    Chief Complaint: Diabetes (No bottles//Pt is here for a 3 month check up//Decline eye exam and pna vaccine.//Jacquelin )    Pt here for regular DM/lipids/CAD f/u  Patient reports overall doing okay.  States has been trying to watch his diet a little bit better and has lost a few lb.    Treated last month again for another diverticulitis attack.  CT scan was performed last month which showed mild diverticulitis, no abscess or perforation.  Patient reports pain did resolve with antibiotics.  This is his 3rd or 4th attack in the past year.  Had colonoscopy last year which showed diverticulum throughout colon.  Reports checks his blood pressure occasionally at home and it has been controlled in the 130s with the increase in atenolol dose      Ancillary Orders on 08/12/2022   Component Date Value Ref Range Status    INR 08/18/2022 2.5 (A)  Final    Prothrombin Time 08/18/2022 23.8 (A) 9.0 - 11.5 sec Final   Ancillary Orders on 07/15/2022   Component Date Value Ref Range Status    INR 07/19/2022 1.9 (A)  Final    Prothrombin Time 07/19/2022 18.4 (A) 9.0 - 11.5 sec Final   Hospital Outpatient Visit on 07/07/2022   Component Date Value Ref Range Status    POC Creatinine 07/07/2022 1.2  0.5 - 1.4 mg/dL Final    Sample 07/07/2022 VENOUS   Final   Ancillary Orders on 06/17/2022   Component Date Value Ref Range Status    INR 06/22/2022 2.1 (A)  Final    Prothrombin Time 06/22/2022 20.3 (A) 9.0 - 11.5 sec Final   Office Visit on 05/26/2022   Component Date Value Ref Range Status    Hemoglobin A1C 08/18/2022 7.5 (A) <5.7 % of total Hgb Final    Cholesterol 08/18/2022 168  <200 mg/dL Final    HDL 08/18/2022 38 (A) > OR = 40 mg/dL Final    Triglycerides 08/18/2022 296 (A) <150 mg/dL Final    LDL Cholesterol 08/18/2022 91  mg/dL (calc) Final    HDL/Cholesterol Ratio 08/18/2022 4.4  <5.0 (calc) Final    Non HDL Chol. (LDL+VLDL) 08/18/2022 130 (A) <130  mg/dL (calc) Final    Glucose 08/18/2022 118 (A) 65 - 99 mg/dL Final    BUN 08/18/2022 33 (A) 7 - 25 mg/dL Final    Creatinine 08/18/2022 1.38 (A) 0.70 - 1.35 mg/dL Final    EGFR 08/18/2022 57 (A) > OR = 60 mL/min/1.73m2 Final    BUN/Creatinine Ratio 08/18/2022 24 (A) 6 - 22 (calc) Final    Sodium 08/18/2022 139  135 - 146 mmol/L Final    Potassium 08/18/2022 4.7  3.5 - 5.3 mmol/L Final    Chloride 08/18/2022 106  98 - 110 mmol/L Final    CO2 08/18/2022 22  20 - 32 mmol/L Final    Calcium 08/18/2022 9.5  8.6 - 10.3 mg/dL Final    Total Protein 08/18/2022 7.4  6.1 - 8.1 g/dL Final    Albumin 08/18/2022 4.5  3.6 - 5.1 g/dL Final    Globulin, Total 08/18/2022 2.9  1.9 - 3.7 g/dL (calc) Final    Albumin/Globulin Ratio 08/18/2022 1.6  1.0 - 2.5 (calc) Final    Total Bilirubin 08/18/2022 0.6  0.2 - 1.2 mg/dL Final    Alkaline Phosphatase 08/18/2022 39  35 - 144 U/L Final    AST 08/18/2022 42 (A) 10 - 35 U/L Final    ALT 08/18/2022 42  9 - 46 U/L Final   Ancillary Orders on 05/20/2022   Component Date Value Ref Range Status    INR 05/23/2022 3.2 (A)  Final    Prothrombin Time 05/23/2022 29.2 (A) 9.0 - 11.5 sec Final   Ancillary Orders on 04/01/2022   Component Date Value Ref Range Status    INR 04/01/2022 1.4 (A)  Final    Prothrombin Time 04/01/2022 13.5 (A) 9.0 - 11.5 sec Final   Office Visit on 03/30/2022   Component Date Value Ref Range Status    Hemoglobin A1C 05/23/2022 7.7 (A) <5.7 % of total Hgb Final   Ancillary Orders on 03/25/2022   Component Date Value Ref Range Status    INR 03/28/2022 3.8 (A)  Final    Prothrombin Time 03/28/2022 34.7 (A) 9.0 - 11.5 sec Final   Ancillary Orders on 03/04/2022   Component Date Value Ref Range Status    INR 03/08/2022 2.2 (A)  Final    Prothrombin Time 03/08/2022 21.0 (A) 9.0 - 11.5 sec Final   There may be more visits with results that are not included.       Past Medical History:   Diagnosis Date    Anticoagulant long-term use     Arthritis      Cervical spine pain     Clotting disorder     Coronary artery disease     Diabetes mellitus     Encounter for blood transfusion     Hypertension      Past Surgical History:   Procedure Laterality Date    BACK SURGERY      cabg      CARDIAC SURGERY      FOOT SURGERY      FRACTURE SURGERY      ROTATOR CUFF REPAIR Left      Family History   Problem Relation Age of Onset    Heart disease Mother     Cancer Mother     Heart disease Father     Alcohol abuse Father     No Known Problems Sister     No Known Problems Brother     No Known Problems Daughter     No Known Problems Son     Cataracts Neg Hx     Glaucoma Neg Hx     Retinal detachment Neg Hx     Macular degeneration Neg Hx        The 10-year CVD risk score (Christiano et al., 2008) is: 39.7%    Values used to calculate the score:      Age: 61 years      Sex: Male      Diabetic: Yes      Tobacco smoker: No      Systolic Blood Pressure: 136 mmHg      Is BP treated: Yes      HDL Cholesterol: 38 mg/dL      Total Cholesterol: 168 mg/dL     Marital Status:   Alcohol History:  reports current alcohol use.  Tobacco History:  reports that he quit smoking about 28 years ago. He has a 0.25 pack-year smoking history. He has never used smokeless tobacco.  Drug History:  reports no history of drug use.    Health Maintenance Topics with due status: Not Due       Topic Last Completion Date    Colorectal Cancer Screening 08/17/2021    Influenza Vaccine 11/17/2021    TETANUS VACCINE 01/22/2022    Diabetes Urine Screening 03/28/2022    Foot Exam 05/26/2022    Lipid Panel 08/18/2022    Hemoglobin A1c 08/18/2022    High Dose Statin 08/23/2022     Immunization History   Administered Date(s) Administered    COVID-19, MRNA, LN-S, PF (Pfizer) (Purple Cap) 03/25/2021, 04/14/2021    Influenza (FLUBLOK) - Quadrivalent - Recombinant - PF *Preferred* (egg allergy) 11/16/2020, 11/17/2021    Influenza - Quadrivalent - PF *Preferred* (6 months and older) 12/26/2019  "   Influenza - Trivalent (ADULT) 10/01/2014    Influenza - Trivalent - Recombinant - PF 09/25/2018    Tdap 01/22/2022       Review of patient's allergies indicates:   Allergen Reactions    Nsaids (non-steroidal anti-inflammatory drug) Nausea Only    Ancef [cefazolin] Anxiety       Current Outpatient Medications:     ALPRAZolam (XANAX) 1 MG tablet, Take 1 tablet (1 mg total) by mouth 2 (two) times daily., Disp: 60 tablet, Rfl: 2    amLODIPine (NORVASC) 10 MG tablet, Take 1 tablet (10 mg total) by mouth once daily., Disp: 90 tablet, Rfl: 1    aspirin 325 MG tablet, Take 325 mg by mouth once daily., Disp: , Rfl:     atenoloL (TENORMIN) 50 MG tablet, Take 2 tablets (100 mg total) by mouth 2 (two) times a day., Disp: 360 tablet, Rfl: 1    BD ULTRA-FINE MINI PEN NEEDLE 31 gauge x 3/16" Ndle, 1 each by In Vitro route once daily., Disp: 100 each, Rfl: 1    blood sugar diagnostic Strp, To check BG 3-4 times daily, to use with insurance preferred meter, Disp: 360 each, Rfl: 3    blood-glucose meter kit, To check BG 3-4 times daily, to use with insurance preferred meter, Disp: 1 each, Rfl: 0    ciprofloxacin HCl (CIPRO) 500 MG tablet, Take 1 tablet (500 mg total) by mouth 2 (two) times daily., Disp: 20 tablet, Rfl: 0    coenzyme Q10 100 mg capsule, Take 100 mg by mouth 2 (two) times a day., Disp: , Rfl:     fenofibrate (TRICOR) 145 MG tablet, Take 1 tablet (145 mg total) by mouth once daily., Disp: 90 tablet, Rfl: 1    glimepiride (AMARYL) 4 MG tablet, Take 1 tablet (4 mg total) by mouth 2 (two) times daily., Disp: 180 tablet, Rfl: 1    HYDROcodone-acetaminophen (NORCO) 5-325 mg per tablet, Take 1 tablet by mouth every 6 (six) hours as needed (severe pain)., Disp: 12 tablet, Rfl: 0    insulin NPH-insulin regular, 70/30, (HUMULIN 70/30 U-100 INSULIN) 100 unit/mL (70-30) injection, 35 units in AM and 35 units in PM- may titrate upwards as needed to max of 40 units BID, Disp: 7 each, Rfl: 3    insulin " "syringe-needle U-100 0.3 mL 31 gauge x 15/64" Syrg, , Disp: , Rfl:     insulin syringe-needle U-100 1/2 mL 31 gauge x 15/64" Syrg, BID, Disp: 180 Syringe, Rfl: 3    lancets Misc, To check BG 3-4 times daily, to use with insurance preferred meter, Disp: 360 each, Rfl: 3    lisinopriL (PRINIVIL,ZESTRIL) 20 MG tablet, Take 1 tablet (20 mg total) by mouth 2 (two) times daily., Disp: 180 tablet, Rfl: 1    metFORMIN (GLUCOPHAGE) 500 MG tablet, Take 2 tablets (1,000 mg total) by mouth 2 (two) times daily with meals., Disp: 360 tablet, Rfl: 1    metroNIDAZOLE (FLAGYL) 500 MG tablet, Take 1 tablet (500 mg total) by mouth every 8 (eight) hours., Disp: 30 tablet, Rfl: 0    rosuvastatin (CRESTOR) 40 MG Tab, Take 1 tablet (40 mg total) by mouth once daily., Disp: 90 tablet, Rfl: 1    semaglutide (OZEMPIC) 0.25 mg or 0.5 mg(2 mg/1.5 mL) pen injector, Start with 0.25mg weekly for 4 weeks then increase to 0.5mg weekly (Patient not taking: Reported on 5/26/2022), Disp: 2 pen, Rfl: 0    tiZANidine (ZANAFLEX) 4 MG tablet, Take 1 tablet (4 mg total) by mouth 2 (two) times daily as needed (back pain)., Disp: 180 tablet, Rfl: 1    TRUEPLUS INSULIN 0.5 mL 29 gauge x 1/2" Syrg, , Disp: , Rfl: 5    ULTRA COMFORT INSULIN SYRINGE 1 mL 29 gauge x 1/2" Syrg, Inject 25 Units as directed 2 (two) times daily., Disp: 180 each, Rfl: 3    warfarin (COUMADIN) 1 MG tablet, Take 1 tablet (1 mg total) by mouth Daily., Disp: 90 tablet, Rfl: 3    warfarin (COUMADIN) 5 MG tablet, TAKE 1 & 1/2 (ONE & ONE-HALF) TABLETS BY MOUTH ON MODAY, WEDNESDAY, AND FRIDAY. TAKE 1 TABLET ON SATURDAY, SUNDAY, TUESDAY AND THURSDAY, Disp: 45 tablet, Rfl: 3    Review of Systems   Constitutional: Negative for chills, fever and unexpected weight change (Weight down 4 lb since last visit).   HENT: Negative for trouble swallowing.    Eyes: Negative for visual disturbance.   Respiratory: Negative for cough, shortness of breath and wheezing.    Cardiovascular: Negative " "for chest pain, palpitations and leg swelling.   Gastrointestinal: Negative for abdominal pain, blood in stool, constipation, diarrhea, nausea and vomiting.   Genitourinary: Positive for frequency (nocturia 1-3 times/night). Negative for dysuria and hematuria.   Musculoskeletal: Positive for back pain (chronic, takes tylenol BID). Negative for gait problem.   Skin: Negative for rash.   Neurological: Positive for numbness (Left hand). Negative for dizziness, syncope, speech difficulty and headaches.   Psychiatric/Behavioral: Negative for dysphoric mood. The patient is not nervous/anxious (stable on xanax).           Objective:      Vitals:    08/23/22 1115 08/23/22 1122 08/23/22 1157   BP: (!) 140/80 (!) 142/78 136/76   Pulse: 60     SpO2: 96%     Weight: 105.1 kg (231 lb 9.6 oz)     Height: 5' 10" (1.778 m)       Physical Exam  Vitals and nursing note reviewed.   Constitutional:       General: He is not in acute distress.     Appearance: He is well-developed. He is obese.   HENT:      Head: Normocephalic and atraumatic.   Neck:      Vascular: No carotid bruit.   Cardiovascular:      Rate and Rhythm: Normal rate and regular rhythm.      Heart sounds: No murmur heard.    No friction rub. No gallop.   Pulmonary:      Effort: Pulmonary effort is normal. No respiratory distress.      Breath sounds: Normal breath sounds. No wheezing or rales.   Abdominal:      General: There is no distension.      Palpations: Abdomen is soft.      Tenderness: There is no abdominal tenderness.   Musculoskeletal:      Cervical back: Neck supple.      Right lower leg: No edema.      Left lower leg: No edema.      Comments: + varicose veins without inflammation   Lymphadenopathy:      Cervical: No cervical adenopathy.   Skin:     General: Skin is warm and dry.      Findings: No rash.   Neurological:      General: No focal deficit present.      Mental Status: He is alert and oriented to person, place, and time.      Gait: Gait normal.       "     Assessment:       1. DM type 2 with diabetic dyslipidemia    2. Mixed hyperlipidemia    3. Essential hypertension    4. Coronary artery disease involving native coronary artery of native heart without angina pectoris    5. Diverticulitis    6. Stage 3a chronic kidney disease    7. DDD (degenerative disc disease), lumbar    8. Heterozygous factor V Leiden mutation           Plan:       DM type 2 with diabetic dyslipidemia  Comments:  A1c improved slightly to 7.5%, continue with diet efforts and current medications  Orders:  -     Hemoglobin A1C; Future; Expected date: 08/23/2022    Mixed hyperlipidemia  Comments:  Reviewed recent labs with pt, mild improvement in TG, LDL not at goal rate given diabetes/CAD.  Patient cannot afford vascepa, will increase rosuvastatin  Orders:  -     rosuvastatin (CRESTOR) 40 MG Tab; Take 1 tablet (40 mg total) by mouth once daily.  Dispense: 90 tablet; Refill: 1  -     Lipid Panel; Future; Expected date: 08/23/2022    Essential hypertension  Comments:  BP improved on recheck and reports home readings have improved with increase in atenolol dose  Orders:  -     atenoloL (TENORMIN) 50 MG tablet; Take 2 tablets (100 mg total) by mouth 2 (two) times a day.  Dispense: 360 tablet; Refill: 1  -     lisinopriL (PRINIVIL,ZESTRIL) 20 MG tablet; Take 1 tablet (20 mg total) by mouth 2 (two) times daily.  Dispense: 180 tablet; Refill: 1    Coronary artery disease involving native coronary artery of native heart without angina pectoris  Comments:  Stable, denies angina, follow-up with Dr. Crow    Diverticlotis  Comments:  Discussed diverticulitis, pt advised if he continues to have recurrent episodes may need to see surgeon.  Will give prescription of antibiotics to have on hand  Orders:  -     ciprofloxacin HCl (CIPRO) 500 MG tablet; Take 1 tablet (500 mg total) by mouth 2 (two) times daily.  Dispense: 20 tablet; Refill: 0  -     metroNIDAZOLE (FLAGYL) 500 MG tablet; Take 1 tablet (500 mg  total) by mouth every 8 (eight) hours.  Dispense: 30 tablet; Refill: 0    Stage 3a chronic kidney disease  Comments:  Reviewed recent labs with mild increase in BUN/creatinine, recommend increasing water intake and will recheck before next visit  Orders:  -     Comprehensive Metabolic Panel; Future; Expected date: 08/23/2022    DDD (degenerative disc disease), lumbar  -     tiZANidine (ZANAFLEX) 4 MG tablet; Take 1 tablet (4 mg total) by mouth 2 (two) times daily as needed (back pain).  Dispense: 180 tablet; Refill: 1    Heterozygous factor V Leiden mutation  Comments:  Stable on warfarin, managed by Dr. Sofia      Follow up in about 3 months (around 11/23/2022).          Counseled on age and gender appropriate medical preventative services, including cancer screenings, immunizations, overall nutritional health, need for a consistent exercise regimen and an overall push towards maintaining a vigorous and active lifestyle.      8/23/2022 Marianna Hdez NP

## 2022-08-23 NOTE — TELEPHONE ENCOUNTER
Spoke with pt in regards to message sent. Verbalized per Marianna that his kidney function has declined just slightly so recommend he increase his water intake and make sure he's not taking any NSAIDs. Repeat his labs before next visit. Pt acknowledge understanding.   Reminder set up.

## 2022-08-23 NOTE — TELEPHONE ENCOUNTER
----- Message from Marianna Hdez NP sent at 8/23/2022  2:00 PM CDT -----  Please call pt and let him know I didn't review his kidney function labs with him at visit- kidney function has declined just slightly so recommend he increase his water intake and make sure he's not taking any NSAIDs. Repeat his labs before next visit

## 2022-10-04 ENCOUNTER — TELEPHONE (OUTPATIENT)
Dept: HEMATOLOGY/ONCOLOGY | Facility: CLINIC | Age: 62
End: 2022-10-04

## 2022-10-04 NOTE — TELEPHONE ENCOUNTER
Spoke to pt. INR adequate at 2.9. Continue same dose and re-check in one month. Pt verbalized understanding.

## 2022-10-04 NOTE — TELEPHONE ENCOUNTER
----- Message from Daisy Otero sent at 10/4/2022  2:52 PM CDT -----  Pt called to see if you got the results for his INR that was done yesterday cb# [phone]

## 2022-10-18 DIAGNOSIS — F41.9 ANXIETY: ICD-10-CM

## 2022-10-18 RX ORDER — ALPRAZOLAM 1 MG/1
1 TABLET ORAL 2 TIMES DAILY
Qty: 60 TABLET | Refills: 0 | Status: SHIPPED | OUTPATIENT
Start: 2022-10-18 | End: 2022-11-23 | Stop reason: SDUPTHER

## 2022-10-18 NOTE — TELEPHONE ENCOUNTER
----- Message from Terrence Oliva MA sent at 10/18/2022 10:32 AM CDT -----  Pt is calling for a refill on his alprazolam. [phone].

## 2022-10-27 DIAGNOSIS — E78.2 MIXED HYPERLIPIDEMIA: ICD-10-CM

## 2022-10-27 RX ORDER — ROSUVASTATIN CALCIUM 40 MG/1
40 TABLET, COATED ORAL DAILY
Qty: 90 TABLET | Refills: 1 | Status: SHIPPED | OUTPATIENT
Start: 2022-10-27 | End: 2023-03-21 | Stop reason: SDUPTHER

## 2022-10-27 NOTE — TELEPHONE ENCOUNTER
----- Message from Cass Barnard sent at 10/27/2022 11:30 AM CDT -----  Patient called and stated that he need a refill of his rosuvastatin he stated that she increased it to two a day  he also stated that he need more sent to them but he is not sure which one so he would like to have the prescription sent to [facility] please give him a call at [phone]

## 2022-11-08 DIAGNOSIS — E78.5 DM TYPE 2 WITH DIABETIC DYSLIPIDEMIA: ICD-10-CM

## 2022-11-08 DIAGNOSIS — E11.69 DM TYPE 2 WITH DIABETIC DYSLIPIDEMIA: ICD-10-CM

## 2022-11-08 RX ORDER — GLIMEPIRIDE 4 MG/1
4 TABLET ORAL 2 TIMES DAILY
Qty: 180 TABLET | Refills: 1 | Status: SHIPPED | OUTPATIENT
Start: 2022-11-08 | End: 2023-05-15 | Stop reason: SDUPTHER

## 2022-11-08 NOTE — TELEPHONE ENCOUNTER
----- Message from Terrence Oliva MA sent at 11/8/2022  8:59 AM CST -----  Pt calling for refill of glimepiride. [phone]

## 2022-11-09 LAB
ALBUMIN SERPL-MCNC: 4.7 G/DL (ref 3.6–5.1)
ALBUMIN/GLOB SERPL: 1.5 (CALC) (ref 1–2.5)
ALP SERPL-CCNC: 41 U/L (ref 35–144)
ALT SERPL-CCNC: 57 U/L (ref 9–46)
AST SERPL-CCNC: 56 U/L (ref 10–35)
BILIRUB SERPL-MCNC: 0.7 MG/DL (ref 0.2–1.2)
BUN SERPL-MCNC: 35 MG/DL (ref 7–25)
BUN/CREAT SERPL: 26 (CALC) (ref 6–22)
CALCIUM SERPL-MCNC: 9.9 MG/DL (ref 8.6–10.3)
CHLORIDE SERPL-SCNC: 105 MMOL/L (ref 98–110)
CHOLEST SERPL-MCNC: 145 MG/DL
CHOLEST/HDLC SERPL: 3.6 (CALC)
CO2 SERPL-SCNC: 23 MMOL/L (ref 20–32)
CREAT SERPL-MCNC: 1.33 MG/DL (ref 0.7–1.35)
EGFR: 60 ML/MIN/1.73M2
GLOBULIN SER CALC-MCNC: 3.1 G/DL (CALC) (ref 1.9–3.7)
GLUCOSE SERPL-MCNC: 148 MG/DL (ref 65–99)
HBA1C MFR BLD: 7.4 % OF TOTAL HGB
HDLC SERPL-MCNC: 40 MG/DL
LDLC SERPL CALC-MCNC: 75 MG/DL (CALC)
NONHDLC SERPL-MCNC: 105 MG/DL (CALC)
POTASSIUM SERPL-SCNC: 5.1 MMOL/L (ref 3.5–5.3)
PROT SERPL-MCNC: 7.8 G/DL (ref 6.1–8.1)
SODIUM SERPL-SCNC: 138 MMOL/L (ref 135–146)
TRIGL SERPL-MCNC: 209 MG/DL

## 2022-11-10 ENCOUNTER — TELEPHONE (OUTPATIENT)
Dept: HEMATOLOGY/ONCOLOGY | Facility: CLINIC | Age: 62
End: 2022-11-10

## 2022-11-10 NOTE — TELEPHONE ENCOUNTER
----- Message from ANALI Weiss sent at 11/10/2022 11:54 AM CST -----    ----- Message -----  From: Ac Smith MD  Sent: 11/9/2022   7:46 PM CST  To: ANALI Weiss, Zonia Estrada RN    Call patient , double dose for 2 days then same.  Recheck in one month

## 2022-11-14 ENCOUNTER — TELEPHONE (OUTPATIENT)
Dept: FAMILY MEDICINE | Facility: CLINIC | Age: 62
End: 2022-11-14

## 2022-11-14 NOTE — TELEPHONE ENCOUNTER
Spoke with pt in regards to reminder set. Verbalized that this is a reminder for repeat labs. Pt states that he had lab done on 11/08/2022.

## 2022-11-14 NOTE — TELEPHONE ENCOUNTER
----- Message from Marilee Fall MA sent at 10/31/2022  4:53 PM CDT -----  Regarding: repeat labs    ----- Message -----  From: Marilee Fall MA  Sent: 10/31/2022  12:00 AM CDT  To: Marilee Fall MA    ----- Message from Marianna Hdez NP sent at 8/23/2022  2:00 PM CDT -----  Please call pt and let him know I didn't review his kidney function labs with him at visit- kidney function has declined just slightly so recommend he increase his water intake and make sure he's not taking any NSAIDs. Repeat his labs before next visit

## 2022-11-23 ENCOUNTER — OFFICE VISIT (OUTPATIENT)
Dept: FAMILY MEDICINE | Facility: CLINIC | Age: 62
End: 2022-11-23
Payer: MEDICARE

## 2022-11-23 VITALS
BODY MASS INDEX: 33.36 KG/M2 | HEART RATE: 60 BPM | WEIGHT: 233 LBS | HEIGHT: 70 IN | DIASTOLIC BLOOD PRESSURE: 80 MMHG | SYSTOLIC BLOOD PRESSURE: 132 MMHG

## 2022-11-23 DIAGNOSIS — E78.5 DM TYPE 2 WITH DIABETIC DYSLIPIDEMIA: Primary | ICD-10-CM

## 2022-11-23 DIAGNOSIS — M51.36 DDD (DEGENERATIVE DISC DISEASE), LUMBAR: ICD-10-CM

## 2022-11-23 DIAGNOSIS — I10 ESSENTIAL HYPERTENSION: ICD-10-CM

## 2022-11-23 DIAGNOSIS — F41.9 ANXIETY: ICD-10-CM

## 2022-11-23 DIAGNOSIS — E11.69 DM TYPE 2 WITH DIABETIC DYSLIPIDEMIA: Primary | ICD-10-CM

## 2022-11-23 DIAGNOSIS — I25.10 CORONARY ARTERY DISEASE INVOLVING NATIVE CORONARY ARTERY OF NATIVE HEART WITHOUT ANGINA PECTORIS: ICD-10-CM

## 2022-11-23 DIAGNOSIS — Z23 NEED FOR INFLUENZA VACCINATION: ICD-10-CM

## 2022-11-23 DIAGNOSIS — E78.2 MIXED HYPERLIPIDEMIA: ICD-10-CM

## 2022-11-23 PROCEDURE — 3066F PR DOCUMENTATION OF TREATMENT FOR NEPHROPATHY: ICD-10-PCS | Mod: CPTII,S$GLB,, | Performed by: NURSE PRACTITIONER

## 2022-11-23 PROCEDURE — 4010F PR ACE/ARB THEARPY RXD/TAKEN: ICD-10-PCS | Mod: CPTII,S$GLB,, | Performed by: NURSE PRACTITIONER

## 2022-11-23 PROCEDURE — 99214 PR OFFICE/OUTPT VISIT, EST, LEVL IV, 30-39 MIN: ICD-10-PCS | Mod: 25,S$GLB,, | Performed by: NURSE PRACTITIONER

## 2022-11-23 PROCEDURE — 3060F POS MICROALBUMINURIA REV: CPT | Mod: CPTII,S$GLB,, | Performed by: NURSE PRACTITIONER

## 2022-11-23 PROCEDURE — 1159F PR MEDICATION LIST DOCUMENTED IN MEDICAL RECORD: ICD-10-PCS | Mod: CPTII,S$GLB,, | Performed by: NURSE PRACTITIONER

## 2022-11-23 PROCEDURE — 90682 RIV4 VACC RECOMBINANT DNA IM: CPT | Mod: S$GLB,,, | Performed by: NURSE PRACTITIONER

## 2022-11-23 PROCEDURE — 4010F ACE/ARB THERAPY RXD/TAKEN: CPT | Mod: CPTII,S$GLB,, | Performed by: NURSE PRACTITIONER

## 2022-11-23 PROCEDURE — 3079F DIAST BP 80-89 MM HG: CPT | Mod: CPTII,S$GLB,, | Performed by: NURSE PRACTITIONER

## 2022-11-23 PROCEDURE — G0008 ADMIN INFLUENZA VIRUS VAC: HCPCS | Mod: S$GLB,,, | Performed by: NURSE PRACTITIONER

## 2022-11-23 PROCEDURE — 1160F RVW MEDS BY RX/DR IN RCRD: CPT | Mod: CPTII,S$GLB,, | Performed by: NURSE PRACTITIONER

## 2022-11-23 PROCEDURE — 99214 OFFICE O/P EST MOD 30 MIN: CPT | Mod: 25,S$GLB,, | Performed by: NURSE PRACTITIONER

## 2022-11-23 PROCEDURE — 3072F LOW RISK FOR RETINOPATHY: CPT | Mod: CPTII,S$GLB,, | Performed by: NURSE PRACTITIONER

## 2022-11-23 PROCEDURE — 3060F PR POS MICROALBUMINURIA RESULT DOCUMENTED/REVIEW: ICD-10-PCS | Mod: CPTII,S$GLB,, | Performed by: NURSE PRACTITIONER

## 2022-11-23 PROCEDURE — 3008F PR BODY MASS INDEX (BMI) DOCUMENTED: ICD-10-PCS | Mod: CPTII,S$GLB,, | Performed by: NURSE PRACTITIONER

## 2022-11-23 PROCEDURE — 3051F PR MOST RECENT HEMOGLOBIN A1C LEVEL 7.0 - < 8.0%: ICD-10-PCS | Mod: CPTII,S$GLB,, | Performed by: NURSE PRACTITIONER

## 2022-11-23 PROCEDURE — 3072F PR LOW RISK FOR RETINOPATHY: ICD-10-PCS | Mod: CPTII,S$GLB,, | Performed by: NURSE PRACTITIONER

## 2022-11-23 PROCEDURE — 3066F NEPHROPATHY DOC TX: CPT | Mod: CPTII,S$GLB,, | Performed by: NURSE PRACTITIONER

## 2022-11-23 PROCEDURE — 90682 FLU VACCINE - QUADRIVALENT (RECOMBINANT) PRESERVATIVE FREE: ICD-10-PCS | Mod: S$GLB,,, | Performed by: NURSE PRACTITIONER

## 2022-11-23 PROCEDURE — 3075F SYST BP GE 130 - 139MM HG: CPT | Mod: CPTII,S$GLB,, | Performed by: NURSE PRACTITIONER

## 2022-11-23 PROCEDURE — G0008 FLU VACCINE - QUADRIVALENT (RECOMBINANT) PRESERVATIVE FREE: ICD-10-PCS | Mod: S$GLB,,, | Performed by: NURSE PRACTITIONER

## 2022-11-23 PROCEDURE — 3079F PR MOST RECENT DIASTOLIC BLOOD PRESSURE 80-89 MM HG: ICD-10-PCS | Mod: CPTII,S$GLB,, | Performed by: NURSE PRACTITIONER

## 2022-11-23 PROCEDURE — 3075F PR MOST RECENT SYSTOLIC BLOOD PRESS GE 130-139MM HG: ICD-10-PCS | Mod: CPTII,S$GLB,, | Performed by: NURSE PRACTITIONER

## 2022-11-23 PROCEDURE — 3008F BODY MASS INDEX DOCD: CPT | Mod: CPTII,S$GLB,, | Performed by: NURSE PRACTITIONER

## 2022-11-23 PROCEDURE — 3051F HG A1C>EQUAL 7.0%<8.0%: CPT | Mod: CPTII,S$GLB,, | Performed by: NURSE PRACTITIONER

## 2022-11-23 PROCEDURE — 1159F MED LIST DOCD IN RCRD: CPT | Mod: CPTII,S$GLB,, | Performed by: NURSE PRACTITIONER

## 2022-11-23 PROCEDURE — 1160F PR REVIEW ALL MEDS BY PRESCRIBER/CLIN PHARMACIST DOCUMENTED: ICD-10-PCS | Mod: CPTII,S$GLB,, | Performed by: NURSE PRACTITIONER

## 2022-11-23 RX ORDER — AMLODIPINE BESYLATE 10 MG/1
10 TABLET ORAL DAILY
Qty: 90 TABLET | Refills: 1 | Status: SHIPPED | OUTPATIENT
Start: 2022-11-23 | End: 2023-03-21 | Stop reason: SDUPTHER

## 2022-11-23 RX ORDER — ALPRAZOLAM 1 MG/1
1 TABLET ORAL 2 TIMES DAILY
Qty: 60 TABLET | Refills: 5 | Status: SHIPPED | OUTPATIENT
Start: 2022-11-23 | End: 2023-03-21 | Stop reason: SDUPTHER

## 2022-11-23 RX ORDER — FENOFIBRATE 145 MG/1
145 TABLET, FILM COATED ORAL DAILY
Qty: 90 TABLET | Refills: 1 | Status: SHIPPED | OUTPATIENT
Start: 2022-11-23 | End: 2023-03-21 | Stop reason: SDUPTHER

## 2022-11-23 RX ORDER — METFORMIN HYDROCHLORIDE 500 MG/1
1000 TABLET ORAL 2 TIMES DAILY WITH MEALS
Qty: 360 TABLET | Refills: 1 | Status: SHIPPED | OUTPATIENT
Start: 2022-11-23 | End: 2023-03-21 | Stop reason: SDUPTHER

## 2022-11-23 RX ORDER — TIZANIDINE 4 MG/1
4 TABLET ORAL 2 TIMES DAILY PRN
Qty: 180 TABLET | Refills: 1 | Status: SHIPPED | OUTPATIENT
Start: 2022-11-23 | End: 2023-03-21 | Stop reason: SDUPTHER

## 2022-11-23 NOTE — PROGRESS NOTES
SUBJECTIVE:    Patient ID: Luis Resendiz is a 62 y.o. male.    Chief Complaint: Diabetes (3mth, brought list no bottles, eye exam pt will sched, Flu wants// SW)    Pt here for regular DM/lipids/CAD f/u    Pt reports overall doing okay- continues with chronic LBP and neck pain- reports bilat hands are chronically numb. Has previously seen pain mgmt and had injections which helped some with neck pain but no improvement in lower back pain- not interested in going back to pain mgmt right now    Denies any recent diverticulitis episodes    Follows with Dr. Sofia for hypercoagulable- Factor V leiden    Hx of CAD/CABG- reports hasn't followed up with  in over 2 years d/t outstanding bill. Denies any CP or SOB.      Ancillary Orders on 11/04/2022   Component Date Value Ref Range Status    INR 11/08/2022 1.6 (H)   Final    Prothrombin Time 11/08/2022 15.4 (H)  9.0 - 11.5 sec Final   Ancillary Orders on 09/30/2022   Component Date Value Ref Range Status    INR 10/03/2022 2.9 (H)   Final    Prothrombin Time 10/03/2022 27.0 (H)  9.0 - 11.5 sec Final   Office Visit on 08/23/2022   Component Date Value Ref Range Status    Hemoglobin A1C 11/08/2022 7.4 (H)  <5.7 % of total Hgb Final    Cholesterol 11/08/2022 145  <200 mg/dL Final    HDL 11/08/2022 40  > OR = 40 mg/dL Final    Triglycerides 11/08/2022 209 (H)  <150 mg/dL Final    LDL Cholesterol 11/08/2022 75  mg/dL (calc) Final    HDL/Cholesterol Ratio 11/08/2022 3.6  <5.0 (calc) Final    Non HDL Chol. (LDL+VLDL) 11/08/2022 105  <130 mg/dL (calc) Final    Glucose 11/08/2022 148 (H)  65 - 99 mg/dL Final    BUN 11/08/2022 35 (H)  7 - 25 mg/dL Final    Creatinine 11/08/2022 1.33  0.70 - 1.35 mg/dL Final    eGFR 11/08/2022 60  > OR = 60 mL/min/1.73m2 Final    BUN/Creatinine Ratio 11/08/2022 26 (H)  6 - 22 (calc) Final    Sodium 11/08/2022 138  135 - 146 mmol/L Final    Potassium 11/08/2022 5.1  3.5 - 5.3 mmol/L Final    Chloride 11/08/2022 105  98 - 110 mmol/L  Final    CO2 11/08/2022 23  20 - 32 mmol/L Final    Calcium 11/08/2022 9.9  8.6 - 10.3 mg/dL Final    Total Protein 11/08/2022 7.8  6.1 - 8.1 g/dL Final    Albumin 11/08/2022 4.7  3.6 - 5.1 g/dL Final    Globulin, Total 11/08/2022 3.1  1.9 - 3.7 g/dL (calc) Final    Albumin/Globulin Ratio 11/08/2022 1.5  1.0 - 2.5 (calc) Final    Total Bilirubin 11/08/2022 0.7  0.2 - 1.2 mg/dL Final    Alkaline Phosphatase 11/08/2022 41  35 - 144 U/L Final    AST 11/08/2022 56 (H)  10 - 35 U/L Final    ALT 11/08/2022 57 (H)  9 - 46 U/L Final   Ancillary Orders on 08/12/2022   Component Date Value Ref Range Status    INR 08/18/2022 2.5 (H)   Final    Prothrombin Time 08/18/2022 23.8 (H)  9.0 - 11.5 sec Final   Ancillary Orders on 07/15/2022   Component Date Value Ref Range Status    INR 07/19/2022 1.9 (H)   Final    Prothrombin Time 07/19/2022 18.4 (H)  9.0 - 11.5 sec Final   Hospital Outpatient Visit on 07/07/2022   Component Date Value Ref Range Status    POC Creatinine 07/07/2022 1.2  0.5 - 1.4 mg/dL Final    Sample 07/07/2022 VENOUS   Final   Ancillary Orders on 06/17/2022   Component Date Value Ref Range Status    INR 06/22/2022 2.1 (H)   Final    Prothrombin Time 06/22/2022 20.3 (H)  9.0 - 11.5 sec Final   Office Visit on 05/26/2022   Component Date Value Ref Range Status    Hemoglobin A1C 08/18/2022 7.5 (H)  <5.7 % of total Hgb Final    Cholesterol 08/18/2022 168  <200 mg/dL Final    HDL 08/18/2022 38 (L)  > OR = 40 mg/dL Final    Triglycerides 08/18/2022 296 (H)  <150 mg/dL Final    LDL Cholesterol 08/18/2022 91  mg/dL (calc) Final    HDL/Cholesterol Ratio 08/18/2022 4.4  <5.0 (calc) Final    Non HDL Chol. (LDL+VLDL) 08/18/2022 130 (H)  <130 mg/dL (calc) Final    Glucose 08/18/2022 118 (H)  65 - 99 mg/dL Final    BUN 08/18/2022 33 (H)  7 - 25 mg/dL Final    Creatinine 08/18/2022 1.38 (H)  0.70 - 1.35 mg/dL Final    eGFR 08/18/2022 57 (L)  > OR = 60 mL/min/1.73m2 Final    BUN/Creatinine Ratio 08/18/2022 24 (H)  6 - 22 (calc)  Final    Sodium 08/18/2022 139  135 - 146 mmol/L Final    Potassium 08/18/2022 4.7  3.5 - 5.3 mmol/L Final    Chloride 08/18/2022 106  98 - 110 mmol/L Final    CO2 08/18/2022 22  20 - 32 mmol/L Final    Calcium 08/18/2022 9.5  8.6 - 10.3 mg/dL Final    Total Protein 08/18/2022 7.4  6.1 - 8.1 g/dL Final    Albumin 08/18/2022 4.5  3.6 - 5.1 g/dL Final    Globulin, Total 08/18/2022 2.9  1.9 - 3.7 g/dL (calc) Final    Albumin/Globulin Ratio 08/18/2022 1.6  1.0 - 2.5 (calc) Final    Total Bilirubin 08/18/2022 0.6  0.2 - 1.2 mg/dL Final    Alkaline Phosphatase 08/18/2022 39  35 - 144 U/L Final    AST 08/18/2022 42 (H)  10 - 35 U/L Final    ALT 08/18/2022 42  9 - 46 U/L Final       Past Medical History:   Diagnosis Date    Anticoagulant long-term use     Arthritis     Cervical spine pain     Clotting disorder     Coronary artery disease     Diabetes mellitus     Encounter for blood transfusion     Hypertension      Past Surgical History:   Procedure Laterality Date    BACK SURGERY      cabg      CARDIAC SURGERY      FOOT SURGERY      FRACTURE SURGERY      ROTATOR CUFF REPAIR Left      Family History   Problem Relation Age of Onset    Heart disease Mother     Cancer Mother     Heart disease Father     Alcohol abuse Father     No Known Problems Sister     No Known Problems Brother     No Known Problems Daughter     No Known Problems Son     Cataracts Neg Hx     Glaucoma Neg Hx     Retinal detachment Neg Hx     Macular degeneration Neg Hx        The 10-year CVD risk score (ALEX'Agostino, et al., 2008) is: 33.2%    Values used to calculate the score:      Age: 62 years      Sex: Male      Diabetic: Yes      Tobacco smoker: No      Systolic Blood Pressure: 132 mmHg      Is BP treated: Yes      HDL Cholesterol: 40 mg/dL      Total Cholesterol: 145 mg/dL     Marital Status:   Alcohol History:  reports current alcohol use.  Tobacco History:  reports that he quit smoking about 28 years ago. He has a 0.25 pack-year smoking  "history. He has never used smokeless tobacco.  Drug History:  reports no history of drug use.    Health Maintenance Topics with due status: Not Due       Topic Last Completion Date    Colorectal Cancer Screening 08/17/2021    TETANUS VACCINE 01/22/2022    Diabetes Urine Screening 03/28/2022    Foot Exam 05/26/2022    Lipid Panel 11/08/2022    Hemoglobin A1c 11/08/2022    High Dose Statin 11/23/2022     Immunization History   Administered Date(s) Administered    COVID-19, MRNA, LN-S, PF (Pfizer) (Purple Cap) 03/25/2021, 04/14/2021    Influenza (FLUBLOK) - Quadrivalent - Recombinant - PF *Preferred* (egg allergy) 11/16/2020, 11/17/2021, 11/23/2022    Influenza - Quadrivalent - PF *Preferred* (6 months and older) 12/26/2019    Influenza - Trivalent (ADULT) 10/01/2014    Influenza - Trivalent - Recombinant - PF 09/25/2018    Tdap 01/22/2022       Review of patient's allergies indicates:   Allergen Reactions    Nsaids (non-steroidal anti-inflammatory drug) Nausea Only    Ancef [cefazolin] Anxiety       Current Outpatient Medications:     aspirin 325 MG tablet, Take 325 mg by mouth once daily., Disp: , Rfl:     atenoloL (TENORMIN) 50 MG tablet, Take 2 tablets (100 mg total) by mouth 2 (two) times a day., Disp: 360 tablet, Rfl: 1    BD ULTRA-FINE MINI PEN NEEDLE 31 gauge x 3/16" Ndle, 1 each by In Vitro route once daily., Disp: 100 each, Rfl: 1    blood sugar diagnostic Strp, To check BG 3-4 times daily, to use with insurance preferred meter, Disp: 360 each, Rfl: 3    blood-glucose meter kit, To check BG 3-4 times daily, to use with insurance preferred meter, Disp: 1 each, Rfl: 0    ciprofloxacin HCl (CIPRO) 500 MG tablet, Take 1 tablet (500 mg total) by mouth 2 (two) times daily., Disp: 20 tablet, Rfl: 0    coenzyme Q10 100 mg capsule, Take 100 mg by mouth 2 (two) times a day., Disp: , Rfl:     glimepiride (AMARYL) 4 MG tablet, Take 1 tablet (4 mg total) by mouth 2 (two) times daily., Disp: 180 tablet, Rfl: 1    " "HYDROcodone-acetaminophen (NORCO) 5-325 mg per tablet, Take 1 tablet by mouth every 6 (six) hours as needed (severe pain)., Disp: 12 tablet, Rfl: 0    insulin NPH-insulin regular, 70/30, (HUMULIN 70/30 U-100 INSULIN) 100 unit/mL (70-30) injection, 35 units in AM and 35 units in PM- may titrate upwards as needed to max of 40 units BID, Disp: 7 each, Rfl: 3    insulin syringe-needle U-100 0.3 mL 31 gauge x 15/64" Syrg, , Disp: , Rfl:     insulin syringe-needle U-100 1/2 mL 31 gauge x 15/64" Syrg, BID, Disp: 180 Syringe, Rfl: 3    lancets Misc, To check BG 3-4 times daily, to use with insurance preferred meter, Disp: 360 each, Rfl: 3    lisinopriL (PRINIVIL,ZESTRIL) 20 MG tablet, Take 1 tablet (20 mg total) by mouth 2 (two) times daily., Disp: 180 tablet, Rfl: 1    metroNIDAZOLE (FLAGYL) 500 MG tablet, Take 1 tablet (500 mg total) by mouth every 8 (eight) hours., Disp: 30 tablet, Rfl: 0    rosuvastatin (CRESTOR) 40 MG Tab, Take 1 tablet (40 mg total) by mouth once daily., Disp: 90 tablet, Rfl: 1    semaglutide (OZEMPIC) 0.25 mg or 0.5 mg(2 mg/1.5 mL) pen injector, Start with 0.25mg weekly for 4 weeks then increase to 0.5mg weekly, Disp: 2 pen, Rfl: 0    TRUEPLUS INSULIN 0.5 mL 29 gauge x 1/2" Syrg, , Disp: , Rfl: 5    ULTRA COMFORT INSULIN SYRINGE 1 mL 29 gauge x 1/2" Syrg, Inject 25 Units as directed 2 (two) times daily., Disp: 180 each, Rfl: 3    warfarin (COUMADIN) 1 MG tablet, Take 1 tablet by mouth once daily, Disp: 90 tablet, Rfl: 3    warfarin (COUMADIN) 5 MG tablet, TAKE 1 & 1/2 (ONE & ONE-HALF) TABLETS BY MOUTH ON MODAY, WEDNESDAY, AND FRIDAY. TAKE 1 TABLET ON SATURDAY, SUNDAY, TUESDAY AND THURSDAY, Disp: 45 tablet, Rfl: 3    ALPRAZolam (XANAX) 1 MG tablet, Take 1 tablet (1 mg total) by mouth 2 (two) times daily., Disp: 60 tablet, Rfl: 5    amLODIPine (NORVASC) 10 MG tablet, Take 1 tablet (10 mg total) by mouth once daily., Disp: 90 tablet, Rfl: 1    fenofibrate (TRICOR) 145 MG tablet, Take 1 tablet (145 mg " "total) by mouth once daily., Disp: 90 tablet, Rfl: 1    metFORMIN (GLUCOPHAGE) 500 MG tablet, Take 2 tablets (1,000 mg total) by mouth 2 (two) times daily with meals., Disp: 360 tablet, Rfl: 1    tiZANidine (ZANAFLEX) 4 MG tablet, Take 1 tablet (4 mg total) by mouth 2 (two) times daily as needed (back pain)., Disp: 180 tablet, Rfl: 1    Review of Systems   Constitutional:  Negative for appetite change, chills, fever and unexpected weight change.   HENT:  Negative for sore throat and trouble swallowing.    Eyes:  Negative for visual disturbance.   Respiratory:  Negative for cough, shortness of breath and wheezing.    Cardiovascular:  Negative for chest pain, palpitations and leg swelling.   Gastrointestinal:  Negative for abdominal pain, blood in stool, constipation, diarrhea, nausea and vomiting.   Genitourinary:  Positive for frequency (nocturia 1-3 times/night). Negative for dysuria and hematuria.   Musculoskeletal:  Positive for back pain (chronic, takes tylenol BID) and neck pain. Negative for gait problem.   Skin:  Negative for rash.   Neurological:  Positive for numbness (bilat hand numbness). Negative for dizziness, syncope, speech difficulty and headaches.   Psychiatric/Behavioral:  Negative for dysphoric mood. The patient is not nervous/anxious (stable on xanax).         Objective:      Vitals:    11/23/22 1042   BP: 132/80   Pulse: 60   Weight: 105.7 kg (233 lb)   Height: 5' 10" (1.778 m)     Physical Exam  Vitals and nursing note reviewed.   Constitutional:       General: He is not in acute distress.     Appearance: He is well-developed. He is obese.   HENT:      Head: Normocephalic and atraumatic.   Neck:      Vascular: No carotid bruit.   Cardiovascular:      Rate and Rhythm: Normal rate and regular rhythm.      Heart sounds: No murmur heard.    No friction rub. No gallop.   Pulmonary:      Effort: Pulmonary effort is normal. No respiratory distress.      Breath sounds: Normal breath sounds. No wheezing " or rales.   Abdominal:      General: There is no distension.      Palpations: Abdomen is soft.      Tenderness: There is no abdominal tenderness.   Musculoskeletal:      Cervical back: Neck supple.      Right lower leg: No edema.      Left lower leg: No edema.      Comments: + varicose veins without inflammation   Lymphadenopathy:      Cervical: No cervical adenopathy.   Skin:     General: Skin is warm and dry.      Findings: No rash.   Neurological:      General: No focal deficit present.      Mental Status: He is alert and oriented to person, place, and time.      Gait: Gait normal.         Assessment:       1. DM type 2 with diabetic dyslipidemia    2. Mixed hyperlipidemia    3. Essential hypertension    4. Coronary artery disease involving native coronary artery of native heart without angina pectoris    5. DDD (degenerative disc disease), lumbar    6. Anxiety    7. Need for influenza vaccination           Plan:       DM type 2 with diabetic dyslipidemia  Comments:  improved slightly to 7.4%- continue current meds- pt has declined addition of farxiga given copay concerns  Orders:  -     metFORMIN (GLUCOPHAGE) 500 MG tablet; Take 2 tablets (1,000 mg total) by mouth 2 (two) times daily with meals.  Dispense: 360 tablet; Refill: 1    Mixed hyperlipidemia  Comments:  reviewed recent labs with pt- TG/LDL/HDL improved  Orders:  -     fenofibrate (TRICOR) 145 MG tablet; Take 1 tablet (145 mg total) by mouth once daily.  Dispense: 90 tablet; Refill: 1    Essential hypertension  Comments:  BP controlled  Orders:  -     amLODIPine (NORVASC) 10 MG tablet; Take 1 tablet (10 mg total) by mouth once daily.  Dispense: 90 tablet; Refill: 1    Coronary artery disease involving native coronary artery of native heart without angina pectoris  Comments:  stable, denies angina- has not followed up with cardiology in a couple years d/t copay concerns    DDD (degenerative disc disease), lumbar  Comments:  stable  Orders:  -      tiZANidine (ZANAFLEX) 4 MG tablet; Take 1 tablet (4 mg total) by mouth 2 (two) times daily as needed (back pain).  Dispense: 180 tablet; Refill: 1    Anxiety  Comments:  stable on med  Orders:  -     ALPRAZolam (XANAX) 1 MG tablet; Take 1 tablet (1 mg total) by mouth 2 (two) times daily.  Dispense: 60 tablet; Refill: 5    Need for influenza vaccination  -     Influenza - Quadrivalent (Recombinant) (PF)    Follow up in about 4 months (around 3/23/2023) for Dr. Barboza next visit, Diabetes, HTN, lipids.          Counseled on age and gender appropriate medical preventative services, including cancer screenings, immunizations, overall nutritional health, need for a consistent exercise regimen and an overall push towards maintaining a vigorous and active lifestyle.      11/23/2022 Marianna Hdez NP

## 2022-11-30 ENCOUNTER — TELEPHONE (OUTPATIENT)
Dept: FAMILY MEDICINE | Facility: CLINIC | Age: 62
End: 2022-11-30

## 2022-11-30 DIAGNOSIS — M51.36 DDD (DEGENERATIVE DISC DISEASE), LUMBAR: Primary | ICD-10-CM

## 2022-11-30 RX ORDER — HYDROCODONE BITARTRATE AND ACETAMINOPHEN 5; 325 MG/1; MG/1
1 TABLET ORAL EVERY 6 HOURS PRN
Qty: 12 TABLET | Refills: 0 | Status: SHIPPED | OUTPATIENT
Start: 2022-11-30 | End: 2023-08-16

## 2022-11-30 NOTE — TELEPHONE ENCOUNTER
Spoke with pt in regards to recent message sent. Verbalized per Marianna that she will send in a small quantity of hydrocodone. If pain persists Marianna dose recommend we refer him to either physical therapy or pain management. Pt acknowledge understanding.

## 2022-11-30 NOTE — TELEPHONE ENCOUNTER
Please call patient and let him know I will send in a small quantity of hydrocodone.  If pain persists I do recommend we refer him to either physical therapy or pain management

## 2022-11-30 NOTE — TELEPHONE ENCOUNTER
----- Message from Terrence Oliva MA sent at 11/30/2022  2:36 PM CST -----  Regarding: Med request  Pt calling asking for something for back pain because he has been in bed for a few days. [phone]

## 2022-11-30 NOTE — TELEPHONE ENCOUNTER
Pt is asking for something to help with his back pain     Spoke to pt and he stated he woke up and his back was hurting and has been hurting for the last couple of days. Pt stated he usually takes Extra strength tylenol and Zanaflex but it is not helping.

## 2022-11-30 NOTE — TELEPHONE ENCOUNTER
----- Message from Daisha Sequeira sent at 11/30/2022  2:10 PM CST -----  Vm- pt looked at his visit summary and the a1c is different than what alda told him. Would like to clarify   [phone]

## 2022-12-13 ENCOUNTER — TELEPHONE (OUTPATIENT)
Dept: HEMATOLOGY/ONCOLOGY | Facility: CLINIC | Age: 62
End: 2022-12-13

## 2022-12-13 NOTE — TELEPHONE ENCOUNTER
----- Message from Ac Smith MD sent at 12/13/2022  6:23 AM CST -----  Call patient, INR good.  Recheck [facility]

## 2022-12-13 NOTE — TELEPHONE ENCOUNTER
Attempted to call pt regarding INR. There was no answer and voice mailbox is not set up. Will try to call again later.

## 2022-12-15 ENCOUNTER — TELEPHONE (OUTPATIENT)
Dept: HEMATOLOGY/ONCOLOGY | Facility: CLINIC | Age: 62
End: 2022-12-15

## 2022-12-15 NOTE — TELEPHONE ENCOUNTER
Spoke to pt regarding INR. Per Dr. Smith, continue on same Coumadin dosage and re-check in one month. Pt verbalized understanding.

## 2023-01-11 ENCOUNTER — TELEPHONE (OUTPATIENT)
Dept: HEMATOLOGY/ONCOLOGY | Facility: CLINIC | Age: 63
End: 2023-01-11

## 2023-01-11 NOTE — TELEPHONE ENCOUNTER
INR 3.1. Per Dr. Smith, hold Coumadin for one day and resume same dose tomorrow. Re-check labs in one month. Pt was notified and verbalized understanding.

## 2023-01-11 NOTE — TELEPHONE ENCOUNTER
----- Message from Ac Smith MD sent at 1/11/2023 11:04 AM CST -----  Call patient,  his INR is up.  Hold tonights dose then resume same dose.  Recheck in one month.

## 2023-01-26 ENCOUNTER — TELEPHONE (OUTPATIENT)
Dept: HEMATOLOGY/ONCOLOGY | Facility: CLINIC | Age: 63
End: 2023-01-26

## 2023-01-26 ENCOUNTER — OFFICE VISIT (OUTPATIENT)
Dept: HEMATOLOGY/ONCOLOGY | Facility: CLINIC | Age: 63
End: 2023-01-26
Payer: MEDICARE

## 2023-01-26 VITALS
BODY MASS INDEX: 34.12 KG/M2 | HEART RATE: 76 BPM | DIASTOLIC BLOOD PRESSURE: 85 MMHG | WEIGHT: 237.81 LBS | TEMPERATURE: 99 F | SYSTOLIC BLOOD PRESSURE: 161 MMHG

## 2023-01-26 DIAGNOSIS — D68.51 HETEROZYGOUS FACTOR V LEIDEN MUTATION: Chronic | ICD-10-CM

## 2023-01-26 PROCEDURE — 1159F PR MEDICATION LIST DOCUMENTED IN MEDICAL RECORD: ICD-10-PCS | Mod: CPTII,S$GLB,, | Performed by: INTERNAL MEDICINE

## 2023-01-26 PROCEDURE — 3079F DIAST BP 80-89 MM HG: CPT | Mod: CPTII,S$GLB,, | Performed by: INTERNAL MEDICINE

## 2023-01-26 PROCEDURE — 99213 PR OFFICE/OUTPT VISIT, EST, LEVL III, 20-29 MIN: ICD-10-PCS | Mod: S$GLB,,, | Performed by: INTERNAL MEDICINE

## 2023-01-26 PROCEDURE — 1160F RVW MEDS BY RX/DR IN RCRD: CPT | Mod: CPTII,S$GLB,, | Performed by: INTERNAL MEDICINE

## 2023-01-26 PROCEDURE — 3008F PR BODY MASS INDEX (BMI) DOCUMENTED: ICD-10-PCS | Mod: CPTII,S$GLB,, | Performed by: INTERNAL MEDICINE

## 2023-01-26 PROCEDURE — 99213 OFFICE O/P EST LOW 20 MIN: CPT | Mod: S$GLB,,, | Performed by: INTERNAL MEDICINE

## 2023-01-26 PROCEDURE — 3077F PR MOST RECENT SYSTOLIC BLOOD PRESSURE >= 140 MM HG: ICD-10-PCS | Mod: CPTII,S$GLB,, | Performed by: INTERNAL MEDICINE

## 2023-01-26 PROCEDURE — 1160F PR REVIEW ALL MEDS BY PRESCRIBER/CLIN PHARMACIST DOCUMENTED: ICD-10-PCS | Mod: CPTII,S$GLB,, | Performed by: INTERNAL MEDICINE

## 2023-01-26 PROCEDURE — 3079F PR MOST RECENT DIASTOLIC BLOOD PRESSURE 80-89 MM HG: ICD-10-PCS | Mod: CPTII,S$GLB,, | Performed by: INTERNAL MEDICINE

## 2023-01-26 PROCEDURE — 3008F BODY MASS INDEX DOCD: CPT | Mod: CPTII,S$GLB,, | Performed by: INTERNAL MEDICINE

## 2023-01-26 PROCEDURE — 3077F SYST BP >= 140 MM HG: CPT | Mod: CPTII,S$GLB,, | Performed by: INTERNAL MEDICINE

## 2023-01-26 PROCEDURE — 1159F MED LIST DOCD IN RCRD: CPT | Mod: CPTII,S$GLB,, | Performed by: INTERNAL MEDICINE

## 2023-01-26 NOTE — PROGRESS NOTES
"                                                         PROGRESS NOTE    Subjective:       Patient ID: Luis Resendiz is a 62 y.o. male.    Chief Complaint:  No chief complaint on file.  Hypercoagulable Syndrome, long term use of anticoagulants follow up.        History of Present Illness:   Luis Resendiz is a 62 y.o. male who presents for follow up of above.      Patient is doing well today.  No new complaints at this time.     Family and Social history reviewed and is unchanged from previous      ROS:  Review of Systems   Constitutional:  Negative for fever and unexpected weight change.   HENT:  Negative for nosebleeds.    Respiratory:  Negative for chest tightness and shortness of breath.    Cardiovascular:  Negative for chest pain.   Gastrointestinal:  Negative for abdominal pain and blood in stool.   Genitourinary:  Negative for hematuria.   Skin:  Negative for rash.   Hematological:  Does not bruise/bleed easily.        Current Outpatient Medications:     ALPRAZolam (XANAX) 1 MG tablet, Take 1 tablet (1 mg total) by mouth 2 (two) times daily., Disp: 60 tablet, Rfl: 5    amLODIPine (NORVASC) 10 MG tablet, Take 1 tablet (10 mg total) by mouth once daily., Disp: 90 tablet, Rfl: 1    aspirin 325 MG tablet, Take 325 mg by mouth once daily., Disp: , Rfl:     atenoloL (TENORMIN) 50 MG tablet, Take 2 tablets (100 mg total) by mouth 2 (two) times a day., Disp: 360 tablet, Rfl: 1    BD ULTRA-FINE MINI PEN NEEDLE 31 gauge x 3/16" Ndle, 1 each by In Vitro route once daily., Disp: 100 each, Rfl: 1    blood sugar diagnostic Strp, To check BG 3-4 times daily, to use with insurance preferred meter, Disp: 360 each, Rfl: 3    coenzyme Q10 100 mg capsule, Take 100 mg by mouth 2 (two) times a day., Disp: , Rfl:     fenofibrate (TRICOR) 145 MG tablet, Take 1 tablet (145 mg total) by mouth once daily., Disp: 90 tablet, Rfl: 1    glimepiride (AMARYL) 4 MG tablet, Take 1 tablet (4 mg total) by mouth 2 (two) times daily., " "Disp: 180 tablet, Rfl: 1    insulin NPH-insulin regular, 70/30, (HUMULIN 70/30 U-100 INSULIN) 100 unit/mL (70-30) injection, 35 units in AM and 35 units in PM- may titrate upwards as needed to max of 40 units BID, Disp: 7 each, Rfl: 3    insulin syringe-needle U-100 0.3 mL 31 gauge x 15/64" Syrg, , Disp: , Rfl:     insulin syringe-needle U-100 1/2 mL 31 gauge x 15/64" Syrg, BID, Disp: 180 Syringe, Rfl: 3    lancets Misc, To check BG 3-4 times daily, to use with insurance preferred meter, Disp: 360 each, Rfl: 3    lisinopriL (PRINIVIL,ZESTRIL) 20 MG tablet, Take 1 tablet (20 mg total) by mouth 2 (two) times daily., Disp: 180 tablet, Rfl: 1    metFORMIN (GLUCOPHAGE) 500 MG tablet, Take 2 tablets (1,000 mg total) by mouth 2 (two) times daily with meals., Disp: 360 tablet, Rfl: 1    rosuvastatin (CRESTOR) 40 MG Tab, Take 1 tablet (40 mg total) by mouth once daily., Disp: 90 tablet, Rfl: 1    tiZANidine (ZANAFLEX) 4 MG tablet, Take 1 tablet (4 mg total) by mouth 2 (two) times daily as needed (back pain)., Disp: 180 tablet, Rfl: 1    TRUEPLUS INSULIN 0.5 mL 29 gauge x 1/2" Syrg, , Disp: , Rfl: 5    ULTRA COMFORT INSULIN SYRINGE 1 mL 29 gauge x 1/2" Syrg, Inject 25 Units as directed 2 (two) times daily., Disp: 180 each, Rfl: 3    warfarin (COUMADIN) 1 MG tablet, Take 1 tablet by mouth once daily, Disp: 90 tablet, Rfl: 3    warfarin (COUMADIN) 5 MG tablet, TAKE 1 & 1/2 (ONE & ONE-HALF) TABLETS BY MOUTH ON MODAY, WEDNESDAY, AND FRIDAY. TAKE 1 TABLET ON SATURDAY, SUNDAY, TUESDAY AND THURSDAY, Disp: 45 tablet, Rfl: 3    blood-glucose meter kit, To check BG 3-4 times daily, to use with insurance preferred meter, Disp: 1 each, Rfl: 0    ciprofloxacin HCl (CIPRO) 500 MG tablet, Take 1 tablet (500 mg total) by mouth 2 (two) times daily. (Patient not taking: Reported on 1/26/2023), Disp: 20 tablet, Rfl: 0    HYDROcodone-acetaminophen (NORCO) 5-325 mg per tablet, Take 1 tablet by mouth every 6 (six) hours as needed (severe pain). " (Patient not taking: Reported on 1/26/2023), Disp: 12 tablet, Rfl: 0    metroNIDAZOLE (FLAGYL) 500 MG tablet, Take 1 tablet (500 mg total) by mouth every 8 (eight) hours. (Patient not taking: Reported on 1/26/2023), Disp: 30 tablet, Rfl: 0    semaglutide (OZEMPIC) 0.25 mg or 0.5 mg(2 mg/1.5 mL) pen injector, Start with 0.25mg weekly for 4 weeks then increase to 0.5mg weekly (Patient not taking: Reported on 1/26/2023), Disp: 2 pen, Rfl: 0        Objective:       Physical Examination:     BP (!) 161/85   Pulse 76   Temp 98.6 °F (37 °C)   Wt 107.9 kg (237 lb 12.8 oz)   BMI 34.12 kg/m²     Physical Exam  Vitals reviewed.   Constitutional:       Appearance: He is well-developed.   HENT:      Head: Normocephalic and atraumatic.      Right Ear: External ear normal.      Left Ear: External ear normal.   Eyes:      General: No scleral icterus.     Conjunctiva/sclera: Conjunctivae normal.      Pupils: Pupils are equal, round, and reactive to light.   Cardiovascular:      Rate and Rhythm: Normal rate and regular rhythm.      Heart sounds: Normal heart sounds. No murmur heard.    No friction rub. No gallop.   Pulmonary:      Effort: Pulmonary effort is normal. No respiratory distress.      Breath sounds: Normal breath sounds. No rales.   Chest:      Chest wall: No tenderness.   Abdominal:      General: Bowel sounds are normal. There is no distension.      Palpations: Abdomen is soft. There is no mass.      Tenderness: There is no abdominal tenderness. There is no guarding or rebound.   Musculoskeletal:      Cervical back: Normal range of motion and neck supple.   Lymphadenopathy:      Head:      Right side of head: No tonsillar adenopathy.      Left side of head: No tonsillar adenopathy.      Cervical: No cervical adenopathy.      Upper Body:      Right upper body: No supraclavicular adenopathy.      Left upper body: No supraclavicular adenopathy.   Neurological:      Mental Status: He is alert and oriented to person,  place, and time.   Psychiatric:         Behavior: Behavior normal.         Thought Content: Thought content normal.         Judgment: Judgment normal.       Labs:   No results found for this or any previous visit (from the past 336 hour(s)).  CMP  Sodium   Date Value Ref Range Status   11/08/2022 138 135 - 146 mmol/L Final     Potassium   Date Value Ref Range Status   11/08/2022 5.1 3.5 - 5.3 mmol/L Final     Chloride   Date Value Ref Range Status   11/08/2022 105 98 - 110 mmol/L Final     CO2   Date Value Ref Range Status   11/08/2022 23 20 - 32 mmol/L Final     Glucose   Date Value Ref Range Status   11/08/2022 148 (H) 65 - 99 mg/dL Final     Comment:                   Fasting reference interval     For someone without known diabetes, a glucose  value >125 mg/dL indicates that they may have  diabetes and this should be confirmed with a  follow-up test.          BUN   Date Value Ref Range Status   11/08/2022 35 (H) 7 - 25 mg/dL Final     Creatinine   Date Value Ref Range Status   11/08/2022 1.33 0.70 - 1.35 mg/dL Final   06/21/2013 1.1 0.5 - 1.4 mg/dL Final     Calcium   Date Value Ref Range Status   11/08/2022 9.9 8.6 - 10.3 mg/dL Final   06/21/2013 9.3 8.7 - 10.5 mg/dL Final     Total Protein   Date Value Ref Range Status   11/08/2022 7.8 6.1 - 8.1 g/dL Final     Albumin   Date Value Ref Range Status   11/08/2022 4.7 3.6 - 5.1 g/dL Final     Total Bilirubin   Date Value Ref Range Status   11/08/2022 0.7 0.2 - 1.2 mg/dL Final     Alkaline Phosphatase   Date Value Ref Range Status   07/27/2020 49 35 - 144 U/L Final     AST   Date Value Ref Range Status   11/08/2022 56 (H) 10 - 35 U/L Final     ALT   Date Value Ref Range Status   11/08/2022 57 (H) 9 - 46 U/L Final     Anion Gap   Date Value Ref Range Status   07/05/2020 9 8 - 16 mmol/L Final   06/21/2013 13 5 - 15 meq/L Final     eGFR if    Date Value Ref Range Status   03/28/2022 85 > OR = 60 mL/min/1.73m2 Final     eGFR if non African American    Date Value Ref Range Status   03/28/2022 73 > OR = 60 mL/min/1.73m2 Final     No results found for: CEA  No results found for: PSA        Assessment/Plan:     Problem List Items Addressed This Visit       Heterozygous factor V Leiden mutation (Chronic)     Patient is doing well and continues on life-long coumadin therapy.  No bleeding issues and he overall looks ok.  Will continue therapy and continue to see him yearly.  Discussed this today.          Relevant Orders    CBC Auto Differential    Comprehensive Metabolic Panel       Discussion:     Follow up in about 1 year (around 1/26/2024).      Electronically signed by Ac Sofia

## 2023-02-02 ENCOUNTER — TELEPHONE (OUTPATIENT)
Dept: HEMATOLOGY/ONCOLOGY | Facility: CLINIC | Age: 63
End: 2023-02-02

## 2023-02-02 NOTE — TELEPHONE ENCOUNTER
----- Message from ANALI Weiss sent at 2/1/2023  4:01 PM CST -----  Yes 40 mg daily nothing day of procedure. Restart Warfarin and Lovenox day after procedure and continue Lovenox until INR therapeutic.    Miri  ----- Message -----  From: Zonia Estrada RN  Sent: 2/1/2023   3:12 PM CST  To: ANALI Weiss      ----- Message -----  From: Dona Cole  Sent: 2/1/2023   3:10 PM CST  To: Sarah Shen Staff    The patient said he needs to have some teeth extracted and hw is on warfarin. He said he stopped taking his Warfarin on Saturday on his own so he could have the work done. He said he needs instructions and to know if he needs to get on lovenox. # [phone]  He has dentist appointment on Friday morning.

## 2023-02-02 NOTE — TELEPHONE ENCOUNTER
Spoke to pt. He does not know when his procedure will be done, but he has a dentist appointment tomorrow. I advised him to restart his Coumadin tomorrow if they don't do anything and call us back to let us know what the plan is so that we can get him set up with Lovenox accordingly. Pt verbalized understanding.

## 2023-02-03 ENCOUNTER — TELEPHONE (OUTPATIENT)
Dept: HEMATOLOGY/ONCOLOGY | Facility: CLINIC | Age: 63
End: 2023-02-03

## 2023-02-03 NOTE — TELEPHONE ENCOUNTER
Spoke to pt and advised him to re-check blood work on Monday per Miri due to him being off of Warfarin all week. Pt verbalized understanding.

## 2023-02-08 ENCOUNTER — TELEPHONE (OUTPATIENT)
Dept: HEMATOLOGY/ONCOLOGY | Facility: CLINIC | Age: 63
End: 2023-02-08

## 2023-02-08 DIAGNOSIS — D68.51 HETEROZYGOUS FACTOR V LEIDEN MUTATION: Primary | ICD-10-CM

## 2023-02-08 RX ORDER — ENOXAPARIN SODIUM 100 MG/ML
40 INJECTION SUBCUTANEOUS EVERY MORNING
Qty: 10 EACH | Refills: 0 | Status: SHIPPED | OUTPATIENT
Start: 2023-02-08 | End: 2023-09-15 | Stop reason: SDUPTHER

## 2023-02-08 NOTE — TELEPHONE ENCOUNTER
Spoke to pt and gave him verbal instructions on Lovenox bridging for procedure on Monday. Stop taking Coumadin today and start Lovenox for 5 days. Nothing the day of procedure on Monday. Resume Coumadin and Lovenox Tuesday. Continue for 3 days and then re-check INR. Will give further instructions based on those results. Pt verbalized understanding.

## 2023-02-08 NOTE — TELEPHONE ENCOUNTER
INR adequate at 2.1. Per Dr. Smith, no change in Coumadin dose and re-check in one month. Pt notified and verbalized understanding.

## 2023-02-09 DIAGNOSIS — I10 ESSENTIAL HYPERTENSION: ICD-10-CM

## 2023-02-09 RX ORDER — ATENOLOL 50 MG/1
100 TABLET ORAL 2 TIMES DAILY
Qty: 360 TABLET | Refills: 3 | Status: SHIPPED | OUTPATIENT
Start: 2023-02-09 | End: 2024-02-21 | Stop reason: SDUPTHER

## 2023-02-09 NOTE — TELEPHONE ENCOUNTER
----- Message from Marianna Valderrama sent at 2/9/2023  9:30 AM CST -----  Refill for atenolol. [facility] [address] [place]. Pt #[phone]

## 2023-02-16 DIAGNOSIS — I10 ESSENTIAL HYPERTENSION: ICD-10-CM

## 2023-02-16 RX ORDER — LISINOPRIL 20 MG/1
20 TABLET ORAL 2 TIMES DAILY
Qty: 180 TABLET | Refills: 1 | Status: SHIPPED | OUTPATIENT
Start: 2023-02-16 | End: 2023-03-21 | Stop reason: SDUPTHER

## 2023-02-16 NOTE — TELEPHONE ENCOUNTER
Pt is needing a refill on his lisinopril. Last office visit 11/23/2022. Next office visit 03/21/2023

## 2023-02-16 NOTE — TELEPHONE ENCOUNTER
----- Message from Sade King MA sent at 2/16/2023  2:13 PM CST -----  Regarding: refill  Needs refill on lisinopril 20 mg  [facility] [address]  [phone]

## 2023-02-20 ENCOUNTER — TELEPHONE (OUTPATIENT)
Dept: HEMATOLOGY/ONCOLOGY | Facility: CLINIC | Age: 63
End: 2023-02-20

## 2023-02-20 NOTE — TELEPHONE ENCOUNTER
----- Message from ANALI Weiss sent at 2/20/2023  9:00 AM CST -----  He was off for a procedure. We are doing Lovenox bridging and he is restarting it and doing labs.      Zonia see when he restarted the Coumadin. Tell him to cont the Lovenox.    Miri  ----- Message -----  From: Ac Smith MD  Sent: 2/18/2023   3:15 PM CST  To: ANALI Weiss, Zonia Estrada RN    See if he is off his coumadin or on a new med.  If neither than   double dose of coumadin x 1 then same dose.  Recheck in 2 weeks.

## 2023-02-20 NOTE — TELEPHONE ENCOUNTER
Spoke to pt. From what I understood, he had his procedure done last week and has not taken the Lovenox for a couple of days. I instructed pt to continue taking Lovenox and Coumadin. Re-check his INR on Wednesday. Pt verbalized understanding.

## 2023-02-27 ENCOUNTER — TELEPHONE (OUTPATIENT)
Dept: HEMATOLOGY/ONCOLOGY | Facility: CLINIC | Age: 63
End: 2023-02-27

## 2023-02-27 NOTE — TELEPHONE ENCOUNTER
INR 3.0. Per Dr. Smith and Miri, pt can stop Lovenox and just continue 6 mg of Coumadin. Re-check in 2 weeks. Pt was notified and verbalized understanding.

## 2023-03-01 ENCOUNTER — TELEPHONE (OUTPATIENT)
Dept: FAMILY MEDICINE | Facility: CLINIC | Age: 63
End: 2023-03-01

## 2023-03-01 DIAGNOSIS — E78.5 DM TYPE 2 WITH DIABETIC DYSLIPIDEMIA: Primary | ICD-10-CM

## 2023-03-01 DIAGNOSIS — E11.69 DM TYPE 2 WITH DIABETIC DYSLIPIDEMIA: Primary | ICD-10-CM

## 2023-03-01 DIAGNOSIS — E78.2 MIXED HYPERLIPIDEMIA: ICD-10-CM

## 2023-03-01 DIAGNOSIS — Z79.899 ENCOUNTER FOR LONG-TERM (CURRENT) USE OF OTHER MEDICATIONS: ICD-10-CM

## 2023-03-01 DIAGNOSIS — I10 ESSENTIAL HYPERTENSION: ICD-10-CM

## 2023-03-01 NOTE — TELEPHONE ENCOUNTER
Spoke with pt in regards to pre-visit labs. Verbalized that we have placed lab order for you to have done before your upcoming appointment on 03/21/2023. Verbalized that the lab order are placed through [facility], so they can come into the office anytime, no appointment necessary. Just make sure that you are fasting for you labs. Pt acknowledge understanding.

## 2023-03-09 DIAGNOSIS — E11.42 DIABETIC POLYNEUROPATHY ASSOCIATED WITH TYPE 2 DIABETES MELLITUS: ICD-10-CM

## 2023-03-09 NOTE — TELEPHONE ENCOUNTER
----- Message from Sade Pham sent at 3/9/2023 10:54 AM CST -----  Refill on: rosuvastatin   insulin NPH-insulin regular, 70/30    [facility] - [place], [place] [facility] [address] [facility]    [phone]

## 2023-03-18 LAB
ALBUMIN SERPL-MCNC: 4.2 G/DL (ref 3.6–5.1)
ALBUMIN/CREAT UR: 40 MCG/MG CREAT
ALBUMIN/GLOB SERPL: 1.5 (CALC) (ref 1–2.5)
ALP SERPL-CCNC: 64 U/L (ref 35–144)
ALT SERPL-CCNC: 33 U/L (ref 9–46)
APPEARANCE UR: CLEAR
AST SERPL-CCNC: 32 U/L (ref 10–35)
BACTERIA #/AREA URNS HPF: ABNORMAL /HPF
BACTERIA UR CULT: ABNORMAL
BASOPHILS # BLD AUTO: 32 CELLS/UL (ref 0–200)
BASOPHILS NFR BLD AUTO: 0.6 %
BILIRUB SERPL-MCNC: 0.5 MG/DL (ref 0.2–1.2)
BILIRUB UR QL STRIP: NEGATIVE
BUN SERPL-MCNC: 41 MG/DL (ref 7–25)
BUN/CREAT SERPL: 32 (CALC) (ref 6–22)
CALCIUM SERPL-MCNC: 9.4 MG/DL (ref 8.6–10.3)
CHLORIDE SERPL-SCNC: 105 MMOL/L (ref 98–110)
CHOLEST SERPL-MCNC: 135 MG/DL
CHOLEST/HDLC SERPL: 4 (CALC)
CO2 SERPL-SCNC: 22 MMOL/L (ref 20–32)
COLOR UR: YELLOW
CREAT SERPL-MCNC: 1.3 MG/DL (ref 0.7–1.35)
CREAT UR-MCNC: 131 MG/DL (ref 20–320)
EGFR: 61 ML/MIN/1.73M2
EOSINOPHIL # BLD AUTO: 97 CELLS/UL (ref 15–500)
EOSINOPHIL NFR BLD AUTO: 1.8 %
ERYTHROCYTE [DISTWIDTH] IN BLOOD BY AUTOMATED COUNT: 13.7 % (ref 11–15)
GLOBULIN SER CALC-MCNC: 2.8 G/DL (CALC) (ref 1.9–3.7)
GLUCOSE SERPL-MCNC: 299 MG/DL (ref 65–99)
GLUCOSE UR QL STRIP: ABNORMAL
HBA1C MFR BLD: 7.5 % OF TOTAL HGB
HCT VFR BLD AUTO: 36.2 % (ref 38.5–50)
HDLC SERPL-MCNC: 34 MG/DL
HGB BLD-MCNC: 11.9 G/DL (ref 13.2–17.1)
HGB UR QL STRIP: NEGATIVE
HYALINE CASTS #/AREA URNS LPF: ABNORMAL /LPF
KETONES UR QL STRIP: NEGATIVE
LDLC SERPL CALC-MCNC: 65 MG/DL (CALC)
LEUKOCYTE ESTERASE UR QL STRIP: NEGATIVE
LYMPHOCYTES # BLD AUTO: 1436 CELLS/UL (ref 850–3900)
LYMPHOCYTES NFR BLD AUTO: 26.6 %
MCH RBC QN AUTO: 31.6 PG (ref 27–33)
MCHC RBC AUTO-ENTMCNC: 32.9 G/DL (ref 32–36)
MCV RBC AUTO: 96.3 FL (ref 80–100)
MICROALBUMIN UR-MCNC: 5.3 MG/DL
MONOCYTES # BLD AUTO: 529 CELLS/UL (ref 200–950)
MONOCYTES NFR BLD AUTO: 9.8 %
NEUTROPHILS # BLD AUTO: 3305 CELLS/UL (ref 1500–7800)
NEUTROPHILS NFR BLD AUTO: 61.2 %
NITRITE UR QL STRIP: NEGATIVE
NONHDLC SERPL-MCNC: 101 MG/DL (CALC)
PH UR STRIP: ABNORMAL [PH] (ref 5–8)
PLATELET # BLD AUTO: 199 THOUSAND/UL (ref 140–400)
PMV BLD REES-ECKER: 11.1 FL (ref 7.5–12.5)
POTASSIUM SERPL-SCNC: 4.9 MMOL/L (ref 3.5–5.3)
PROT SERPL-MCNC: 7 G/DL (ref 6.1–8.1)
PROT UR QL STRIP: ABNORMAL
RBC # BLD AUTO: 3.76 MILLION/UL (ref 4.2–5.8)
RBC #/AREA URNS HPF: ABNORMAL /HPF
SERVICE CMNT-IMP: ABNORMAL
SODIUM SERPL-SCNC: 137 MMOL/L (ref 135–146)
SP GR UR STRIP: 1.03 (ref 1–1.03)
SQUAMOUS #/AREA URNS HPF: ABNORMAL /HPF
TRIGL SERPL-MCNC: 271 MG/DL
TSH SERPL-ACNC: 1.27 MIU/L (ref 0.4–4.5)
WBC # BLD AUTO: 5.4 THOUSAND/UL (ref 3.8–10.8)
WBC #/AREA URNS HPF: ABNORMAL /HPF

## 2023-03-20 ENCOUNTER — TELEPHONE (OUTPATIENT)
Dept: HEMATOLOGY/ONCOLOGY | Facility: CLINIC | Age: 63
End: 2023-03-20

## 2023-03-20 ENCOUNTER — TELEPHONE (OUTPATIENT)
Dept: FAMILY MEDICINE | Facility: CLINIC | Age: 63
End: 2023-03-20

## 2023-03-20 NOTE — TELEPHONE ENCOUNTER
----- Message from Ac Smith MD sent at 3/19/2023  1:27 PM CDT -----  Call patient,  no change.  Recheck 1 month

## 2023-03-20 NOTE — TELEPHONE ENCOUNTER
I attempted to call pt, but could not leave a VM because voice mailbox was not set up. Will try again later.

## 2023-03-20 NOTE — TELEPHONE ENCOUNTER
INR adequate at 2.4. Per Dr. Smith, continue same Coumadin dosage and re-check in one month. Pt was notified and verbalized understanding.

## 2023-03-21 ENCOUNTER — TELEPHONE (OUTPATIENT)
Dept: FAMILY MEDICINE | Facility: CLINIC | Age: 63
End: 2023-03-21

## 2023-03-21 ENCOUNTER — OFFICE VISIT (OUTPATIENT)
Dept: FAMILY MEDICINE | Facility: CLINIC | Age: 63
End: 2023-03-21
Payer: MEDICARE

## 2023-03-21 VITALS
WEIGHT: 234.81 LBS | HEIGHT: 70 IN | SYSTOLIC BLOOD PRESSURE: 132 MMHG | BODY MASS INDEX: 33.62 KG/M2 | DIASTOLIC BLOOD PRESSURE: 70 MMHG | HEART RATE: 73 BPM | OXYGEN SATURATION: 96 %

## 2023-03-21 DIAGNOSIS — Z79.899 ENCOUNTER FOR LONG-TERM (CURRENT) USE OF OTHER MEDICATIONS: ICD-10-CM

## 2023-03-21 DIAGNOSIS — E11.69 DM TYPE 2 WITH DIABETIC DYSLIPIDEMIA: Primary | ICD-10-CM

## 2023-03-21 DIAGNOSIS — M51.36 DDD (DEGENERATIVE DISC DISEASE), LUMBAR: ICD-10-CM

## 2023-03-21 DIAGNOSIS — E78.5 DM TYPE 2 WITH DIABETIC DYSLIPIDEMIA: Primary | ICD-10-CM

## 2023-03-21 DIAGNOSIS — Z51.81 ENCOUNTER FOR THERAPEUTIC DRUG MONITORING: ICD-10-CM

## 2023-03-21 DIAGNOSIS — F13.20 BENZODIAZEPINE DEPENDENCE: ICD-10-CM

## 2023-03-21 DIAGNOSIS — E78.2 MIXED HYPERLIPIDEMIA: ICD-10-CM

## 2023-03-21 DIAGNOSIS — F41.9 ANXIETY: ICD-10-CM

## 2023-03-21 DIAGNOSIS — I10 ESSENTIAL HYPERTENSION: ICD-10-CM

## 2023-03-21 PROCEDURE — 3060F PR POS MICROALBUMINURIA RESULT DOCUMENTED/REVIEW: ICD-10-PCS | Mod: CPTII,S$GLB,, | Performed by: NURSE PRACTITIONER

## 2023-03-21 PROCEDURE — 3078F DIAST BP <80 MM HG: CPT | Mod: CPTII,S$GLB,, | Performed by: NURSE PRACTITIONER

## 2023-03-21 PROCEDURE — 4010F ACE/ARB THERAPY RXD/TAKEN: CPT | Mod: CPTII,S$GLB,, | Performed by: NURSE PRACTITIONER

## 2023-03-21 PROCEDURE — 99214 PR OFFICE/OUTPT VISIT, EST, LEVL IV, 30-39 MIN: ICD-10-PCS | Mod: S$GLB,,, | Performed by: NURSE PRACTITIONER

## 2023-03-21 PROCEDURE — 1160F PR REVIEW ALL MEDS BY PRESCRIBER/CLIN PHARMACIST DOCUMENTED: ICD-10-PCS | Mod: CPTII,S$GLB,, | Performed by: NURSE PRACTITIONER

## 2023-03-21 PROCEDURE — 1160F RVW MEDS BY RX/DR IN RCRD: CPT | Mod: CPTII,S$GLB,, | Performed by: NURSE PRACTITIONER

## 2023-03-21 PROCEDURE — 3066F PR DOCUMENTATION OF TREATMENT FOR NEPHROPATHY: ICD-10-PCS | Mod: CPTII,S$GLB,, | Performed by: NURSE PRACTITIONER

## 2023-03-21 PROCEDURE — 3075F PR MOST RECENT SYSTOLIC BLOOD PRESS GE 130-139MM HG: ICD-10-PCS | Mod: CPTII,S$GLB,, | Performed by: NURSE PRACTITIONER

## 2023-03-21 PROCEDURE — 3066F NEPHROPATHY DOC TX: CPT | Mod: CPTII,S$GLB,, | Performed by: NURSE PRACTITIONER

## 2023-03-21 PROCEDURE — 3008F BODY MASS INDEX DOCD: CPT | Mod: CPTII,S$GLB,, | Performed by: NURSE PRACTITIONER

## 2023-03-21 PROCEDURE — 99214 OFFICE O/P EST MOD 30 MIN: CPT | Mod: S$GLB,,, | Performed by: NURSE PRACTITIONER

## 2023-03-21 PROCEDURE — 3060F POS MICROALBUMINURIA REV: CPT | Mod: CPTII,S$GLB,, | Performed by: NURSE PRACTITIONER

## 2023-03-21 PROCEDURE — 4010F PR ACE/ARB THEARPY RXD/TAKEN: ICD-10-PCS | Mod: CPTII,S$GLB,, | Performed by: NURSE PRACTITIONER

## 2023-03-21 PROCEDURE — 1159F MED LIST DOCD IN RCRD: CPT | Mod: CPTII,S$GLB,, | Performed by: NURSE PRACTITIONER

## 2023-03-21 PROCEDURE — 3075F SYST BP GE 130 - 139MM HG: CPT | Mod: CPTII,S$GLB,, | Performed by: NURSE PRACTITIONER

## 2023-03-21 PROCEDURE — 1159F PR MEDICATION LIST DOCUMENTED IN MEDICAL RECORD: ICD-10-PCS | Mod: CPTII,S$GLB,, | Performed by: NURSE PRACTITIONER

## 2023-03-21 PROCEDURE — 3078F PR MOST RECENT DIASTOLIC BLOOD PRESSURE < 80 MM HG: ICD-10-PCS | Mod: CPTII,S$GLB,, | Performed by: NURSE PRACTITIONER

## 2023-03-21 PROCEDURE — 3051F HG A1C>EQUAL 7.0%<8.0%: CPT | Mod: CPTII,S$GLB,, | Performed by: NURSE PRACTITIONER

## 2023-03-21 PROCEDURE — 3008F PR BODY MASS INDEX (BMI) DOCUMENTED: ICD-10-PCS | Mod: CPTII,S$GLB,, | Performed by: NURSE PRACTITIONER

## 2023-03-21 PROCEDURE — 3051F PR MOST RECENT HEMOGLOBIN A1C LEVEL 7.0 - < 8.0%: ICD-10-PCS | Mod: CPTII,S$GLB,, | Performed by: NURSE PRACTITIONER

## 2023-03-21 RX ORDER — LISINOPRIL 20 MG/1
20 TABLET ORAL 2 TIMES DAILY
Qty: 180 TABLET | Refills: 3 | Status: SHIPPED | OUTPATIENT
Start: 2023-03-21

## 2023-03-21 RX ORDER — HYDROCODONE BITARTRATE AND ACETAMINOPHEN 7.5; 325 MG/1; MG/1
1 TABLET ORAL
COMMUNITY
Start: 2023-02-20 | End: 2023-08-16

## 2023-03-21 RX ORDER — AMLODIPINE BESYLATE 10 MG/1
10 TABLET ORAL DAILY
Qty: 90 TABLET | Refills: 3 | Status: SHIPPED | OUTPATIENT
Start: 2023-03-21 | End: 2024-03-20

## 2023-03-21 RX ORDER — CHLORHEXIDINE GLUCONATE ORAL RINSE 1.2 MG/ML
SOLUTION DENTAL
COMMUNITY
Start: 2023-02-13 | End: 2023-09-22

## 2023-03-21 RX ORDER — FENOFIBRATE 145 MG/1
145 TABLET, FILM COATED ORAL DAILY
Qty: 90 TABLET | Refills: 3 | Status: SHIPPED | OUTPATIENT
Start: 2023-03-21 | End: 2024-03-25 | Stop reason: SDUPTHER

## 2023-03-21 RX ORDER — ZOLPIDEM TARTRATE 5 MG/1
5 TABLET ORAL NIGHTLY PRN
COMMUNITY
Start: 2023-03-01 | End: 2023-10-09

## 2023-03-21 RX ORDER — ALPRAZOLAM 1 MG/1
1 TABLET ORAL 2 TIMES DAILY
Qty: 60 TABLET | Refills: 5 | Status: SHIPPED | OUTPATIENT
Start: 2023-03-21 | End: 2023-10-09 | Stop reason: SDUPTHER

## 2023-03-21 RX ORDER — TIZANIDINE 4 MG/1
4 TABLET ORAL 2 TIMES DAILY PRN
Qty: 180 TABLET | Refills: 3 | Status: SHIPPED | OUTPATIENT
Start: 2023-03-21 | End: 2023-03-27 | Stop reason: SDUPTHER

## 2023-03-21 RX ORDER — METFORMIN HYDROCHLORIDE 500 MG/1
1000 TABLET ORAL 2 TIMES DAILY WITH MEALS
Qty: 360 TABLET | Refills: 3 | Status: SHIPPED | OUTPATIENT
Start: 2023-03-21

## 2023-03-21 RX ORDER — ROSUVASTATIN CALCIUM 40 MG/1
40 TABLET, COATED ORAL DAILY
Qty: 90 TABLET | Refills: 3 | Status: SHIPPED | OUTPATIENT
Start: 2023-03-21 | End: 2024-03-25 | Stop reason: SDUPTHER

## 2023-03-21 NOTE — PATIENT INSTRUCTIONS
Fill out the patient assistance applications and drop it off at the office    You are due for annual diabetic eye exam

## 2023-03-21 NOTE — PROGRESS NOTES
SUBJECTIVE:    Patient ID: Luis Resendiz is a 62 y.o. male.    Chief Complaint: Follow-up (No bottles//pt is here for a follow up// pt declined eye exam//michele)    Pt here for regular DM/lipids/CAD f/u    Pt reports overall doing okay. Has been having a lot of dental work- recently had 9 lower teeth extracted    Pt admits he hasn't been monitoring his sugar very often. Denies any hypoglycemic episodes. Currently taking NPH insulin 35units BID and glimepide/metformin. Reports cannot afford copay for ozempic or farxiga    continues with chronic LBP and neck pain- reports bilat hands are chronically numb. Has previously seen pain mgmt and had injections which helped some with neck pain but no improvement in lower back pain- not interested in going back to pain mgmt right now    Denies any recent diverticulitis episodes    Follows with Dr. Sofia for hypercoagulable- Factor V leiden    Hx of CAD/CABG- reports hasn't followed up with  in over 2 years d/t outstanding bill. Denies any CP or SOB.      Office Visit on 03/21/2023   Component Date Value Ref Range Status    Hemoglobin A1C 03/21/2023 7.7 (H)  <5.7 % of total Hgb Final   Telephone on 03/01/2023   Component Date Value Ref Range Status    WBC 03/17/2023 5.4  3.8 - 10.8 Thousand/uL Final    RBC 03/17/2023 3.76 (L)  4.20 - 5.80 Million/uL Final    Hemoglobin 03/17/2023 11.9 (L)  13.2 - 17.1 g/dL Final    Hematocrit 03/17/2023 36.2 (L)  38.5 - 50.0 % Final    MCV 03/17/2023 96.3  80.0 - 100.0 fL Final    MCH 03/17/2023 31.6  27.0 - 33.0 pg Final    MCHC 03/17/2023 32.9  32.0 - 36.0 g/dL Final    RDW 03/17/2023 13.7  11.0 - 15.0 % Final    Platelets 03/17/2023 199  140 - 400 Thousand/uL Final    MPV 03/17/2023 11.1  7.5 - 12.5 fL Final    Neutrophils, Abs 03/17/2023 3,305  1,500 - 7,800 cells/uL Final    Lymph # 03/17/2023 1,436  850 - 3,900 cells/uL Final    Mono # 03/17/2023 529  200 - 950 cells/uL Final    Eos # 03/17/2023 97  15 - 500 cells/uL  Final    Baso # 03/17/2023 32  0 - 200 cells/uL Final    Neutrophils Relative 03/17/2023 61.2  % Final    Lymph % 03/17/2023 26.6  % Final    Mono % 03/17/2023 9.8  % Final    Eosinophil % 03/17/2023 1.8  % Final    Basophil % 03/17/2023 0.6  % Final    Glucose 03/17/2023 299 (H)  65 - 99 mg/dL Final    BUN 03/17/2023 41 (H)  7 - 25 mg/dL Final    Creatinine 03/17/2023 1.30  0.70 - 1.35 mg/dL Final    eGFR 03/17/2023 61  > OR = 60 mL/min/1.73m2 Final    BUN/Creatinine Ratio 03/17/2023 32 (H)  6 - 22 (calc) Final    Sodium 03/17/2023 137  135 - 146 mmol/L Final    Potassium 03/17/2023 4.9  3.5 - 5.3 mmol/L Final    Chloride 03/17/2023 105  98 - 110 mmol/L Final    CO2 03/17/2023 22  20 - 32 mmol/L Final    Calcium 03/17/2023 9.4  8.6 - 10.3 mg/dL Final    Total Protein 03/17/2023 7.0  6.1 - 8.1 g/dL Final    Albumin 03/17/2023 4.2  3.6 - 5.1 g/dL Final    Globulin, Total 03/17/2023 2.8  1.9 - 3.7 g/dL (calc) Final    Albumin/Globulin Ratio 03/17/2023 1.5  1.0 - 2.5 (calc) Final    Total Bilirubin 03/17/2023 0.5  0.2 - 1.2 mg/dL Final    Alkaline Phosphatase 03/17/2023 64  35 - 144 U/L Final    AST 03/17/2023 32  10 - 35 U/L Final    ALT 03/17/2023 33  9 - 46 U/L Final    Hemoglobin A1C 03/17/2023 7.5 (H)  <5.7 % of total Hgb Final    Cholesterol 03/17/2023 135  <200 mg/dL Final    HDL 03/17/2023 34 (L)  > OR = 40 mg/dL Final    Triglycerides 03/17/2023 271 (H)  <150 mg/dL Final    LDL Cholesterol 03/17/2023 65  mg/dL (calc) Final    HDL/Cholesterol Ratio 03/17/2023 4.0  <5.0 (calc) Final    Non HDL Chol. (LDL+VLDL) 03/17/2023 101  <130 mg/dL (calc) Final    Creatinine, Urine 03/17/2023 131  20 - 320 mg/dL Final    Microalb, Ur 03/17/2023 5.3  See Note: mg/dL Final    Microalb/Creat Ratio 03/17/2023 40 (H)  <30 mcg/mg creat Final    Color, UA 03/17/2023 YELLOW  YELLOW Final    Appearance, UA 03/17/2023 CLEAR  CLEAR Final    Specific Gravity, UA 03/17/2023 1.027  1.001 - 1.035 Final    pH, UA 03/17/2023 < OR = 5.0  5.0  - 8.0 Final    Glucose, UA 03/17/2023 3+ (A)  NEGATIVE Final    Bilirubin, UA 03/17/2023 NEGATIVE  NEGATIVE Final    Ketones, UA 03/17/2023 NEGATIVE  NEGATIVE Final    Occult Blood UA 03/17/2023 NEGATIVE  NEGATIVE Final    Protein, UA 03/17/2023 TRACE (A)  NEGATIVE Final    Nitrite, UA 03/17/2023 NEGATIVE  NEGATIVE Final    Leukocytes, UA 03/17/2023 NEGATIVE  NEGATIVE Final    WBC Casts, UA 03/17/2023 NONE SEEN  < OR = 5 /HPF Final    RBC Casts, UA 03/17/2023 NONE SEEN  < OR = 2 /HPF Final    Squam Epithel, UA 03/17/2023 NONE SEEN  < OR = 5 /HPF Final    Bacteria, UA 03/17/2023 NONE SEEN  NONE SEEN /HPF Final    Hyaline Casts, UA 03/17/2023 NONE SEEN  NONE SEEN /LPF Final    Service Cmt: 03/17/2023    Final    Reflexive Urine Culture 03/17/2023    Final    TSH w/reflex to FT4 03/17/2023 1.27  0.40 - 4.50 mIU/L Final   Ancillary Orders on 12/23/2022   Component Date Value Ref Range Status    INR 02/06/2023 2.1 (H)   Final    Prothrombin Time 02/06/2023 19.7 (H)  9.0 - 11.5 sec Final   Ancillary Orders on 12/16/2022   Component Date Value Ref Range Status    INR 02/17/2023 1.3 (H)   Final    Prothrombin Time 02/17/2023 13.0 (H)  9.0 - 11.5 sec Final   Ancillary Orders on 12/09/2022   Component Date Value Ref Range Status    INR 12/12/2022 2.6 (H)   Final    Prothrombin Time 12/12/2022 24.5 (H)  9.0 - 11.5 sec Final   Ancillary Orders on 12/02/2022   Component Date Value Ref Range Status    INR 02/24/2023 3.0 (H)   Final    Prothrombin Time 02/24/2023 27.6 (H)  9.0 - 11.5 sec Final   Ancillary Orders on 11/25/2022   Component Date Value Ref Range Status    INR 03/17/2023 2.4 (H)   Final    Prothrombin Time 03/17/2023 22.2 (H)  9.0 - 11.5 sec Final   Ancillary Orders on 11/04/2022   Component Date Value Ref Range Status    INR 11/08/2022 1.6 (H)   Final    Prothrombin Time 11/08/2022 15.4 (H)  9.0 - 11.5 sec Final   Ancillary Orders on 09/30/2022   Component Date Value Ref Range Status    INR 10/03/2022 2.9 (H)   Final     Prothrombin Time 10/03/2022 27.0 (H)  9.0 - 11.5 sec Final       Past Medical History:   Diagnosis Date    Anticoagulant long-term use     Arthritis     Cervical spine pain     Clotting disorder     Coronary artery disease     Diabetes mellitus     Encounter for blood transfusion     Hypertension      Past Surgical History:   Procedure Laterality Date    BACK SURGERY      cabg      CARDIAC SURGERY      FOOT SURGERY      FRACTURE SURGERY      ROTATOR CUFF REPAIR Left      Family History   Problem Relation Age of Onset    Heart disease Mother     Cancer Mother     Heart disease Father     Alcohol abuse Father     No Known Problems Sister     No Known Problems Brother     No Known Problems Daughter     No Known Problems Son     Cataracts Neg Hx     Glaucoma Neg Hx     Retinal detachment Neg Hx     Macular degeneration Neg Hx        The 10-year CVD risk score (Christiano, et al., 2008) is: 35.2%    Values used to calculate the score:      Age: 62 years      Sex: Male      Diabetic: Yes      Tobacco smoker: No      Systolic Blood Pressure: 132 mmHg      Is BP treated: Yes      HDL Cholesterol: 34 mg/dL      Total Cholesterol: 135 mg/dL     Marital Status:   Alcohol History:  reports current alcohol use.  Tobacco History:  reports that he quit smoking about 28 years ago. He has a 0.25 pack-year smoking history. He has never used smokeless tobacco.  Drug History:  reports no history of drug use.    Health Maintenance Topics with due status: Not Due       Topic Last Completion Date    Colorectal Cancer Screening 08/17/2021    TETANUS VACCINE 01/22/2022    Foot Exam 05/26/2022    Diabetes Urine Screening 03/17/2023    Lipid Panel 03/17/2023    High Dose Statin 03/21/2023    Hemoglobin A1c 03/21/2023     Immunization History   Administered Date(s) Administered    COVID-19, MRNA, LN-S, PF (Pfizer) (Purple Cap) 03/25/2021, 04/14/2021    Influenza (FLUBLOK) - Quadrivalent - Recombinant - PF *Preferred* (egg allergy)  "11/16/2020, 11/17/2021, 11/23/2022    Influenza - Quadrivalent - PF *Preferred* (6 months and older) 12/26/2019    Influenza - Trivalent (ADULT) 10/01/2014    Influenza - Trivalent - Recombinant - PF 09/25/2018    Tdap 01/22/2022       Review of patient's allergies indicates:   Allergen Reactions    Nsaids (non-steroidal anti-inflammatory drug) Nausea Only    Ancef [cefazolin] Anxiety       Current Outpatient Medications:     aspirin 325 MG tablet, Take 325 mg by mouth once daily., Disp: , Rfl:     atenoloL (TENORMIN) 50 MG tablet, Take 2 tablets (100 mg total) by mouth 2 (two) times a day., Disp: 360 tablet, Rfl: 3    BD ULTRA-FINE MINI PEN NEEDLE 31 gauge x 3/16" Ndle, 1 each by In Vitro route once daily., Disp: 100 each, Rfl: 1    blood sugar diagnostic Strp, To check BG 3-4 times daily, to use with insurance preferred meter, Disp: 360 each, Rfl: 3    blood-glucose meter kit, To check BG 3-4 times daily, to use with insurance preferred meter, Disp: 1 each, Rfl: 0    insulin NPH-insulin regular, 70/30, (HUMULIN 70/30 U-100 INSULIN) 100 unit/mL (70-30) injection, 35 units in AM and 35 units in PM- may titrate upwards as needed to max of 40 units BID, Disp: 7 each, Rfl: 3    insulin syringe-needle U-100 0.3 mL 31 gauge x 15/64" Syrg, , Disp: , Rfl:     insulin syringe-needle U-100 1/2 mL 31 gauge x 15/64" Syrg, BID, Disp: 180 Syringe, Rfl: 3    lancets Misc, To check BG 3-4 times daily, to use with insurance preferred meter, Disp: 360 each, Rfl: 3    TRUEPLUS INSULIN 0.5 mL 29 gauge x 1/2" Syrg, , Disp: , Rfl: 5    ULTRA COMFORT INSULIN SYRINGE 1 mL 29 gauge x 1/2" Syrg, Inject 25 Units as directed 2 (two) times daily., Disp: 180 each, Rfl: 3    warfarin (COUMADIN) 1 MG tablet, Take 1 tablet by mouth once daily, Disp: 90 tablet, Rfl: 3    warfarin (COUMADIN) 5 MG tablet, TAKE 1 & 1/2 (ONE & ONE-HALF) TABLETS BY MOUTH ON MODAY, WEDNESDAY, AND FRIDAY. TAKE 1 TABLET ON SATURDAY, SUNDAY, TUESDAY AND THURSDAY, Disp: 45 " tablet, Rfl: 3    zolpidem (AMBIEN) 5 MG Tab, Take 5 mg by mouth every evening., Disp: , Rfl:     ALPRAZolam (XANAX) 1 MG tablet, Take 1 tablet (1 mg total) by mouth 2 (two) times daily., Disp: 60 tablet, Rfl: 5    amLODIPine (NORVASC) 10 MG tablet, Take 1 tablet (10 mg total) by mouth once daily., Disp: 90 tablet, Rfl: 3    chlorhexidine (PERIDEX) 0.12 % solution, SMARTSIG:By Mouth, Disp: , Rfl:     ciprofloxacin HCl (CIPRO) 500 MG tablet, Take 1 tablet (500 mg total) by mouth 2 (two) times daily. (Patient not taking: Reported on 1/26/2023), Disp: 20 tablet, Rfl: 0    coenzyme Q10 100 mg capsule, Take 100 mg by mouth 2 (two) times a day., Disp: , Rfl:     enoxaparin (LOVENOX) 40 mg/0.4 mL Syrg, Inject 0.4 mLs (40 mg total) into the skin every morning. (Patient not taking: Reported on 3/21/2023), Disp: 10 each, Rfl: 0    fenofibrate (TRICOR) 145 MG tablet, Take 1 tablet (145 mg total) by mouth once daily., Disp: 90 tablet, Rfl: 3    glimepiride (AMARYL) 4 MG tablet, Take 1 tablet (4 mg total) by mouth 2 (two) times daily., Disp: 180 tablet, Rfl: 1    HYDROcodone-acetaminophen (NORCO) 5-325 mg per tablet, Take 1 tablet by mouth every 6 (six) hours as needed (severe pain). (Patient not taking: Reported on 1/26/2023), Disp: 12 tablet, Rfl: 0    HYDROcodone-acetaminophen (NORCO) 7.5-325 mg per tablet, Take 1 tablet by mouth., Disp: , Rfl:     lisinopriL (PRINIVIL,ZESTRIL) 20 MG tablet, Take 1 tablet (20 mg total) by mouth 2 (two) times daily., Disp: 180 tablet, Rfl: 3    metFORMIN (GLUCOPHAGE) 500 MG tablet, Take 2 tablets (1,000 mg total) by mouth 2 (two) times daily with meals., Disp: 360 tablet, Rfl: 3    metroNIDAZOLE (FLAGYL) 500 MG tablet, Take 1 tablet (500 mg total) by mouth every 8 (eight) hours. (Patient not taking: Reported on 1/26/2023), Disp: 30 tablet, Rfl: 0    rosuvastatin (CRESTOR) 40 MG Tab, Take 1 tablet (40 mg total) by mouth once daily., Disp: 90 tablet, Rfl: 3    semaglutide (OZEMPIC) 0.25 mg or  "0.5 mg(2 mg/1.5 mL) pen injector, Start with 0.25mg weekly for 4 weeks then increase to 0.5mg weekly (Patient not taking: Reported on 1/26/2023), Disp: 2 pen, Rfl: 0    tiZANidine (ZANAFLEX) 4 MG tablet, Take 1 tablet (4 mg total) by mouth 2 (two) times daily as needed (back pain)., Disp: 180 tablet, Rfl: 3    Review of Systems   Constitutional:  Negative for appetite change, chills, fever and unexpected weight change.   HENT:  Negative for sore throat and trouble swallowing.    Eyes:  Negative for visual disturbance.   Respiratory:  Negative for cough, shortness of breath and wheezing.    Cardiovascular:  Negative for chest pain, palpitations and leg swelling.   Gastrointestinal:  Negative for abdominal pain, blood in stool, constipation, diarrhea, nausea and vomiting.   Genitourinary:  Positive for frequency (nocturia 1-3 times/night). Negative for dysuria and hematuria.   Musculoskeletal:  Positive for back pain (chronic, takes tylenol BID) and neck pain. Negative for gait problem.   Skin:  Negative for rash.   Neurological:  Positive for numbness (bilat hands and feet numb). Negative for dizziness, syncope, speech difficulty and headaches.   Psychiatric/Behavioral:  Negative for dysphoric mood. The patient is not nervous/anxious (stable on xanax).         Objective:      Vitals:    03/21/23 1057 03/21/23 1110   BP: (!) 140/86 132/70   Pulse: 73    SpO2: 96%    Weight: 106.5 kg (234 lb 12.8 oz)    Height: 5' 10" (1.778 m)      Physical Exam  Vitals and nursing note reviewed.   Constitutional:       General: He is not in acute distress.     Appearance: He is well-developed. He is obese.   HENT:      Head: Normocephalic and atraumatic.      Right Ear: Tympanic membrane and ear canal normal.      Left Ear: Tympanic membrane and ear canal normal.   Neck:      Vascular: No carotid bruit.   Cardiovascular:      Rate and Rhythm: Normal rate and regular rhythm.      Heart sounds: No murmur heard.    No friction rub. No " gallop.   Pulmonary:      Effort: Pulmonary effort is normal. No respiratory distress.      Breath sounds: Normal breath sounds. No wheezing or rales.   Abdominal:      General: There is no distension.      Palpations: Abdomen is soft.      Tenderness: There is no abdominal tenderness.   Musculoskeletal:      Cervical back: Neck supple.      Right lower leg: No edema.      Left lower leg: No edema.      Comments: + varicose veins without inflammation   Lymphadenopathy:      Cervical: No cervical adenopathy.   Skin:     General: Skin is warm and dry.      Findings: No rash.   Neurological:      General: No focal deficit present.      Mental Status: He is alert and oriented to person, place, and time.      Gait: Gait normal.         Assessment:       1. DM type 2 with diabetic dyslipidemia    2. Essential hypertension    3. Mixed hyperlipidemia    4. DDD (degenerative disc disease), lumbar    5. Anxiety    6. Encounter for long-term (current) use of other medications    7. Encounter for therapeutic drug monitoring    8. Benzodiazepine dependence           Plan:       1. DM type 2 with diabetic dyslipidemia  -A1c 7.5%, remains above goal of <7%. Discussed with pt impo of DM control and recommend ozempic or farxiga however he reports he cannot afford copays. Pt given pt assistance program applications for both ozempic and jardiance and advised to complete both and return and we will see if he qualifies. For now stressed diabetic diet and monitoring blood sugars, increase PM insulin if FBS >140 and AM insulin if predinner sugars >180  -     metFORMIN (GLUCOPHAGE) 500 MG tablet; Take 2 tablets (1,000 mg total) by mouth 2 (two) times daily with meals.  Dispense: 360 tablet; Refill: 3  -     Hemoglobin A1C; Future; Expected date: 03/21/2023    2. Essential hypertension  -BP well controlled  -     lisinopriL (PRINIVIL,ZESTRIL) 20 MG tablet; Take 1 tablet (20 mg total) by mouth 2 (two) times daily.  Dispense: 180 tablet;  Refill: 3  -     amLODIPine (NORVASC) 10 MG tablet; Take 1 tablet (10 mg total) by mouth once daily.  Dispense: 90 tablet; Refill: 3    3. Mixed hyperlipidemia  -reviewed recent labs with patient, LDL at goal though elevated triglycerides.  Patient states can not afford Vascepa, continue current meds and try to work on diet  -     rosuvastatin (CRESTOR) 40 MG Tab; Take 1 tablet (40 mg total) by mouth once daily.  Dispense: 90 tablet; Refill: 3  -     fenofibrate (TRICOR) 145 MG tablet; Take 1 tablet (145 mg total) by mouth once daily.  Dispense: 90 tablet; Refill: 3    4. DDD (degenerative disc disease), lumbar  -stable  -     DRUG MONITOR, PANEL 4, W/CONF, URINE; Future; Expected date: 03/21/2023  -     tiZANidine (ZANAFLEX) 4 MG tablet; Take 1 tablet (4 mg total) by mouth 2 (two) times daily as needed (back pain).  Dispense: 180 tablet; Refill: 3    5. Anxiety  -stable on Xanax b.i.d., previously tried on SSRI though patient states did not tolerate. UDS today  -     DRUG MONITOR, PANEL 4, W/CONF, URINE; Future; Expected date: 03/21/2023  -     ALPRAZolam (XANAX) 1 MG tablet; Take 1 tablet (1 mg total) by mouth 2 (two) times daily.  Dispense: 60 tablet; Refill: 5    6. Encounter for long-term (current) use of other medications  -     DRUG MONITOR, PANEL 4, W/CONF, URINE; Future; Expected date: 03/21/2023    7. Encounter for therapeutic drug monitoring  -     DRUG MONITOR, PANEL 4, W/CONF, URINE; Future; Expected date: 03/21/2023      Follow up in about 4 months (around 7/21/2023) for UDS today, Diabetes.          Counseled on age and gender appropriate medical preventative services, including cancer screenings, immunizations, overall nutritional health, need for a consistent exercise regimen and an overall push towards maintaining a vigorous and active lifestyle.      3/22/2023 Marianna Hdez NP

## 2023-03-21 NOTE — TELEPHONE ENCOUNTER
----- Message from Rosalia Pena MA sent at 3/21/2023 11:52 AM CDT -----  Regarding: f/u slot needed  [phone] Marianna want s to this Pt back in 4 month f/u patient is requesting that the appt be scheduled for am between 10-11:40am   please call to confirm works

## 2023-03-22 LAB — HBA1C MFR BLD: 7.7 % OF TOTAL HGB

## 2023-03-23 LAB
1OH-MIDAZOLAM UR-MCNC: NEGATIVE NG/ML
7AMINOCLONAZEPAM UR-MCNC: NEGATIVE NG/ML
A-OH ALPRAZ UR-MCNC: 681 NG/ML
A-OH-TRIAZOLAM UR-MCNC: NEGATIVE NG/ML
AMPHETAMINES UR QL: NEGATIVE NG/ML
BARBITURATES UR QL: NEGATIVE NG/ML
BENZODIAZ UR QL: POSITIVE NG/ML
BZE UR QL: NEGATIVE NG/ML
CREAT UR-MCNC: 114.7 MG/DL
DRUG SCREEN COMMENT UR-IMP: ABNORMAL
LORAZEPAM UR-MCNC: NEGATIVE NG/ML
METHADONE UR QL: NEGATIVE NG/ML
NORDIAZEPAM UR-MCNC: NEGATIVE NG/ML
NOTES AND COMMENTS: ABNORMAL
OH-ETHYLFLURAZ UR-MCNC: NEGATIVE NG/ML
OPIATES UR QL: NEGATIVE NG/ML
OXAZEPAM UR-MCNC: NEGATIVE NG/ML
OXIDANTS UR QL: NEGATIVE MCG/ML
OXYCODONE UR QL: NEGATIVE NG/ML
PCP UR QL: NEGATIVE NG/ML
PH UR: 5.2 [PH] (ref 4.5–9)
TEMAZEPAM UR-MCNC: NEGATIVE NG/ML

## 2023-03-27 DIAGNOSIS — M51.36 DDD (DEGENERATIVE DISC DISEASE), LUMBAR: ICD-10-CM

## 2023-03-27 RX ORDER — TIZANIDINE 4 MG/1
4 TABLET ORAL 2 TIMES DAILY PRN
Qty: 180 TABLET | Refills: 3 | Status: SHIPPED | OUTPATIENT
Start: 2023-03-27 | End: 2023-04-20

## 2023-04-18 DIAGNOSIS — D68.51 HETEROZYGOUS FACTOR V LEIDEN MUTATION: ICD-10-CM

## 2023-04-18 RX ORDER — WARFARIN SODIUM 5 MG/1
TABLET ORAL
Qty: 45 TABLET | Refills: 3 | Status: SHIPPED | OUTPATIENT
Start: 2023-04-18 | End: 2024-03-06 | Stop reason: SDUPTHER

## 2023-04-20 DIAGNOSIS — M51.36 DDD (DEGENERATIVE DISC DISEASE), LUMBAR: ICD-10-CM

## 2023-04-20 RX ORDER — TIZANIDINE 4 MG/1
4 TABLET ORAL 3 TIMES DAILY
Qty: 90 TABLET | Refills: 1 | Status: SHIPPED | OUTPATIENT
Start: 2023-04-20 | End: 2023-04-23 | Stop reason: SDUPTHER

## 2023-04-20 NOTE — TELEPHONE ENCOUNTER
Spoke to pt and let him know Marianna is out of the office today and tomorrow. Pt stated he is trying to cut down on the extra strength Tylenol because he has been taking it with the Tizanidine since the weather changing is making him hurt/achy. Pt stated he also being using this strength for a while an thinks it don't work as good.     Pt takes Tizanidine 4mg BID

## 2023-04-20 NOTE — TELEPHONE ENCOUNTER
Spoke to pt and let him know per Dr. Barboza ''okay to increase tizanidine 4mg  to 1 tab in the morning, and 2 tablets in the evening.''  Pt verbalized understanding and pharmacy is [facility] on [address]

## 2023-04-20 NOTE — TELEPHONE ENCOUNTER
----- Message from Cass Barnard sent at 4/20/2023  2:11 PM CDT -----  Patient called and stated that he would like to see if Marianna can increase the dosage of his tizanidine please give him a call back at [phone]

## 2023-04-21 ENCOUNTER — TELEPHONE (OUTPATIENT)
Dept: FAMILY MEDICINE | Facility: CLINIC | Age: 63
End: 2023-04-21

## 2023-04-21 DIAGNOSIS — M51.36 DDD (DEGENERATIVE DISC DISEASE), LUMBAR: ICD-10-CM

## 2023-04-21 NOTE — TELEPHONE ENCOUNTER
----- Message from Cass Barnard sent at 4/21/2023  7:39 AM CDT -----  VM 04/20/23 @ 5:35 PM :patient called and stated that he need to speak to the nurse about the medicine that was called in. Please give him a call at [phone]

## 2023-04-21 NOTE — TELEPHONE ENCOUNTER
Spoke to pt and he stated he was returning a phone call and we has spoke to pt already. Pt verbalized understanding

## 2023-04-21 NOTE — TELEPHONE ENCOUNTER
----- Message from Sade Pham sent at 4/21/2023  9:52 AM CDT -----  Pt states he is returning a phone call [phone]

## 2023-04-21 NOTE — TELEPHONE ENCOUNTER
Spoke to pt and he stated he went to [facility] and they did not have his prescription.       Spoke to Chiquis from [facility] and she stated it is too soon for pt to get new rx because insurance wont cover   and he picked up quantity of 180 on 3/15     Explained to pt and above. And he stated he needs the medication changed to another muscle relaxer because he already upped the tizanidine on his own and he takings 1 in the morning and 2 at night already like Dr. Barboza sent in last night. Pt stated he is still having back pain and he needs something a little stronger. Pt stated he is taking a extra strength tylenol and he wants to cut down on the tylenol. Pt stated he either needs an increase in MG or be needs something different. Let pt know Marianna is not in our office today. Pt stated he has enough to last the weekend and will wait for Marianna to get back on Monday to see if she can sent something in.

## 2023-04-23 RX ORDER — TIZANIDINE 4 MG/1
8 TABLET ORAL 3 TIMES DAILY PRN
Qty: 180 TABLET | Refills: 5 | Status: SHIPPED | OUTPATIENT
Start: 2023-04-23 | End: 2023-04-24 | Stop reason: SDUPTHER

## 2023-04-23 NOTE — TELEPHONE ENCOUNTER
Please let pt know I will increase the tizandine to 8mg (2 tablets) TID however caution him on side effect of dizziness/low blood pressure. If his back pan is not controlled with that I would recommend we refer him back to pain mgmt

## 2023-04-24 DIAGNOSIS — M51.36 DDD (DEGENERATIVE DISC DISEASE), LUMBAR: ICD-10-CM

## 2023-04-24 RX ORDER — TIZANIDINE 4 MG/1
8 TABLET ORAL 3 TIMES DAILY PRN
Qty: 180 TABLET | Refills: 5 | Status: SHIPPED | OUTPATIENT
Start: 2023-04-24 | End: 2023-09-19 | Stop reason: SDUPTHER

## 2023-04-24 NOTE — TELEPHONE ENCOUNTER
Spoke with pt in regards to message sent . Pt is needing his Tizanidine to be sent to [facility]. Pt states that it's a lot cheaper than his local pharmacy. Verbalized that I will set this medication up to be sent to [facility]. Pt acknowledged understanding.

## 2023-04-24 NOTE — TELEPHONE ENCOUNTER
----- Message from Sade Pham sent at 4/24/2023  2:06 PM CDT -----  Pt called and stated that his dosage increased on his ZANAFLEX. And he would like for us to notify his pharmacy.[phone]

## 2023-04-24 NOTE — TELEPHONE ENCOUNTER
Spoke with pt in regards to recent message sent. Verbalized per Marianna that she will increase the tizandine to 8mg (2 tablets) TID however caution him on side effect of dizziness/low blood pressure. If his back pan is not controlled with that, Marianna would recommend we refer him back to pain mgmt. Pt acknowledged understanding.

## 2023-04-27 ENCOUNTER — TELEPHONE (OUTPATIENT)
Dept: HEMATOLOGY/ONCOLOGY | Facility: CLINIC | Age: 63
End: 2023-04-27

## 2023-04-27 NOTE — TELEPHONE ENCOUNTER
----- Message from Ac Smith MD sent at 4/26/2023  4:12 PM CDT -----  Call patient,  he needs to hold his therapy for a day.  Recheck again in one month.

## 2023-04-27 NOTE — TELEPHONE ENCOUNTER
INR elevated at 3.3. Per Dr. Smith, hold Coumadin for one day and re-check next month. Pt was notified and verbalized understanding.

## 2023-05-15 DIAGNOSIS — E78.5 DM TYPE 2 WITH DIABETIC DYSLIPIDEMIA: ICD-10-CM

## 2023-05-15 DIAGNOSIS — E11.69 DM TYPE 2 WITH DIABETIC DYSLIPIDEMIA: ICD-10-CM

## 2023-05-15 RX ORDER — GLIMEPIRIDE 4 MG/1
4 TABLET ORAL 2 TIMES DAILY
Qty: 180 TABLET | Refills: 3 | Status: SHIPPED | OUTPATIENT
Start: 2023-05-15 | End: 2024-02-21

## 2023-05-15 NOTE — TELEPHONE ENCOUNTER
Last office visit 3/21  Next office visit 7/26   Spoke to pt and let him know Xanax refill available at pharmacy pt verbalized understanding

## 2023-05-15 NOTE — TELEPHONE ENCOUNTER
----- Message from Terrence Oliva MA sent at 5/15/2023 10:35 AM CDT -----  Regarding: refill  Pt needs glimipiride and xanax sent to [facility] in [place] on [address]. [phone]

## 2023-05-26 ENCOUNTER — TELEPHONE (OUTPATIENT)
Dept: HEMATOLOGY/ONCOLOGY | Facility: CLINIC | Age: 63
End: 2023-05-26

## 2023-05-26 DIAGNOSIS — D68.51 HETEROZYGOUS FACTOR V LEIDEN MUTATION: Primary | ICD-10-CM

## 2023-05-26 RX ORDER — WARFARIN 4 MG/1
4 TABLET ORAL DAILY
Qty: 30 TABLET | Refills: 1 | Status: SHIPPED | OUTPATIENT
Start: 2023-05-26 | End: 2023-07-13 | Stop reason: SDUPTHER

## 2023-05-26 NOTE — TELEPHONE ENCOUNTER
INR high at 3.6. Per Dr. Smith, hold Coumadin for 1 day and reduce dose to 4.5 mg starting tomorrow. Re-check in 2 weeks. Pt was notified and verbalized understanding.

## 2023-05-26 NOTE — TELEPHONE ENCOUNTER
----- Message from Ac Smith MD sent at 5/26/2023  2:56 PM CDT -----  Call patient ,  hold for 1 day.  Need to reduce dose by 25% of current.  Recheck in 2 weeks.

## 2023-06-12 ENCOUNTER — TELEPHONE (OUTPATIENT)
Dept: HEMATOLOGY/ONCOLOGY | Facility: CLINIC | Age: 63
End: 2023-06-12

## 2023-06-12 NOTE — TELEPHONE ENCOUNTER
INR 1.8. Per Dr. Smith, continue on same dose of Coumadin and re-check in one month. Pt was notified and verbalized understanding.

## 2023-06-12 NOTE — TELEPHONE ENCOUNTER
----- Message from Ac Smith MD sent at 6/11/2023  6:33 AM CDT -----  Call patient,  keep him on current dose.  Recheck in one month

## 2023-07-11 ENCOUNTER — TELEPHONE (OUTPATIENT)
Dept: FAMILY MEDICINE | Facility: CLINIC | Age: 63
End: 2023-07-11

## 2023-07-11 NOTE — TELEPHONE ENCOUNTER
----- Message from Sade King MA sent at 7/11/2023  9:48 AM CDT -----  Regarding: need labs  Does patient need labs before visit. [facility]  [phone]

## 2023-07-13 ENCOUNTER — TELEPHONE (OUTPATIENT)
Dept: HEMATOLOGY/ONCOLOGY | Facility: CLINIC | Age: 63
End: 2023-07-13

## 2023-07-13 DIAGNOSIS — D68.51 HETEROZYGOUS FACTOR V LEIDEN MUTATION: ICD-10-CM

## 2023-07-13 RX ORDER — WARFARIN 4 MG/1
4 TABLET ORAL DAILY
Qty: 30 TABLET | Refills: 1 | Status: SHIPPED | OUTPATIENT
Start: 2023-07-13 | End: 2023-10-31 | Stop reason: SDUPTHER

## 2023-07-13 NOTE — TELEPHONE ENCOUNTER
Spoke to pt and notified him that INR is low at 1.7. Per Miri, rotate 4.5 & 5 mg and re-check labs in 2 weeks. Pt verbalized understanding.

## 2023-07-13 NOTE — TELEPHONE ENCOUNTER
----- Message from ANALI Weiss sent at 7/13/2023  9:51 AM CDT -----  Please notify the patient that their INR is still a little low at 1.7. Rotate 4.5 and 5 mg and repeat in 2 weeks

## 2023-07-26 ENCOUNTER — OFFICE VISIT (OUTPATIENT)
Dept: FAMILY MEDICINE | Facility: CLINIC | Age: 63
End: 2023-07-26
Payer: MEDICARE

## 2023-07-26 VITALS
WEIGHT: 230.13 LBS | OXYGEN SATURATION: 95 % | SYSTOLIC BLOOD PRESSURE: 136 MMHG | HEIGHT: 70 IN | HEART RATE: 72 BPM | DIASTOLIC BLOOD PRESSURE: 68 MMHG | BODY MASS INDEX: 32.95 KG/M2

## 2023-07-26 DIAGNOSIS — R35.1 BENIGN PROSTATIC HYPERPLASIA WITH NOCTURIA: ICD-10-CM

## 2023-07-26 DIAGNOSIS — E78.2 MIXED HYPERLIPIDEMIA: ICD-10-CM

## 2023-07-26 DIAGNOSIS — N40.1 BENIGN PROSTATIC HYPERPLASIA WITH NOCTURIA: ICD-10-CM

## 2023-07-26 DIAGNOSIS — M51.36 DDD (DEGENERATIVE DISC DISEASE), LUMBAR: ICD-10-CM

## 2023-07-26 DIAGNOSIS — Z12.5 PROSTATE CANCER SCREENING: ICD-10-CM

## 2023-07-26 DIAGNOSIS — R20.2 LEFT HAND PARESTHESIA: ICD-10-CM

## 2023-07-26 DIAGNOSIS — N18.31 STAGE 3A CHRONIC KIDNEY DISEASE: ICD-10-CM

## 2023-07-26 DIAGNOSIS — E11.69 DM TYPE 2 WITH DIABETIC DYSLIPIDEMIA: Primary | ICD-10-CM

## 2023-07-26 DIAGNOSIS — E78.5 DM TYPE 2 WITH DIABETIC DYSLIPIDEMIA: Primary | ICD-10-CM

## 2023-07-26 DIAGNOSIS — I10 ESSENTIAL HYPERTENSION: ICD-10-CM

## 2023-07-26 PROBLEM — M51.369 DDD (DEGENERATIVE DISC DISEASE), LUMBAR: Status: ACTIVE | Noted: 2023-07-26

## 2023-07-26 LAB — HBA1C MFR BLD: 6.6 %

## 2023-07-26 PROCEDURE — 4010F ACE/ARB THERAPY RXD/TAKEN: CPT | Mod: CPTII,S$GLB,, | Performed by: NURSE PRACTITIONER

## 2023-07-26 PROCEDURE — 1159F MED LIST DOCD IN RCRD: CPT | Mod: CPTII,S$GLB,, | Performed by: NURSE PRACTITIONER

## 2023-07-26 PROCEDURE — 3060F POS MICROALBUMINURIA REV: CPT | Mod: CPTII,S$GLB,, | Performed by: NURSE PRACTITIONER

## 2023-07-26 PROCEDURE — 1159F PR MEDICATION LIST DOCUMENTED IN MEDICAL RECORD: ICD-10-PCS | Mod: CPTII,S$GLB,, | Performed by: NURSE PRACTITIONER

## 2023-07-26 PROCEDURE — 3075F PR MOST RECENT SYSTOLIC BLOOD PRESS GE 130-139MM HG: ICD-10-PCS | Mod: CPTII,S$GLB,, | Performed by: NURSE PRACTITIONER

## 2023-07-26 PROCEDURE — 83036 HEMOGLOBIN GLYCOSYLATED A1C: CPT | Mod: QW,,, | Performed by: NURSE PRACTITIONER

## 2023-07-26 PROCEDURE — 3060F PR POS MICROALBUMINURIA RESULT DOCUMENTED/REVIEW: ICD-10-PCS | Mod: CPTII,S$GLB,, | Performed by: NURSE PRACTITIONER

## 2023-07-26 PROCEDURE — 1160F RVW MEDS BY RX/DR IN RCRD: CPT | Mod: CPTII,S$GLB,, | Performed by: NURSE PRACTITIONER

## 2023-07-26 PROCEDURE — 99214 OFFICE O/P EST MOD 30 MIN: CPT | Mod: S$GLB,,, | Performed by: NURSE PRACTITIONER

## 2023-07-26 PROCEDURE — 4010F PR ACE/ARB THEARPY RXD/TAKEN: ICD-10-PCS | Mod: CPTII,S$GLB,, | Performed by: NURSE PRACTITIONER

## 2023-07-26 PROCEDURE — 83036 POCT HEMOGLOBIN A1C: ICD-10-PCS | Mod: QW,,, | Performed by: NURSE PRACTITIONER

## 2023-07-26 PROCEDURE — 1160F PR REVIEW ALL MEDS BY PRESCRIBER/CLIN PHARMACIST DOCUMENTED: ICD-10-PCS | Mod: CPTII,S$GLB,, | Performed by: NURSE PRACTITIONER

## 2023-07-26 PROCEDURE — 3078F DIAST BP <80 MM HG: CPT | Mod: CPTII,S$GLB,, | Performed by: NURSE PRACTITIONER

## 2023-07-26 PROCEDURE — 3066F PR DOCUMENTATION OF TREATMENT FOR NEPHROPATHY: ICD-10-PCS | Mod: CPTII,S$GLB,, | Performed by: NURSE PRACTITIONER

## 2023-07-26 PROCEDURE — 3008F PR BODY MASS INDEX (BMI) DOCUMENTED: ICD-10-PCS | Mod: CPTII,S$GLB,, | Performed by: NURSE PRACTITIONER

## 2023-07-26 PROCEDURE — 99214 PR OFFICE/OUTPT VISIT, EST, LEVL IV, 30-39 MIN: ICD-10-PCS | Mod: S$GLB,,, | Performed by: NURSE PRACTITIONER

## 2023-07-26 PROCEDURE — 3044F PR MOST RECENT HEMOGLOBIN A1C LEVEL <7.0%: ICD-10-PCS | Mod: CPTII,S$GLB,, | Performed by: NURSE PRACTITIONER

## 2023-07-26 PROCEDURE — 3075F SYST BP GE 130 - 139MM HG: CPT | Mod: CPTII,S$GLB,, | Performed by: NURSE PRACTITIONER

## 2023-07-26 PROCEDURE — 3008F BODY MASS INDEX DOCD: CPT | Mod: CPTII,S$GLB,, | Performed by: NURSE PRACTITIONER

## 2023-07-26 PROCEDURE — 3044F HG A1C LEVEL LT 7.0%: CPT | Mod: CPTII,S$GLB,, | Performed by: NURSE PRACTITIONER

## 2023-07-26 PROCEDURE — 3066F NEPHROPATHY DOC TX: CPT | Mod: CPTII,S$GLB,, | Performed by: NURSE PRACTITIONER

## 2023-07-26 PROCEDURE — 3078F PR MOST RECENT DIASTOLIC BLOOD PRESSURE < 80 MM HG: ICD-10-PCS | Mod: CPTII,S$GLB,, | Performed by: NURSE PRACTITIONER

## 2023-07-26 RX ORDER — GABAPENTIN 100 MG/1
CAPSULE ORAL
Qty: 90 CAPSULE | Refills: 11 | Status: SHIPPED | OUTPATIENT
Start: 2023-07-26 | End: 2023-10-23

## 2023-07-26 RX ORDER — TAMSULOSIN HYDROCHLORIDE 0.4 MG/1
0.4 CAPSULE ORAL DAILY
Qty: 30 CAPSULE | Refills: 11 | Status: SHIPPED | OUTPATIENT
Start: 2023-07-26 | End: 2024-07-25

## 2023-07-26 NOTE — PROGRESS NOTES
SUBJECTIVE:    Patient ID: Luis Resendiz is a 62 y.o. male.    Chief Complaint: Diabetes (No bottles//Pt is here for a 4 month check up//Pt will scheduled eye exam later//Foot exam ordered today//ANA )    Pt here for regular DM/lipids/CAD f/u    Pt reports overall doing okay. Reports blood sugars remain labile but hasn't been seeing any sugars over 200- adjusts his insulin a little bit based on his readings. Admits has had a couple readings down to the 60s with symptoms of sweaty and weakness which resolves with eating something    Patient reports recently has had increase in urinary frequency/nocturia.  At times feels like he is not emptying his bladder fully and have to urinate short time later.  Denies any dysuria or hematuria.    Went to  a few months back with severe LBP- was given a few pain pills which did help some. Reports occas taking higher dose of tizanidine 8mg and uses TENs unit. Has hx of cervical/lumbar DDD s/p surgery years ago- previously saw pain mgmt and had injections which he reports didn't help- Not interested in going back to pain mgmt again. Years ago had some issues with overmedication with opioids and benzos- has been off chronic opioids for several years    Denies any recent LLQ pain/diverticulitis episodes    Follows with Dr. Sofia for hypercoagulable- Factor V leiden    Hx of CAD/CABG- reports hasn't followed up with  in over 2 years d/t outstanding bill. Denies any CP or SOB.      Office Visit on 07/26/2023   Component Date Value Ref Range Status    Hemoglobin A1C, POC 07/26/2023 6.6  % Final   Office Visit on 03/21/2023   Component Date Value Ref Range Status    Amphetamines 03/21/2023 NEGATIVE  <500 ng/mL Final    Barbiturates 03/21/2023 NEGATIVE  <300 ng/mL Final    Benzodiazepines 03/21/2023 POSITIVE (A)  <100 ng/mL Final    Alphahydroxyalprazolam 03/21/2023 681 (H)  <25 ng/mL Final    Alphahydroxymidazolam 03/21/2023 NEGATIVE  <50 ng/mL Final     Alphahydroxytriazolam 03/21/2023 NEGATIVE  <50 ng/mL Final    Aminoclonazepam 03/21/2023 NEGATIVE  <25 ng/mL Final    hydroxyethylflurazepam UR GC/MS 03/21/2023 NEGATIVE  <50 ng/mL Final    Lorazepam 03/21/2023 NEGATIVE  <50 ng/mL Final    Nordiazepam Lvl 03/21/2023 NEGATIVE  <50 ng/mL Final    Oxazepam 03/21/2023 NEGATIVE  <50 ng/mL Final    Temazepam GC/MS Conf 03/21/2023 NEGATIVE  <50 ng/mL Final    Benzodiazepines Comments 03/21/2023    Final    Cocaine Metabolites 03/21/2023 NEGATIVE  <150 ng/mL Final    Methadone 03/21/2023 NEGATIVE  <100 ng/mL Final    Opiates 03/21/2023 NEGATIVE  <100 ng/mL Final    Oxycodone 03/21/2023 NEGATIVE  <100 ng/mL Final    Phencyclidine 03/21/2023 NEGATIVE  <25 ng/mL Final    Creatinine 03/21/2023 114.7  > or = 20.0 mg/dL Final    pH 03/21/2023 5.2  4.5 - 9.0 Final    Oxidants, Urine (Tox) 03/21/2023 NEGATIVE  <200 mcg/mL Final    Notes and Comments 03/21/2023    Final    Hemoglobin A1C 03/21/2023 7.7 (H)  <5.7 % of total Hgb Final   Telephone on 03/01/2023   Component Date Value Ref Range Status    WBC 03/17/2023 5.4  3.8 - 10.8 Thousand/uL Final    RBC 03/17/2023 3.76 (L)  4.20 - 5.80 Million/uL Final    Hemoglobin 03/17/2023 11.9 (L)  13.2 - 17.1 g/dL Final    Hematocrit 03/17/2023 36.2 (L)  38.5 - 50.0 % Final    MCV 03/17/2023 96.3  80.0 - 100.0 fL Final    MCH 03/17/2023 31.6  27.0 - 33.0 pg Final    MCHC 03/17/2023 32.9  32.0 - 36.0 g/dL Final    RDW 03/17/2023 13.7  11.0 - 15.0 % Final    Platelets 03/17/2023 199  140 - 400 Thousand/uL Final    MPV 03/17/2023 11.1  7.5 - 12.5 fL Final    Neutrophils, Abs 03/17/2023 3,305  1,500 - 7,800 cells/uL Final    Lymph # 03/17/2023 1,436  850 - 3,900 cells/uL Final    Mono # 03/17/2023 529  200 - 950 cells/uL Final    Eos # 03/17/2023 97  15 - 500 cells/uL Final    Baso # 03/17/2023 32  0 - 200 cells/uL Final    Neutrophils Relative 03/17/2023 61.2  % Final    Lymph % 03/17/2023 26.6  % Final    Mono % 03/17/2023 9.8  % Final     Eosinophil % 03/17/2023 1.8  % Final    Basophil % 03/17/2023 0.6  % Final    Glucose 03/17/2023 299 (H)  65 - 99 mg/dL Final    BUN 03/17/2023 41 (H)  7 - 25 mg/dL Final    Creatinine 03/17/2023 1.30  0.70 - 1.35 mg/dL Final    eGFR 03/17/2023 61  > OR = 60 mL/min/1.73m2 Final    BUN/Creatinine Ratio 03/17/2023 32 (H)  6 - 22 (calc) Final    Sodium 03/17/2023 137  135 - 146 mmol/L Final    Potassium 03/17/2023 4.9  3.5 - 5.3 mmol/L Final    Chloride 03/17/2023 105  98 - 110 mmol/L Final    CO2 03/17/2023 22  20 - 32 mmol/L Final    Calcium 03/17/2023 9.4  8.6 - 10.3 mg/dL Final    Total Protein 03/17/2023 7.0  6.1 - 8.1 g/dL Final    Albumin 03/17/2023 4.2  3.6 - 5.1 g/dL Final    Globulin, Total 03/17/2023 2.8  1.9 - 3.7 g/dL (calc) Final    Albumin/Globulin Ratio 03/17/2023 1.5  1.0 - 2.5 (calc) Final    Total Bilirubin 03/17/2023 0.5  0.2 - 1.2 mg/dL Final    Alkaline Phosphatase 03/17/2023 64  35 - 144 U/L Final    AST 03/17/2023 32  10 - 35 U/L Final    ALT 03/17/2023 33  9 - 46 U/L Final    Hemoglobin A1C 03/17/2023 7.5 (H)  <5.7 % of total Hgb Final    Cholesterol 03/17/2023 135  <200 mg/dL Final    HDL 03/17/2023 34 (L)  > OR = 40 mg/dL Final    Triglycerides 03/17/2023 271 (H)  <150 mg/dL Final    LDL Cholesterol 03/17/2023 65  mg/dL (calc) Final    HDL/Cholesterol Ratio 03/17/2023 4.0  <5.0 (calc) Final    Non HDL Chol. (LDL+VLDL) 03/17/2023 101  <130 mg/dL (calc) Final    Creatinine, Urine 03/17/2023 131  20 - 320 mg/dL Final    Microalb, Ur 03/17/2023 5.3  See Note: mg/dL Final    Microalb/Creat Ratio 03/17/2023 40 (H)  <30 mcg/mg creat Final    Color, UA 03/17/2023 YELLOW  YELLOW Final    Appearance, UA 03/17/2023 CLEAR  CLEAR Final    Specific Gravity, UA 03/17/2023 1.027  1.001 - 1.035 Final    pH, UA 03/17/2023 < OR = 5.0  5.0 - 8.0 Final    Glucose, UA 03/17/2023 3+ (A)  NEGATIVE Final    Bilirubin, UA 03/17/2023 NEGATIVE  NEGATIVE Final    Ketones, UA 03/17/2023 NEGATIVE  NEGATIVE Final    Occult  Blood UA 03/17/2023 NEGATIVE  NEGATIVE Final    Protein, UA 03/17/2023 TRACE (A)  NEGATIVE Final    Nitrite, UA 03/17/2023 NEGATIVE  NEGATIVE Final    Leukocytes, UA 03/17/2023 NEGATIVE  NEGATIVE Final    WBC Casts, UA 03/17/2023 NONE SEEN  < OR = 5 /HPF Final    RBC Casts, UA 03/17/2023 NONE SEEN  < OR = 2 /HPF Final    Squam Epithel, UA 03/17/2023 NONE SEEN  < OR = 5 /HPF Final    Bacteria, UA 03/17/2023 NONE SEEN  NONE SEEN /HPF Final    Hyaline Casts, UA 03/17/2023 NONE SEEN  NONE SEEN /LPF Final    Service Cmt: 03/17/2023    Final    Reflexive Urine Culture 03/17/2023    Final    TSH w/reflex to FT4 03/17/2023 1.27  0.40 - 4.50 mIU/L Final       Past Medical History:   Diagnosis Date    Anticoagulant long-term use     Arthritis     Cervical spine pain     Clotting disorder     Coronary artery disease     Diabetes mellitus     Encounter for blood transfusion     Hypertension      Past Surgical History:   Procedure Laterality Date    BACK SURGERY      cabg      CARDIAC SURGERY      FOOT SURGERY      FRACTURE SURGERY      ROTATOR CUFF REPAIR Left      Family History   Problem Relation Age of Onset    Heart disease Mother     Cancer Mother     Heart disease Father     Alcohol abuse Father     No Known Problems Sister     No Known Problems Brother     No Known Problems Daughter     No Known Problems Son     Cataracts Neg Hx     Glaucoma Neg Hx     Retinal detachment Neg Hx     Macular degeneration Neg Hx        The 10-year CVD risk score (ALEX'Agostino, et al., 2008) is: 36.9%    Values used to calculate the score:      Age: 62 years      Sex: Male      Diabetic: Yes      Tobacco smoker: No      Systolic Blood Pressure: 136 mmHg      Is BP treated: Yes      HDL Cholesterol: 34 mg/dL      Total Cholesterol: 135 mg/dL     Marital Status:   Alcohol History:  reports current alcohol use.  Tobacco History:  reports that he quit smoking about 29 years ago. His smoking use included cigarettes. He has a 0.25 pack-year  "smoking history. He has been exposed to tobacco smoke. He has never used smokeless tobacco.  Drug History:  reports no history of drug use.    Health Maintenance Topics with due status: Not Due       Topic Last Completion Date    Colorectal Cancer Screening 08/17/2021    TETANUS VACCINE 01/22/2022    Influenza Vaccine 11/23/2022    Diabetes Urine Screening 03/17/2023    Lipid Panel 03/17/2023    High Dose Statin 03/21/2023    Foot Exam 07/26/2023    Hemoglobin A1c 07/26/2023     Immunization History   Administered Date(s) Administered    COVID-19, MRNA, LN-S, PF (Pfizer) (Purple Cap) 03/25/2021, 04/14/2021    Influenza (FLUBLOK) - Quadrivalent - Recombinant - PF *Preferred* (egg allergy) 11/16/2020, 11/17/2021, 11/23/2022    Influenza - Quadrivalent - PF *Preferred* (6 months and older) 12/26/2019    Influenza - Trivalent (ADULT) 10/01/2014    Influenza - Trivalent - Recombinant - PF 09/25/2018    Tdap 01/22/2022       Review of patient's allergies indicates:   Allergen Reactions    Nsaids (non-steroidal anti-inflammatory drug) Nausea Only    Ancef [cefazolin] Anxiety       Current Outpatient Medications:     ALPRAZolam (XANAX) 1 MG tablet, Take 1 tablet (1 mg total) by mouth 2 (two) times daily., Disp: 60 tablet, Rfl: 5    amLODIPine (NORVASC) 10 MG tablet, Take 1 tablet (10 mg total) by mouth once daily., Disp: 90 tablet, Rfl: 3    aspirin 325 MG tablet, Take 325 mg by mouth once daily., Disp: , Rfl:     atenoloL (TENORMIN) 50 MG tablet, Take 2 tablets (100 mg total) by mouth 2 (two) times a day., Disp: 360 tablet, Rfl: 3    BD ULTRA-FINE MINI PEN NEEDLE 31 gauge x 3/16" Ndle, 1 each by In Vitro route once daily., Disp: 100 each, Rfl: 1    blood sugar diagnostic Strp, To check BG 3-4 times daily, to use with insurance preferred meter, Disp: 360 each, Rfl: 3    blood-glucose meter kit, To check BG 3-4 times daily, to use with insurance preferred meter, Disp: 1 each, Rfl: 0    chlorhexidine (PERIDEX) 0.12 % " "solution, SMARTSIG:By Mouth, Disp: , Rfl:     ciprofloxacin HCl (CIPRO) 500 MG tablet, Take 1 tablet (500 mg total) by mouth 2 (two) times daily. (Patient not taking: Reported on 1/26/2023), Disp: 20 tablet, Rfl: 0    coenzyme Q10 100 mg capsule, Take 100 mg by mouth 2 (two) times a day., Disp: , Rfl:     enoxaparin (LOVENOX) 40 mg/0.4 mL Syrg, Inject 0.4 mLs (40 mg total) into the skin every morning. (Patient not taking: Reported on 3/21/2023), Disp: 10 each, Rfl: 0    fenofibrate (TRICOR) 145 MG tablet, Take 1 tablet (145 mg total) by mouth once daily., Disp: 90 tablet, Rfl: 3    gabapentin (NEURONTIN) 100 MG capsule, Start with 1 capsule at bedtime- may increase to 2-3 capsules at bedtime as needed for back pain, Disp: 90 capsule, Rfl: 11    glimepiride (AMARYL) 4 MG tablet, Take 1 tablet (4 mg total) by mouth 2 (two) times daily., Disp: 180 tablet, Rfl: 3    HYDROcodone-acetaminophen (NORCO) 5-325 mg per tablet, Take 1 tablet by mouth every 6 (six) hours as needed (severe pain). (Patient not taking: Reported on 1/26/2023), Disp: 12 tablet, Rfl: 0    HYDROcodone-acetaminophen (NORCO) 7.5-325 mg per tablet, Take 1 tablet by mouth., Disp: , Rfl:     insulin NPH-insulin regular, 70/30, (HUMULIN 70/30 U-100 INSULIN) 100 unit/mL (70-30) injection, 35 units in AM and 35 units in PM- may titrate upwards as needed to max of 40 units BID, Disp: 7 each, Rfl: 3    insulin syringe-needle U-100 0.3 mL 31 gauge x 15/64" Syrg, , Disp: , Rfl:     insulin syringe-needle U-100 1/2 mL 31 gauge x 15/64" Syrg, BID, Disp: 180 Syringe, Rfl: 3    lancets Misc, To check BG 3-4 times daily, to use with insurance preferred meter, Disp: 360 each, Rfl: 3    lisinopriL (PRINIVIL,ZESTRIL) 20 MG tablet, Take 1 tablet (20 mg total) by mouth 2 (two) times daily., Disp: 180 tablet, Rfl: 3    metFORMIN (GLUCOPHAGE) 500 MG tablet, Take 2 tablets (1,000 mg total) by mouth 2 (two) times daily with meals., Disp: 360 tablet, Rfl: 3    metroNIDAZOLE " "(FLAGYL) 500 MG tablet, Take 1 tablet (500 mg total) by mouth every 8 (eight) hours. (Patient not taking: Reported on 1/26/2023), Disp: 30 tablet, Rfl: 0    rosuvastatin (CRESTOR) 40 MG Tab, Take 1 tablet (40 mg total) by mouth once daily., Disp: 90 tablet, Rfl: 3    semaglutide (OZEMPIC) 0.25 mg or 0.5 mg(2 mg/1.5 mL) pen injector, Start with 0.25mg weekly for 4 weeks then increase to 0.5mg weekly (Patient not taking: Reported on 1/26/2023), Disp: 2 pen, Rfl: 0    tamsulosin (FLOMAX) 0.4 mg Cap, Take 1 capsule (0.4 mg total) by mouth once daily. Take 30 minutes after evening meal, Disp: 30 capsule, Rfl: 11    tiZANidine (ZANAFLEX) 4 MG tablet, Take 2 tablets (8 mg total) by mouth 3 (three) times daily as needed (back pain)., Disp: 180 tablet, Rfl: 5    TRUEPLUS INSULIN 0.5 mL 29 gauge x 1/2" Syrg, , Disp: , Rfl: 5    ULTRA COMFORT INSULIN SYRINGE 1 mL 29 gauge x 1/2" Syrg, Inject 25 Units as directed 2 (two) times daily., Disp: 180 each, Rfl: 3    warfarin (COUMADIN) 1 MG tablet, Take 1 tablet by mouth once daily, Disp: 90 tablet, Rfl: 3    warfarin (COUMADIN) 4 MG tablet, Take 1 tablet (4 mg total) by mouth Daily., Disp: 30 tablet, Rfl: 1    warfarin (COUMADIN) 5 MG tablet, Take as directed by your doctor., Disp: 45 tablet, Rfl: 3    zolpidem (AMBIEN) 5 MG Tab, Take 5 mg by mouth every evening., Disp: , Rfl:     Review of Systems   Constitutional:  Negative for appetite change, chills, fever and unexpected weight change.   HENT:  Negative for sore throat and trouble swallowing.    Eyes:  Negative for visual disturbance.   Respiratory:  Negative for cough, shortness of breath and wheezing.    Cardiovascular:  Negative for chest pain, palpitations and leg swelling.   Gastrointestinal:  Negative for abdominal pain, blood in stool, constipation, diarrhea, nausea and vomiting.   Genitourinary:  Positive for frequency (nocturia 3-4 times). Negative for dysuria and hematuria.        Doesn't feel like he's always emptying " "his bladder    Musculoskeletal:  Positive for back pain (chronic, takes tylenol BID) and neck pain. Negative for gait problem.   Skin:  Negative for rash.   Neurological:  Positive for numbness (Reports left hand often feels numb both day and night, at times left hand strength weaker). Negative for dizziness, syncope, speech difficulty and headaches.   Psychiatric/Behavioral:  Negative for dysphoric mood. The patient is not nervous/anxious (stable on xanax).         Objective:      Vitals:    07/26/23 1052   BP: 136/68   Pulse: 72   SpO2: 95%   Weight: 104.4 kg (230 lb 1.6 oz)   Height: 5' 10" (1.778 m)     Physical Exam  Vitals and nursing note reviewed.   Constitutional:       General: He is not in acute distress.     Appearance: He is well-developed. He is obese.   HENT:      Head: Normocephalic and atraumatic.      Right Ear: Tympanic membrane and ear canal normal.      Left Ear: Tympanic membrane and ear canal normal.   Neck:      Vascular: No carotid bruit.   Cardiovascular:      Rate and Rhythm: Normal rate and regular rhythm.      Heart sounds: No murmur heard.    No friction rub. No gallop.      Comments: +spider and varicose veins to bilat lower legs/ankles, chronic purplish discoloration to bilat feet, feet warm/dry  Pulmonary:      Effort: Pulmonary effort is normal. No respiratory distress.      Breath sounds: Normal breath sounds. No wheezing or rales.   Abdominal:      General: There is no distension.      Palpations: Abdomen is soft.      Tenderness: There is no abdominal tenderness.   Musculoskeletal:      Cervical back: Neck supple.      Right lower leg: No edema.      Left lower leg: No edema.      Comments: Left hand-no thenar atrophy.  Bilateral hand grasps strong/equal   Lymphadenopathy:      Cervical: No cervical adenopathy.   Skin:     General: Skin is warm and dry.      Findings: No rash.   Neurological:      General: No focal deficit present.      Mental Status: He is alert and oriented to " person, place, and time.      Gait: Gait normal.         Assessment:       1. DM type 2 with diabetic dyslipidemia    2. Essential hypertension    3. Mixed hyperlipidemia    4. Stage 3a chronic kidney disease    5. DDD (degenerative disc disease), lumbar    6. Benign prostatic hyperplasia with nocturia    7. Left hand paresthesia    8. Prostate cancer screening           Plan:       1. DM type 2 with diabetic dyslipidemia  -A1c improved to 6.6 % though patient cautioned on risks see me.  Encouraged to protein within 30 minutes of treating hypoglycemia and cautioned to reduce insulin dose for recurrent hypoglycemic episodes.  -     Hemoglobin A1C, POCT  -      DIABETES FOOT EXAM    2. Essential hypertension   -BP well controlled today    3. Mixed hyperlipidemia   -LDL 65,  on last labs, continue statin.  Patient can not afford Vascepa which previously been prescribed    4. Stage 3a chronic kidney disease   -stable on last labs    5. DDD (degenerative disc disease), lumbar  -discussed with chronic neck back pain.  Discussed gabapentin versus duloxetine- He does not recall if he has ever been treated with gabapentin in the past so will add low-dose gabapentin and discussed we may need to titrate dose.  Cautioned on side of sedation and dizziness.  -     gabapentin (NEURONTIN) 100 MG capsule; Start with 1 capsule at bedtime- may increase to 2-3 capsules at bedtime as needed for back pain  Dispense: 90 capsule; Refill: 11    6. Benign prostatic hyperplasia with nocturia  -had tamsulosin due to nocturia complaints.  Advised if no improvement medication recommend referral to Urology  -     tamsulosin (FLOMAX) 0.4 mg Cap; Take 1 capsule (0.4 mg total) by mouth once daily. Take 30 minutes after evening meal  Dispense: 30 capsule; Refill: 11    7. Left hand paresthesia   -patient complaining of chronic left hand numbness, advised I can refer for EMG/NCS to determine cause though he declines.  Patient has a lot of  concerns regarding finances and co-pay is so declines any workup at this time    8. Prostate cancer screening  -PSA with next INR  -     PSA, Screening; Future; Expected date: 07/26/2023      Follow up in about 3 months (around 10/26/2023) for lipids, Diabetes, lumbar DDD.      Encouraged patient to schedule diabetic eye exam    Counseled on age and gender appropriate medical preventative services, including cancer screenings, immunizations, overall nutritional health, need for a consistent exercise regimen and an overall push towards maintaining a vigorous and active lifestyle.      7/26/2023 Marianna Hdez NP

## 2023-07-29 LAB — PSA SERPL-MCNC: 0.43 NG/ML

## 2023-07-31 ENCOUNTER — TELEPHONE (OUTPATIENT)
Dept: HEMATOLOGY/ONCOLOGY | Facility: CLINIC | Age: 63
End: 2023-07-31

## 2023-07-31 ENCOUNTER — TELEPHONE (OUTPATIENT)
Dept: FAMILY MEDICINE | Facility: CLINIC | Age: 63
End: 2023-07-31

## 2023-07-31 DIAGNOSIS — D68.51 HETEROZYGOUS FACTOR V LEIDEN MUTATION: ICD-10-CM

## 2023-07-31 RX ORDER — WARFARIN 4 MG/1
4 TABLET ORAL DAILY
Qty: 30 TABLET | Refills: 1 | Status: CANCELLED | OUTPATIENT
Start: 2023-07-31 | End: 2024-07-30

## 2023-07-31 NOTE — TELEPHONE ENCOUNTER
Spoke with pt in regards to recent lab results. Verbalized per Marianna that pt's PSA level is stable and in normal range at 0.43. Pt acknowledged understanding.

## 2023-07-31 NOTE — TELEPHONE ENCOUNTER
INR adequate at 2.1. Per Miri, continue same dose of Coumadin and re-check in 4 weeks. Pt was notified and verbalized understanding.

## 2023-07-31 NOTE — TELEPHONE ENCOUNTER
----- Message from ANALI Weiss sent at 7/31/2023  2:43 PM CDT -----  2.1 continue same dose and recheck in 4 weeks      Miri  ----- Message -----  From: Zonia Estrada RN  Sent: 7/31/2023   1:55 PM CDT  To: ANALI Weiss    Do you happen to have his INR results? I don't see them in the chart  ----- Message -----  From: Daisy Otero  Sent: 7/31/2023  11:59 AM CDT  To: Sarah Shen Staff    Pt would like a call back with INR results from Friday, please advise     [phone]

## 2023-07-31 NOTE — TELEPHONE ENCOUNTER
----- Message from Marianna Hdez NP sent at 7/30/2023  6:18 PM CDT -----  Please let patient know PSA level is stable and in normal range at 0.43

## 2023-08-15 ENCOUNTER — TELEPHONE (OUTPATIENT)
Dept: FAMILY MEDICINE | Facility: CLINIC | Age: 63
End: 2023-08-15

## 2023-08-15 DIAGNOSIS — M51.36 DDD (DEGENERATIVE DISC DISEASE), LUMBAR: ICD-10-CM

## 2023-08-15 DIAGNOSIS — M50.30 DDD (DEGENERATIVE DISC DISEASE), CERVICAL: Primary | ICD-10-CM

## 2023-08-15 NOTE — TELEPHONE ENCOUNTER
----- Message from Jos eArmando Leslie sent at 8/15/2023  2:06 PM CDT -----  Pt states he is in pain and would like to know if he should be referred to a pain management dr. The Gabapentin is not working, he has tried everything but nothing is helping relieve his back pain. Please advise [phone]

## 2023-08-16 ENCOUNTER — HOSPITAL ENCOUNTER (OUTPATIENT)
Dept: RADIOLOGY | Facility: HOSPITAL | Age: 63
Discharge: HOME | End: 2023-08-16
Attending: ANESTHESIOLOGY
Payer: MEDICARE

## 2023-08-16 ENCOUNTER — OFFICE VISIT (OUTPATIENT)
Dept: PAIN MEDICINE | Facility: CLINIC | Age: 63
End: 2023-08-16
Payer: MEDICARE

## 2023-08-16 ENCOUNTER — TELEPHONE (OUTPATIENT)
Dept: PAIN MEDICINE | Facility: CLINIC | Age: 63
End: 2023-08-16
Payer: MEDICARE

## 2023-08-16 ENCOUNTER — TELEPHONE (OUTPATIENT)
Dept: PAIN MEDICINE | Facility: CLINIC | Age: 63
End: 2023-08-16

## 2023-08-16 VITALS
BODY MASS INDEX: 33.49 KG/M2 | DIASTOLIC BLOOD PRESSURE: 76 MMHG | SYSTOLIC BLOOD PRESSURE: 128 MMHG | HEIGHT: 70 IN | WEIGHT: 233.94 LBS | HEART RATE: 79 BPM

## 2023-08-16 DIAGNOSIS — M54.16 LUMBAR RADICULOPATHY, CHRONIC: ICD-10-CM

## 2023-08-16 DIAGNOSIS — M50.30 DDD (DEGENERATIVE DISC DISEASE), CERVICAL: ICD-10-CM

## 2023-08-16 DIAGNOSIS — M54.16 LUMBAR RADICULOPATHY, CHRONIC: Primary | ICD-10-CM

## 2023-08-16 DIAGNOSIS — M51.36 DDD (DEGENERATIVE DISC DISEASE), LUMBAR: ICD-10-CM

## 2023-08-16 PROCEDURE — 72110 XR LUMBAR SPINE COMPLETE 5 VIEW: ICD-10-PCS | Mod: 26,,, | Performed by: RADIOLOGY

## 2023-08-16 PROCEDURE — 3066F NEPHROPATHY DOC TX: CPT | Mod: CPTII,S$GLB,, | Performed by: ANESTHESIOLOGY

## 2023-08-16 PROCEDURE — 3074F PR MOST RECENT SYSTOLIC BLOOD PRESSURE < 130 MM HG: ICD-10-PCS | Mod: CPTII,S$GLB,, | Performed by: ANESTHESIOLOGY

## 2023-08-16 PROCEDURE — 3074F SYST BP LT 130 MM HG: CPT | Mod: CPTII,S$GLB,, | Performed by: ANESTHESIOLOGY

## 2023-08-16 PROCEDURE — 3008F PR BODY MASS INDEX (BMI) DOCUMENTED: ICD-10-PCS | Mod: CPTII,S$GLB,, | Performed by: ANESTHESIOLOGY

## 2023-08-16 PROCEDURE — 3060F POS MICROALBUMINURIA REV: CPT | Mod: CPTII,S$GLB,, | Performed by: ANESTHESIOLOGY

## 2023-08-16 PROCEDURE — 72110 X-RAY EXAM L-2 SPINE 4/>VWS: CPT | Mod: 26,,, | Performed by: RADIOLOGY

## 2023-08-16 PROCEDURE — 1159F PR MEDICATION LIST DOCUMENTED IN MEDICAL RECORD: ICD-10-PCS | Mod: CPTII,S$GLB,, | Performed by: ANESTHESIOLOGY

## 2023-08-16 PROCEDURE — 3008F BODY MASS INDEX DOCD: CPT | Mod: CPTII,S$GLB,, | Performed by: ANESTHESIOLOGY

## 2023-08-16 PROCEDURE — 3060F PR POS MICROALBUMINURIA RESULT DOCUMENTED/REVIEW: ICD-10-PCS | Mod: CPTII,S$GLB,, | Performed by: ANESTHESIOLOGY

## 2023-08-16 PROCEDURE — 3044F HG A1C LEVEL LT 7.0%: CPT | Mod: CPTII,S$GLB,, | Performed by: ANESTHESIOLOGY

## 2023-08-16 PROCEDURE — 3066F PR DOCUMENTATION OF TREATMENT FOR NEPHROPATHY: ICD-10-PCS | Mod: CPTII,S$GLB,, | Performed by: ANESTHESIOLOGY

## 2023-08-16 PROCEDURE — 3078F PR MOST RECENT DIASTOLIC BLOOD PRESSURE < 80 MM HG: ICD-10-PCS | Mod: CPTII,S$GLB,, | Performed by: ANESTHESIOLOGY

## 2023-08-16 PROCEDURE — 4010F PR ACE/ARB THEARPY RXD/TAKEN: ICD-10-PCS | Mod: CPTII,S$GLB,, | Performed by: ANESTHESIOLOGY

## 2023-08-16 PROCEDURE — 99999 PR PBB SHADOW E&M-EST. PATIENT-LVL V: CPT | Mod: PBBFAC,,, | Performed by: ANESTHESIOLOGY

## 2023-08-16 PROCEDURE — 99204 PR OFFICE/OUTPT VISIT, NEW, LEVL IV, 45-59 MIN: ICD-10-PCS | Mod: S$GLB,,, | Performed by: ANESTHESIOLOGY

## 2023-08-16 PROCEDURE — 3078F DIAST BP <80 MM HG: CPT | Mod: CPTII,S$GLB,, | Performed by: ANESTHESIOLOGY

## 2023-08-16 PROCEDURE — 72110 X-RAY EXAM L-2 SPINE 4/>VWS: CPT | Mod: TC,PO

## 2023-08-16 PROCEDURE — 1159F MED LIST DOCD IN RCRD: CPT | Mod: CPTII,S$GLB,, | Performed by: ANESTHESIOLOGY

## 2023-08-16 PROCEDURE — 3044F PR MOST RECENT HEMOGLOBIN A1C LEVEL <7.0%: ICD-10-PCS | Mod: CPTII,S$GLB,, | Performed by: ANESTHESIOLOGY

## 2023-08-16 PROCEDURE — 99204 OFFICE O/P NEW MOD 45 MIN: CPT | Mod: S$GLB,,, | Performed by: ANESTHESIOLOGY

## 2023-08-16 PROCEDURE — 4010F ACE/ARB THERAPY RXD/TAKEN: CPT | Mod: CPTII,S$GLB,, | Performed by: ANESTHESIOLOGY

## 2023-08-16 PROCEDURE — 99999 PR PBB SHADOW E&M-EST. PATIENT-LVL V: ICD-10-PCS | Mod: PBBFAC,,, | Performed by: ANESTHESIOLOGY

## 2023-08-16 RX ORDER — HYDROCODONE BITARTRATE AND ACETAMINOPHEN 5; 325 MG/1; MG/1
1 TABLET ORAL EVERY 8 HOURS PRN
Qty: 20 TABLET | Refills: 0 | Status: SHIPPED | OUTPATIENT
Start: 2023-08-16 | End: 2023-08-16 | Stop reason: SDUPTHER

## 2023-08-16 RX ORDER — HYDROCODONE BITARTRATE AND ACETAMINOPHEN 5; 325 MG/1; MG/1
1 TABLET ORAL EVERY 8 HOURS PRN
Qty: 20 TABLET | Refills: 0 | Status: SHIPPED | OUTPATIENT
Start: 2023-08-16 | End: 2023-09-22 | Stop reason: SDUPTHER

## 2023-08-16 NOTE — TELEPHONE ENCOUNTER
----- Message from Jacqui Wilkes sent at 8/15/2023  5:55 PM CDT -----  Type:  Sooner Apoointment Request    Caller is requesting a sooner appointment.  Caller declined first available appointment listed below.  Caller will not accept being placed on the waitlist and is requesting a message be sent to doctor.  Name of Caller:pt  When is the first available appointment?feb 2024  Would the patient rather a call back or a response via [facility]? call  Best Call Back Number:[phone]  Additional Information: has referral from pcp

## 2023-08-16 NOTE — PROGRESS NOTES
This note was completed with dictation software and grammatical errors may exist.    Chief Complaint   Patient presents with    Back Pain        HPI: Luis Resendiz is a 62 y.o. year old male patient who has a past medical history of Anticoagulant long-term use, Arthritis, Cervical spine pain, Clotting disorder, Coronary artery disease, Diabetes mellitus, Encounter for blood transfusion, and Hypertension. He presents in referral from Marianna Hdez for right back pain.  The patient reports having chronic back pain ever since his early 20s he was having issues.  Had his 1st lumbar surgery in 1980 and is 2nd in 1984.  Has always had recurrent flare-ups of back pain radiating into the left leg.  He was going to see a pain management specialist and receiving injections, that was seem to be helping for a while.  Then he stopped going in 2016 and has not had any unbearable pain since that time.  However he still gets flare-ups that last anywhere from several days to weeks, usually on the left side.  For the last 6 weeks however he has had severe right back pain, does not seem to radiate into the leg but radiates out into the hip.  This is so severe that he is having difficulty moving, any flexion or extension.  He denies any trauma that may have caused this.  He states that he is not sure if he has any left-sided pain currently because the right side hurts so much.  He describes the pain as burning, shooting, aching worse with any type of movement, slightly relieved when lying down or sitting down.  He denies any bowel or bladder incontinence.      Pain intervention history:  The patient reports he was seeing Dr. Taryn Dugan until 2016, was receiving injections with some relief.    Spine surgeries:    Antineuropathics:  Gabapentin at bedtime  NSAIDs:  Physical therapy:  Last time he did physical therapy with several years ago but he feels that the pain is so severe right now he could not even attempt  "this  Antidepressants:  Muscle relaxers:  Xanax, tizanidine  Opioids:  He occasionally gets hydrocodone for his severe pain  Antiplatelets/Anticoagulants:  Aspirin 325, Coumadin        ROS:  He reports back pain, stiffness, joint stiffness, joint pain.  Balance of review of systems is negative.    Lab Results   Component Value Date    HGBA1C 7.7 (H) 2023       Lab Results   Component Value Date    WBC 5.4 2023    HGB 11.9 (L) 2023    HCT 36.2 (L) 2023    MCV 96.3 2023     2023             Past Medical History:   Diagnosis Date    Anticoagulant long-term use     Arthritis     Cervical spine pain     Clotting disorder     Coronary artery disease     Diabetes mellitus     Encounter for blood transfusion     Hypertension        Past Surgical History:   Procedure Laterality Date    BACK SURGERY      cabg      CARDIAC SURGERY      FOOT SURGERY      FRACTURE SURGERY      ROTATOR CUFF REPAIR Left        Social History     Socioeconomic History    Marital status:    Tobacco Use    Smoking status: Former     Current packs/day: 0.00     Average packs/day: 0.5 packs/day for 0.5 years (0.2 ttl pk-yrs)     Types: Cigarettes     Start date: 1993     Quit date: 1994     Years since quittin.1     Passive exposure: Past    Smokeless tobacco: Never   Substance and Sexual Activity    Alcohol use: Yes     Comment: socially    Drug use: No    Sexual activity: Never         Medications/Allergies: See med card    Vitals:    23 0953   BP: 128/76   Pulse: 79   Weight: 106.1 kg (233 lb 14.5 oz)   Height: 5' 10" (1.778 m)   PainSc:   8   PainLoc: Back     Body mass index is 33.56 kg/m².    Physical exam:    Gen: A and O x3, pleasant, well-groomed  Skin: No rashes or obvious lesions  HEENT: PERRLA, no obvious deformities on ears or in canals. Trachea midline.  CVS: Regular rate and rhythm, normal palpable pulses.  Resp:No increased work of breathing, symmetrical chest " rise.  Abdomen: Soft, NT/ND.  Musculoskeletal:  Slow, shuffling gait, has severe pain with any slight movement especially getting onto the exam table         Neuro:    Iliopsoas Quadriceps Knee  Flexion Tibialis  anterior Gastro- cnemius EHL   Lower: R 5/5 5/5 5/5 5/5 5/5 5/5    L 5/5 5/5 5/5 5/5 5/5 5/5      Left  Right    Patellar DTR 0+ 0+   Achilles DTR 0+ 0+                    Intact and symmetrical to light touch and pinprick in L1-S1 dermatomes bilaterally.    Lumbar spine:  Lumbar spine: ROM is severely reduced with both flexion and extension and oblique extension to either side with severe increased pain especially in the right low back.    Sascha's test causes no increased pain on either side.    Supine straight leg raise is positive for low back pain.    Internal and external rotation of the hip causes no increased pain on either side.  Myofascial exam: No tenderness to palpation across lumbar paraspinous muscles.      Imaging:  None    Assessment:  Luis Resendiz is a 62 y.o. year old male patient who has a past medical history of Anticoagulant long-term use, Arthritis, Cervical spine pain, Clotting disorder, Coronary artery disease, Diabetes mellitus, Encounter for blood transfusion, and Hypertension. He presents in referral from Marianna Hdez for right back pain.     1. Lumbar radiculopathy, chronic  X-Ray Lumbar Spine 5 View    MRI Lumbar Spine Without Contrast    MRI Lumbar Spine Without Contrast      2. DDD (degenerative disc disease), cervical  Ambulatory referral/consult to Pain Clinic    X-Ray Lumbar Spine 5 View    MRI Lumbar Spine Without Contrast    MRI Lumbar Spine Without Contrast      3. DDD (degenerative disc disease), lumbar  Ambulatory referral/consult to Pain Clinic    X-Ray Lumbar Spine 5 View    MRI Lumbar Spine Without Contrast    MRI Lumbar Spine Without Contrast          Plan:  1. Since his pain is so severe at this point I do not think he would tolerating physical  therapy, he can hardly move.  I am going to get him set up with an x-ray and MRI of the lumbar spine, I suspect that he has nerve root impingement or at least stenosis due to his history.  I am going to get this MRI and call him with the results.  He will have this done outside [facility] since he wants an open MRI.  In the meantime I have given him a prescription for hydrocodone to take as needed.    Thank you for referring this interesting patient, and I look forward to continuing to collaborate in his care.\

## 2023-08-28 ENCOUNTER — TELEPHONE (OUTPATIENT)
Dept: NEUROSURGERY | Facility: CLINIC | Age: 63
End: 2023-08-28
Payer: MEDICARE

## 2023-08-28 NOTE — TELEPHONE ENCOUNTER
Spoke with patient and informed that his order was sent to [facility]. Advised patient to obtain a disc of Mri for upload. Patient verbalized understanding.

## 2023-08-28 NOTE — TELEPHONE ENCOUNTER
----- Message from Tameka Groves sent at 8/28/2023 10:03 AM CDT -----  Contact: Patient  Type:  Needs Medical Advice    Who Called:   Patient    Would the patient rather a call back or a response via [facility]?   Call back  Best Call Back Number:   [phone]    Additional Information: States he would like to speak with someone in the office - states his insurance company advised he let doctor's office know it's fine for him to get the Open MRI - please call to advise - thank you

## 2023-09-01 ENCOUNTER — TELEPHONE (OUTPATIENT)
Dept: PAIN MEDICINE | Facility: CLINIC | Age: 63
End: 2023-09-01
Payer: MEDICARE

## 2023-09-01 NOTE — TELEPHONE ENCOUNTER
----- Message from Debby Alexandre sent at 8/29/2023 10:54 AM CDT -----  Regarding: MRI Images  Morning!    Mr. Resendiz dropped off his disc this morning of the MRI Dr. Duff ordered for him. Images are uploaded in his chart.    Thanks,  Debby

## 2023-09-06 ENCOUNTER — TELEPHONE (OUTPATIENT)
Dept: PAIN MEDICINE | Facility: CLINIC | Age: 63
End: 2023-09-06

## 2023-09-06 NOTE — TELEPHONE ENCOUNTER
----- Message from Radha Johnson sent at 9/1/2023 11:08 AM CDT -----  Contact: pt  Type:  Patient Returning Call    Who Called:pt  Who Left Message for Patient:jane  Does the patient know what this is regarding?:unknown, poss results of MRI done Tuesday 8/29  Would the patient rather a call back or a response via [facility]? Call back  Best Call Back Number:[phone]  Additional Information: please call to advise  Thanks

## 2023-09-08 ENCOUNTER — TELEPHONE (OUTPATIENT)
Dept: HEMATOLOGY/ONCOLOGY | Facility: CLINIC | Age: 63
End: 2023-09-08

## 2023-09-08 ENCOUNTER — TELEPHONE (OUTPATIENT)
Dept: PAIN MEDICINE | Facility: CLINIC | Age: 63
End: 2023-09-08

## 2023-09-08 NOTE — TELEPHONE ENCOUNTER
----- Message from Ac Smith MD sent at 9/7/2023  6:16 PM CDT -----  Call patient, INR is good.  Recheck one month

## 2023-09-08 NOTE — TELEPHONE ENCOUNTER
Please let the patient know that I reviewed his lumbar spine MRI and it shows severe disc degeneration at L3/4, L4/5 and L5/S1.  The most significant thing that would possibly cause his right-sided pain would be the narrowing he has out to the right side at L4/5 and L5/S1.  I would say that since the pain has been so severe, if he would like we will get him set up for a transforaminal epidural steroid injection.    Physician - Dr Duff    Type of Procedure/Injection - Lumbar Transforaminal Epidural  L4/5 and L5/S1           Laterality - Right      Type of Sedation - RNIV      Need to hold medication - Yes      Aspirin for 5 days and Coumadin for 5 days    Semaglutide (Ozempic) 7 days      Clearance needed - Yes      Follow up - 3 week  With [facility]

## 2023-09-08 NOTE — TELEPHONE ENCOUNTER
INR adequate at 2.0. Per Dr. Smith, continue same dose and re-check in one month. Pt verbalized understanding.

## 2023-09-12 NOTE — TELEPHONE ENCOUNTER
I do not recommend starting on opioids long term, opioids are used after a surgery but not good for long term use. Furthermore, he is already taking a benzodiazepine (Xanax) and would not recommend combining those.

## 2023-09-12 NOTE — TELEPHONE ENCOUNTER
"Spoke with patient about his MRI and your recommendation for injection. He reports his right sided pain is slightly better since he saw you. He said he has had multiple injections in the past and is not very interested at this moment with proceeding with another. States he has had back pain for 40 years and it "flairs up" from time to time. He uses a TENS unit and OTC pain medicine with slight relief of pain. Patient wanted a message sent to you to see if you would prescribe RX pain medicine instead of getting an injection since his PCP does not like to prescribe narcotics? Please advise. Thanks!  "

## 2023-09-14 ENCOUNTER — TELEPHONE (OUTPATIENT)
Dept: PAIN MEDICINE | Facility: CLINIC | Age: 63
End: 2023-09-14

## 2023-09-14 ENCOUNTER — TELEPHONE (OUTPATIENT)
Dept: FAMILY MEDICINE | Facility: CLINIC | Age: 63
End: 2023-09-14

## 2023-09-14 ENCOUNTER — TELEPHONE (OUTPATIENT)
Dept: PAIN MEDICINE | Facility: CLINIC | Age: 63
End: 2023-09-14
Payer: MEDICARE

## 2023-09-14 NOTE — TELEPHONE ENCOUNTER
----- Message from Jose Armando Leslie sent at 9/14/2023 10:36 AM CDT -----  Pt states he has left messages about orders for an [phone]

## 2023-09-14 NOTE — TELEPHONE ENCOUNTER
Spoke with patient and reviewed Dr. Duff's message. Patient stated he would like to schedule the LULU. Informed patient we would need to fax a clearance request since he takes coumadin. Once we received the clearance back we will give him a call. He verbalized understanding.

## 2023-09-14 NOTE — TELEPHONE ENCOUNTER
----- Message from Jorje Sun sent at 9/13/2023  9:17 AM CDT -----  Contact: self  Type: Sooner Appointment Request        Caller is requesting a sooner appointment. Caller declined first available appointment listed below. Caller will not accept being placed on the waitlist and is requesting a message be sent to doctor.        Name of Caller: Patient   Best Call Back Number: [phone]  Additional Information: Pt called said he is in pain and wants to see about getting an epidural shot if possible today or soon. Plz call to get scheduled. Thanks

## 2023-09-14 NOTE — TELEPHONE ENCOUNTER
----- Message from Rd Vela sent at 9/12/2023 11:15 AM CDT -----  Type:  Patient Returning Call    Who Called:  Patient  Who Left Message for Patient:  NA  Does the patient know what this is regarding?:  Epidural and/or pain medication  Best Call Back Number:  [phone]  Additional Information:

## 2023-09-14 NOTE — TELEPHONE ENCOUNTER
Spoke to pt and let him know Dr. Duff ordered the MRI and pt stated he was told to contact Dr. Sofia. To be cleared to do testing because he takes coumadin. Pt stated he is has been waiting on the clearance and he is tried of waiting, Told pt we do not handle his coumadin and he will have to call Dr. Sofia and discuss being cleared for surgery. Pt stated he is going to be in pain for the next 3 weeks and he wants to get cleared. Explained to pt if Dr. Duff is asked for Dr. Sofia to clear him then he would have to call their office. Let pt know he needs to call Dr. Sofia office and is wanting to be seen soon to get cleared . Pt stated he is going to get a second opinion and see about his back pain

## 2023-09-14 NOTE — TELEPHONE ENCOUNTER
----- Message from Adelina Gaytan, Patient Care Assistant sent at 9/12/2023 10:15 AM CDT -----  Contact: self  Pt is calling to speak w/ a nurse  [phone]  thanks

## 2023-09-14 NOTE — TELEPHONE ENCOUNTER
Patient is to be scheduled for a Lumbar Transforaminal Epidural  L4/5 and L5/S1 with Dr. Duff. Patient currently takes coumadin and aspirin and will need to hold these 5 days prior. Please advise if pt is ok to hold these.

## 2023-09-14 NOTE — TELEPHONE ENCOUNTER
----- Message from Jorje Sun sent at 9/13/2023  2:07 PM CDT -----  Contact: self  Type: Sooner Appointment Request         Caller is requesting a sooner appointment. Caller declined first available appointment listed below. Caller will not accept being placed on the waitlist and is requesting a message be sent to doctor.         Name of Caller: Patient   Best Call Back Number: [phone]   Additional Information: Pt called said he is in pain and wants to see about getting an epidural shot if possible today or soon. Plz call to get scheduled. Thanks

## 2023-09-14 NOTE — TELEPHONE ENCOUNTER
----- Message from Daisha Shirley sent at 9/14/2023  9:53 AM CDT -----  Contact: self  Type: Needs Medical Advice  Who Called:  pt  Best Call Back Number: [phone]   Additional Information: please call

## 2023-09-15 ENCOUNTER — NURSE TRIAGE (OUTPATIENT)
Dept: ADMINISTRATIVE | Facility: CLINIC | Age: 63
End: 2023-09-15
Payer: MEDICARE

## 2023-09-15 ENCOUNTER — TELEPHONE (OUTPATIENT)
Dept: HEMATOLOGY/ONCOLOGY | Facility: CLINIC | Age: 63
End: 2023-09-15

## 2023-09-15 DIAGNOSIS — D68.51 HETEROZYGOUS FACTOR V LEIDEN MUTATION: ICD-10-CM

## 2023-09-15 RX ORDER — ENOXAPARIN SODIUM 100 MG/ML
40 INJECTION SUBCUTANEOUS DAILY
Qty: 10 EACH | Refills: 1 | Status: SHIPPED | OUTPATIENT
Start: 2023-09-15

## 2023-09-15 NOTE — TELEPHONE ENCOUNTER
Spoke to pt and gave him instructions for his upcoming procedure per Miri. Hold Coumadin for 5 days prior and give Lovenox daily for those 5 days. Nothing on the day of the procedure. Restart Coumadin and Lovenox the day after. Repeat INR every 3 days after restarting. Continue Coumadin and Lovenox until therapeutic. Pt verbalized understanding.

## 2023-09-15 NOTE — TELEPHONE ENCOUNTER
Pt states that he is scheduled for an Epidural with . Pt calling to find out date for procedure. Chart reviewed; no date noted at this time. Pt states that he has had lower back pain for over 40 years. Now c/o back pain 10/10 when moving and 7-8/10 while sitting. Pt states that he is completely out of Norco 5-325 mg. Pt states that he has tried different meds and pain is still present. States that pain radiates down left leg to knee. Pt states that pain is 10/10 and unable to do normal activities like clean house. Pt advised to go to ED and to call 911 if no immediate transportation is available. Pt states that he has to pay $90.00 for a co-payment. Reiterated dispo and made aware of [facility].   Verbalized understanding. Encounter routed to provider.   Reason for Disposition   [1] SEVERE back pain (e.g., excruciating, unable to do any normal activities) AND [2] not improved 2 hours after pain medicine    Additional Information   Negative: Passed out (i.e., lost consciousness, collapsed and was not responding)   Negative: Shock suspected (e.g., cold/pale/clammy skin, too weak to stand, low BP, rapid pulse)   Negative: Sounds like a life-threatening emergency to the triager   Negative: [1] SEVERE back pain (e.g., excruciating) AND [2] sudden onset AND [3] age > 60 years   Negative: [1] Unable to urinate (or only a few drops) > 4 hours AND [2] bladder feels very full (e.g., palpable bladder or strong urge to urinate)   Negative: [1] Loss of bladder or bowel control (urine or bowel incontinence; wetting self, leaking stool) AND [2] new-onset   Negative: Numbness in groin or rectal area (i.e., loss of sensation)   Negative: [1] SEVERE abdominal pain AND [2] present > 1 hour   Negative: [1] Abdominal pain AND [2] age > 60 years   Negative: Weakness of a leg or foot (e.g., unable to bear weight, dragging foot)   Negative: Unable to walk   Negative: Patient sounds very sick or weak to the  triager    Protocols used: Back Pain-A-AH

## 2023-09-18 ENCOUNTER — TELEPHONE (OUTPATIENT)
Dept: PAIN MEDICINE | Facility: CLINIC | Age: 63
End: 2023-09-18
Payer: MEDICARE

## 2023-09-18 DIAGNOSIS — M54.16 LUMBAR RADICULOPATHY: Primary | ICD-10-CM

## 2023-09-18 RX ORDER — SODIUM CHLORIDE, SODIUM LACTATE, POTASSIUM CHLORIDE, CALCIUM CHLORIDE 600; 310; 30; 20 MG/100ML; MG/100ML; MG/100ML; MG/100ML
INJECTION, SOLUTION INTRAVENOUS CONTINUOUS
Status: CANCELLED | OUTPATIENT
Start: 2023-09-18

## 2023-09-18 NOTE — TELEPHONE ENCOUNTER
Spoke with patient, scheduled procedure and follow up appointment. Reviewed pre-op instructions. Patient verbalized understanding. Patient received blood thinner recommendations from other provider and denies questions at this time.

## 2023-09-18 NOTE — TELEPHONE ENCOUNTER
----- Message from Ron Delgado sent at 9/15/2023 10:55 AM CDT -----  Type: Needs Medical Advice  Who Called:  pt  Best Call Back Number: [phone]  Additional Information:  pt is calling the office to get a better idea of when the epidural will take place as he just got instructions from his Dr about his blood pressure medication. Please call back to advise Thanks!

## 2023-09-19 DIAGNOSIS — M51.36 DDD (DEGENERATIVE DISC DISEASE), LUMBAR: ICD-10-CM

## 2023-09-19 RX ORDER — TIZANIDINE 4 MG/1
8 TABLET ORAL 3 TIMES DAILY PRN
Qty: 180 TABLET | Refills: 5 | Status: SHIPPED | OUTPATIENT
Start: 2023-09-19 | End: 2024-01-02 | Stop reason: SDUPTHER

## 2023-09-19 NOTE — TELEPHONE ENCOUNTER
----- Message from Jose Armando Leslie sent at 9/19/2023 10:40 AM CDT -----  Pt needs refill on Tizanadine  [phone]  [facility]

## 2023-09-19 NOTE — TELEPHONE ENCOUNTER
----- Message from Nikki Rowell sent at 9/19/2023  7:58 AM CDT -----  Type: Needs Medical Advice  Who Called: pt    Best Call Back Number: [phone]  Additional Information: Pt would like to speak to staff in regards to copay for epidural  Pt needs cpt code for ins company  Thank you

## 2023-09-19 NOTE — TELEPHONE ENCOUNTER
----- Message from Nikki Rowell sent at 9/19/2023  7:58 AM CDT -----  Type: Needs Medical Advice  Who Called: pt    Best Call Back Number: [phone]  Additional Information: Pt would like to speak to staff in regards to copay for epidural  Pt needs cpt code for ins company  Thank you

## 2023-09-20 ENCOUNTER — TELEPHONE (OUTPATIENT)
Dept: PAIN MEDICINE | Facility: CLINIC | Age: 63
End: 2023-09-20

## 2023-09-20 RX ORDER — HYDROCODONE BITARTRATE AND ACETAMINOPHEN 5; 325 MG/1; MG/1
1 TABLET ORAL EVERY 8 HOURS PRN
Qty: 20 TABLET | Refills: 0 | Status: CANCELLED | OUTPATIENT
Start: 2023-09-20

## 2023-09-20 NOTE — TELEPHONE ENCOUNTER
----- Message from Nikki Rowell sent at 9/18/2023  8:02 AM CDT -----  Type:  RX Refill Request    Who Called:  pt  Refill or New Rx:  ref  RX Name and Strength:  HYDROcodone-acetaminophen (NORCO) 5-325 mg per tablet  How is the patient currently taking it? (ex. 1XDay):  as rx'd  Is this a 30 day or 90 day RX:  n/a  Preferred Pharmacy with phone number:      [facility] #[id number] - [place] [address] AT [facility] [address] [address]  Phone: [phone] Fax: [phone]      Local or Mail Order:  local  Ordering Provider:  [facility] Call Back Number:  [phone]   Additional Information:  thanks

## 2023-09-20 NOTE — TELEPHONE ENCOUNTER
Requested rx -  HYDROcodone-acetaminophen (NORCO) 5-325 mg per tablet   shows last refill - 8/16/2023   Last office visit - 8/16/2023   Next office visit - 10/23/2023   Pt DOES NOT have a UDS on file with pain management   Pt DOES have a pain contract on file (imported 8/12/2020)

## 2023-09-20 NOTE — TELEPHONE ENCOUNTER
This was a one time prescription. If he wants long term opioid prescriptions, will need to look elsewhere

## 2023-09-21 ENCOUNTER — NURSE TRIAGE (OUTPATIENT)
Dept: ADMINISTRATIVE | Facility: CLINIC | Age: 63
End: 2023-09-21
Payer: MEDICARE

## 2023-09-21 NOTE — TELEPHONE ENCOUNTER
Reason for Disposition   [1] SEVERE back pain (e.g., excruciating) AND [2] sudden onset AND [3] age > 60 years    Additional Information   Negative: Passed out (i.e., lost consciousness, collapsed and was not responding)   Negative: Shock suspected (e.g., cold/pale/clammy skin, too weak to stand, low BP, rapid pulse)   Negative: Sounds like a life-threatening emergency to the triager   Negative: Major injury to the back (e.g., MVA, fall > 10 feet or 3 meters, penetrating injury, etc.)   Negative: Followed a tailbone injury   Negative: [1] Pain in the upper back over the ribs (rib cage) AND [2] radiates (travels, goes) into chest   Negative: [1] Pain in the upper back over the ribs (rib cage) AND [2] worsened by coughing (or clearly increases with breathing)   Negative: Back pain during pregnancy   Negative: Pain mainly in flank (i.e., in the side, over the lower ribs or just below the ribs)    Protocols used: Back Pain-A-    Pt states she is a pt of Dr. Duff. States he is seeing him for management of back pain. States he has an epidural procedure scheduled for 9/26/23. States he has severe back pain and gabapentin does not help.     Pt states the office nurse told him he cannot have pain medication prescribed due to interactions with his other medications. Pt states he is willing to stop the other medications until his procedure so he can get something to relieve his pain.    Pt advised per Triage protocol to go to the ED. He refused stating they will only tell him to see his doctor. Advised pt this message will go to the Provider and office staff now to contact him when the office opens. He verbalized understanding.

## 2023-09-22 RX ORDER — HYDROCODONE BITARTRATE AND ACETAMINOPHEN 5; 325 MG/1; MG/1
1 TABLET ORAL EVERY 8 HOURS PRN
Qty: 15 TABLET | Refills: 0 | Status: SHIPPED | OUTPATIENT
Start: 2023-09-22 | End: 2023-10-18 | Stop reason: SDUPTHER

## 2023-09-26 PROBLEM — M54.16 LUMBAR RADICULOPATHY: Status: ACTIVE | Noted: 2023-09-26

## 2023-10-02 ENCOUNTER — TELEPHONE (OUTPATIENT)
Dept: HEMATOLOGY/ONCOLOGY | Facility: CLINIC | Age: 63
End: 2023-10-02

## 2023-10-02 NOTE — TELEPHONE ENCOUNTER
INR 1.2. Pt stated he is not on any new or different meds. He started back on his Coumadin last Wednesday. Per Dr. Smith, double dosage for 2 days and then go back to the same dose. Re-check in 2 weeks. Pt verbalized understanding.

## 2023-10-09 DIAGNOSIS — F41.9 ANXIETY: ICD-10-CM

## 2023-10-09 RX ORDER — ALPRAZOLAM 1 MG/1
1 TABLET ORAL 2 TIMES DAILY
Qty: 60 TABLET | Refills: 5 | Status: SHIPPED | OUTPATIENT
Start: 2023-10-09 | End: 2024-03-25 | Stop reason: SDUPTHER

## 2023-10-09 NOTE — TELEPHONE ENCOUNTER
Last OV 7/26/2023  Upcoming OV 11/15/2023  UDS 3/21/2023    Per  last dispensed 09/09/2023    Rx order set up.

## 2023-10-09 NOTE — TELEPHONE ENCOUNTER
----- Message from Marianna Valderrama sent at 10/9/2023  8:26 AM CDT -----  Refill for Xanax. [facility] in [place]. Pt #[phone]

## 2023-10-17 ENCOUNTER — TELEPHONE (OUTPATIENT)
Dept: PAIN MEDICINE | Facility: CLINIC | Age: 63
End: 2023-10-17
Payer: MEDICARE

## 2023-10-17 NOTE — TELEPHONE ENCOUNTER
----- Message from Kailey Díaz MA sent at 10/17/2023  3:23 PM CDT -----  Type: Needs Medical Advice  Who Called:  pt   Symptoms (please be specific):  pain   How long has patient had these symptoms:  few days after injection  Pharmacy name and phone #:      [facility] #[id number] - [facility] [address] [place] [address] AT [facility] [address] [facility] [address]  [address] [address] [address]  Phone: [phone] Fax: [phone]      Best Call Back Number: [phone]    Additional Information: please advise pt would like to know if provider would be able to call something  in for pain

## 2023-10-18 RX ORDER — HYDROCODONE BITARTRATE AND ACETAMINOPHEN 5; 325 MG/1; MG/1
1 TABLET ORAL EVERY 8 HOURS PRN
Qty: 15 TABLET | Refills: 0 | Status: SHIPPED | OUTPATIENT
Start: 2023-10-18 | End: 2024-01-11

## 2023-10-18 NOTE — TELEPHONE ENCOUNTER
Spoke with patient, states he was able to fill his prescription yesterday at [facility].

## 2023-10-18 NOTE — TELEPHONE ENCOUNTER
----- Message from Daisha Garcia sent at 10/17/2023  5:18 PM CDT -----  Needs advice from nurse:      Who Called:pt  Regarding: states pain meds were not called in   Would the patient rather a call back or VIA [facility]?  Best Call Back number:[phone]  Additional Info:[facility] #[id number] - [place] [address] AT [facility] OF [facility] [address] [address]  Phone: [phone] Fax: [phone]

## 2023-10-19 ENCOUNTER — TELEPHONE (OUTPATIENT)
Dept: HEMATOLOGY/ONCOLOGY | Facility: CLINIC | Age: 63
End: 2023-10-19

## 2023-10-19 DIAGNOSIS — Z79.01 LONG TERM (CURRENT) USE OF ANTICOAGULANTS: Primary | ICD-10-CM

## 2023-10-20 ENCOUNTER — TELEPHONE (OUTPATIENT)
Dept: HEMATOLOGY/ONCOLOGY | Facility: CLINIC | Age: 63
End: 2023-10-20

## 2023-10-20 DIAGNOSIS — D68.51 HETEROZYGOUS FACTOR V LEIDEN MUTATION: Primary | ICD-10-CM

## 2023-10-20 LAB
INR PPP: 3.5
PROTHROMBIN TIME: 34.3 SEC (ref 9–11.5)

## 2023-10-20 NOTE — TELEPHONE ENCOUNTER
INR elevated at 3.5. Per Dr. Smith, hold Coumadin for one day and then resume same dose. Re-check in one week. Pt was notified and verbalized understanding.

## 2023-10-23 ENCOUNTER — OFFICE VISIT (OUTPATIENT)
Dept: PAIN MEDICINE | Facility: CLINIC | Age: 63
End: 2023-10-23
Payer: MEDICARE

## 2023-10-23 VITALS
BODY MASS INDEX: 32.37 KG/M2 | HEART RATE: 65 BPM | SYSTOLIC BLOOD PRESSURE: 167 MMHG | RESPIRATION RATE: 18 BRPM | DIASTOLIC BLOOD PRESSURE: 80 MMHG | WEIGHT: 218.56 LBS | HEIGHT: 69 IN

## 2023-10-23 DIAGNOSIS — M51.36 DDD (DEGENERATIVE DISC DISEASE), LUMBAR: ICD-10-CM

## 2023-10-23 DIAGNOSIS — M54.16 LUMBAR RADICULOPATHY: Primary | ICD-10-CM

## 2023-10-23 PROCEDURE — 3077F PR MOST RECENT SYSTOLIC BLOOD PRESSURE >= 140 MM HG: ICD-10-PCS | Mod: CPTII,S$GLB,, | Performed by: PHYSICIAN ASSISTANT

## 2023-10-23 PROCEDURE — 1101F PT FALLS ASSESS-DOCD LE1/YR: CPT | Mod: CPTII,S$GLB,, | Performed by: PHYSICIAN ASSISTANT

## 2023-10-23 PROCEDURE — 99999 PR PBB SHADOW E&M-EST. PATIENT-LVL III: ICD-10-PCS | Mod: PBBFAC,,, | Performed by: PHYSICIAN ASSISTANT

## 2023-10-23 PROCEDURE — 1160F PR REVIEW ALL MEDS BY PRESCRIBER/CLIN PHARMACIST DOCUMENTED: ICD-10-PCS | Mod: CPTII,S$GLB,, | Performed by: PHYSICIAN ASSISTANT

## 2023-10-23 PROCEDURE — 3079F PR MOST RECENT DIASTOLIC BLOOD PRESSURE 80-89 MM HG: ICD-10-PCS | Mod: CPTII,S$GLB,, | Performed by: PHYSICIAN ASSISTANT

## 2023-10-23 PROCEDURE — 3044F PR MOST RECENT HEMOGLOBIN A1C LEVEL <7.0%: ICD-10-PCS | Mod: CPTII,S$GLB,, | Performed by: PHYSICIAN ASSISTANT

## 2023-10-23 PROCEDURE — 3288F PR FALLS RISK ASSESSMENT DOCUMENTED: ICD-10-PCS | Mod: CPTII,S$GLB,, | Performed by: PHYSICIAN ASSISTANT

## 2023-10-23 PROCEDURE — 3044F HG A1C LEVEL LT 7.0%: CPT | Mod: CPTII,S$GLB,, | Performed by: PHYSICIAN ASSISTANT

## 2023-10-23 PROCEDURE — 3008F BODY MASS INDEX DOCD: CPT | Mod: CPTII,S$GLB,, | Performed by: PHYSICIAN ASSISTANT

## 2023-10-23 PROCEDURE — 1160F RVW MEDS BY RX/DR IN RCRD: CPT | Mod: CPTII,S$GLB,, | Performed by: PHYSICIAN ASSISTANT

## 2023-10-23 PROCEDURE — 1101F PR PT FALLS ASSESS DOC 0-1 FALLS W/OUT INJ PAST YR: ICD-10-PCS | Mod: CPTII,S$GLB,, | Performed by: PHYSICIAN ASSISTANT

## 2023-10-23 PROCEDURE — 4010F ACE/ARB THERAPY RXD/TAKEN: CPT | Mod: CPTII,S$GLB,, | Performed by: PHYSICIAN ASSISTANT

## 2023-10-23 PROCEDURE — 3079F DIAST BP 80-89 MM HG: CPT | Mod: CPTII,S$GLB,, | Performed by: PHYSICIAN ASSISTANT

## 2023-10-23 PROCEDURE — 3066F NEPHROPATHY DOC TX: CPT | Mod: CPTII,S$GLB,, | Performed by: PHYSICIAN ASSISTANT

## 2023-10-23 PROCEDURE — 99999 PR PBB SHADOW E&M-EST. PATIENT-LVL III: CPT | Mod: PBBFAC,,, | Performed by: PHYSICIAN ASSISTANT

## 2023-10-23 PROCEDURE — 1159F MED LIST DOCD IN RCRD: CPT | Mod: CPTII,S$GLB,, | Performed by: PHYSICIAN ASSISTANT

## 2023-10-23 PROCEDURE — 99214 OFFICE O/P EST MOD 30 MIN: CPT | Mod: S$GLB,,, | Performed by: PHYSICIAN ASSISTANT

## 2023-10-23 PROCEDURE — 3066F PR DOCUMENTATION OF TREATMENT FOR NEPHROPATHY: ICD-10-PCS | Mod: CPTII,S$GLB,, | Performed by: PHYSICIAN ASSISTANT

## 2023-10-23 PROCEDURE — 4010F PR ACE/ARB THEARPY RXD/TAKEN: ICD-10-PCS | Mod: CPTII,S$GLB,, | Performed by: PHYSICIAN ASSISTANT

## 2023-10-23 PROCEDURE — 3060F POS MICROALBUMINURIA REV: CPT | Mod: CPTII,S$GLB,, | Performed by: PHYSICIAN ASSISTANT

## 2023-10-23 PROCEDURE — 3288F FALL RISK ASSESSMENT DOCD: CPT | Mod: CPTII,S$GLB,, | Performed by: PHYSICIAN ASSISTANT

## 2023-10-23 PROCEDURE — 1159F PR MEDICATION LIST DOCUMENTED IN MEDICAL RECORD: ICD-10-PCS | Mod: CPTII,S$GLB,, | Performed by: PHYSICIAN ASSISTANT

## 2023-10-23 PROCEDURE — 99214 PR OFFICE/OUTPT VISIT, EST, LEVL IV, 30-39 MIN: ICD-10-PCS | Mod: S$GLB,,, | Performed by: PHYSICIAN ASSISTANT

## 2023-10-23 PROCEDURE — 3077F SYST BP >= 140 MM HG: CPT | Mod: CPTII,S$GLB,, | Performed by: PHYSICIAN ASSISTANT

## 2023-10-23 PROCEDURE — 3060F PR POS MICROALBUMINURIA RESULT DOCUMENTED/REVIEW: ICD-10-PCS | Mod: CPTII,S$GLB,, | Performed by: PHYSICIAN ASSISTANT

## 2023-10-23 PROCEDURE — 3008F PR BODY MASS INDEX (BMI) DOCUMENTED: ICD-10-PCS | Mod: CPTII,S$GLB,, | Performed by: PHYSICIAN ASSISTANT

## 2023-10-23 RX ORDER — GABAPENTIN 300 MG/1
CAPSULE ORAL
Qty: 90 CAPSULE | Refills: 2 | Status: SHIPPED | OUTPATIENT
Start: 2023-10-23 | End: 2024-01-31

## 2023-10-27 ENCOUNTER — TELEPHONE (OUTPATIENT)
Dept: HEMATOLOGY/ONCOLOGY | Facility: CLINIC | Age: 63
End: 2023-10-27

## 2023-10-27 LAB
INR PPP: 2.1
PROTHROMBIN TIME: 20.7 SEC (ref 9–11.5)

## 2023-10-29 NOTE — PROGRESS NOTES
This note was completed with dictation software and grammatical errors may exist.    Chief Complaint   Patient presents with    Follow-up        HPI: Luis Resendiz is a 63 y.o. year old male patient who has a past medical history of Anticoagulant long-term use, Arthritis, Cervical spine pain, Clotting disorder, Coronary artery disease, Diabetes mellitus, Encounter for blood transfusion, Fatty liver, and Hypertension. He presents in referral from No ref. provider found for right back pain.  He is status post bilateral L5/S1 transforaminal epidural steroid injection on 09/26/2023 with 30-40% relief.  The patient is new to me.  He describes cramps, spasms and pain in the bilateral low back, upper buttocks.  This pain is worse with simple tasks around his home and also much worse with walking.  He has not had significant improvement with anything.  He has been taking gabapentin with minimal relief.  He reports left leg numbness due to his back and right leg numbness due to history of leg fracture and surgery.  He reports weakness in both legs.      Initial history:  The patient reports having chronic back pain ever since his early 20s he was having issues.  Had his 1st lumbar surgery in 1980 and is 2nd in 1984.  Has always had recurrent flare-ups of back pain radiating into the left leg.  He was going to see a pain management specialist and receiving injections, that was seem to be helping for a while.  Then he stopped going in 2016 and has not had any unbearable pain since that time.  However he still gets flare-ups that last anywhere from several days to weeks, usually on the left side.  For the last 6 weeks however he has had severe right back pain, does not seem to radiate into the leg but radiates out into the hip.  This is so severe that he is having difficulty moving, any flexion or extension.  He denies any trauma that may have caused this.  He states that he is not sure if he has any left-sided pain  currently because the right side hurts so much.  He describes the pain as burning, shooting, aching worse with any type of movement, slightly relieved when lying down or sitting down.  He denies any bowel or bladder incontinence.      Pain intervention history:  The patient reports he was seeing Dr. Taryn Dugan until 2016, was receiving injections with some relief.He is status post bilateral L5/S1 transforaminal epidural steroid injection on 09/26/2023 with 30-40% relief.      Spine surgeries:    Antineuropathics:  Gabapentin at bedtime  NSAIDs:  Physical therapy:  Last time he did physical therapy with several years ago but he feels that the pain is so severe right now he could not even attempt this  Antidepressants:  Muscle relaxers:  Xanax, tizanidine  Opioids:  He occasionally gets hydrocodone for his severe pain  Antiplatelets/Anticoagulants:  Aspirin 325, Coumadin        ROS:  He reports back pain, stiffness, joint stiffness, joint pain.  Balance of review of systems is negative.    Lab Results   Component Value Date    HGBA1C 7.7 (H) 03/21/2023       Lab Results   Component Value Date    WBC 5.4 03/17/2023    HGB 11.9 (L) 03/17/2023    HCT 36.2 (L) 03/17/2023    MCV 96.3 03/17/2023     03/17/2023             Past Medical History:   Diagnosis Date    Anticoagulant long-term use     Arthritis     Cervical spine pain     Clotting disorder     Coronary artery disease     Diabetes mellitus     Encounter for blood transfusion     Fatty liver     Hypertension        Past Surgical History:   Procedure Laterality Date    BACK SURGERY      cabg  2011    CARDIAC SURGERY      FOOT SURGERY Right     FRACTURE SURGERY Right     tibia/fibula    ROTATOR CUFF REPAIR Left     TRANSFORAMINAL EPIDURAL INJECTION OF STEROID Bilateral 9/26/2023    Procedure: Injection,steroid,epidural,transforaminal approach L4/5 and L5/S1;  Surgeon: Rivera Duff MD;  Location: [facility];  Service: Pain Management;  Laterality:  "Bilateral;       Social History     Socioeconomic History    Marital status:    Tobacco Use    Smoking status: Former     Current packs/day: 0.00     Average packs/day: 0.5 packs/day for 0.5 years (0.2 ttl pk-yrs)     Types: Cigarettes     Start date: 1993     Quit date: 1994     Years since quittin.3     Passive exposure: Past    Smokeless tobacco: Never   Substance and Sexual Activity    Alcohol use: Yes     Comment: rarely    Drug use: No         Medications/Allergies: See med card    Vitals:    10/23/23 1415   BP: (!) 167/80   Pulse: 65   Resp: 18   Weight: 99.2 kg (218 lb 9.4 oz)   Height: 5' 9" (1.753 m)   PainSc:   6   PainLoc: Back     Body mass index is 32.28 kg/m².    Physical exam:  Gen: A and O x3, pleasant, well-groomed  Skin: No rashes or obvious lesions  HEENT: PERRLA, no obvious deformities on ears or in canals. Trachea midline.  CVS: Regular rate and rhythm, normal palpable pulses.  Resp:No increased work of breathing, symmetrical chest rise.  Abdomen: Soft, NT/ND.  Musculoskeletal:  Slow, shuffling gait    Neuro:    Iliopsoas Quadriceps Knee  Flexion Tibialis  anterior Gastro- cnemius EHL   Lower: R 5/5 5/5 5/5 5/5 5/5 5/5    L 5/5 5/5 5/5 5/5 5/5 5/5      Left  Right    Patellar DTR 0+ 0+   Achilles DTR 0+ 0+                    Intact and symmetrical to light touch and pinprick in L1-S1 dermatomes bilaterally.    Lumbar spine:  Lumbar spine: ROM is severely reduced with both flexion and extension and oblique extension to either side with severe increased pain especially in the right low back.    Sascha's test causes no increased pain on either side.    Supine straight leg raise is positive for low back pain.  Internal and external rotation of the hip causes no increased pain on either side.  Myofascial exam: No tenderness to palpation across lumbar paraspinous muscles.      Imaging:  None    Assessment:  Luis Resendiz is a 63 y.o. year old male patient who has a past " medical history of Anticoagulant long-term use, Arthritis, Cervical spine pain, Clotting disorder, Coronary artery disease, Diabetes mellitus, Encounter for blood transfusion, and Hypertension. He presents in referral from [facility] for right back pain.     1. Lumbar radiculopathy        2. DDD (degenerative disc disease), lumbar            Plan:  1. The patient had mild relief following the injections and for financial reasons he can not have another procedure at this time.  He also declined physical therapy for this reason.  2. We discussed medications and I am going to have him increase gabapentin to 300 mg in morning and 600 mg at night if tolerated.  We discussed that we will avoid opioids.  3. Follow-up in 3 months or sooner as needed.

## 2023-10-31 DIAGNOSIS — D68.51 HETEROZYGOUS FACTOR V LEIDEN MUTATION: ICD-10-CM

## 2023-10-31 RX ORDER — WARFARIN 4 MG/1
4 TABLET ORAL DAILY
Qty: 30 TABLET | Refills: 1 | Status: SHIPPED | OUTPATIENT
Start: 2023-10-31 | End: 2024-03-06 | Stop reason: SDUPTHER

## 2023-11-15 ENCOUNTER — OFFICE VISIT (OUTPATIENT)
Dept: FAMILY MEDICINE | Facility: CLINIC | Age: 63
End: 2023-11-15
Attending: FAMILY MEDICINE
Payer: MEDICARE

## 2023-11-15 VITALS
HEIGHT: 69 IN | HEART RATE: 67 BPM | WEIGHT: 227 LBS | BODY MASS INDEX: 33.62 KG/M2 | SYSTOLIC BLOOD PRESSURE: 110 MMHG | DIASTOLIC BLOOD PRESSURE: 70 MMHG

## 2023-11-15 DIAGNOSIS — F33.41 RECURRENT MAJOR DEPRESSIVE DISORDER, IN PARTIAL REMISSION: ICD-10-CM

## 2023-11-15 DIAGNOSIS — Z79.4 TYPE 2 DIABETES MELLITUS WITH HYPOGLYCEMIA WITHOUT COMA, WITH LONG-TERM CURRENT USE OF INSULIN: ICD-10-CM

## 2023-11-15 DIAGNOSIS — E78.2 MIXED HYPERLIPIDEMIA: ICD-10-CM

## 2023-11-15 DIAGNOSIS — M21.70 ACQUIRED LEG LENGTH DISCREPANCY: ICD-10-CM

## 2023-11-15 DIAGNOSIS — Z95.1 S/P CABG (CORONARY ARTERY BYPASS GRAFT): Chronic | ICD-10-CM

## 2023-11-15 DIAGNOSIS — E66.01 MORBID (SEVERE) OBESITY DUE TO EXCESS CALORIES: ICD-10-CM

## 2023-11-15 DIAGNOSIS — I10 PRIMARY HYPERTENSION: ICD-10-CM

## 2023-11-15 DIAGNOSIS — S09.90XS CLOSED HEAD INJURY, SEQUELA: ICD-10-CM

## 2023-11-15 DIAGNOSIS — Z23 NEED FOR VACCINATION: ICD-10-CM

## 2023-11-15 DIAGNOSIS — M54.16 LUMBAR RADICULOPATHY: ICD-10-CM

## 2023-11-15 DIAGNOSIS — R35.1 BENIGN PROSTATIC HYPERPLASIA WITH NOCTURIA: ICD-10-CM

## 2023-11-15 DIAGNOSIS — N18.31 STAGE 3A CHRONIC KIDNEY DISEASE: ICD-10-CM

## 2023-11-15 DIAGNOSIS — E11.649 TYPE 2 DIABETES MELLITUS WITH HYPOGLYCEMIA WITHOUT COMA, WITH LONG-TERM CURRENT USE OF INSULIN: ICD-10-CM

## 2023-11-15 DIAGNOSIS — D68.51 HETEROZYGOUS FACTOR V LEIDEN MUTATION: Chronic | ICD-10-CM

## 2023-11-15 DIAGNOSIS — E11.69 DM TYPE 2 WITH DIABETIC DYSLIPIDEMIA: Primary | ICD-10-CM

## 2023-11-15 DIAGNOSIS — E78.5 DM TYPE 2 WITH DIABETIC DYSLIPIDEMIA: Primary | ICD-10-CM

## 2023-11-15 DIAGNOSIS — I25.10 CORONARY ARTERY DISEASE INVOLVING NATIVE CORONARY ARTERY OF NATIVE HEART WITHOUT ANGINA PECTORIS: Chronic | ICD-10-CM

## 2023-11-15 DIAGNOSIS — N40.1 BENIGN PROSTATIC HYPERPLASIA WITH NOCTURIA: ICD-10-CM

## 2023-11-15 LAB — HBA1C MFR BLD: 6.6 %

## 2023-11-15 PROCEDURE — 3008F BODY MASS INDEX DOCD: CPT | Mod: CPTII,S$GLB,, | Performed by: FAMILY MEDICINE

## 2023-11-15 PROCEDURE — 90694 VACC AIIV4 NO PRSRV 0.5ML IM: CPT | Mod: S$GLB,,, | Performed by: FAMILY MEDICINE

## 2023-11-15 PROCEDURE — 1101F PR PT FALLS ASSESS DOC 0-1 FALLS W/OUT INJ PAST YR: ICD-10-PCS | Mod: CPTII,S$GLB,, | Performed by: FAMILY MEDICINE

## 2023-11-15 PROCEDURE — G0008 ADMIN INFLUENZA VIRUS VAC: HCPCS | Mod: S$GLB,,, | Performed by: FAMILY MEDICINE

## 2023-11-15 PROCEDURE — 3008F PR BODY MASS INDEX (BMI) DOCUMENTED: ICD-10-PCS | Mod: CPTII,S$GLB,, | Performed by: FAMILY MEDICINE

## 2023-11-15 PROCEDURE — 3044F HG A1C LEVEL LT 7.0%: CPT | Mod: CPTII,S$GLB,, | Performed by: FAMILY MEDICINE

## 2023-11-15 PROCEDURE — 83036 HEMOGLOBIN GLYCOSYLATED A1C: CPT | Mod: QW,,, | Performed by: FAMILY MEDICINE

## 2023-11-15 PROCEDURE — 3044F PR MOST RECENT HEMOGLOBIN A1C LEVEL <7.0%: ICD-10-PCS | Mod: CPTII,S$GLB,, | Performed by: FAMILY MEDICINE

## 2023-11-15 PROCEDURE — 3288F FALL RISK ASSESSMENT DOCD: CPT | Mod: CPTII,S$GLB,, | Performed by: FAMILY MEDICINE

## 2023-11-15 PROCEDURE — 1159F MED LIST DOCD IN RCRD: CPT | Mod: CPTII,S$GLB,, | Performed by: FAMILY MEDICINE

## 2023-11-15 PROCEDURE — 99214 PR OFFICE/OUTPT VISIT, EST, LEVL IV, 30-39 MIN: ICD-10-PCS | Mod: S$GLB,,, | Performed by: FAMILY MEDICINE

## 2023-11-15 PROCEDURE — 3060F POS MICROALBUMINURIA REV: CPT | Mod: CPTII,S$GLB,, | Performed by: FAMILY MEDICINE

## 2023-11-15 PROCEDURE — 3066F PR DOCUMENTATION OF TREATMENT FOR NEPHROPATHY: ICD-10-PCS | Mod: CPTII,S$GLB,, | Performed by: FAMILY MEDICINE

## 2023-11-15 PROCEDURE — 1159F PR MEDICATION LIST DOCUMENTED IN MEDICAL RECORD: ICD-10-PCS | Mod: CPTII,S$GLB,, | Performed by: FAMILY MEDICINE

## 2023-11-15 PROCEDURE — 3288F PR FALLS RISK ASSESSMENT DOCUMENTED: ICD-10-PCS | Mod: CPTII,S$GLB,, | Performed by: FAMILY MEDICINE

## 2023-11-15 PROCEDURE — 3078F PR MOST RECENT DIASTOLIC BLOOD PRESSURE < 80 MM HG: ICD-10-PCS | Mod: CPTII,S$GLB,, | Performed by: FAMILY MEDICINE

## 2023-11-15 PROCEDURE — 4010F ACE/ARB THERAPY RXD/TAKEN: CPT | Mod: CPTII,S$GLB,, | Performed by: FAMILY MEDICINE

## 2023-11-15 PROCEDURE — 3078F DIAST BP <80 MM HG: CPT | Mod: CPTII,S$GLB,, | Performed by: FAMILY MEDICINE

## 2023-11-15 PROCEDURE — 1101F PT FALLS ASSESS-DOCD LE1/YR: CPT | Mod: CPTII,S$GLB,, | Performed by: FAMILY MEDICINE

## 2023-11-15 PROCEDURE — 3066F NEPHROPATHY DOC TX: CPT | Mod: CPTII,S$GLB,, | Performed by: FAMILY MEDICINE

## 2023-11-15 PROCEDURE — G0008 FLU VACCINE - QUADRIVALENT - ADJUVANTED: ICD-10-PCS | Mod: S$GLB,,, | Performed by: FAMILY MEDICINE

## 2023-11-15 PROCEDURE — 3074F PR MOST RECENT SYSTOLIC BLOOD PRESSURE < 130 MM HG: ICD-10-PCS | Mod: CPTII,S$GLB,, | Performed by: FAMILY MEDICINE

## 2023-11-15 PROCEDURE — 3074F SYST BP LT 130 MM HG: CPT | Mod: CPTII,S$GLB,, | Performed by: FAMILY MEDICINE

## 2023-11-15 PROCEDURE — 4010F PR ACE/ARB THEARPY RXD/TAKEN: ICD-10-PCS | Mod: CPTII,S$GLB,, | Performed by: FAMILY MEDICINE

## 2023-11-15 PROCEDURE — 83036 POCT HEMOGLOBIN A1C: ICD-10-PCS | Mod: QW,,, | Performed by: FAMILY MEDICINE

## 2023-11-15 PROCEDURE — 90694 FLU VACCINE - QUADRIVALENT - ADJUVANTED: ICD-10-PCS | Mod: S$GLB,,, | Performed by: FAMILY MEDICINE

## 2023-11-15 PROCEDURE — 3060F PR POS MICROALBUMINURIA RESULT DOCUMENTED/REVIEW: ICD-10-PCS | Mod: CPTII,S$GLB,, | Performed by: FAMILY MEDICINE

## 2023-11-15 PROCEDURE — 99214 OFFICE O/P EST MOD 30 MIN: CPT | Mod: S$GLB,,, | Performed by: FAMILY MEDICINE

## 2023-11-15 NOTE — PROGRESS NOTES
EP  SUBJECTIVE:    Patient ID: Luis Resendiz is a 63 y.o. male.    Chief Complaint: Diabetes (A1c and flu vaccine ordered, Eye exam ordered, back, neck pain, abc )    63-year-old male here for six-month checkup.  He recently had his 13-year-old dog passed away.    Diabetes type 2 blood sugars occasionally dropping down in the 40s and 50s at night.  Hypoglycemia.  He takes 70/30 insulin 35 units a.m. 35 units p.m. Accu-Cheks b.i.d..    In currently on warfarin 4.5 mg alternating with 5 mg daily.  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 Duff pain management with [facility].  Epidural steroid injections at L5-S1 give 30-40% relief.  A increase his gabapentin to 300 mg a.m. 600 mg p.m. he has no sciatica.  He is unable to walk 300 ft however due to pain.  Also has cervical disc disease.  He has a leg length discrepancy.    2011-2 vessel CABG ,        Office Visit on 11/15/2023   Component Date Value Ref Range Status    Hemoglobin A1C, POC 11/15/2023 6.6  % Final    Glucose 11/15/2023 163 (H)  65 - 99 mg/dL Final    BUN 11/15/2023 20  7 - 25 mg/dL Final    Creatinine 11/15/2023 1.22  0.70 - 1.35 mg/dL Final    eGFR 11/15/2023 66  > OR = 60 mL/min/1.73m2 Final    BUN/Creatinine Ratio 11/15/2023 SEE NOTE:  6 - 22 (calc) Final    Sodium 11/15/2023 140  135 - 146 mmol/L Final    Potassium 11/15/2023 5.2  3.5 - 5.3 mmol/L Final    Chloride 11/15/2023 106  98 - 110 mmol/L Final    CO2 11/15/2023 27  20 - 32 mmol/L Final    Calcium 11/15/2023 9.4  8.6 - 10.3 mg/dL Final    Total Protein 11/15/2023 7.5  6.1 - 8.1 g/dL Final    Albumin 11/15/2023 4.1  3.6 - 5.1 g/dL Final    Globulin, Total 11/15/2023 3.4  1.9 - 3.7 g/dL (calc) Final    Albumin/Globulin Ratio 11/15/2023 1.2  1.0 - 2.5 (calc) Final    Total Bilirubin 11/15/2023 0.5  0.2 - 1.2 mg/dL Final    Alkaline Phosphatase 11/15/2023 36  35 - 144 U/L Final    AST 11/15/2023 21  10 - 35 U/L Final    ALT 11/15/2023 19  9 - 46 U/L Final    Cholesterol 11/15/2023  130  <200 mg/dL Final    HDL 11/15/2023 44  > OR = 40 mg/dL Final    Triglycerides 11/15/2023 143  <150 mg/dL Final    LDL Cholesterol 11/15/2023 63  mg/dL (calc) Final    HDL/Cholesterol Ratio 11/15/2023 3.0  <5.0 (calc) Final    Non HDL Chol. (LDL+VLDL) 11/15/2023 86  <130 mg/dL (calc) Final   Telephone on 10/20/2023   Component Date Value Ref Range Status    INR 10/26/2023 2.1 (H)   Final    Prothrombin Time 10/26/2023 20.7 (H)  9.0 - 11.5 sec Final   Telephone on 10/19/2023   Component Date Value Ref Range Status    INR 10/19/2023 3.5 (H)   Final    Prothrombin Time 10/19/2023 34.3 (H)  9.0 - 11.5 sec Final   Admission on 2023, Discharged on 2023   Component Date Value Ref Range Status    POCT Glucose 2023 145 (H)  70 - 110 mg/dL Final   Office Visit on 2023   Component Date Value Ref Range Status    Hemoglobin A1C, POC 2023 6.6  % Final    PROSTATE SPECIFIC ANTIGEN, SCR - Q* 2023 0.43  < OR = 4.00 ng/mL Final       Past Medical History:   Diagnosis Date    Anticoagulant long-term use     Arthritis     Cervical spine pain     Clotting disorder     Coronary artery disease     Diabetes mellitus     Encounter for blood transfusion     Fatty liver     Hypertension      Social History     Socioeconomic History    Marital status:    Tobacco Use    Smoking status: Former     Current packs/day: 0.00     Average packs/day: 0.5 packs/day for 0.5 years (0.2 ttl pk-yrs)     Types: Cigarettes     Start date: 1993     Quit date: 1994     Years since quittin.4     Passive exposure: Past    Smokeless tobacco: Never   Substance and Sexual Activity    Alcohol use: Yes     Comment: rarely    Drug use: No     Past Surgical History:   Procedure Laterality Date    BACK SURGERY      cabg  2011    CARDIAC SURGERY      FOOT SURGERY Right     FRACTURE SURGERY Right     tibia/fibula    ROTATOR CUFF REPAIR Left     TRANSFORAMINAL EPIDURAL INJECTION OF STEROID Bilateral 2023  "   Procedure: Injection,steroid,epidural,transforaminal approach L4/5 and L5/S1;  Surgeon: Rivera Duff MD;  Location: [facility];  Service: Pain Management;  Laterality: Bilateral;     Family History   Problem Relation Age of Onset    Heart disease Mother     Cancer Mother     Heart disease Father     Alcohol abuse Father     No Known Problems Sister     No Known Problems Brother     No Known Problems Daughter     No Known Problems Son     Cataracts Neg Hx     Glaucoma Neg Hx     Retinal detachment Neg Hx     Macular degeneration Neg Hx        The 10-year CVD risk score (Christiano, et al., 2008) is: 21.2%    Values used to calculate the score:      Age: 63 years      Sex: Male      Diabetic: Yes      Tobacco smoker: No      Systolic Blood Pressure: 110 mmHg      Is BP treated: Yes      HDL Cholesterol: 44 mg/dL      Total Cholesterol: 130 mg/dL    Tests to Keep You Healthy    Eye Exam: ORDERED BUT NOT SCHEDULED  Colon Cancer Screening: Met on 8/17/2021  Last Blood Pressure <= 139/89 (11/15/2023): Yes  Last HbA1c < 8 (11/15/2023): Yes      Review of patient's allergies indicates:   Allergen Reactions    Nsaids (non-steroidal anti-inflammatory drug) Nausea Only    Ancef [cefazolin] Anxiety       Current Outpatient Medications:     ALPRAZolam (XANAX) 1 MG tablet, Take 1 tablet (1 mg total) by mouth 2 (two) times daily., Disp: 60 tablet, Rfl: 5    amLODIPine (NORVASC) 10 MG tablet, Take 1 tablet (10 mg total) by mouth once daily., Disp: 90 tablet, Rfl: 3    aspirin 325 MG tablet, Take 325 mg by mouth once daily., Disp: , Rfl:     atenoloL (TENORMIN) 50 MG tablet, Take 2 tablets (100 mg total) by mouth 2 (two) times a day., Disp: 360 tablet, Rfl: 3    BD ULTRA-FINE MINI PEN NEEDLE 31 gauge x 3/16" Ndle, 1 each by In Vitro route once daily., Disp: 100 each, Rfl: 1    blood sugar diagnostic Strp, To check BG 3-4 times daily, to use with insurance preferred meter, Disp: 360 each, Rfl: 3    enoxaparin (LOVENOX) 40 " "mg/0.4 mL Syrg, Inject 0.4 mLs (40 mg total) into the skin once daily., Disp: 10 each, Rfl: 1    fenofibrate (TRICOR) 145 MG tablet, Take 1 tablet (145 mg total) by mouth once daily., Disp: 90 tablet, Rfl: 3    gabapentin (NEURONTIN) 300 MG capsule, Take one capsule by mouth (300 mg) in the morning and two capsules (600 mg) in the evening., Disp: 90 capsule, Rfl: 2    glimepiride (AMARYL) 4 MG tablet, Take 1 tablet (4 mg total) by mouth 2 (two) times daily., Disp: 180 tablet, Rfl: 3    HYDROcodone-acetaminophen (NORCO) 5-325 mg per tablet, Take 1 tablet by mouth every 8 (eight) hours as needed for Pain., Disp: 15 tablet, Rfl: 0    insulin NPH-insulin regular, 70/30, (HUMULIN 70/30 U-100 INSULIN) 100 unit/mL (70-30) injection, 35 units in AM and 35 units in PM- may titrate upwards as needed to max of 40 units BID, Disp: 7 each, Rfl: 3    insulin syringe-needle U-100 0.3 mL 31 gauge x 15/64" Syrg, , Disp: , Rfl:     insulin syringe-needle U-100 1/2 mL 31 gauge x 15/64" Syrg, BID, Disp: 180 Syringe, Rfl: 3    lancets Misc, To check BG 3-4 times daily, to use with insurance preferred meter, Disp: 360 each, Rfl: 3    lisinopriL (PRINIVIL,ZESTRIL) 20 MG tablet, Take 1 tablet (20 mg total) by mouth 2 (two) times daily., Disp: 180 tablet, Rfl: 3    metFORMIN (GLUCOPHAGE) 500 MG tablet, Take 2 tablets (1,000 mg total) by mouth 2 (two) times daily with meals., Disp: 360 tablet, Rfl: 3    rosuvastatin (CRESTOR) 40 MG Tab, Take 1 tablet (40 mg total) by mouth once daily., Disp: 90 tablet, Rfl: 3    semaglutide (OZEMPIC) 0.25 mg or 0.5 mg(2 mg/1.5 mL) pen injector, Start with 0.25mg weekly for 4 weeks then increase to 0.5mg weekly, Disp: 2 pen, Rfl: 0    tamsulosin (FLOMAX) 0.4 mg Cap, Take 1 capsule (0.4 mg total) by mouth once daily. Take 30 minutes after evening meal, Disp: 30 capsule, Rfl: 11    tiZANidine (ZANAFLEX) 4 MG tablet, Take 2 tablets (8 mg total) by mouth 3 (three) times daily as needed (back pain)., Disp: 180 " "tablet, Rfl: 5    TRUEPLUS INSULIN 0.5 mL 29 gauge x 1/2" Syrg, , Disp: , Rfl: 5    ULTRA COMFORT INSULIN SYRINGE 1 mL 29 gauge x 1/2" Syrg, Inject 25 Units as directed 2 (two) times daily., Disp: 180 each, Rfl: 3    warfarin (COUMADIN) 1 MG tablet, Take 1 tablet by mouth once daily, Disp: 90 tablet, Rfl: 3    warfarin (COUMADIN) 4 MG tablet, Take 1 tablet (4 mg total) by mouth Daily., Disp: 30 tablet, Rfl: 1    warfarin (COUMADIN) 5 MG tablet, Take as directed by your doctor., Disp: 45 tablet, Rfl: 3    zolpidem (AMBIEN) 5 MG Tab, TAKE 1 TABLET BY MOUTH AT BEDTIME, Disp: 30 tablet, Rfl: 0    blood-glucose meter kit, To check BG 3-4 times daily, to use with insurance preferred meter, Disp: 1 each, Rfl: 0  No current facility-administered medications for this visit.    Facility-Administered Medications Ordered in Other Visits:     LIDOcaine (PF) 10 mg/ml (1%) injection 10 mg, 1 mL, Other, Once, Rivera Duff MD    Review of Systems   Constitutional:  Negative for appetite change, chills, fatigue, fever and unexpected weight change.   HENT:  Negative for ear pain and trouble swallowing.    Eyes:  Negative for pain, discharge and visual disturbance.   Respiratory:  Negative for apnea, cough, shortness of breath and wheezing.    Cardiovascular:  Negative for chest pain and leg swelling.   Gastrointestinal:  Negative for abdominal pain, blood in stool, constipation, diarrhea, nausea, vomiting and reflux.   Endocrine: Negative for cold intolerance, heat intolerance and polydipsia.   Genitourinary:  Negative for bladder incontinence, dysuria, erectile dysfunction, frequency, hematuria, testicular pain and urgency.   Musculoskeletal:  Positive for arthralgias, back pain and gait problem. Negative for joint swelling and myalgias.   Neurological:  Negative for dizziness, seizures and numbness.   Psychiatric/Behavioral:  Negative for agitation, behavioral problems and hallucinations. The patient is not nervous/anxious.  " "          Objective:      Vitals:    11/15/23 1043   BP: 110/70   Pulse: 67   Weight: 103 kg (227 lb)   Height: 5' 9" (1.753 m)     Physical Exam  Vitals and nursing note reviewed.   Constitutional:       General: He is not in acute distress.     Appearance: Normal appearance. He is well-developed. He is not toxic-appearing.   HENT:      Head: Normocephalic and atraumatic.      Right Ear: Tympanic membrane and external ear normal.      Left Ear: Tympanic membrane and external ear normal.      Nose: Nose normal.      Mouth/Throat:      Pharynx: Oropharynx is clear.   Eyes:      Pupils: Pupils are equal, round, and reactive to light.   Neck:      Thyroid: No thyromegaly.      Vascular: No carotid bruit.   Cardiovascular:      Rate and Rhythm: Normal rate and regular rhythm.      Heart sounds: Normal heart sounds. No murmur heard.  Pulmonary:      Effort: Pulmonary effort is normal.      Breath sounds: Normal breath sounds. No wheezing or rales.   Abdominal:      General: Bowel sounds are normal. There is no distension.      Palpations: Abdomen is soft.      Tenderness: There is no abdominal tenderness.   Musculoskeletal:         General: No tenderness or deformity. Normal range of motion.      Cervical back: Normal range of motion and neck supple.      Lumbar back: Normal. No spasms.      Comments: Bends 90 degrees at  waist   Lymphadenopathy:      Cervical: No cervical adenopathy.   Skin:     General: Skin is warm and dry.      Findings: No rash.   Neurological:      Mental Status: He is alert and oriented to person, place, and time.      Cranial Nerves: No cranial nerve deficit.      Coordination: Coordination normal.      Gait: Gait abnormal (patient has a wobbly gait/ rocking due to leg length discrepancy).   Psychiatric:         Behavior: Behavior normal.         Thought Content: Thought content normal.         Judgment: Judgment normal.           Assessment:       1. DM type 2 with diabetic dyslipidemia    2. " Need for vaccination    3. Acquired leg length discrepancy    4. Closed head injury, sequela    5. Lumbar radiculopathy    6. Recurrent major depressive disorder, in partial remission    7. Coronary artery disease involving native coronary artery of native heart without angina pectoris    8. Mixed hyperlipidemia    9. Primary hypertension    10. S/P CABG (coronary artery bypass graft)    11. Benign prostatic hyperplasia with nocturia    12. Stage 3a chronic kidney disease    13. Heterozygous factor V Leiden mutation    14. Morbid (severe) obesity due to excess calories    15. Type 2 diabetes mellitus with hypoglycemia without coma, with long-term current use of insulin         Plan:       DM type 2 with diabetic dyslipidemia  -     Hemoglobin A1C, POCT  -     Ambulatory referral/consult to Ophthalmology; Future; Expected date: 11/22/2023  -     Comprehensive Metabolic Panel; Future; Expected date: 11/15/2023  -     Lipid Panel; Future; Expected date: 11/15/2023  A1c 6.6 showing adequate control of diabetes.  Continue current regimen.  Refer to ophthalmology for diabetic retinal exam, lab work ordered for this week  Need for vaccination  -     Influenza (FLUAD) - Quadrivalent (Adjuvanted) *Preferred* (65+) (PF)  Flu vaccine today  Acquired leg length discrepancy  This causes a wobbly gait  Closed head injury, sequela    Lumbar radiculopathy  Okay for handicap license parking permit  Recurrent major depressive disorder, in partial remission    Coronary artery disease involving native coronary artery of native heart without angina pectoris  No angina lately  Mixed hyperlipidemia    Primary hypertension    S/P CABG (coronary artery bypass graft)    Benign prostatic hyperplasia with nocturia    Stage 3a chronic kidney disease    Heterozygous factor V Leiden mutation  Continue warfarin as is  Morbid (severe) obesity due to excess calories    Type 2 diabetes mellitus with hypoglycemia without coma, with long-term current  use of insulin      Follow up in about 3 months (around 2/15/2024), or [facility] for Diabetic Check-Up.        11/18/2023 Luis Barboza

## 2023-11-16 LAB
ALBUMIN SERPL-MCNC: 4.1 G/DL (ref 3.6–5.1)
ALBUMIN/GLOB SERPL: 1.2 (CALC) (ref 1–2.5)
ALP SERPL-CCNC: 36 U/L (ref 35–144)
ALT SERPL-CCNC: 19 U/L (ref 9–46)
AST SERPL-CCNC: 21 U/L (ref 10–35)
BILIRUB SERPL-MCNC: 0.5 MG/DL (ref 0.2–1.2)
BUN SERPL-MCNC: 20 MG/DL (ref 7–25)
BUN/CREAT SERPL: ABNORMAL (CALC) (ref 6–22)
CALCIUM SERPL-MCNC: 9.4 MG/DL (ref 8.6–10.3)
CHLORIDE SERPL-SCNC: 106 MMOL/L (ref 98–110)
CHOLEST SERPL-MCNC: 130 MG/DL
CHOLEST/HDLC SERPL: 3 (CALC)
CO2 SERPL-SCNC: 27 MMOL/L (ref 20–32)
CREAT SERPL-MCNC: 1.22 MG/DL (ref 0.7–1.35)
EGFR: 66 ML/MIN/1.73M2
GLOBULIN SER CALC-MCNC: 3.4 G/DL (CALC) (ref 1.9–3.7)
GLUCOSE SERPL-MCNC: 163 MG/DL (ref 65–99)
HDLC SERPL-MCNC: 44 MG/DL
LDLC SERPL CALC-MCNC: 63 MG/DL (CALC)
NONHDLC SERPL-MCNC: 86 MG/DL (CALC)
POTASSIUM SERPL-SCNC: 5.2 MMOL/L (ref 3.5–5.3)
PROT SERPL-MCNC: 7.5 G/DL (ref 6.1–8.1)
SODIUM SERPL-SCNC: 140 MMOL/L (ref 135–146)
TRIGL SERPL-MCNC: 143 MG/DL

## 2023-11-17 ENCOUNTER — TELEPHONE (OUTPATIENT)
Dept: FAMILY MEDICINE | Facility: CLINIC | Age: 63
End: 2023-11-17

## 2023-11-17 NOTE — TELEPHONE ENCOUNTER
Spoke with patient, states he just looked at the paperwork given after his visit and it says to complete labs by 11/15 at [facility] - he says he did these while he was here. I let him know that was just a reminder Dr. Barboza was placing for the front to know he needed labs that day when he checked out.

## 2023-11-17 NOTE — TELEPHONE ENCOUNTER
----- Message from Daisha Sequeira sent at 11/17/2023  8:42 AM CST -----  Pt was seen on the 15th and has some questions about the after visit summary   [phone]

## 2023-11-20 ENCOUNTER — TELEPHONE (OUTPATIENT)
Dept: FAMILY MEDICINE | Facility: CLINIC | Age: 63
End: 2023-11-20

## 2023-11-20 NOTE — PROGRESS NOTES
Call patient.  Fasting sugar 163.  Kidneys and liver look good.  Cholesterol excellent at 130.  Continue current medications

## 2023-11-20 NOTE — TELEPHONE ENCOUNTER
----- Message from Luis Barboza MD sent at 11/19/2023  8:52 PM CST -----  Call patient.  Fasting sugar 163.  Kidneys and liver look good.  Cholesterol excellent at 130.  Continue current medications

## 2023-11-30 ENCOUNTER — TELEPHONE (OUTPATIENT)
Dept: HEMATOLOGY/ONCOLOGY | Facility: CLINIC | Age: 63
End: 2023-11-30

## 2023-11-30 NOTE — TELEPHONE ENCOUNTER
----- Message from Ac Smith MD sent at 11/30/2023 11:34 AM CST -----  Call patient, no change in coumadin.  Recheck one month

## 2023-11-30 NOTE — TELEPHONE ENCOUNTER
INR adequate at 2.4. Per Dr. Smith, no change in Coumadin dosage and re-check labs in one month. Pt was notified and verbalized understanding.

## 2023-12-05 LAB
LEFT EYE DM RETINOPATHY: NEGATIVE
RIGHT EYE DM RETINOPATHY: NEGATIVE

## 2023-12-06 ENCOUNTER — TELEPHONE (OUTPATIENT)
Dept: HEMATOLOGY/ONCOLOGY | Facility: CLINIC | Age: 63
End: 2023-12-06

## 2023-12-06 DIAGNOSIS — F33.41 RECURRENT MAJOR DEPRESSIVE DISORDER, IN PARTIAL REMISSION: ICD-10-CM

## 2023-12-06 RX ORDER — ZOLPIDEM TARTRATE 5 MG/1
5 TABLET ORAL NIGHTLY
Qty: 30 TABLET | Refills: 0 | Status: CANCELLED | OUTPATIENT
Start: 2023-12-06

## 2023-12-06 RX ORDER — ZOLPIDEM TARTRATE 5 MG/1
5 TABLET ORAL NIGHTLY
Qty: 30 TABLET | Refills: 0 | Status: SHIPPED | OUTPATIENT
Start: 2023-12-06 | End: 2024-01-03 | Stop reason: SDUPTHER

## 2023-12-14 DIAGNOSIS — E11.42 DIABETIC POLYNEUROPATHY ASSOCIATED WITH TYPE 2 DIABETES MELLITUS: ICD-10-CM

## 2023-12-14 RX ORDER — LANCETS
EACH MISCELLANEOUS
Qty: 360 EACH | Refills: 3 | Status: CANCELLED | OUTPATIENT
Start: 2023-12-14

## 2023-12-14 NOTE — TELEPHONE ENCOUNTER
----- Message from Savanah Rowell sent at 12/14/2023 12:43 PM CST -----  Eryn from [facility].left a message this morning about the patient needing lancets. Please cancel that order. The patient wants to get CGM and supplies. PHN need  a signed Drs order and l his last office notes Eryn's #  [phone] [facility]

## 2023-12-14 NOTE — TELEPHONE ENCOUNTER
----- Message from Daisha Sequeira sent at 12/14/2023 11:53 AM CST -----  n is calling for test strips and lancets refill

## 2023-12-15 ENCOUNTER — TELEPHONE (OUTPATIENT)
Dept: HEMATOLOGY/ONCOLOGY | Facility: CLINIC | Age: 63
End: 2023-12-15

## 2024-01-02 DIAGNOSIS — M51.36 DDD (DEGENERATIVE DISC DISEASE), LUMBAR: ICD-10-CM

## 2024-01-02 RX ORDER — TIZANIDINE 4 MG/1
8 TABLET ORAL 3 TIMES DAILY PRN
Qty: 180 TABLET | Refills: 5 | Status: SHIPPED | OUTPATIENT
Start: 2024-01-02 | End: 2024-01-11

## 2024-01-02 NOTE — TELEPHONE ENCOUNTER
Pt needing refill on zanaflex to [facility] on [address]. Pt states [facility] told him they were out.      Rx order set up.

## 2024-01-02 NOTE — TELEPHONE ENCOUNTER
----- Message from Daisha Sequeira sent at 1/2/2024  3:37 PM CST -----  - 2:37- pt needs refill   [phone]

## 2024-01-03 ENCOUNTER — TELEPHONE (OUTPATIENT)
Dept: HEMATOLOGY/ONCOLOGY | Facility: CLINIC | Age: 64
End: 2024-01-03

## 2024-01-03 DIAGNOSIS — F33.41 RECURRENT MAJOR DEPRESSIVE DISORDER, IN PARTIAL REMISSION: ICD-10-CM

## 2024-01-03 RX ORDER — ZOLPIDEM TARTRATE 5 MG/1
5 TABLET ORAL NIGHTLY
Qty: 30 TABLET | Refills: 0 | Status: SHIPPED | OUTPATIENT
Start: 2024-01-03

## 2024-01-03 NOTE — TELEPHONE ENCOUNTER
----- Message from ANALI Weiss sent at 1/2/2024  9:09 AM CST -----  Can you let him know this is normal and recheck in 1 mth pls  ----- Message -----  From: Ac Smith MD  Sent: 12/30/2023   7:28 AM CST  To: ANALI Weiss    Call patient , no change.  Recheck one month

## 2024-01-04 DIAGNOSIS — E11.69 DM TYPE 2 WITH DIABETIC DYSLIPIDEMIA: Primary | ICD-10-CM

## 2024-01-04 DIAGNOSIS — E78.5 DM TYPE 2 WITH DIABETIC DYSLIPIDEMIA: Primary | ICD-10-CM

## 2024-01-04 DIAGNOSIS — E11.649 TYPE 2 DIABETES MELLITUS WITH HYPOGLYCEMIA WITHOUT COMA, WITH LONG-TERM CURRENT USE OF INSULIN: ICD-10-CM

## 2024-01-04 DIAGNOSIS — Z79.4 TYPE 2 DIABETES MELLITUS WITH HYPOGLYCEMIA WITHOUT COMA, WITH LONG-TERM CURRENT USE OF INSULIN: ICD-10-CM

## 2024-01-04 RX ORDER — BLOOD-GLUCOSE,RECEIVER,CONT
1 EACH MISCELLANEOUS DAILY
Qty: 1 EACH | Refills: 0 | Status: SHIPPED | OUTPATIENT
Start: 2024-01-04 | End: 2025-01-03

## 2024-01-04 RX ORDER — BLOOD-GLUCOSE,RECEIVER,CONT
EACH MISCELLANEOUS
Status: CANCELLED | OUTPATIENT
Start: 2024-01-04 | End: 2025-01-03

## 2024-01-04 NOTE — TELEPHONE ENCOUNTER
----- Message from Daisha Sequeira sent at 1/4/2024 11:20 AM CST -----  Pt would like to change his diabetic meter to the ones that don't require him to stick his finger   [facility]   [phone]

## 2024-01-04 NOTE — TELEPHONE ENCOUNTER
Spoke with pt who states he has been having too much pain when he sticks his finger and it has been hard to get blood lately. Spoke with Dr. Barboza who confirmed recommended Dexcom. Pt states he does not have a smartphone.  Rx order with sensor set up.

## 2024-01-08 DIAGNOSIS — E11.69 DM TYPE 2 WITH DIABETIC DYSLIPIDEMIA: Primary | ICD-10-CM

## 2024-01-08 DIAGNOSIS — E78.5 DM TYPE 2 WITH DIABETIC DYSLIPIDEMIA: Primary | ICD-10-CM

## 2024-01-08 RX ORDER — BLOOD-GLUCOSE SENSOR
1 EACH MISCELLANEOUS
Qty: 3 EACH | Refills: 11 | Status: SHIPPED | OUTPATIENT
Start: 2024-01-08 | End: 2025-01-07

## 2024-01-08 NOTE — TELEPHONE ENCOUNTER
----- Message from Luz Carrington LPN sent at 1/8/2024 11:06 AM CST -----    ----- Message -----  From: Daisha Sequeira  Sent: 1/8/2024  10:00 AM CST  To: Luis Barboza Staff    Pt needs specification on a dexcom 6 or 7 sent into the pharmacy   [facility]   [phone]

## 2024-01-08 NOTE — TELEPHONE ENCOUNTER
----- Message from Daisha Sequeira sent at 1/8/2024  8:16 AM CST -----  - 1/5-3:39-emma with [facility] in [place] has a question about his medications we sent in   [phone]

## 2024-01-08 NOTE — TELEPHONE ENCOUNTER
----- Message from Daisha Sequeira sent at 1/8/2024  8:00 AM CST -----  - 1/5-2:45- dexcom was called in and the drug store can not fill as we did not specify which dexcom. 6 or 7.   [facility]   [phone]

## 2024-01-11 ENCOUNTER — TELEPHONE (OUTPATIENT)
Dept: PAIN MEDICINE | Facility: CLINIC | Age: 64
End: 2024-01-11
Payer: MEDICARE

## 2024-01-11 ENCOUNTER — HOSPITAL ENCOUNTER (OUTPATIENT)
Dept: RADIOLOGY | Facility: HOSPITAL | Age: 64
Discharge: HOME | End: 2024-01-11
Attending: PHYSICIAN ASSISTANT
Payer: MEDICARE

## 2024-01-11 ENCOUNTER — TELEPHONE (OUTPATIENT)
Dept: PHYSICAL MEDICINE AND REHAB | Facility: CLINIC | Age: 64
End: 2024-01-11
Payer: MEDICARE

## 2024-01-11 ENCOUNTER — OFFICE VISIT (OUTPATIENT)
Dept: PAIN MEDICINE | Facility: CLINIC | Age: 64
End: 2024-01-11
Payer: MEDICARE

## 2024-01-11 VITALS
HEART RATE: 76 BPM | SYSTOLIC BLOOD PRESSURE: 198 MMHG | WEIGHT: 232.38 LBS | DIASTOLIC BLOOD PRESSURE: 85 MMHG | HEIGHT: 69 IN | BODY MASS INDEX: 34.42 KG/M2

## 2024-01-11 DIAGNOSIS — M50.30 DDD (DEGENERATIVE DISC DISEASE), CERVICAL: Primary | ICD-10-CM

## 2024-01-11 DIAGNOSIS — M50.30 DDD (DEGENERATIVE DISC DISEASE), CERVICAL: ICD-10-CM

## 2024-01-11 DIAGNOSIS — M51.36 DDD (DEGENERATIVE DISC DISEASE), LUMBAR: ICD-10-CM

## 2024-01-11 PROCEDURE — 96372 THER/PROPH/DIAG INJ SC/IM: CPT | Mod: S$GLB,,, | Performed by: PHYSICIAN ASSISTANT

## 2024-01-11 PROCEDURE — 99215 OFFICE O/P EST HI 40 MIN: CPT | Mod: 25,S$GLB,, | Performed by: PHYSICIAN ASSISTANT

## 2024-01-11 PROCEDURE — 72050 X-RAY EXAM NECK SPINE 4/5VWS: CPT | Mod: 26,,, | Performed by: RADIOLOGY

## 2024-01-11 PROCEDURE — 72050 X-RAY EXAM NECK SPINE 4/5VWS: CPT | Mod: TC,PO

## 2024-01-11 PROCEDURE — 99999 PR PBB SHADOW E&M-EST. PATIENT-LVL III: CPT | Mod: PBBFAC,,, | Performed by: PHYSICIAN ASSISTANT

## 2024-01-11 RX ORDER — METHYLPREDNISOLONE ACETATE 40 MG/ML
40 INJECTION, SUSPENSION INTRA-ARTICULAR; INTRALESIONAL; INTRAMUSCULAR; SOFT TISSUE
Status: COMPLETED | OUTPATIENT
Start: 2024-01-11 | End: 2024-01-11

## 2024-01-11 RX ORDER — METHOCARBAMOL 750 MG/1
750 TABLET, FILM COATED ORAL 3 TIMES DAILY PRN
Qty: 90 TABLET | Refills: 2 | Status: SHIPPED | OUTPATIENT
Start: 2024-01-11 | End: 2024-02-06 | Stop reason: SDUPTHER

## 2024-01-11 RX ADMIN — METHYLPREDNISOLONE ACETATE 40 MG: 40 INJECTION, SUSPENSION INTRA-ARTICULAR; INTRALESIONAL; INTRAMUSCULAR; SOFT TISSUE at 09:01

## 2024-01-11 NOTE — TELEPHONE ENCOUNTER
Please let the patient know that he does have multilevel degenerative changes, most significant at C3-4 and C4-5.  Let us see how he does with the new medication and with the injection he had today.  If his symptoms do not improve I suggest an MRI.

## 2024-01-11 NOTE — TELEPHONE ENCOUNTER
"Pt called this nurse back in regards to the message left w/his son. Pt does not have his VM set up. This nurse was able to speak w/pt and discuss his XR results and discussed his next steps with possible MRI if symptoms get worse. Pt stated he does not have the financial means to cover a MRI at this time. This nurse did suggest calling central pricing to get a quote and also stated there is a financial assistance program and pt could possible set up payments. This nurse encouraged pt to call next week if his symptoms return or get worse. Pt verbalized understanding. This nurse also suggested pt set up his VM. Pt stated he would "try". No further needs at this time.   Olena Damian RN     "

## 2024-01-15 NOTE — PROGRESS NOTES
This note was completed with dictation software and grammatical errors may exist.    Chief Complaint   Patient presents with    Low-back Pain    Neck Pain        HPI: Luis Resendiz is a 63 y.o. year old male patient who has a past medical history of Anticoagulant long-term use, Arthritis, Cervical spine pain, Clotting disorder, Coronary artery disease, Diabetes mellitus, Encounter for blood transfusion, Fatty liver, and Hypertension. He presents in referral from No ref. provider found for right back pain.  He returns in follow-up today with low back and neck pain.  He does feel that his low back pain has improved some.  He has been taking gabapentin 600 mg twice daily as well as tizanidine.  However, even though he reports that his back has improved some he does not feel that medication is helping him at all.  He does report increased low back pain with washing dishes.  He also reports spasms in his low back.  He states his neck has been killing him.  He reports numbness in his hands.  He reports weakness in his legs.    Initial history:  The patient reports having chronic back pain ever since his early 20s he was having issues.  Had his 1st lumbar surgery in 1980 and is 2nd in 1984.  Has always had recurrent flare-ups of back pain radiating into the left leg.  He was going to see a pain management specialist and receiving injections, that was seem to be helping for a while.  Then he stopped going in 2016 and has not had any unbearable pain since that time.  However he still gets flare-ups that last anywhere from several days to weeks, usually on the left side.  For the last 6 weeks however he has had severe right back pain, does not seem to radiate into the leg but radiates out into the hip.  This is so severe that he is having difficulty moving, any flexion or extension.  He denies any trauma that may have caused this.  He states that he is not sure if he has any left-sided pain currently because the right  side hurts so much.  He describes the pain as burning, shooting, aching worse with any type of movement, slightly relieved when lying down or sitting down.  He denies any bowel or bladder incontinence.      Pain intervention history:  The patient reports he was seeing Dr. aTryn Dugan until 2016, was receiving injections with some relief.He is status post bilateral L5/S1 transforaminal epidural steroid injection on 09/26/2023 with 30-40% relief.      Spine surgeries:    Antineuropathics:  Gabapentin at bedtime  NSAIDs:  Physical therapy:  Last time he did physical therapy with several years ago but he feels that the pain is so severe right now he could not even attempt this  Antidepressants:  Muscle relaxers:  Xanax, tizanidine  Opioids:  He occasionally gets hydrocodone for his severe pain  Antiplatelets/Anticoagulants:  Aspirin 325, Coumadin        ROS:  He reports back pain, stiffness, joint stiffness, joint pain.  Balance of review of systems is negative.    Lab Results   Component Value Date    HGBA1C 7.7 (H) 03/21/2023       Lab Results   Component Value Date    WBC 5.4 03/17/2023    HGB 11.9 (L) 03/17/2023    HCT 36.2 (L) 03/17/2023    MCV 96.3 03/17/2023     03/17/2023             Past Medical History:   Diagnosis Date    Anticoagulant long-term use     Arthritis     Cervical spine pain     Clotting disorder     Coronary artery disease     Diabetes mellitus     Encounter for blood transfusion     Fatty liver     Hypertension        Past Surgical History:   Procedure Laterality Date    BACK SURGERY      cabg  2011    CARDIAC SURGERY      FOOT SURGERY Right     FRACTURE SURGERY Right     tibia/fibula    ROTATOR CUFF REPAIR Left     TRANSFORAMINAL EPIDURAL INJECTION OF STEROID Bilateral 9/26/2023    Procedure: Injection,steroid,epidural,transforaminal approach L4/5 and L5/S1;  Surgeon: Rivera Duff MD;  Location: [facility];  Service: Pain Management;  Laterality: Bilateral;       Social History  "    Socioeconomic History    Marital status:    Tobacco Use    Smoking status: Former     Current packs/day: 0.00     Average packs/day: 0.5 packs/day for 0.5 years (0.2 ttl pk-yrs)     Types: Cigarettes     Start date: 1993     Quit date: 1994     Years since quittin.5     Passive exposure: Past    Smokeless tobacco: Never   Substance and Sexual Activity    Alcohol use: Yes     Comment: rarely    Drug use: No         Medications/Allergies: See med card    Vitals:    24 0840   BP: (!) 198/85   Pulse: 76   Weight: 105.4 kg (232 lb 5.8 oz)   Height: 5' 9" (1.753 m)   PainSc:   8   PainLoc: Neck     Body mass index is 34.31 kg/m².    Physical exam:  Gen: A and O x3, pleasant, well-groomed  Skin: No rashes or obvious lesions  HEENT: PERRLA, no obvious deformities on ears or in canals. Trachea midline.  CVS: Regular rate and rhythm, normal palpable pulses.  Resp:No increased work of breathing, symmetrical chest rise.  Abdomen: Soft, NT/ND.  Musculoskeletal:  Slow, shuffling gait    Neuro:    Iliopsoas Quadriceps Knee  Flexion Tibialis  anterior Gastro- cnemius EHL   Lower: R 5/5 5/5 5/5 5/5 5/5 5/5    L 5/5 5/5 5/5 5/5 5/5 5/5      Left  Right    Patellar DTR 0+ 0+   Achilles DTR 0+ 0+                    Intact and symmetrical to light touch and pinprick in L1-S1 dermatomes bilaterally.    Lumbar spine:  Lumbar spine: ROM is severely reduced with both flexion and extension and oblique extension to either side with severe increased pain especially in the right low back.    Sascha's test causes no increased pain on either side.    Supine straight leg raise is positive for low back pain.  Internal and external rotation of the hip causes no increased pain on either side.  Myofascial exam: No tenderness to palpation across lumbar paraspinous muscles.      Imagin2024 x-ray cervical spine   Vertebral body heights are maintained without definite fracture.  Straightening of the cervical " lordosis with mild retrolisthesis of C3 on C4 and C4 on C5 and anterolisthesis of C5 on C6, 2-3 mm. Severe intervertebral disc space narrowing with marked degenerative endplate change and marginal osteophyte formation at C3-4 and C4-5.  Multilevel facet arthropathy.  Odontoid process appears intact.  Multilevel moderate to severe osseous neural foraminal stenosis, most pronounced at C3-4 and C4-5.  Patient is status post sternotomy with chronically fractured sternal wires.  Calcific plaque projects over the left carotid bulb.  Right AC joint arthrosis and chondrocalcinosis.     Assessment:  Luis Resendiz is a 63 y.o. year old male patient who has a past medical history of Anticoagulant long-term use, Arthritis, Cervical spine pain, Clotting disorder, Coronary artery disease, Diabetes mellitus, Encounter for blood transfusion, and Hypertension. He presents in referral from Marianna Hdez for right back pain.     1. DDD (degenerative disc disease), cervical  X-Ray Cervical Spine Complete 5 view      2. DDD (degenerative disc disease), lumbar            Plan:  1. For his neck pain I have ordered x-rays to further evaluate.  We have contacted him with the results after I individually reviewed the images.  I suggest MRI but he can not do this for financial reasons at this time.  2. We provided Depo-Medrol 80 mg IM today.    3. He currently can not do physical therapy or have interventional procedures for financial reasons at this time.  4. He states gabapentin and tizanidine are not helping him so I advised him to wean off of gabapentin and discontinue tizanidine.  I provided a prescription for methocarbamol.  5. Follow-up in 3 months or sooner as needed.      The total time spent for evaluation and management today including reviewing separately obtained history, performing a medically appropriate exam and evaluation, documenting clinical information in the health record, independently interpreting results and  communicating them to the patient/family/caregiver, and ordering medications/tests/procedures was between 40-54 minutes.

## 2024-01-31 ENCOUNTER — TELEPHONE (OUTPATIENT)
Dept: FAMILY MEDICINE | Facility: CLINIC | Age: 64
End: 2024-01-31
Payer: MEDICARE

## 2024-01-31 RX ORDER — GABAPENTIN 300 MG/1
CAPSULE ORAL
Qty: 90 CAPSULE | Refills: 0 | Status: SHIPPED | OUTPATIENT
Start: 2024-01-31 | End: 2024-05-13 | Stop reason: ALTCHOICE

## 2024-01-31 NOTE — TELEPHONE ENCOUNTER
----- Message from Savanah Rowell sent at 1/31/2024  3:19 PM CST -----  The patient said he needs a nurse call him about his Tizanidine. Pt's # [phone]

## 2024-01-31 NOTE — TELEPHONE ENCOUNTER
Please find out if he is still taking gabapentin.  During last visit he told me it was not helping so he was going to wean off of this.

## 2024-01-31 NOTE — TELEPHONE ENCOUNTER
Spoke with pt who states that he was getting Tizanidine from [facility] because it was cheaper. [facility] stopped filling it. He got a refill from [facility] on the 27th who told him he was given a 90 day supply. Gave him 1 month supply. Reviewed pt's chart. Informed him it looks like the Tizanidine was discontinued by his pain doctor and he was prescribed Robaxin. States that he likes to use thermo patched for his back pain and they seem to help the best. He states that he spoke with his pain doctor's office this morning who prescribed him Gabapentin 100 mg 1 in the AM and 2 in the evening. Informed him that unfortunately we do not prescribe his tizanidine anymore, would recommend giving his pain doctors office a call to get something figured out since they have taken over his pain management. He voiced understanding and states he will call them tomorrow.

## 2024-02-06 ENCOUNTER — OFFICE VISIT (OUTPATIENT)
Dept: PAIN MEDICINE | Facility: CLINIC | Age: 64
End: 2024-02-06
Payer: MEDICARE

## 2024-02-06 ENCOUNTER — TELEPHONE (OUTPATIENT)
Dept: FAMILY MEDICINE | Facility: CLINIC | Age: 64
End: 2024-02-06
Payer: MEDICARE

## 2024-02-06 VITALS
WEIGHT: 223.13 LBS | SYSTOLIC BLOOD PRESSURE: 118 MMHG | HEIGHT: 69 IN | HEART RATE: 67 BPM | DIASTOLIC BLOOD PRESSURE: 69 MMHG | BODY MASS INDEX: 33.05 KG/M2

## 2024-02-06 DIAGNOSIS — E78.2 MIXED HYPERLIPIDEMIA: ICD-10-CM

## 2024-02-06 DIAGNOSIS — M54.16 LUMBAR RADICULOPATHY, CHRONIC: ICD-10-CM

## 2024-02-06 DIAGNOSIS — Z00.00 ANNUAL PHYSICAL EXAM: ICD-10-CM

## 2024-02-06 DIAGNOSIS — I10 PRIMARY HYPERTENSION: ICD-10-CM

## 2024-02-06 DIAGNOSIS — M54.16 LUMBAR RADICULOPATHY: Primary | ICD-10-CM

## 2024-02-06 DIAGNOSIS — M50.30 DDD (DEGENERATIVE DISC DISEASE), CERVICAL: ICD-10-CM

## 2024-02-06 DIAGNOSIS — M54.12 CERVICAL RADICULOPATHY: ICD-10-CM

## 2024-02-06 DIAGNOSIS — E11.69 DM TYPE 2 WITH DIABETIC DYSLIPIDEMIA: Primary | ICD-10-CM

## 2024-02-06 DIAGNOSIS — M51.36 DDD (DEGENERATIVE DISC DISEASE), LUMBAR: ICD-10-CM

## 2024-02-06 DIAGNOSIS — E78.5 DM TYPE 2 WITH DIABETIC DYSLIPIDEMIA: Primary | ICD-10-CM

## 2024-02-06 PROCEDURE — 99215 OFFICE O/P EST HI 40 MIN: CPT | Mod: S$GLB,,,

## 2024-02-06 PROCEDURE — 99999 PR PBB SHADOW E&M-EST. PATIENT-LVL II: CPT | Mod: PBBFAC,,,

## 2024-02-06 RX ORDER — PREGABALIN 75 MG/1
75 CAPSULE ORAL 2 TIMES DAILY
Qty: 60 CAPSULE | Refills: 1 | Status: SHIPPED | OUTPATIENT
Start: 2024-02-06 | End: 2024-05-13 | Stop reason: SDUPTHER

## 2024-02-06 RX ORDER — TIZANIDINE 4 MG/1
4 TABLET ORAL EVERY 6 HOURS PRN
Qty: 40 TABLET | Refills: 2 | Status: SHIPPED | OUTPATIENT
Start: 2024-02-06 | End: 2024-02-16

## 2024-02-06 NOTE — TELEPHONE ENCOUNTER
Left message on voice mail, verbalizing that we have placed labs for the pt to have done before pt's upcoming appointment on 02/21/2024. Lab order are with [facility]. So you can come into the office for your labs, no appointment necessary. Just make sure you are fasting for your labs. Verbalized that if they have any question or concerns, please give our office a call.

## 2024-02-06 NOTE — PROGRESS NOTES
This note was completed with dictation software and grammatical errors may exist.    Chief Complaint   Patient presents with    Low-back Pain    Neck Pain        HPI: Luis Resendiz is a 63 y.o. year old male patient who has a past medical history of Anticoagulant long-term use, Arthritis, Cervical spine pain, Clotting disorder, Coronary artery disease, Diabetes mellitus, Encounter for blood transfusion, Fatty liver, and Hypertension. He presents in referral from No ref. provider found for right back pain.  He returns for follow-up with continued neck pain with radiation into left arm.  He reports numbness and tingling in left hand when holding his left arm up to hold a phone.  He is also reporting worsening lower back pain, 10/10, constant, located across his lower back with radiation down left leg into left lateral hip left buttock stopping before his knee.  He has been taking gabapentin and Robaxin without significant relief of his pain.  Most recent intramuscular steroid injection in January provided no relief.    Initial history:  The patient reports having chronic back pain ever since his early 20s he was having issues.  Had his 1st lumbar surgery in 1980 and is 2nd in 1984.  Has always had recurrent flare-ups of back pain radiating into the left leg.  He was going to see a pain management specialist and receiving injections, that was seem to be helping for a while.  Then he stopped going in 2016 and has not had any unbearable pain since that time.  However he still gets flare-ups that last anywhere from several days to weeks, usually on the left side.  For the last 6 weeks however he has had severe right back pain, does not seem to radiate into the leg but radiates out into the hip.  This is so severe that he is having difficulty moving, any flexion or extension.  He denies any trauma that may have caused this.  He states that he is not sure if he has any left-sided pain currently because the right  side hurts so much.  He describes the pain as burning, shooting, aching worse with any type of movement, slightly relieved when lying down or sitting down.  He denies any bowel or bladder incontinence.      Pain intervention history:  The patient reports he was seeing Dr. Taryn Dugan until 2016, was receiving injections with some relief.He is status post bilateral L5/S1 transforaminal epidural steroid injection on 09/26/2023 with 30-40% relief.      Spine surgeries:    Antineuropathics:  Gabapentin at bedtime  NSAIDs:  Physical therapy:  Last time he did physical therapy with several years ago but he feels that the pain is so severe right now he could not even attempt this  Antidepressants:  Muscle relaxers:  Xanax, tizanidine  Opioids:  He occasionally gets hydrocodone for his severe pain  Antiplatelets/Anticoagulants:  Aspirin 325, Coumadin        ROS:  He reports back pain, stiffness, joint stiffness, joint pain.  Balance of review of systems is negative.    Lab Results   Component Value Date    HGBA1C 7.7 (H) 03/21/2023       Lab Results   Component Value Date    WBC 5.4 03/17/2023    HGB 11.9 (L) 03/17/2023    HCT 36.2 (L) 03/17/2023    MCV 96.3 03/17/2023     03/17/2023             Past Medical History:   Diagnosis Date    Anticoagulant long-term use     Arthritis     Cervical spine pain     Clotting disorder     Coronary artery disease     Diabetes mellitus     Encounter for blood transfusion     Fatty liver     Hypertension        Past Surgical History:   Procedure Laterality Date    BACK SURGERY      cabg  2011    CARDIAC SURGERY      FOOT SURGERY Right     FRACTURE SURGERY Right     tibia/fibula    ROTATOR CUFF REPAIR Left     TRANSFORAMINAL EPIDURAL INJECTION OF STEROID Bilateral 9/26/2023    Procedure: Injection,steroid,epidural,transforaminal approach L4/5 and L5/S1;  Surgeon: Rivera Duff MD;  Location: [facility];  Service: Pain Management;  Laterality: Bilateral;       Social History  "    Socioeconomic History    Marital status:    Tobacco Use    Smoking status: Former     Current packs/day: 0.00     Average packs/day: 0.5 packs/day for 0.5 years (0.2 ttl pk-yrs)     Types: Cigarettes     Start date: 1993     Quit date: 1994     Years since quittin.6     Passive exposure: Past    Smokeless tobacco: Never   Substance and Sexual Activity    Alcohol use: Yes     Comment: rarely    Drug use: No         Medications/Allergies: See med card    Vitals:    24 1311   BP: 118/69   Pulse: 67   Weight: 101.2 kg (223 lb 1.7 oz)   Height: 5' 9" (1.753 m)   PainSc: 10-Worst pain ever   PainLoc: Neck       Body mass index is 32.95 kg/m².    Physical exam:  Gen: A and O x3, pleasant, well-groomed  Skin: No rashes or obvious lesions  HEENT: PERRLA, no obvious deformities on ears or in canals. Trachea midline.  CVS: Regular rate and rhythm, normal palpable pulses.  Resp:No increased work of breathing, symmetrical chest rise.  Abdomen: Soft, NT/ND.  Musculoskeletal:  Slow, shuffling gait    Neuro:    Iliopsoas Quadriceps Knee  Flexion Tibialis  anterior Gastro- cnemius EHL   Lower: R 5/5 5/5 5/5 5/5 5/5 5/5    L 5/5 5/5 5/5 5/5 5/5 5/5      Left  Right    Patellar DTR 0+ 0+   Achilles DTR 0+ 0+                    Intact and symmetrical to light touch and pinprick in L1-S1 dermatomes bilaterally.    Lumbar spine:  Lumbar spine: ROM is severely reduced with both flexion and extension and oblique extension to either side with severe increased pain especially in the right low back.    Sascha's test causes no increased pain on either side.    Supine straight leg raise is positive for low back pain.  Internal and external rotation of the hip causes no increased pain on either side.  Myofascial exam: No tenderness to palpation across lumbar paraspinous muscles.    Neuro:  Motor:    Right Left   C4 Shoulder Abduction  5  5   C5 Elbow Flexion    5  5   C6 Wrist Extension  5  5   C7 Elbow " Extension   5  5   C8/T1 Hand Intrinsics   5  5   C8 First Dorsal Interosseus  5  5   C8 Abductor Pollicus Brevis  5  5      Left  Right    Triceps DTR 1+ 1+   Biceps DTR 1+ 1+   Brachioradialis DTR 1+ 1+   Patellar DTR     Achilles DTR     Donald     Clonus     Babinski       Sensory: Intact and symmetrical to light touch and pinprick in C2-T1 dermatomes bilaterally.  Cervical spine: ROM is full in flexion, extension and lateral rotation without increased pain.  Spurling's maneuver causes no neck pain to either side.  Myofascial exam: No Tenderness to palpation across cervical paraspinous region bilaterally.      Imagin2024 x-ray cervical spine   Vertebral body heights are maintained without definite fracture.  Straightening of the cervical lordosis with mild retrolisthesis of C3 on C4 and C4 on C5 and anterolisthesis of C5 on C6, 2-3 mm. Severe intervertebral disc space narrowing with marked degenerative endplate change and marginal osteophyte formation at C3-4 and C4-5.  Multilevel facet arthropathy.  Odontoid process appears intact.  Multilevel moderate to severe osseous neural foraminal stenosis, most pronounced at C3-4 and C4-5.  Patient is status post sternotomy with chronically fractured sternal wires.  Calcific plaque projects over the left carotid bulb.  Right AC joint arthrosis and chondrocalcinosis.     Assessment:  Luis Resendiz is a 63 y.o. year old male patient who has a past medical history of Anticoagulant long-term use, Arthritis, Cervical spine pain, Clotting disorder, Coronary artery disease, Diabetes mellitus, Encounter for blood transfusion, and Hypertension. He presents in referral from Marianna Hdez for right back pain.     1. Lumbar radiculopathy  tiZANidine (ZANAFLEX) 4 MG tablet      2. DDD (degenerative disc disease), cervical        3. DDD (degenerative disc disease), lumbar        4. Lumbar radiculopathy, chronic        5. Cervical radiculopathy               Plan:  He reports his neck pain has actually improved over the past few weeks.  His back pain is most problematic for him.  Discussed repeat lumbar epidural however he would like to try more conservative options with medications.  He found no relief with the Robaxin, refilled tizanidine.  In lieu of gabapentin 300 mg b.i.d. we will trial Lyrica 75 mg b.i.d. discussed with patient if he has any ill side effects or adverse events to discontinue immediately and returned to gabapentin.  Prescription for Lyrica 75 mg b.i.d. sent to Dr. Duff today for approval.  He requested pain medication today.  I discussed his case further with Dr. Duff, we do not recommend opioids  Follow up in 6 weeks or sooner if needed        The total time spent for evaluation and management today including reviewing separately obtained history, performing a medically appropriate exam and evaluation, documenting clinical information in the health record, independently interpreting results and communicating them to the patient/family/caregiver, and ordering medications/tests/procedures was between 40-54 minutes.

## 2024-02-08 ENCOUNTER — TELEPHONE (OUTPATIENT)
Dept: PAIN MEDICINE | Facility: CLINIC | Age: 64
End: 2024-02-08
Payer: MEDICARE

## 2024-02-08 NOTE — TELEPHONE ENCOUNTER
----- Message from Maeve Rosales sent at 2/8/2024  1:02 PM CST -----  Contact: pt  Type: Needs Medical Advice  Who Called:  pt  Best Call Back Number: [phone]    Additional Information: Pt is calling the office have went through all things he needs to do and meds aren't helping needs some in site on what to do for the pain.Please call back and advise.

## 2024-02-08 NOTE — TELEPHONE ENCOUNTER
We are not going to be giving him opioids. Please give him a list of other area pain management physicians

## 2024-02-21 ENCOUNTER — OFFICE VISIT (OUTPATIENT)
Dept: FAMILY MEDICINE | Facility: CLINIC | Age: 64
End: 2024-02-21
Payer: MEDICARE

## 2024-02-21 VITALS
HEART RATE: 60 BPM | BODY MASS INDEX: 33.42 KG/M2 | DIASTOLIC BLOOD PRESSURE: 60 MMHG | WEIGHT: 225.63 LBS | OXYGEN SATURATION: 99 % | SYSTOLIC BLOOD PRESSURE: 122 MMHG | HEIGHT: 69 IN

## 2024-02-21 DIAGNOSIS — F13.20 BENZODIAZEPINE DEPENDENCE: ICD-10-CM

## 2024-02-21 DIAGNOSIS — F41.9 ANXIETY: ICD-10-CM

## 2024-02-21 DIAGNOSIS — I10 ESSENTIAL HYPERTENSION: ICD-10-CM

## 2024-02-21 DIAGNOSIS — Z79.4 TYPE 2 DIABETES MELLITUS WITH HYPOGLYCEMIA WITHOUT COMA, WITH LONG-TERM CURRENT USE OF INSULIN: Primary | ICD-10-CM

## 2024-02-21 DIAGNOSIS — M50.30 DDD (DEGENERATIVE DISC DISEASE), CERVICAL: ICD-10-CM

## 2024-02-21 DIAGNOSIS — I10 PRIMARY HYPERTENSION: ICD-10-CM

## 2024-02-21 DIAGNOSIS — E78.2 MIXED HYPERLIPIDEMIA: ICD-10-CM

## 2024-02-21 DIAGNOSIS — E11.649 TYPE 2 DIABETES MELLITUS WITH HYPOGLYCEMIA WITHOUT COMA, WITH LONG-TERM CURRENT USE OF INSULIN: Primary | ICD-10-CM

## 2024-02-21 DIAGNOSIS — M51.36 DDD (DEGENERATIVE DISC DISEASE), LUMBAR: ICD-10-CM

## 2024-02-21 DIAGNOSIS — D68.51 HETEROZYGOUS FACTOR V LEIDEN MUTATION: ICD-10-CM

## 2024-02-21 DIAGNOSIS — I25.10 CORONARY ARTERY DISEASE INVOLVING NATIVE CORONARY ARTERY OF NATIVE HEART WITHOUT ANGINA PECTORIS: ICD-10-CM

## 2024-02-21 LAB — HBA1C MFR BLD: 6.2 %

## 2024-02-21 PROCEDURE — 99214 OFFICE O/P EST MOD 30 MIN: CPT | Mod: S$GLB,,, | Performed by: NURSE PRACTITIONER

## 2024-02-21 PROCEDURE — 83036 HEMOGLOBIN GLYCOSYLATED A1C: CPT | Mod: QW,,, | Performed by: NURSE PRACTITIONER

## 2024-02-21 RX ORDER — ATENOLOL 50 MG/1
100 TABLET ORAL 2 TIMES DAILY
Qty: 360 TABLET | Refills: 3 | Status: SHIPPED | OUTPATIENT
Start: 2024-02-21

## 2024-02-21 NOTE — PATIENT INSTRUCTIONS
Stop glimeperide and monitor blood sugar over the next 2 weeks- may need to increase insulin dose

## 2024-02-21 NOTE — TELEPHONE ENCOUNTER
----- Message from Pietro Oliva sent at 2/21/2024 11:03 AM CST -----  Pt was seen today and needs a 3 month f/u appt. Also, he needs a refill on his atenolol. He uses [facility] [address] [place] [address] [address]  [phone]

## 2024-02-21 NOTE — PROGRESS NOTES
SUBJECTIVE:    Patient ID: Luis Resendiz is a 63 y.o. male.    Chief Complaint: Diabetes (No bottles//Pt is here for a checkup and medication refills//Discuss back pain, pt stated that increased pain with movement, on going thing//discuss diabetes//Eye exam done on 6-7 week, in [place]//ANA )    Pt here for regular DM/lipids/CAD f/u    Pt reports overall doing okay.    Reports has been seeing Dr. Duff, pain mgmt for chronic neck/back pain. had lumbar LULU which did help with pain for about a month. At last visit c/o'd of worsening neck pain- recommended MRI but pt reports can't afford copay. At last visit on 2/6 pt was prescribed lyrica in place of gabapentin though pt reports has been taking both meds. Had called pain mgmt on 2/8 c/o'ing of severe pain and asking for something for pain and was advised they would no longer be able to see him and couldn't provide anything more. Pt has now scheduled appt with Dr. Rojas, pain mgmt.     Reports has continued to have low blood sugar episodes- wearing Dexcom CGM using - review of data shows average blood sugar 121, TIR 70%, 11% low, 7% very low, 8% high, 4% very high. Reports will eat bowl of cereal during the night when his sugar drops but then will have sugars up to 250-270.  Reports doesn't really know how much insulin to takes and varies between 10-40units but doesn't understand how to adjust based on what he's eating. Takes glimeperide around 4:30am and 4pm and then insulin around 8a-8pm. Most hypoglycemia is occurring in the evening or overnight.     Hx of CAD/CABG- saw Dr. Crow in December after having a syncopal episode in [place]. Pt feels it was d/t low blood sugar though apparently BP was low when EMS arrived. Dr. Crow recommended taking only 1/2 tab if -160 and hold if less than 130    Follows with Dr. Sofia for hypercoagulable- Factor V leiden on warfarin        Office Visit on 02/21/2024   Component Date Value Ref Range  Status    Hemoglobin A1C, POC 02/21/2024 6.2  % Final   Ancillary Orders on 01/26/2024   Component Date Value Ref Range Status    INR 01/31/2024 2.2 (H)   Final    Prothrombin Time 01/31/2024 21.9 (H)  9.0 - 11.5 sec Final   Ancillary Orders on 12/29/2023   Component Date Value Ref Range Status    INR 12/29/2023 2.1 (H)   Final    Prothrombin Time 12/29/2023 20.7 (H)  9.0 - 11.5 sec Final   Office Visit on 11/15/2023   Component Date Value Ref Range Status    Hemoglobin A1C, POC 11/15/2023 6.6  % Final    Glucose 11/15/2023 163 (H)  65 - 99 mg/dL Final    BUN 11/15/2023 20  7 - 25 mg/dL Final    Creatinine 11/15/2023 1.22  0.70 - 1.35 mg/dL Final    eGFR 11/15/2023 66  > OR = 60 mL/min/1.73m2 Final    BUN/Creatinine Ratio 11/15/2023 SEE NOTE:  6 - 22 (calc) Final    Sodium 11/15/2023 140  135 - 146 mmol/L Final    Potassium 11/15/2023 5.2  3.5 - 5.3 mmol/L Final    Chloride 11/15/2023 106  98 - 110 mmol/L Final    CO2 11/15/2023 27  20 - 32 mmol/L Final    Calcium 11/15/2023 9.4  8.6 - 10.3 mg/dL Final    Total Protein 11/15/2023 7.5  6.1 - 8.1 g/dL Final    Albumin 11/15/2023 4.1  3.6 - 5.1 g/dL Final    Globulin, Total 11/15/2023 3.4  1.9 - 3.7 g/dL (calc) Final    Albumin/Globulin Ratio 11/15/2023 1.2  1.0 - 2.5 (calc) Final    Total Bilirubin 11/15/2023 0.5  0.2 - 1.2 mg/dL Final    Alkaline Phosphatase 11/15/2023 36  35 - 144 U/L Final    AST 11/15/2023 21  10 - 35 U/L Final    ALT 11/15/2023 19  9 - 46 U/L Final    Cholesterol 11/15/2023 130  <200 mg/dL Final    HDL 11/15/2023 44  > OR = 40 mg/dL Final    Triglycerides 11/15/2023 143  <150 mg/dL Final    LDL Cholesterol 11/15/2023 63  mg/dL (calc) Final    HDL/Cholesterol Ratio 11/15/2023 3.0  <5.0 (calc) Final    Non HDL Chol. (LDL+VLDL) 11/15/2023 86  <130 mg/dL (calc) Final   Ancillary Orders on 11/03/2023   Component Date Value Ref Range Status    INR 11/29/2023 2.4 (H)   Final    Prothrombin Time 11/29/2023 23.8 (H)  9.0 - 11.5 sec Final   Telephone on  10/20/2023   Component Date Value Ref Range Status    INR 10/26/2023 2.1 (H)   Final    Prothrombin Time 10/26/2023 20.7 (H)  9.0 - 11.5 sec Final   Telephone on 10/19/2023   Component Date Value Ref Range Status    INR 10/19/2023 3.5 (H)   Final    Prothrombin Time 10/19/2023 34.3 (H)  9.0 - 11.5 sec Final   Admission on 09/26/2023, Discharged on 09/26/2023   Component Date Value Ref Range Status    POCT Glucose 09/26/2023 145 (H)  70 - 110 mg/dL Final       Past Medical History:   Diagnosis Date    Anticoagulant long-term use     Arthritis     Cervical spine pain     Clotting disorder     Coronary artery disease     Diabetes mellitus     Encounter for blood transfusion     Fatty liver     Hypertension      Past Surgical History:   Procedure Laterality Date    BACK SURGERY      cabg  2011    CARDIAC SURGERY      FOOT SURGERY Right     FRACTURE SURGERY Right     tibia/fibula    ROTATOR CUFF REPAIR Left     TRANSFORAMINAL EPIDURAL INJECTION OF STEROID Bilateral 9/26/2023    Procedure: Injection,steroid,epidural,transforaminal approach L4/5 and L5/S1;  Surgeon: Rivera Duff MD;  Location: [facility];  Service: Pain Management;  Laterality: Bilateral;     Family History   Problem Relation Age of Onset    Heart disease Mother     Cancer Mother     Heart disease Father     Alcohol abuse Father     No Known Problems Sister     No Known Problems Brother     No Known Problems Daughter     No Known Problems Son     Cataracts Neg Hx     Glaucoma Neg Hx     Retinal detachment Neg Hx     Macular degeneration Neg Hx        Tests to Keep You Healthy    Eye Exam: ORDERED BUT NOT SCHEDULED  Colon Cancer Screening: Met on 8/17/2021  Last Blood Pressure <= 139/89 (2/21/2024): Yes  Last HbA1c < 8 (02/21/2024): Yes      The 10-year CVD risk score (Christiano et al., 2008) is: 25.4%    Values used to calculate the score:      Age: 63 years      Sex: Male      Diabetic: Yes      Tobacco smoker: No      Systolic Blood Pressure: 122  "mmHg      Is BP treated: Yes      HDL Cholesterol: 44 mg/dL      Total Cholesterol: 130 mg/dL     Marital Status:   Alcohol History:  reports current alcohol use.  Tobacco History:  reports that he quit smoking about 29 years ago. His smoking use included cigarettes. He started smoking about 30 years ago. He has a 0.2 pack-year smoking history. He has been exposed to tobacco smoke. He has never used smokeless tobacco.  Drug History:  reports no history of drug use.    Health Maintenance Topics with due status: Not Due       Topic Last Completion Date    Colorectal Cancer Screening 08/17/2021    TETANUS VACCINE 01/22/2022    Foot Exam 07/26/2023    Lipid Panel 11/15/2023    High Dose Statin 02/06/2024    Hemoglobin A1c 02/21/2024     Immunization History   Administered Date(s) Administered    COVID-19, MRNA, LN-S, PF (Pfizer) (Purple Cap) 03/25/2021, 04/14/2021    Influenza (FLUAD) - Quadrivalent - Adjuvanted - PF *Preferred* (65+) 11/15/2023    Influenza (FLUBLOK) - Quadrivalent - Recombinant - PF *Preferred* (egg allergy) 11/16/2020, 11/17/2021, 11/23/2022    Influenza - Quadrivalent - PF *Preferred* (6 months and older) 12/26/2019    Influenza - Trivalent (ADULT) 10/01/2014    Influenza - Trivalent - Recombinant - PF 09/25/2018    Tdap 01/22/2022       Review of patient's allergies indicates:   Allergen Reactions    Nsaids (non-steroidal anti-inflammatory drug) Nausea Only    Ancef [cefazolin] Anxiety       Current Outpatient Medications:     ALPRAZolam (XANAX) 1 MG tablet, Take 1 tablet (1 mg total) by mouth 2 (two) times daily., Disp: 60 tablet, Rfl: 5    amLODIPine (NORVASC) 10 MG tablet, Take 1 tablet (10 mg total) by mouth once daily., Disp: 90 tablet, Rfl: 3    aspirin 325 MG tablet, Take 325 mg by mouth once daily., Disp: , Rfl:     atenoloL (TENORMIN) 50 MG tablet, Take 2 tablets (100 mg total) by mouth 2 (two) times a day., Disp: 360 tablet, Rfl: 3    BD ULTRA-FINE MINI PEN NEEDLE 31 gauge x 3/16" " "Ndle, 1 each by In Vitro route once daily., Disp: 100 each, Rfl: 1    blood sugar diagnostic Strp, To check BG 3-4 times daily, to use with insurance preferred meter, Disp: 360 each, Rfl: 3    blood-glucose meter kit, To check BG 3-4 times daily, to use with insurance preferred meter, Disp: 1 each, Rfl: 0    blood-glucose meter,continuous (DEXCOM ) Misc, 1 each by Misc.(Non-Drug; Combo Route) route Daily., Disp: 1 each, Rfl: 0    blood-glucose sensor (DEXCOM G7 SENSOR) Phuong, 1 each by Misc.(Non-Drug; Combo Route) route every 10 days., Disp: 3 each, Rfl: 11    enoxaparin (LOVENOX) 40 mg/0.4 mL Syrg, Inject 0.4 mLs (40 mg total) into the skin once daily., Disp: 10 each, Rfl: 1    fenofibrate (TRICOR) 145 MG tablet, Take 1 tablet (145 mg total) by mouth once daily., Disp: 90 tablet, Rfl: 3    gabapentin (NEURONTIN) 300 MG capsule, TAKE 1 CAPSULE BY MOUTH IN THE MORNING AND 2 IN THE EVENING, Disp: 90 capsule, Rfl: 0    insulin NPH-insulin regular, 70/30, (HUMULIN 70/30 U-100 INSULIN) 100 unit/mL (70-30) injection, 35 units in AM and 35 units in PM- may titrate upwards as needed to max of 40 units BID, Disp: 7 each, Rfl: 3    insulin syringe-needle U-100 0.3 mL 31 gauge x 15/64" Syrg, , Disp: , Rfl:     insulin syringe-needle U-100 1/2 mL 31 gauge x 15/64" Syrg, BID, Disp: 180 Syringe, Rfl: 3    lancets Misc, To check BG 3-4 times daily, to use with insurance preferred meter, Disp: 360 each, Rfl: 3    lisinopriL (PRINIVIL,ZESTRIL) 20 MG tablet, Take 1 tablet (20 mg total) by mouth 2 (two) times daily., Disp: 180 tablet, Rfl: 3    metFORMIN (GLUCOPHAGE) 500 MG tablet, Take 2 tablets (1,000 mg total) by mouth 2 (two) times daily with meals., Disp: 360 tablet, Rfl: 3    pregabalin (LYRICA) 75 MG capsule, Take 1 capsule (75 mg total) by mouth 2 (two) times daily., Disp: 60 capsule, Rfl: 01    rosuvastatin (CRESTOR) 40 MG Tab, Take 1 tablet (40 mg total) by mouth once daily., Disp: 90 tablet, Rfl: 3    tamsulosin " "(FLOMAX) 0.4 mg Cap, Take 1 capsule (0.4 mg total) by mouth once daily. Take 30 minutes after evening meal, Disp: 30 capsule, Rfl: 11    TRUEPLUS INSULIN 0.5 mL 29 gauge x 1/2" Syrg, , Disp: , Rfl: 5    ULTRA COMFORT INSULIN SYRINGE 1 mL 29 gauge x 1/2" Syrg, Inject 25 Units as directed 2 (two) times daily., Disp: 180 each, Rfl: 3    warfarin (COUMADIN) 1 MG tablet, Take 1 tablet by mouth once daily, Disp: 90 tablet, Rfl: 3    warfarin (COUMADIN) 4 MG tablet, Take 1 tablet (4 mg total) by mouth Daily., Disp: 30 tablet, Rfl: 1    warfarin (COUMADIN) 5 MG tablet, Take as directed by your doctor., Disp: 45 tablet, Rfl: 3    zolpidem (AMBIEN) 5 MG Tab, Take 1 tablet (5 mg total) by mouth every evening., Disp: 30 tablet, Rfl: 0  No current facility-administered medications for this visit.    Facility-Administered Medications Ordered in Other Visits:     LIDOcaine (PF) 10 mg/ml (1%) injection 10 mg, 1 mL, Other, Once, Rivera Duff MD    Review of Systems   Constitutional:  Negative for appetite change, chills, fever and unexpected weight change.   HENT:  Negative for sore throat and trouble swallowing.    Eyes:  Negative for visual disturbance.   Respiratory:  Negative for cough, shortness of breath and wheezing.    Cardiovascular:  Negative for chest pain, palpitations and leg swelling.   Gastrointestinal:  Negative for abdominal pain, blood in stool, constipation, diarrhea, nausea and vomiting.   Genitourinary:  Positive for frequency (nocturia 3-4 times). Negative for dysuria and hematuria.   Musculoskeletal:  Positive for back pain (chronic, takes tylenol BID) and neck pain. Negative for gait problem.   Skin:  Negative for rash.   Neurological:  Positive for numbness (left hand numbness intermittently). Negative for dizziness, syncope, speech difficulty and headaches.   Psychiatric/Behavioral:  Negative for dysphoric mood. The patient is not nervous/anxious (stable on xanax).           Objective:      Vitals: " "   02/21/24 0951   BP: 122/60   Pulse: 60   SpO2: 99%   Weight: 102.3 kg (225 lb 9.6 oz)   Height: 5' 9" (1.753 m)     Physical Exam  Vitals and nursing note reviewed.   Constitutional:       General: He is not in acute distress.     Appearance: He is well-developed. He is obese.   HENT:      Head: Normocephalic and atraumatic.      Right Ear: Tympanic membrane and ear canal normal.      Left Ear: Tympanic membrane and ear canal normal.   Neck:      Vascular: No carotid bruit.   Cardiovascular:      Rate and Rhythm: Normal rate and regular rhythm.      Heart sounds: No murmur heard.     No friction rub. No gallop.      Comments: +spider and varicose veins to bilat lower legs/ankles, chronic purplish discoloration to bilat feet, feet warm/dry  Pulmonary:      Effort: Pulmonary effort is normal. No respiratory distress.      Breath sounds: Normal breath sounds. No wheezing or rales.   Abdominal:      General: There is no distension.      Palpations: Abdomen is soft.      Tenderness: There is no abdominal tenderness.   Musculoskeletal:      Cervical back: Neck supple.      Right lower leg: No edema.      Left lower leg: No edema.   Lymphadenopathy:      Cervical: No cervical adenopathy.   Skin:     General: Skin is warm and dry.      Findings: No rash.   Neurological:      General: No focal deficit present.      Mental Status: He is alert and oriented to person, place, and time.      Gait: Gait normal.           Assessment:       1. Type 2 diabetes mellitus with hypoglycemia without coma, with long-term current use of insulin    2. Primary hypertension    3. Mixed hyperlipidemia    4. Coronary artery disease involving native coronary artery of native heart without angina pectoris    5. DDD (degenerative disc disease), cervical    6. DDD (degenerative disc disease), lumbar    7. Anxiety    8. Benzodiazepine dependence    9. Heterozygous factor V Leiden mutation           Plan:       1. Type 2 diabetes mellitus with " hypoglycemia without coma, with long-term current use of insulin  -A1C 6.2% and pt advised given 7% very low readings on CGM recommend stopping glimeperide- will likely need to adjust insulin dose. Also recommend diabetic educator referral to help him learn how to adjust insulin based on diet  -     Hemoglobin A1C, POCT  -     Ambulatory referral/consult to Diabetes Education; Future; Expected date: 02/28/2024    2. Primary hypertension   -BP well controlled    3. Mixed hyperlipidemia   -well controlled on last labs    4. Coronary artery disease involving native coronary artery of native heart without angina pectoris   -stable, denies angina    5. DDD (degenerative disc disease), cervical   -pt continues with chronic neck/back pain- advised he should not be taking both lyrica and gabapentin. Pt not happy with [facility] pain mgmt and has scheduled an appt with Dr. Rojas    6. DDD (degenerative disc disease), lumbar    7. Anxiety   -stable on chronic benzo- have cautioned pt previously on risks of benzos and need to avoid opioid medication while taking medication    8. Benzodiazepine dependence    9. Heterozygous factor V Leiden mutation   -stable, INR therapeutic, monitored by Dr. Sofia     Follow up in about 3 months (around 5/21/2024) for Diabetes.          Counseled on age and gender appropriate medical preventative services, including cancer screenings, immunizations, overall nutritional health, need for a consistent exercise regimen and an overall push towards maintaining a vigorous and active lifestyle.      2/21/2024 Marianna Hdez NP

## 2024-02-28 ENCOUNTER — TELEPHONE (OUTPATIENT)
Dept: FAMILY MEDICINE | Facility: CLINIC | Age: 64
End: 2024-02-28
Payer: MEDICARE

## 2024-02-28 NOTE — TELEPHONE ENCOUNTER
Spoke with pt in regards to recent message sent. Verbalized per Marianna that the diabetic educator has been trying to reach him to set up appt and gave pt the diabetic educator number. Pt acknowledged understanding.

## 2024-02-28 NOTE — TELEPHONE ENCOUNTER
----- Message from Marianna Hdez NP sent at 2/28/2024 10:33 AM CST -----  Regarding: FW: Other  Please let pt know the diabetic educator has been trying to reach him to set up appt and give him phone number to call    ----- Message -----  From: Mary De Dios  Sent: 2/28/2024  10:30 AM CST  To: Marianna Hdez NP  Subject: Other

## 2024-03-05 ENCOUNTER — TELEPHONE (OUTPATIENT)
Dept: HEMATOLOGY/ONCOLOGY | Facility: CLINIC | Age: 64
End: 2024-03-05

## 2024-03-05 ENCOUNTER — OFFICE VISIT (OUTPATIENT)
Dept: HEMATOLOGY/ONCOLOGY | Facility: CLINIC | Age: 64
End: 2024-03-05
Payer: MEDICARE

## 2024-03-05 ENCOUNTER — TELEPHONE (OUTPATIENT)
Dept: FAMILY MEDICINE | Facility: CLINIC | Age: 64
End: 2024-03-05
Payer: MEDICARE

## 2024-03-05 VITALS
BODY MASS INDEX: 33.08 KG/M2 | TEMPERATURE: 98 F | DIASTOLIC BLOOD PRESSURE: 63 MMHG | WEIGHT: 224 LBS | RESPIRATION RATE: 15 BRPM | SYSTOLIC BLOOD PRESSURE: 119 MMHG | HEART RATE: 62 BPM

## 2024-03-05 DIAGNOSIS — D68.51 HETEROZYGOUS FACTOR V LEIDEN MUTATION: Primary | Chronic | ICD-10-CM

## 2024-03-05 PROCEDURE — 99213 OFFICE O/P EST LOW 20 MIN: CPT | Mod: S$GLB,,, | Performed by: INTERNAL MEDICINE

## 2024-03-05 NOTE — TELEPHONE ENCOUNTER
----- Message from Ac Smith MD sent at 3/4/2024  5:26 AM CST -----  Call patient,  continue therapy.  INR good.  Recheck one month      Patient saw Dr. Smith today. INR and dosage of coumadin discussed. He will recheck in 1 month.

## 2024-03-05 NOTE — TELEPHONE ENCOUNTER
Spoke with pt in regards to recent message sent. Pt stated that his blood sugar is better, he had to lower humulin insulin to between 30-35 units, from his max dose of 40 units.   Pt stated that his morning fasting blood sugar today was 109.    14 day report:   Average blood sugar was 119  Time in range:  1% very high  10% high  76 % In range  9 % Low  4% Very low

## 2024-03-05 NOTE — TELEPHONE ENCOUNTER
----- Message from Marianna Hdez NP sent at 2/21/2024  8:11 PM CST -----  Please call pt and ask him how his sugar readings have been- have him pull up 14 day report on Dexcom  and see what is time in range, very low and very high reading percentages are running

## 2024-03-05 NOTE — ASSESSMENT & PLAN NOTE
Patient is doing well and his INR has been very stable.  He continues on life-long therapy and will continue to see him on a yearly basis.  Patient is ok with this approach.

## 2024-03-05 NOTE — TELEPHONE ENCOUNTER
Spoke with pt, he stated that he has an appointment with Dr. Smith today. He stated that he'll come to the office to speak with us personally about his blood sugar.

## 2024-03-05 NOTE — TELEPHONE ENCOUNTER
Spoke with pt in person in regards to message. Verbalized per Marianna that she would like to see less of these very low episodes so, Marianna stated would do just as he has and cut back on his insulin dose slightly. Pt acknowledged understanding.

## 2024-03-05 NOTE — TELEPHONE ENCOUNTER
Please let pt know we would like to see less of these very low episodes so I would do just as he has and cut back on his insulin dose slightly

## 2024-03-05 NOTE — PROGRESS NOTES
"                                                         PROGRESS NOTE    Subjective:       Patient ID: Luis Resendiz is a 63 y.o. male.    Chief Complaint:  No chief complaint on file.  Hypercoagulable Syndrome, long term use of anticoagulants follow up.        History of Present Illness:   Luis Resendiz is a 63 y.o. male who presents for follow up of above.      Patient is doing well today.  No new complaints at this time.     Family and Social history reviewed and is unchanged from previous      ROS:  Review of Systems   Constitutional:  Negative for fever and unexpected weight change.   HENT:  Negative for nosebleeds.    Respiratory:  Negative for chest tightness and shortness of breath.    Cardiovascular:  Negative for chest pain.   Gastrointestinal:  Negative for abdominal pain and blood in stool.   Genitourinary:  Negative for hematuria.   Skin:  Negative for rash.   Hematological:  Does not bruise/bleed easily.          Current Outpatient Medications:     ALPRAZolam (XANAX) 1 MG tablet, Take 1 tablet (1 mg total) by mouth 2 (two) times daily., Disp: 60 tablet, Rfl: 5    amLODIPine (NORVASC) 10 MG tablet, Take 1 tablet (10 mg total) by mouth once daily., Disp: 90 tablet, Rfl: 3    aspirin 325 MG tablet, Take 325 mg by mouth once daily., Disp: , Rfl:     atenoloL (TENORMIN) 50 MG tablet, Take 2 tablets (100 mg total) by mouth 2 (two) times a day., Disp: 360 tablet, Rfl: 3    BD ULTRA-FINE MINI PEN NEEDLE 31 gauge x 3/16" Ndle, 1 each by In Vitro route once daily., Disp: 100 each, Rfl: 1    blood sugar diagnostic Strp, To check BG 3-4 times daily, to use with insurance preferred meter, Disp: 360 each, Rfl: 3    blood-glucose meter kit, To check BG 3-4 times daily, to use with insurance preferred meter, Disp: 1 each, Rfl: 0    blood-glucose meter,continuous (DEXCOM ) Misc, 1 each by Misc.(Non-Drug; Combo Route) route Daily., Disp: 1 each, Rfl: 0    blood-glucose sensor (DEXCOM G7 SENSOR) " "Phuong, 1 each by Misc.(Non-Drug; Combo Route) route every 10 days., Disp: 3 each, Rfl: 11    enoxaparin (LOVENOX) 40 mg/0.4 mL Syrg, Inject 0.4 mLs (40 mg total) into the skin once daily., Disp: 10 each, Rfl: 1    fenofibrate (TRICOR) 145 MG tablet, Take 1 tablet (145 mg total) by mouth once daily., Disp: 90 tablet, Rfl: 3    gabapentin (NEURONTIN) 300 MG capsule, TAKE 1 CAPSULE BY MOUTH IN THE MORNING AND 2 IN THE EVENING, Disp: 90 capsule, Rfl: 0    insulin NPH-insulin regular, 70/30, (HUMULIN 70/30 U-100 INSULIN) 100 unit/mL (70-30) injection, 35 units in AM and 35 units in PM- may titrate upwards as needed to max of 40 units BID, Disp: 7 each, Rfl: 3    insulin syringe-needle U-100 0.3 mL 31 gauge x 15/64" Syrg, , Disp: , Rfl:     insulin syringe-needle U-100 1/2 mL 31 gauge x 15/64" Syrg, BID, Disp: 180 Syringe, Rfl: 3    lancets Misc, To check BG 3-4 times daily, to use with insurance preferred meter, Disp: 360 each, Rfl: 3    lisinopriL (PRINIVIL,ZESTRIL) 20 MG tablet, Take 1 tablet (20 mg total) by mouth 2 (two) times daily., Disp: 180 tablet, Rfl: 3    metFORMIN (GLUCOPHAGE) 500 MG tablet, Take 2 tablets (1,000 mg total) by mouth 2 (two) times daily with meals., Disp: 360 tablet, Rfl: 3    pregabalin (LYRICA) 75 MG capsule, Take 1 capsule (75 mg total) by mouth 2 (two) times daily., Disp: 60 capsule, Rfl: 01    rosuvastatin (CRESTOR) 40 MG Tab, Take 1 tablet (40 mg total) by mouth once daily., Disp: 90 tablet, Rfl: 3    tamsulosin (FLOMAX) 0.4 mg Cap, Take 1 capsule (0.4 mg total) by mouth once daily. Take 30 minutes after evening meal, Disp: 30 capsule, Rfl: 11    TRUEPLUS INSULIN 0.5 mL 29 gauge x 1/2" Syrg, , Disp: , Rfl: 5    ULTRA COMFORT INSULIN SYRINGE 1 mL 29 gauge x 1/2" Syrg, Inject 25 Units as directed 2 (two) times daily., Disp: 180 each, Rfl: 3    warfarin (COUMADIN) 1 MG tablet, Take 1 tablet by mouth once daily, Disp: 90 tablet, Rfl: 3    warfarin (COUMADIN) 4 MG tablet, Take 1 tablet (4 mg " total) by mouth Daily., Disp: 30 tablet, Rfl: 1    warfarin (COUMADIN) 5 MG tablet, Take as directed by your doctor., Disp: 45 tablet, Rfl: 3    zolpidem (AMBIEN) 5 MG Tab, Take 1 tablet (5 mg total) by mouth every evening., Disp: 30 tablet, Rfl: 0  No current facility-administered medications for this visit.    Facility-Administered Medications Ordered in Other Visits:     LIDOcaine (PF) 10 mg/ml (1%) injection 10 mg, 1 mL, Other, Once, Rivera Duff MD        Objective:       Physical Examination:     /63   Pulse 62   Temp 98.3 °F (36.8 °C)   Resp 15   Wt 101.6 kg (224 lb)   BMI 33.08 kg/m²     Physical Exam  Vitals reviewed.   Constitutional:       Appearance: He is well-developed.   HENT:      Head: Normocephalic and atraumatic.      Right Ear: External ear normal.      Left Ear: External ear normal.   Eyes:      General: No scleral icterus.     Conjunctiva/sclera: Conjunctivae normal.      Pupils: Pupils are equal, round, and reactive to light.   Cardiovascular:      Rate and Rhythm: Normal rate and regular rhythm.      Heart sounds: Normal heart sounds. No murmur heard.     No friction rub. No gallop.   Pulmonary:      Effort: Pulmonary effort is normal. No respiratory distress.      Breath sounds: Normal breath sounds. No rales.   Chest:      Chest wall: No tenderness.   Abdominal:      General: Bowel sounds are normal. There is no distension.      Palpations: Abdomen is soft. There is no mass.      Tenderness: There is no abdominal tenderness. There is no guarding or rebound.   Musculoskeletal:      Cervical back: Normal range of motion and neck supple.   Lymphadenopathy:      Head:      Right side of head: No tonsillar adenopathy.      Left side of head: No tonsillar adenopathy.      Cervical: No cervical adenopathy.      Upper Body:      Right upper body: No supraclavicular adenopathy.      Left upper body: No supraclavicular adenopathy.   Neurological:      Mental Status: He is alert and  oriented to person, place, and time.   Psychiatric:         Behavior: Behavior normal.         Thought Content: Thought content normal.         Judgment: Judgment normal.         Labs:   No results found for this or any previous visit (from the past 336 hour(s)).  CMP  Sodium   Date Value Ref Range Status   11/15/2023 140 135 - 146 mmol/L Final     Potassium   Date Value Ref Range Status   11/15/2023 5.2 3.5 - 5.3 mmol/L Final     Chloride   Date Value Ref Range Status   11/15/2023 106 98 - 110 mmol/L Final     CO2   Date Value Ref Range Status   11/15/2023 27 20 - 32 mmol/L Final     Glucose   Date Value Ref Range Status   11/15/2023 163 (H) 65 - 99 mg/dL Final     Comment:                   Fasting reference interval     For someone without known diabetes, a glucose  value >125 mg/dL indicates that they may have  diabetes and this should be confirmed with a  follow-up test.          BUN   Date Value Ref Range Status   11/15/2023 20 7 - 25 mg/dL Final     Creatinine   Date Value Ref Range Status   11/15/2023 1.22 0.70 - 1.35 mg/dL Final   06/21/2013 1.1 0.5 - 1.4 mg/dL Final     Calcium   Date Value Ref Range Status   11/15/2023 9.4 8.6 - 10.3 mg/dL Final   06/21/2013 9.3 8.7 - 10.5 mg/dL Final     Total Protein   Date Value Ref Range Status   11/15/2023 7.5 6.1 - 8.1 g/dL Final     Albumin   Date Value Ref Range Status   11/15/2023 4.1 3.6 - 5.1 g/dL Final     Total Bilirubin   Date Value Ref Range Status   11/15/2023 0.5 0.2 - 1.2 mg/dL Final     Alkaline Phosphatase   Date Value Ref Range Status   07/27/2020 49 35 - 144 U/L Final     AST   Date Value Ref Range Status   11/15/2023 21 10 - 35 U/L Final     ALT   Date Value Ref Range Status   11/15/2023 19 9 - 46 U/L Final     Anion Gap   Date Value Ref Range Status   07/05/2020 9 8 - 16 mmol/L Final   06/21/2013 13 5 - 15 meq/L Final     eGFR if    Date Value Ref Range Status   03/28/2022 85 > OR = 60 mL/min/1.73m2 Final     eGFR if non African  "American   Date Value Ref Range Status   03/28/2022 73 > OR = 60 mL/min/1.73m2 Final     No results found for: "CEA"  No results found for: "PSA"        Assessment/Plan:     Problem List Items Addressed This Visit       Heterozygous factor V Leiden mutation - Primary (Chronic)     Patient is doing well and his INR has been very stable.  He continues on life-long therapy and will continue to see him on a yearly basis.  Patient is ok with this approach.            Discussion:     Follow up in about 1 year (around 3/5/2025).      Electronically signed by Ac Sofia        "

## 2024-03-06 DIAGNOSIS — D68.51 HETEROZYGOUS FACTOR V LEIDEN MUTATION: ICD-10-CM

## 2024-03-06 DIAGNOSIS — G47.00 INSOMNIA, UNSPECIFIED TYPE: Primary | ICD-10-CM

## 2024-03-06 DIAGNOSIS — G47.00 INSOMNIA, UNSPECIFIED TYPE: ICD-10-CM

## 2024-03-06 RX ORDER — WARFARIN 4 MG/1
4 TABLET ORAL DAILY
Qty: 30 TABLET | Refills: 1 | Status: SHIPPED | OUTPATIENT
Start: 2024-03-06 | End: 2025-03-06

## 2024-03-06 RX ORDER — TEMAZEPAM 15 MG/1
15 CAPSULE ORAL NIGHTLY PRN
Qty: 30 CAPSULE | Refills: 0 | Status: SHIPPED | OUTPATIENT
Start: 2024-03-06 | End: 2024-03-07

## 2024-03-06 RX ORDER — WARFARIN SODIUM 5 MG/1
TABLET ORAL
Qty: 45 TABLET | Refills: 3 | Status: SHIPPED | OUTPATIENT
Start: 2024-03-06

## 2024-03-06 RX ORDER — WARFARIN 1 MG/1
TABLET ORAL
Qty: 90 TABLET | Refills: 3 | Status: SHIPPED | OUTPATIENT
Start: 2024-03-06

## 2024-03-06 NOTE — TELEPHONE ENCOUNTER
Spoke with patient and informed him that I did speak with Miri about increasing his ambien dosage and that she is aware that he is taking 2 (5mg) anbien and mixing that with an OTC sleep aid. Miri Duff NP advised that she wants pt to try Restoril 15mg and to not take anything OTC along with it to sleep. Advised pt to call us by Friday to let us know how new medication is working. Pt verbalized understanding.

## 2024-03-07 DIAGNOSIS — D68.51 HETEROZYGOUS FACTOR V LEIDEN MUTATION: Primary | Chronic | ICD-10-CM

## 2024-03-07 DIAGNOSIS — D68.51 HETEROZYGOUS FACTOR V LEIDEN MUTATION: Chronic | ICD-10-CM

## 2024-03-07 RX ORDER — ZOLPIDEM TARTRATE 10 MG/1
10 TABLET ORAL NIGHTLY PRN
COMMUNITY
End: 2024-03-07 | Stop reason: SDUPTHER

## 2024-03-07 RX ORDER — TEMAZEPAM 15 MG/1
15 CAPSULE ORAL NIGHTLY PRN
Qty: 30 CAPSULE | Refills: 0 | OUTPATIENT
Start: 2024-03-07

## 2024-03-07 RX ORDER — ZOLPIDEM TARTRATE 10 MG/1
10 TABLET ORAL NIGHTLY PRN
Qty: 30 TABLET | Refills: 3 | Status: SHIPPED | OUTPATIENT
Start: 2024-03-07

## 2024-03-07 RX ORDER — TRAZODONE HYDROCHLORIDE 50 MG/1
50 TABLET ORAL NIGHTLY
Qty: 30 TABLET | Refills: 11 | Status: SHIPPED | OUTPATIENT
Start: 2024-03-07 | End: 2024-03-07

## 2024-03-07 NOTE — TELEPHONE ENCOUNTER
Spoke to Luis. I explained to him that Miri will order the 10 mg Ambien. He understands that he can not take Restoril or the trazodone since he will be taking the Ambien and is also on Xanax. He said he has not gotten either of these filled. They have been cancelled as of now.

## 2024-03-08 ENCOUNTER — TELEPHONE (OUTPATIENT)
Dept: HEMATOLOGY/ONCOLOGY | Facility: CLINIC | Age: 64
End: 2024-03-08

## 2024-03-08 RX ORDER — ZOLPIDEM TARTRATE 10 MG/1
10 TABLET ORAL NIGHTLY PRN
Qty: 30 TABLET | Refills: 3 | OUTPATIENT
Start: 2024-03-08

## 2024-03-08 NOTE — TELEPHONE ENCOUNTER
Patient reported he had difficulty getting his Ambien prescription at [facility] pharmacy last night and that the clinic needed to call [facility] to clarify. Spoke with Sunil, pharmacist at [facility] pharmacy and per his report, no clarification was needed and patient should be able to  the prescription this afternoon. Patient made aware of above and verbalized understanding.

## 2024-03-11 ENCOUNTER — TELEPHONE (OUTPATIENT)
Dept: FAMILY MEDICINE | Facility: CLINIC | Age: 64
End: 2024-03-11
Payer: MEDICARE

## 2024-03-11 NOTE — TELEPHONE ENCOUNTER
"----- Message from Leslie Atwood MA sent at 3/11/2024  3:21 PM CDT -----  Patient called back and states she is having low blood sugar issues and "passed out" in a store the other day. States this is the second time this happened in the last few months. She said alda knows about the first time this happened. He thinks he needs his medication adjusted. Would like a call back ASAP at [phone]    "

## 2024-03-11 NOTE — TELEPHONE ENCOUNTER
Spoke to pt and he stated he has been having problem with his blood sugar. Pt stated he has passed out from is being too low. Last time it happened he was at [facility] and EMS came and he was given IV glucose. Pt was slurring speech, could not focus on a topic was talking about different things, said he could not get up to get some apple juice that he ate a small snack cake. Pt stated he feels he is going to pass out. Pt was confused. Asked pt to call 911 just to have someone come look at him and pt stated he was Fine and could not afford it. Per Marianna call 911 and have someone go check on him . I call 911 and EMS is being sent to his house. Marianna aware

## 2024-03-11 NOTE — TELEPHONE ENCOUNTER
----- Message from Leslie Atwood MA sent at 3/11/2024  3:19 PM CDT -----  Patient calling and would like his son removed from his emergency contact list. Does not want any further information released to him.    Call back with questions at [phone]

## 2024-03-21 ENCOUNTER — TELEPHONE (OUTPATIENT)
Dept: FAMILY MEDICINE | Facility: CLINIC | Age: 64
End: 2024-03-21
Payer: MEDICARE

## 2024-03-21 NOTE — TELEPHONE ENCOUNTER
----- Message from Aaliyah Mendez sent at 3/21/2024  9:36 AM CDT -----  Pt calling about little ulcer type sore that popped out on the side of pt left ankle.Pt has been cleaning sore really well,but sore is not healing has been there for a week. Pt unsure if it is necessary for him to come in will leave up to alda.   [phone]

## 2024-03-21 NOTE — TELEPHONE ENCOUNTER
Started with little scabbed area on inner ankle area, three small areas. Taking care of at home with neosporin/iodine and cleaning well, they have dried up but concerned that they have not gone away in a week. Offered an appt tomorrow, he declined due to weather, booked for Monday with Marianna, and instructed to go to  if look worse over weekend

## 2024-03-25 ENCOUNTER — OFFICE VISIT (OUTPATIENT)
Dept: FAMILY MEDICINE | Facility: CLINIC | Age: 64
End: 2024-03-25
Payer: MEDICARE

## 2024-03-25 VITALS
BODY MASS INDEX: 32.26 KG/M2 | HEART RATE: 59 BPM | OXYGEN SATURATION: 99 % | HEIGHT: 69 IN | WEIGHT: 217.81 LBS | SYSTOLIC BLOOD PRESSURE: 122 MMHG | DIASTOLIC BLOOD PRESSURE: 70 MMHG

## 2024-03-25 DIAGNOSIS — Z79.4 TYPE 2 DIABETES MELLITUS WITH HYPOGLYCEMIA WITHOUT COMA, WITH LONG-TERM CURRENT USE OF INSULIN: ICD-10-CM

## 2024-03-25 DIAGNOSIS — E78.2 MIXED HYPERLIPIDEMIA: ICD-10-CM

## 2024-03-25 DIAGNOSIS — L97.221 VENOUS STASIS ULCER OF LEFT CALF LIMITED TO BREAKDOWN OF SKIN WITH VARICOSE VEINS: Primary | ICD-10-CM

## 2024-03-25 DIAGNOSIS — I83.022 VENOUS STASIS ULCER OF LEFT CALF LIMITED TO BREAKDOWN OF SKIN WITH VARICOSE VEINS: Primary | ICD-10-CM

## 2024-03-25 DIAGNOSIS — I83.022 VENOUS STASIS ULCER OF LEFT CALF LIMITED TO BREAKDOWN OF SKIN WITH VARICOSE VEINS: ICD-10-CM

## 2024-03-25 DIAGNOSIS — F41.9 ANXIETY: ICD-10-CM

## 2024-03-25 DIAGNOSIS — E11.649 TYPE 2 DIABETES MELLITUS WITH HYPOGLYCEMIA WITHOUT COMA, WITH LONG-TERM CURRENT USE OF INSULIN: ICD-10-CM

## 2024-03-25 DIAGNOSIS — L97.221 VENOUS STASIS ULCER OF LEFT CALF LIMITED TO BREAKDOWN OF SKIN WITH VARICOSE VEINS: ICD-10-CM

## 2024-03-25 PROCEDURE — 99213 OFFICE O/P EST LOW 20 MIN: CPT | Mod: S$GLB,,, | Performed by: NURSE PRACTITIONER

## 2024-03-25 RX ORDER — ALPRAZOLAM 1 MG/1
1 TABLET ORAL 2 TIMES DAILY
Qty: 60 TABLET | Refills: 5 | Status: SHIPPED | OUTPATIENT
Start: 2024-03-25

## 2024-03-25 RX ORDER — MUPIROCIN 20 MG/G
OINTMENT TOPICAL 2 TIMES DAILY
Qty: 22 G | Refills: 2 | Status: SHIPPED | OUTPATIENT
Start: 2024-03-25

## 2024-03-25 RX ORDER — DOXYCYCLINE 100 MG/1
100 CAPSULE ORAL 2 TIMES DAILY
Qty: 14 CAPSULE | Refills: 0 | Status: SHIPPED | OUTPATIENT
Start: 2024-03-25 | End: 2024-03-25 | Stop reason: SDUPTHER

## 2024-03-25 RX ORDER — ROSUVASTATIN CALCIUM 40 MG/1
40 TABLET, COATED ORAL DAILY
Qty: 90 TABLET | Refills: 3 | Status: SHIPPED | OUTPATIENT
Start: 2024-03-25

## 2024-03-25 RX ORDER — DOXYCYCLINE 100 MG/1
100 CAPSULE ORAL 2 TIMES DAILY
Qty: 14 CAPSULE | Refills: 0 | Status: SHIPPED | OUTPATIENT
Start: 2024-03-25

## 2024-03-25 RX ORDER — FENOFIBRATE 145 MG/1
145 TABLET, FILM COATED ORAL DAILY
Qty: 90 TABLET | Refills: 3 | Status: SHIPPED | OUTPATIENT
Start: 2024-03-25

## 2024-03-25 NOTE — TELEPHONE ENCOUNTER
----- Message from Savanah Rowell sent at 3/25/2024 12:25 PM CDT -----  There are 2 pharmacies in this message.The patient saw Marianna this morning. Refill Fenofibrate and Rosuvastatin go to [facility]. The Alprazolam goes to [facility] in [place]. Pt's # [phone] GH

## 2024-03-25 NOTE — TELEPHONE ENCOUNTER
----- Message from Savanah Rowell sent at 3/25/2024  2:20 PM CDT -----  The patient went to  [facility] in [place] to get his antibiotic. Its not there. Can you resend it.to [facility] in [place].  pt's # [phone]

## 2024-03-25 NOTE — PROGRESS NOTES
Patient ID: Luis Resendiz is a 63 y.o. male.    Chief Complaint: Abrasion (No bottles//Pt c/o possible skin ulcer on the left ankle x 2-3 weeks, pt is cleaning the ulcer with iodine/peroxide and triple antibiotic oint//ANA )    Pt here for sick visit- reports developed small ulcers to left distal medial calf a couple weeks ago- wasn't aware of any trauma or bites. Has been cleaning with betadine and peroxide. Reports most distal wound has been painful and redder than other areas though overall feels they look better than they did. Denies any fever/chills, no drainage.  Has compression socks at home but doesn't wear because they're uncomfortable    Patient reports have a couple further hypoglycemic episodes since last visit.  had a syncopal episode with hypoglycemia at [facility].  Treated by EMS and given IV D50.  Patient at that time was taking half a tablet glimepiride though he had been advised to stop glimepiride completely.  Since then he has stopped glimepiride and has had no further episodes.  Using Dexcom CGM            Past Medical History:   Diagnosis Date    Anticoagulant long-term use     Arthritis     Cervical spine pain     Clotting disorder     Coronary artery disease     Diabetes mellitus     Encounter for blood transfusion     Fatty liver     Hypertension      Past Surgical History:   Procedure Laterality Date    BACK SURGERY      cabg  2011    CARDIAC SURGERY      FOOT SURGERY Right     FRACTURE SURGERY Right     tibia/fibula    ROTATOR CUFF REPAIR Left     TRANSFORAMINAL EPIDURAL INJECTION OF STEROID Bilateral 9/26/2023    Procedure: Injection,steroid,epidural,transforaminal approach L4/5 and L5/S1;  Surgeon: Rivera Duff MD;  Location: [facility];  Service: Pain Management;  Laterality: Bilateral;       Tests to Keep You Healthy    Eye Exam: ORDERED BUT NOT SCHEDULED  Colon Cancer Screening: Met on 8/17/2021  Last Blood Pressure <= 139/89 (3/25/2024): Yes  Last HbA1c < 8  "(02/21/2024): Yes        Tobacco History:  reports that he quit smoking about 29 years ago. His smoking use included cigarettes. He started smoking about 30 years ago. He has a 0.2 pack-year smoking history. He has been exposed to tobacco smoke. He has never used smokeless tobacco.      Review of patient's allergies indicates:   Allergen Reactions    Nsaids (non-steroidal anti-inflammatory drug) Nausea Only    Ancef [cefazolin] Anxiety       Current Outpatient Medications:     ALPRAZolam (XANAX) 1 MG tablet, Take 1 tablet (1 mg total) by mouth 2 (two) times daily., Disp: 60 tablet, Rfl: 5    amLODIPine (NORVASC) 10 MG tablet, Take 1 tablet (10 mg total) by mouth once daily., Disp: 90 tablet, Rfl: 3    aspirin 325 MG tablet, Take 325 mg by mouth once daily., Disp: , Rfl:     atenoloL (TENORMIN) 50 MG tablet, Take 2 tablets (100 mg total) by mouth 2 (two) times a day., Disp: 360 tablet, Rfl: 3    BD ULTRA-FINE MINI PEN NEEDLE 31 gauge x 3/16" Ndle, 1 each by In Vitro route once daily., Disp: 100 each, Rfl: 1    blood sugar diagnostic Strp, To check BG 3-4 times daily, to use with insurance preferred meter, Disp: 360 each, Rfl: 3    blood-glucose meter kit, To check BG 3-4 times daily, to use with insurance preferred meter, Disp: 1 each, Rfl: 0    blood-glucose meter,continuous (DEXCOM ) Misc, 1 each by Misc.(Non-Drug; Combo Route) route Daily., Disp: 1 each, Rfl: 0    blood-glucose sensor (DEXCOM G7 SENSOR) Phuong, 1 each by Misc.(Non-Drug; Combo Route) route every 10 days., Disp: 3 each, Rfl: 11    doxycycline (MONODOX) 100 MG capsule, Take 1 capsule (100 mg total) by mouth 2 (two) times daily., Disp: 14 capsule, Rfl: 0    enoxaparin (LOVENOX) 40 mg/0.4 mL Syrg, Inject 0.4 mLs (40 mg total) into the skin once daily., Disp: 10 each, Rfl: 1    fenofibrate (TRICOR) 145 MG tablet, Take 1 tablet (145 mg total) by mouth once daily., Disp: 90 tablet, Rfl: 3    gabapentin (NEURONTIN) 300 MG capsule, TAKE 1 CAPSULE BY " "MOUTH IN THE MORNING AND 2 IN THE EVENING, Disp: 90 capsule, Rfl: 0    insulin NPH-insulin regular, 70/30, (HUMULIN 70/30 U-100 INSULIN) 100 unit/mL (70-30) injection, 35 units in AM and 35 units in PM- may titrate upwards as needed to max of 40 units BID, Disp: 7 each, Rfl: 3    insulin syringe-needle U-100 0.3 mL 31 gauge x 15/64" Syrg, , Disp: , Rfl:     insulin syringe-needle U-100 1/2 mL 31 gauge x 15/64" Syrg, BID, Disp: 180 Syringe, Rfl: 3    lancets Misc, To check BG 3-4 times daily, to use with insurance preferred meter, Disp: 360 each, Rfl: 3    lisinopriL (PRINIVIL,ZESTRIL) 20 MG tablet, Take 1 tablet (20 mg total) by mouth 2 (two) times daily., Disp: 180 tablet, Rfl: 3    metFORMIN (GLUCOPHAGE) 500 MG tablet, Take 2 tablets (1,000 mg total) by mouth 2 (two) times daily with meals., Disp: 360 tablet, Rfl: 3    mupirocin (BACTROBAN) 2 % ointment, Apply topically 2 (two) times daily., Disp: 22 g, Rfl: 2    pregabalin (LYRICA) 75 MG capsule, Take 1 capsule (75 mg total) by mouth 2 (two) times daily., Disp: 60 capsule, Rfl: 01    rosuvastatin (CRESTOR) 40 MG Tab, Take 1 tablet (40 mg total) by mouth once daily., Disp: 90 tablet, Rfl: 3    tamsulosin (FLOMAX) 0.4 mg Cap, Take 1 capsule (0.4 mg total) by mouth once daily. Take 30 minutes after evening meal, Disp: 30 capsule, Rfl: 11    TRUEPLUS INSULIN 0.5 mL 29 gauge x 1/2" Syrg, , Disp: , Rfl: 5    ULTRA COMFORT INSULIN SYRINGE 1 mL 29 gauge x 1/2" Syrg, Inject 25 Units as directed 2 (two) times daily., Disp: 180 each, Rfl: 3    warfarin (COUMADIN) 1 MG tablet, Take 1 tablet by mouth once daily, Disp: 90 tablet, Rfl: 3    warfarin (COUMADIN) 4 MG tablet, Take 1 tablet (4 mg total) by mouth Daily., Disp: 30 tablet, Rfl: 1    warfarin (COUMADIN) 5 MG tablet, Take as directed by your doctor., Disp: 45 tablet, Rfl: 3    zolpidem (AMBIEN) 10 mg Tab, Take 1 tablet (10 mg total) by mouth nightly as needed., Disp: 30 tablet, Rfl: 3  No current facility-administered " "medications for this visit.    Facility-Administered Medications Ordered in Other Visits:     LIDOcaine (PF) 10 mg/ml (1%) injection 10 mg, 1 mL, Other, Once, Rivera Duff MD    Review of Systems   Constitutional:  Negative for chills and fever.   Respiratory:  Negative for cough and shortness of breath.    Cardiovascular:  Negative for chest pain and palpitations.   Gastrointestinal:  Negative for abdominal pain, nausea and vomiting.   Genitourinary:  Negative for dysuria and hematuria.   Skin:  Positive for wound.   Neurological:  Positive for syncope (see HPI). Negative for dizziness.          Objective:      Vitals:    03/25/24 1138   BP: 122/70   Pulse: (!) 59   SpO2: 99%   Weight: 98.8 kg (217 lb 12.8 oz)   Height: 5' 9" (1.753 m)     Physical Exam  Vitals and nursing note reviewed.   Constitutional:       General: He is not in acute distress.     Appearance: He is well-developed. He is obese.   HENT:      Head: Normocephalic and atraumatic.   Cardiovascular:      Rate and Rhythm: Normal rate and regular rhythm.      Heart sounds: No murmur heard.     Comments: +spider and varicose veins to bilat lower legs/ankles, chronic purplish discoloration to bilat feet, feet warm/dry  Pulmonary:      Effort: Pulmonary effort is normal. No respiratory distress.      Breath sounds: Normal breath sounds. No wheezing or rales.   Abdominal:      Palpations: Abdomen is soft.      Tenderness: There is no abdominal tenderness.   Musculoskeletal:      Right lower leg: No edema.      Left lower leg: No edema.   Skin:     General: Skin is warm and dry.      Findings: No rash.          Neurological:      General: No focal deficit present.      Mental Status: He is alert and oriented to person, place, and time.      Gait: Gait normal.           Assessment:       1. Venous stasis ulcer of left calf limited to breakdown of skin with varicose veins    2. Type 2 diabetes mellitus with hypoglycemia without coma, with long-term " current use of insulin             Plan:       Venous stasis ulcer of left calf limited to breakdown of skin with varicose veins  -discussed imp of compression with pt and encouraged him to wear knee high compression sock daily. Superficial ulcers to calf overall look clean though pt c/o's of tenderness to distal wound with mild erythema- will treat with abx. Cautioned to call for any worsening      doxycycline (MONODOX) 100 MG capsule; Take 1 capsule (100 mg total) by mouth 2 (two) times daily.  Dispense: 14 capsule; Refill: 0  -     mupirocin (BACTROBAN) 2 % ointment; Apply topically 2 (two) times daily.  Dispense: 22 g; Refill: 2    Type 2 diabetes mellitus with hypoglycemia without coma, with long-term current use of insulin   -patient advised to continue to hold glimepiride      Follow up if symptoms worsen or fail to improve, for as scheduled.        3/25/2024 Marianna Hdez NP

## 2024-03-25 NOTE — PATIENT INSTRUCTIONS
Clean wounds daily with soap and water and apply mupirocin ointment to wounds    Wear knee high compression socks daily!    Take doxycycline 100mg twice a day for 7 days

## 2024-03-26 ENCOUNTER — TELEPHONE (OUTPATIENT)
Dept: FAMILY MEDICINE | Facility: CLINIC | Age: 64
End: 2024-03-26
Payer: MEDICARE

## 2024-03-26 DIAGNOSIS — Z79.4 TYPE 2 DIABETES MELLITUS WITH HYPOGLYCEMIA WITHOUT COMA, WITH LONG-TERM CURRENT USE OF INSULIN: ICD-10-CM

## 2024-03-26 DIAGNOSIS — I83.022 VENOUS STASIS ULCER OF LEFT CALF LIMITED TO BREAKDOWN OF SKIN WITH VARICOSE VEINS: Primary | ICD-10-CM

## 2024-03-26 DIAGNOSIS — L97.221 VENOUS STASIS ULCER OF LEFT CALF LIMITED TO BREAKDOWN OF SKIN WITH VARICOSE VEINS: Primary | ICD-10-CM

## 2024-03-26 DIAGNOSIS — E11.649 TYPE 2 DIABETES MELLITUS WITH HYPOGLYCEMIA WITHOUT COMA, WITH LONG-TERM CURRENT USE OF INSULIN: ICD-10-CM

## 2024-03-26 NOTE — TELEPHONE ENCOUNTER
----- Message from Daisha Sequeira sent at 3/26/2024  1:50 PM CDT -----  - 1:38-josephine with [facility] care home health is calling and they received a therapy order from dr. Chavez and upon speaking with therapy he has some wounds to his lower legs and would benefit from nursing.   [phone]

## 2024-04-02 ENCOUNTER — PATIENT OUTREACH (OUTPATIENT)
Dept: ADMINISTRATIVE | Facility: HOSPITAL | Age: 64
End: 2024-04-02
Payer: MEDICARE

## 2024-04-02 ENCOUNTER — TELEPHONE (OUTPATIENT)
Dept: FAMILY MEDICINE | Facility: CLINIC | Age: 64
End: 2024-04-02
Payer: MEDICARE

## 2024-04-02 NOTE — TELEPHONE ENCOUNTER
----- Message from Aaliyah Mendez sent at 4/2/2024  3:05 PM CDT -----  Not very happy with pain management treatment, pt would like to know information to Dr. Hernandez.   [phone]

## 2024-04-02 NOTE — PROGRESS NOTES
Population Health Chart Review & Patient Outreach Details      Additional [facility] Notes:      States did eye exam in [place].  Will get location name and request records.          Updates Requested / Reviewed:        Health Maintenance Topics Overdue:      VB Score: 3     Urine Screening  Eye Exam  AAA Screening    Pneumonia Vaccine  Shingles/Zoster Vaccine  RSV Vaccine                  Health Maintenance Topic(s) Outreach Outcomes & Actions Taken:    Eye Exam - Outreach Outcomes & Actions Taken  : notes

## 2024-04-02 NOTE — TELEPHONE ENCOUNTER
Spoke to pt and he is going to call Dr. Zurita and see if he can get an appointment. Let pt know to call If he needs anything

## 2024-04-02 NOTE — LETTER
AUTHORIZATION FOR RELEASE OF   CONFIDENTIAL INFORMATION    Alverto Easley OD    We are seeing Luis Resendiz, date of birth 1960, in the clinic at [facility]. Luis Barboza MD is the patient's PCP. Luis Resendiz has an outstanding lab/procedure at the time we reviewed his chart. In order to help keep his health information updated, he has authorized us to request the following medical record(s):       EYE EXAM                  Please fax records to [facility], Casey, Michael D., MD, [phone]     If you have any questions, please contact Lizeth Sanchez, Care Coordinator   at [phone].            Patient Name: Luis Resendiz  : 1960  Patient Phone #: [phone]

## 2024-04-04 ENCOUNTER — TELEPHONE (OUTPATIENT)
Dept: FAMILY MEDICINE | Facility: CLINIC | Age: 64
End: 2024-04-04
Payer: MEDICARE

## 2024-04-04 NOTE — TELEPHONE ENCOUNTER
----- Message from Luz Carrington LPN sent at 4/4/2024 12:36 PM CDT -----    ----- Message -----  From: Aaliyah Mendez  Sent: 4/4/2024  12:22 PM CDT  To: Luis Barboza Staff    Pt calling back about severe pain.   [phone]

## 2024-04-04 NOTE — TELEPHONE ENCOUNTER
What medication is he referring to? I don't see he's been prescribed pain medication other than pregabalin and given he's on xanax and ambien would not recommend opioid pain medication

## 2024-04-04 NOTE — TELEPHONE ENCOUNTER
----- Message from Luz Carrington LPN sent at 4/4/2024 10:59 AM CDT -----    ----- Message -----  From: Aaliyah Mendez  Sent: 4/4/2024  10:53 AM CDT  To: Luis [facility]bo[facility] Staff    Pt states he is having a problem with pain management doctor with not getting medication sent to pharmacy for the patient. Pain management doctor now on vacation and pt with pain needs to be able to function. Wanting to know if our office is able to send him something to help him get back on his feet.   Dennis [address]  [phone]

## 2024-04-05 RX ORDER — ACETAMINOPHEN 325 MG/1
500 TABLET ORAL EVERY 6 HOURS PRN
COMMUNITY

## 2024-04-05 RX ORDER — TIZANIDINE 4 MG/1
8 TABLET ORAL 2 TIMES DAILY
COMMUNITY
Start: 2024-03-25

## 2024-04-05 NOTE — TELEPHONE ENCOUNTER
Spoke with pt in regards to messages sent. Verbalized verbatim per Marianna. Pt acknowledged understanding. Pt stated that he is not taken the pregabalin or the gabapentin. Pt stated that he is taken tizanidine 8 mg BID and tylenol. Pain miguel a. appointment on 04/09/2024. Medication list updated.       [place]

## 2024-04-05 NOTE — PROGRESS NOTES
Population Health Chart Review & Patient Outreach Details      Additional [facility] Health Notes:               Updates Requested / Reviewed:      Care Team Updated         Health Maintenance Topics Overdue:      VB Score: 3     Urine Screening  Eye Exam  AAA Screening    Pneumonia Vaccine  Shingles/Zoster Vaccine  RSV Vaccine                  Health Maintenance Topic(s) Outreach Outcomes & Actions Taken:    Eye Exam - Outreach Outcomes & Actions Taken  : External Records Requested & Care Team Updated if Applicable

## 2024-04-10 ENCOUNTER — TELEPHONE (OUTPATIENT)
Dept: HEMATOLOGY/ONCOLOGY | Facility: CLINIC | Age: 64
End: 2024-04-10

## 2024-04-10 NOTE — TELEPHONE ENCOUNTER
Spoke with patient and reviewed above. Instructed patient to recheck INR next week. Patient verbalized understanding.

## 2024-04-10 NOTE — TELEPHONE ENCOUNTER
----- Message from Ac Smith MD sent at 4/9/2024  8:24 AM CDT -----  Call patient , INR is high at 4.0.  hold for 2 days then resume same.  Verify no bleeding issues.  No new meds?

## 2024-04-11 ENCOUNTER — TELEPHONE (OUTPATIENT)
Dept: FAMILY MEDICINE | Facility: CLINIC | Age: 64
End: 2024-04-11
Payer: MEDICARE

## 2024-04-11 DIAGNOSIS — E11.42 DIABETIC POLYNEUROPATHY ASSOCIATED WITH TYPE 2 DIABETES MELLITUS: ICD-10-CM

## 2024-04-11 NOTE — TELEPHONE ENCOUNTER
----- Message from Aaliyah Mendez sent at 4/11/2024  8:35 AM CDT -----  Pt needs a refill on insuline   [facility]   [phone]

## 2024-04-11 NOTE — TELEPHONE ENCOUNTER
----- Message from Savanah Rowell sent at 4/11/2024 11:39 AM CDT -----  The patient needs his insulin to go to [facility]  not [facility]. [facility] called [facility] and said they are out of stock. Pt's # [phone]

## 2024-04-11 NOTE — TELEPHONE ENCOUNTER
----- Message from Luz Carrington LPN sent at 4/11/2024  1:58 PM CDT -----    ----- Message -----  From: Daisha Sequeira  Sent: 4/11/2024   1:51 PM CDT  To: Luis [facility]boza Staff    - 1:18-pt has a question for the nurse   [phone]

## 2024-04-12 ENCOUNTER — PATIENT OUTREACH (OUTPATIENT)
Dept: ADMINISTRATIVE | Facility: HOSPITAL | Age: 64
End: 2024-04-12
Payer: MEDICARE

## 2024-04-12 NOTE — PROGRESS NOTES
Population Health Chart Review & Patient Outreach Details      Additional [facility] Health Notes:               Updates Requested / Reviewed:      Updated Care Coordination Note, Care Everywhere, and Immunizations Reconciliation Completed or Queried: [facility] Topics Overdue:      [facility] Score: 2     Urine Screening  AAA Screening    Pneumonia Vaccine  Shingles/Zoster Vaccine  RSV Vaccine                  Health Maintenance Topic(s) Outreach Outcomes & Actions Taken:    Eye Exam - Outreach Outcomes & Actions Taken  : Diabetic Eye External Records Uploaded, Care Team & History Updated if Applicable

## 2024-04-16 ENCOUNTER — EXTERNAL HOME HEALTH (OUTPATIENT)
Dept: HOME HEALTH SERVICES | Facility: HOSPITAL | Age: 64
End: 2024-04-16

## 2024-04-22 ENCOUNTER — TELEPHONE (OUTPATIENT)
Dept: HEMATOLOGY/ONCOLOGY | Facility: CLINIC | Age: 64
End: 2024-04-22

## 2024-04-22 NOTE — TELEPHONE ENCOUNTER
Patient made aware of above, reports he is taking 4.5mg and 5mg alternated of coumadin currently. Patient verbalized understanding and reports he will recheck INR next week.

## 2024-04-22 NOTE — TELEPHONE ENCOUNTER
----- Message from ANALI Weiss sent at 4/22/2024  2:17 PM CDT -----  Too thick at 1.7 Continue same dose and repea next week  ----- Message -----  From: Beatrice Burgos RN  Sent: 4/22/2024   2:01 PM CDT  To: ANALI Weiss    Patient would like his PT/INR reviewed  ----- Message -----  From: Dona Cole  Sent: 4/22/2024   1:28 PM CDT  To: Sarah Shen Staff    The patient wants a call back with his PT INR results from last week. # [phone]

## 2024-04-23 ENCOUNTER — TELEPHONE (OUTPATIENT)
Dept: HEMATOLOGY/ONCOLOGY | Facility: CLINIC | Age: 64
End: 2024-04-23

## 2024-04-23 NOTE — TELEPHONE ENCOUNTER
I called patient letting him know his INR is good and he can recheck his INR in 2 weeks. He voiced understanding

## 2024-04-23 NOTE — TELEPHONE ENCOUNTER
----- Message from Ac Smith MD sent at 4/22/2024  6:25 PM CDT -----  Call patient , no changes.  Recheck again in 2 weeks.

## 2024-04-29 DIAGNOSIS — I10 ESSENTIAL HYPERTENSION: ICD-10-CM

## 2024-04-29 DIAGNOSIS — E78.5 DM TYPE 2 WITH DIABETIC DYSLIPIDEMIA: ICD-10-CM

## 2024-04-29 DIAGNOSIS — E11.69 DM TYPE 2 WITH DIABETIC DYSLIPIDEMIA: ICD-10-CM

## 2024-04-29 RX ORDER — METFORMIN HYDROCHLORIDE 500 MG/1
1000 TABLET ORAL 2 TIMES DAILY WITH MEALS
Qty: 360 TABLET | Refills: 3 | Status: SHIPPED | OUTPATIENT
Start: 2024-04-29

## 2024-04-29 RX ORDER — LISINOPRIL 20 MG/1
20 TABLET ORAL 2 TIMES DAILY
Qty: 180 TABLET | Refills: 3 | Status: SHIPPED | OUTPATIENT
Start: 2024-04-29

## 2024-04-29 NOTE — TELEPHONE ENCOUNTER
----- Message from Aaliyah Mendez sent at 4/29/2024 11:58 AM CDT -----  Pt needs refills on metformin, lisinopril   [facility]   [phone]

## 2024-05-06 ENCOUNTER — TELEPHONE (OUTPATIENT)
Dept: FAMILY MEDICINE | Facility: CLINIC | Age: 64
End: 2024-05-06
Payer: MEDICARE

## 2024-05-06 DIAGNOSIS — M50.30 DDD (DEGENERATIVE DISC DISEASE), CERVICAL: ICD-10-CM

## 2024-05-06 DIAGNOSIS — M51.36 DDD (DEGENERATIVE DISC DISEASE), LUMBAR: Primary | ICD-10-CM

## 2024-05-06 NOTE — TELEPHONE ENCOUNTER
----- Message from Savanah Rowell sent at 5/6/2024  3:27 PM CDT -----  The patient said he has been seeing Dr. Rojas. He needs to see someone else. That's who he sees now. He is not helping him. Can you call him with another name and number? He wants to know can Marianna call in something for him until he can see someone. [facility] [address]  in [place].  pt's # [phone] GH

## 2024-05-06 NOTE — TELEPHONE ENCOUNTER
----- Message from Marianna Valderrama sent at 5/6/2024  2:34 PM CDT -----  Pt needs a referral to a different doctor other than Dr. Chavez. Please advise. Pt #[phone]

## 2024-05-06 NOTE — TELEPHONE ENCOUNTER
I'm really not sure who else takes his insurance- he could try Dr. Starr, pain mgmt and see if he takes his insurance

## 2024-05-06 NOTE — TELEPHONE ENCOUNTER
"Spoke with patient, state he has already been to a few doctors and nothing has been done so he would like a referral to someone else.  States he feels like his next step is just going to the ER, states he last saw Dr. Rojas and went over the MRI, told him "how bad it was" but refused to write anything for his pain.     Has already seen Dr. Rojas, Dr. Gallo, Dr. Duff.  He's had an LULU which gave him relief for a few weeks and then they decided "amongst themselves" that they were unable to do anything else for him so he does not want to see another [facility] provider.   States he tried Dr. Resendez and he is not accepting new patients.  "

## 2024-05-06 NOTE — TELEPHONE ENCOUNTER
----- Message from Aaliyah Mendez sent at 5/6/2024  2:21 PM CDT -----  Pt returning a phone call. I assisted him with the number for Dr. Ag   [phone]

## 2024-05-06 NOTE — TELEPHONE ENCOUNTER
----- Message from Savanah Rowell sent at 5/6/2024 10:47 AM CDT -----  The patient wants to be referred to a pain Dr for his back pain.  He wants a call before you refer him. Pt's # [phone]

## 2024-05-06 NOTE — TELEPHONE ENCOUNTER
Spoke with patient and gave him the name and number to Dr. Ag - states he is driving and will call back for that information.    Their number is [phone]

## 2024-05-08 ENCOUNTER — TELEPHONE (OUTPATIENT)
Dept: FAMILY MEDICINE | Facility: CLINIC | Age: 64
End: 2024-05-08
Payer: MEDICARE

## 2024-05-08 NOTE — TELEPHONE ENCOUNTER
Spoke with pt in regards to pre-visit labs. Verbalized that we have placed lab order for you to have done before your upcoming appointment on 05/21/2024. Verbalized that the lab order are placed through [facility], so they can come into the office anytime, no appointment necessary. Just make sure that you are fasting for you labs. Pt acknowledge understanding.

## 2024-05-10 ENCOUNTER — TELEPHONE (OUTPATIENT)
Dept: FAMILY MEDICINE | Facility: CLINIC | Age: 64
End: 2024-05-10
Payer: MEDICARE

## 2024-05-10 NOTE — TELEPHONE ENCOUNTER
Spoke to pt and explained we can not give him pain medications, if the UC is refusing and he is in severe pain then we recommend ER. PT stated he has been seeing Dr. Barboza for a long time. Let pt know Dr. Barboza and Marianna are not in the office today. Pt verbalized understanding

## 2024-05-10 NOTE — TELEPHONE ENCOUNTER
----- Message from Leslie Atwood MA sent at 5/10/2024  9:27 AM CDT -----    ----- Message -----  From: Savanah Rowell  Sent: 5/10/2024   9:26 AM CDT  To: Luis Barboza Staff    The patient fell. He went to the urgent Care. They said his ribs are probably bruised. He is in a lot of pain. He wants to know what to do. Please call  pt's # [phone]

## 2024-05-10 NOTE — TELEPHONE ENCOUNTER
Spoke to pt and he stated he fell and bruised his ribs and the urgent care told him to call here for pain medication. Explained Marianna Barboza are out of the office and Daisha del angel NP said he needs to go back to UC and be seen if he still in pain. Pt verbalized understanding and is going back to UC

## 2024-05-10 NOTE — TELEPHONE ENCOUNTER
----- Message from Leslie Atwood MA sent at 5/10/2024 11:35 AM CDT -----    ----- Message -----  From: Savanah Rowell  Sent: 5/10/2024  11:32 AM CDT  To: Luis Barboza Staff    The patient went to Urgent care again and they said they can not call Dr. Barboza and get  an ok to give him something for his pain. His medication would have to come from his PCP.  Please call pt's #  [phone] GH

## 2024-05-13 DIAGNOSIS — M54.16 LUMBAR RADICULOPATHY: ICD-10-CM

## 2024-05-13 RX ORDER — PREGABALIN 75 MG/1
75 CAPSULE ORAL 2 TIMES DAILY
Qty: 60 CAPSULE | Refills: 1 | Status: SHIPPED | OUTPATIENT
Start: 2024-05-13 | End: 2024-06-12

## 2024-05-13 NOTE — TELEPHONE ENCOUNTER
Spoke to pt and let him know per Marianna verbatim below. Pt stated he has been a patient of Marianna's for 12 years and he does not know why he's not trusted he has never done anything wrong. Explained Marianna can only send in lyrica and nothing else. Pt asked if lyrica helps with pain and I stated yes. Pt is agreeable to lyrica

## 2024-05-13 NOTE — TELEPHONE ENCOUNTER
----- Message from Savanah Rowell sent at 5/13/2024 11:12 AM CDT -----  The patient said he spoke to someone Friday. Please call. It's about last weeks message. If something can be called in.H wants a call from the nurse. He needs it to be called in to [facility] on [address] [place]. Pt's # [phone] GH

## 2024-05-13 NOTE — TELEPHONE ENCOUNTER
Please call pt and clarify if he's taking the gabapentin because he can't be on both gabapentin and pregabalin. We can either increase the gabapentin dose or stop gabapentin and resume pregabalin

## 2024-05-13 NOTE — TELEPHONE ENCOUNTER
Pt fell and bruised his ribs,. UC refused to give pain medications he went [facility] was told to call our office for meds.

## 2024-05-13 NOTE — TELEPHONE ENCOUNTER
----- Message from Savanah Rowell sent at 5/13/2024  2:25 PM CDT -----  The patient is calling back to make sure someone is going to call him back today. The patient said they took X-rays the 1st time he went to Urgent Care. Pt's # [phone]

## 2024-05-13 NOTE — TELEPHONE ENCOUNTER
Called pt and verified that he has not taken gabapentin in several months. He is wanting the lyrica sent in.

## 2024-05-17 ENCOUNTER — TELEPHONE (OUTPATIENT)
Facility: CLINIC | Age: 64
End: 2024-05-17
Payer: MEDICARE

## 2024-05-17 LAB
ALBUMIN SERPL-MCNC: 4.5 G/DL (ref 3.6–5.1)
ALBUMIN/CREAT UR: 23 MG/G CREAT
ALBUMIN/GLOB SERPL: 1.5 (CALC) (ref 1–2.5)
ALP SERPL-CCNC: 48 U/L (ref 35–144)
ALT SERPL-CCNC: 17 U/L (ref 9–46)
APPEARANCE UR: CLEAR
AST SERPL-CCNC: 25 U/L (ref 10–35)
BACTERIA #/AREA URNS HPF: NORMAL /HPF
BACTERIA UR CULT: NORMAL
BASOPHILS # BLD AUTO: 50 CELLS/UL (ref 0–200)
BASOPHILS NFR BLD AUTO: 0.8 %
BILIRUB SERPL-MCNC: 0.6 MG/DL (ref 0.2–1.2)
BILIRUB UR QL STRIP: NEGATIVE
BUN SERPL-MCNC: 35 MG/DL (ref 7–25)
BUN/CREAT SERPL: 26 (CALC) (ref 6–22)
CALCIUM SERPL-MCNC: 9.2 MG/DL (ref 8.6–10.3)
CHLORIDE SERPL-SCNC: 107 MMOL/L (ref 98–110)
CHOLEST SERPL-MCNC: 135 MG/DL
CHOLEST/HDLC SERPL: 3.1 (CALC)
CO2 SERPL-SCNC: 23 MMOL/L (ref 20–32)
COLOR UR: YELLOW
CREAT SERPL-MCNC: 1.33 MG/DL (ref 0.7–1.35)
CREAT UR-MCNC: 142 MG/DL (ref 20–320)
EGFR: 59 ML/MIN/1.73M2
EOSINOPHIL # BLD AUTO: 149 CELLS/UL (ref 15–500)
EOSINOPHIL NFR BLD AUTO: 2.4 %
ERYTHROCYTE [DISTWIDTH] IN BLOOD BY AUTOMATED COUNT: 14.6 % (ref 11–15)
GLOBULIN SER CALC-MCNC: 3 G/DL (CALC) (ref 1.9–3.7)
GLUCOSE SERPL-MCNC: 83 MG/DL (ref 65–99)
GLUCOSE UR QL STRIP: NEGATIVE
HBA1C MFR BLD: 6.2 % OF TOTAL HGB
HCT VFR BLD AUTO: 36.7 % (ref 38.5–50)
HDLC SERPL-MCNC: 44 MG/DL
HGB BLD-MCNC: 11.9 G/DL (ref 13.2–17.1)
HGB UR QL STRIP: NEGATIVE
HYALINE CASTS #/AREA URNS LPF: NORMAL /LPF
KETONES UR QL STRIP: NEGATIVE
LDLC SERPL CALC-MCNC: 68 MG/DL (CALC)
LEUKOCYTE ESTERASE UR QL STRIP: NEGATIVE
LYMPHOCYTES # BLD AUTO: 1984 CELLS/UL (ref 850–3900)
LYMPHOCYTES NFR BLD AUTO: 32 %
MCH RBC QN AUTO: 30.7 PG (ref 27–33)
MCHC RBC AUTO-ENTMCNC: 32.4 G/DL (ref 32–36)
MCV RBC AUTO: 94.6 FL (ref 80–100)
MICROALBUMIN UR-MCNC: 3.2 MG/DL
MONOCYTES # BLD AUTO: 471 CELLS/UL (ref 200–950)
MONOCYTES NFR BLD AUTO: 7.6 %
NEUTROPHILS # BLD AUTO: 3546 CELLS/UL (ref 1500–7800)
NEUTROPHILS NFR BLD AUTO: 57.2 %
NITRITE UR QL STRIP: NEGATIVE
NONHDLC SERPL-MCNC: 91 MG/DL (CALC)
PH UR STRIP: 5.5 [PH] (ref 5–8)
PLATELET # BLD AUTO: 232 THOUSAND/UL (ref 140–400)
PMV BLD REES-ECKER: 10.1 FL (ref 7.5–12.5)
POTASSIUM SERPL-SCNC: 5 MMOL/L (ref 3.5–5.3)
PROT SERPL-MCNC: 7.5 G/DL (ref 6.1–8.1)
PROT UR QL STRIP: NEGATIVE
RBC # BLD AUTO: 3.88 MILLION/UL (ref 4.2–5.8)
RBC #/AREA URNS HPF: NORMAL /HPF
SERVICE CMNT-IMP: NORMAL
SODIUM SERPL-SCNC: 143 MMOL/L (ref 135–146)
SP GR UR STRIP: 1.02 (ref 1–1.03)
SQUAMOUS #/AREA URNS HPF: NORMAL /HPF
TRIGL SERPL-MCNC: 146 MG/DL
TSH SERPL-ACNC: 0.88 MIU/L (ref 0.4–4.5)
WBC # BLD AUTO: 6.2 THOUSAND/UL (ref 3.8–10.8)
WBC #/AREA URNS HPF: NORMAL /HPF

## 2024-05-17 NOTE — TELEPHONE ENCOUNTER
----- Message from Ac Smith MD sent at 5/17/2024  8:30 AM CDT -----  Call patient , INR good.  Recheck one month

## 2024-05-21 ENCOUNTER — OFFICE VISIT (OUTPATIENT)
Dept: FAMILY MEDICINE | Facility: CLINIC | Age: 64
End: 2024-05-21
Payer: MEDICARE

## 2024-05-21 VITALS
SYSTOLIC BLOOD PRESSURE: 116 MMHG | OXYGEN SATURATION: 97 % | BODY MASS INDEX: 32.14 KG/M2 | HEART RATE: 65 BPM | HEIGHT: 69 IN | DIASTOLIC BLOOD PRESSURE: 68 MMHG | WEIGHT: 217 LBS

## 2024-05-21 DIAGNOSIS — M51.36 DDD (DEGENERATIVE DISC DISEASE), LUMBAR: ICD-10-CM

## 2024-05-21 DIAGNOSIS — E11.69 DM TYPE 2 WITH DIABETIC DYSLIPIDEMIA: ICD-10-CM

## 2024-05-21 DIAGNOSIS — B02.9 HERPES ZOSTER WITHOUT COMPLICATION: Primary | ICD-10-CM

## 2024-05-21 DIAGNOSIS — E78.5 DM TYPE 2 WITH DIABETIC DYSLIPIDEMIA: ICD-10-CM

## 2024-05-21 DIAGNOSIS — E78.2 MIXED HYPERLIPIDEMIA: ICD-10-CM

## 2024-05-21 DIAGNOSIS — E11.649 TYPE 2 DIABETES MELLITUS WITH HYPOGLYCEMIA WITHOUT COMA, WITH LONG-TERM CURRENT USE OF INSULIN: ICD-10-CM

## 2024-05-21 DIAGNOSIS — N18.31 STAGE 3A CHRONIC KIDNEY DISEASE: ICD-10-CM

## 2024-05-21 DIAGNOSIS — I25.10 CORONARY ARTERY DISEASE INVOLVING NATIVE CORONARY ARTERY OF NATIVE HEART WITHOUT ANGINA PECTORIS: ICD-10-CM

## 2024-05-21 DIAGNOSIS — I10 PRIMARY HYPERTENSION: ICD-10-CM

## 2024-05-21 DIAGNOSIS — Z79.4 TYPE 2 DIABETES MELLITUS WITH HYPOGLYCEMIA WITHOUT COMA, WITH LONG-TERM CURRENT USE OF INSULIN: ICD-10-CM

## 2024-05-21 PROCEDURE — 1159F MED LIST DOCD IN RCRD: CPT | Mod: CPTII,S$GLB,, | Performed by: NURSE PRACTITIONER

## 2024-05-21 PROCEDURE — 3061F NEG MICROALBUMINURIA REV: CPT | Mod: CPTII,S$GLB,, | Performed by: NURSE PRACTITIONER

## 2024-05-21 PROCEDURE — 3008F BODY MASS INDEX DOCD: CPT | Mod: CPTII,S$GLB,, | Performed by: NURSE PRACTITIONER

## 2024-05-21 PROCEDURE — 3288F FALL RISK ASSESSMENT DOCD: CPT | Mod: CPTII,S$GLB,, | Performed by: NURSE PRACTITIONER

## 2024-05-21 PROCEDURE — 1160F RVW MEDS BY RX/DR IN RCRD: CPT | Mod: CPTII,S$GLB,, | Performed by: NURSE PRACTITIONER

## 2024-05-21 PROCEDURE — 1125F AMNT PAIN NOTED PAIN PRSNT: CPT | Mod: CPTII,S$GLB,, | Performed by: NURSE PRACTITIONER

## 2024-05-21 PROCEDURE — 3066F NEPHROPATHY DOC TX: CPT | Mod: CPTII,S$GLB,, | Performed by: NURSE PRACTITIONER

## 2024-05-21 PROCEDURE — 4010F ACE/ARB THERAPY RXD/TAKEN: CPT | Mod: CPTII,S$GLB,, | Performed by: NURSE PRACTITIONER

## 2024-05-21 PROCEDURE — 3074F SYST BP LT 130 MM HG: CPT | Mod: CPTII,S$GLB,, | Performed by: NURSE PRACTITIONER

## 2024-05-21 PROCEDURE — 1101F PT FALLS ASSESS-DOCD LE1/YR: CPT | Mod: CPTII,S$GLB,, | Performed by: NURSE PRACTITIONER

## 2024-05-21 PROCEDURE — 3044F HG A1C LEVEL LT 7.0%: CPT | Mod: CPTII,S$GLB,, | Performed by: NURSE PRACTITIONER

## 2024-05-21 PROCEDURE — 99214 OFFICE O/P EST MOD 30 MIN: CPT | Mod: S$GLB,,, | Performed by: NURSE PRACTITIONER

## 2024-05-21 PROCEDURE — 3078F DIAST BP <80 MM HG: CPT | Mod: CPTII,S$GLB,, | Performed by: NURSE PRACTITIONER

## 2024-05-21 RX ORDER — VALACYCLOVIR HYDROCHLORIDE 1 G/1
1000 TABLET, FILM COATED ORAL 3 TIMES DAILY
Qty: 21 TABLET | Refills: 0 | Status: SHIPPED | OUTPATIENT
Start: 2024-05-21

## 2024-05-21 NOTE — PROGRESS NOTES
SUBJECTIVE:    Patient ID: Luis Resendiz is a 63 y.o. male.    Chief Complaint: Diabetes (No bottles//Pt is here for a check up//ANA )    Pt here for regular DM/lipids/CAD f/u    Pt reports overall doing okay though continues with chronic neck/back pain. Reports just noticed mild rash to left neck/behind ear this morning after shaving. C/o's of mild pain and increased sensitivity to area    Reports a couple weeks ago was walking his dog on a long lead and tripped over leash and fell off 6 inch porch and struck left anterior chest on stepping stone on the ground. Went to  on 5/7, day of fall, for chest wall pain and pain with deep breath- xray negative for fracture. Prescribed lidoderm patch which didn't  help at all. Went back to  on 5/10 due to continued chest pain-  told him PCP would have to prescribe pain medication. We had sent in lyrica and he reports it's helped a little. Still has pain to left anterior ribs and can't lay on his left side in bed d/t pain and reports pain in upper middle back but it's much better than it was    Pt reports he went and saw Dr. Chavez, pain mgmt for several visits- pt reports he reviewed his MRI with him and Dr. Chavez recommended PT-  PT was ordered and pt reports PT only showed up once-reports he's had to turn his phone off because of numerous spam callers. Pt not sure he wants to follow up with him. Had previously seen Dr. Duff pain angle also and was prescribed lyrica but was not prescribed opioid so didn't follow up    Reports blood sugars have improved and not having hypoglycemia as often off glimepiride. Reports will still occas take if blood sugar is staying high.  Reviewed with pt 30 day data report from CGM Dexcom G7 via manual    Average blood glucose:114  TIR:84%  Low:0%  Very Low:3%  High:5%  Very High:0%      Hx of CAD/CABG- saw Dr. Crow in December after having a syncopal episode in [place]. Pt feels it was d/t low blood sugar  though apparently BP was low when EMS arrived. Dr. Crow recommended taking only 1/2 tab of amlodipine if -160 and hold if less than 130. Reports was monitoring BP at home and it didn't seem to change much no matter what dose so he's been taking amlodipine 10mg    Follows with Dr. Sofia for hypercoagulable- Factor V leiden on warfarin        Ancillary Orders on 05/03/2024   Component Date Value Ref Range Status    INR 05/16/2024 2.4 (H)   Final    Prothrombin Time 05/16/2024 23.6 (H)  9.0 - 11.5 sec Final   Patient Outreach on 04/12/2024   Component Date Value Ref Range Status    Left Eye DM Retinopathy 12/05/2023 Negative   Final    Right Eye DM Retinopathy 12/05/2023 Negative   Final   Ancillary Orders on 04/05/2024   Component Date Value Ref Range Status    INR 04/08/2024 4.0 (H)   Final    Prothrombin Time 04/08/2024 38.5 (H)  9.0 - 11.5 sec Final   Ancillary Orders on 03/22/2024   Component Date Value Ref Range Status    INR 04/18/2024 1.7 (H)   Final    Prothrombin Time 04/18/2024 17.6 (H)  9.0 - 11.5 sec Final   Ancillary Orders on 02/23/2024   Component Date Value Ref Range Status    INR 03/01/2024 2.6 (H)   Final    Prothrombin Time 03/01/2024 25.7 (H)  9.0 - 11.5 sec Final   Office Visit on 02/21/2024   Component Date Value Ref Range Status    Hemoglobin A1C, POC 02/21/2024 6.2  % Final   Telephone on 02/06/2024   Component Date Value Ref Range Status    WBC 05/16/2024 6.2  3.8 - 10.8 Thousand/uL Final    RBC 05/16/2024 3.88 (L)  4.20 - 5.80 Million/uL Final    Hemoglobin 05/16/2024 11.9 (L)  13.2 - 17.1 g/dL Final    Hematocrit 05/16/2024 36.7 (L)  38.5 - 50.0 % Final    MCV 05/16/2024 94.6  80.0 - 100.0 fL Final    MCH 05/16/2024 30.7  27.0 - 33.0 pg Final    MCHC 05/16/2024 32.4  32.0 - 36.0 g/dL Final    RDW 05/16/2024 14.6  11.0 - 15.0 % Final    Platelets 05/16/2024 232  140 - 400 Thousand/uL Final    MPV 05/16/2024 10.1  7.5 - 12.5 fL Final    Neutrophils, Abs 05/16/2024 3,546  1,500 -  7,800 cells/uL Final    Lymph # 05/16/2024 1,984  850 - 3,900 cells/uL Final    Mono # 05/16/2024 471  200 - 950 cells/uL Final    Eos # 05/16/2024 149  15 - 500 cells/uL Final    Baso # 05/16/2024 50  0 - 200 cells/uL Final    Neutrophils Relative 05/16/2024 57.2  % Final    Lymph % 05/16/2024 32.0  % Final    Mono % 05/16/2024 7.6  % Final    Eosinophil % 05/16/2024 2.4  % Final    Basophil % 05/16/2024 0.8  % Final    Glucose 05/16/2024 83  65 - 99 mg/dL Final    BUN 05/16/2024 35 (H)  7 - 25 mg/dL Final    Creatinine 05/16/2024 1.33  0.70 - 1.35 mg/dL Final    eGFR 05/16/2024 59 (L)  > OR = 60 mL/min/1.73m2 Final    BUN/Creatinine Ratio 05/16/2024 26 (H)  6 - 22 (calc) Final    Sodium 05/16/2024 143  135 - 146 mmol/L Final    Potassium 05/16/2024 5.0  3.5 - 5.3 mmol/L Final    Chloride 05/16/2024 107  98 - 110 mmol/L Final    CO2 05/16/2024 23  20 - 32 mmol/L Final    Calcium 05/16/2024 9.2  8.6 - 10.3 mg/dL Final    Total Protein 05/16/2024 7.5  6.1 - 8.1 g/dL Final    Albumin 05/16/2024 4.5  3.6 - 5.1 g/dL Final    Globulin, Total 05/16/2024 3.0  1.9 - 3.7 g/dL (calc) Final    Albumin/Globulin Ratio 05/16/2024 1.5  1.0 - 2.5 (calc) Final    Total Bilirubin 05/16/2024 0.6  0.2 - 1.2 mg/dL Final    Alkaline Phosphatase 05/16/2024 48  35 - 144 U/L Final    AST 05/16/2024 25  10 - 35 U/L Final    ALT 05/16/2024 17  9 - 46 U/L Final    Hemoglobin A1C 05/16/2024 6.2 (H)  <5.7 % of total Hgb Final    Cholesterol 05/16/2024 135  <200 mg/dL Final    HDL 05/16/2024 44  > OR = 40 mg/dL Final    Triglycerides 05/16/2024 146  <150 mg/dL Final    LDL Cholesterol 05/16/2024 68  mg/dL (calc) Final    HDL/Cholesterol Ratio 05/16/2024 3.1  <5.0 (calc) Final    Non HDL Chol. (LDL+VLDL) 05/16/2024 91  <130 mg/dL (calc) Final    Creatinine, Urine 05/16/2024 142  20 - 320 mg/dL Final    Microalb, Ur 05/16/2024 3.2  See Note: mg/dL Final    Microalb/Creat Ratio 05/16/2024 23  <30 mg/g creat Final    Color, UA 05/16/2024 YELLOW   YELLOW Final    Appearance, UA 05/16/2024 CLEAR  CLEAR Final    Specific Gravity, UA 05/16/2024 1.023  1.001 - 1.035 Final    pH, UA 05/16/2024 5.5  5.0 - 8.0 Final    Glucose, UA 05/16/2024 NEGATIVE  NEGATIVE Final    Bilirubin, UA 05/16/2024 NEGATIVE  NEGATIVE Final    Ketones, UA 05/16/2024 NEGATIVE  NEGATIVE Final    Occult Blood UA 05/16/2024 NEGATIVE  NEGATIVE Final    Protein, UA 05/16/2024 NEGATIVE  NEGATIVE Final    Nitrite, UA 05/16/2024 NEGATIVE  NEGATIVE Final    Leukocytes, UA 05/16/2024 NEGATIVE  NEGATIVE Final    WBC Casts, UA 05/16/2024 NONE SEEN  < OR = 5 /HPF Final    RBC Casts, UA 05/16/2024 NONE SEEN  < OR = 2 /HPF Final    Squam Epithel, UA 05/16/2024 NONE SEEN  < OR = 5 /HPF Final    Bacteria, UA 05/16/2024 NONE SEEN  NONE SEEN /HPF Final    Hyaline Casts, UA 05/16/2024 NONE SEEN  NONE SEEN /LPF Final    Service Cmt: 05/16/2024    Final    Reflexive Urine Culture 05/16/2024    Final    TSH w/reflex to FT4 05/16/2024 0.88  0.40 - 4.50 mIU/L Final   Ancillary Orders on 01/26/2024   Component Date Value Ref Range Status    INR 01/31/2024 2.2 (H)   Final    Prothrombin Time 01/31/2024 21.9 (H)  9.0 - 11.5 sec Final   Ancillary Orders on 12/29/2023   Component Date Value Ref Range Status    INR 12/29/2023 2.1 (H)   Final    Prothrombin Time 12/29/2023 20.7 (H)  9.0 - 11.5 sec Final       Past Medical History:   Diagnosis Date    Anticoagulant long-term use     Arthritis     Cervical spine pain     Clotting disorder     Coronary artery disease     Diabetes mellitus     Encounter for blood transfusion     Fatty liver     Hypertension      Past Surgical History:   Procedure Laterality Date    BACK SURGERY      cabg  2011    CARDIAC SURGERY      FOOT SURGERY Right     FRACTURE SURGERY Right     tibia/fibula    ROTATOR CUFF REPAIR Left     TRANSFORAMINAL EPIDURAL INJECTION OF STEROID Bilateral 9/26/2023    Procedure: Injection,steroid,epidural,transforaminal approach L4/5 and L5/S1;  Surgeon: Omega  Rivera SHEA MD;  Location: [facility];  Service: Pain Management;  Laterality: Bilateral;     Family History   Problem Relation Name Age of Onset    Heart disease Mother      Cancer Mother      Heart disease Father      Alcohol abuse Father      No Known Problems Sister      No Known Problems Brother      No Known Problems Daughter      No Known Problems Son      Cataracts Neg Hx      Glaucoma Neg Hx      Retinal detachment Neg Hx      Macular degeneration Neg Hx         All of your core healthy metrics are met.      The 10-year CVD risk score (Christiano, et al., 2008) is: 24.1%    Values used to calculate the score:      Age: 63 years      Sex: Male      Diabetic: Yes      Tobacco smoker: No      Systolic Blood Pressure: 116 mmHg      Is BP treated: Yes      HDL Cholesterol: 44 mg/dL      Total Cholesterol: 135 mg/dL     Marital Status:   Alcohol History:  reports current alcohol use.  Tobacco History:  reports that he quit smoking about 29 years ago. His smoking use included cigarettes. He started smoking about 30 years ago. He has a 0.2 pack-year smoking history. He has been exposed to tobacco smoke. He has never used smokeless tobacco.  Drug History:  reports no history of drug use.    Health Maintenance Topics with due status: Not Due       Topic Last Completion Date    Colorectal Cancer Screening 08/17/2021    TETANUS VACCINE 01/22/2022    Foot Exam 07/26/2023    Eye Exam 12/05/2023    High Dose Statin 03/25/2024    Diabetes Urine Screening 05/16/2024    Lipid Panel 05/16/2024    Hemoglobin A1c 05/16/2024     Immunization History   Administered Date(s) Administered    COVID-19, MRNA, LN-S, PF (Pfizer) (Purple Cap) 03/25/2021, 04/14/2021    Influenza (FLUAD) - Quadrivalent - Adjuvanted - PF *Preferred* (65+) 11/15/2023    Influenza (FLUBLOK) - Quadrivalent - Recombinant - PF *Preferred* (egg allergy) 11/16/2020, 11/17/2021, 11/23/2022    Influenza - Quadrivalent - PF *Preferred* (6 months and older)  "12/26/2019    Influenza - Trivalent (ADULT) 10/01/2014    Influenza - Trivalent - Recombinant - PF 09/25/2018    Tdap 01/22/2022       Review of patient's allergies indicates:   Allergen Reactions    Nsaids (non-steroidal anti-inflammatory drug) Nausea Only    Ancef [cefazolin] Anxiety       Current Outpatient Medications:     acetaminophen (TYLENOL) 325 MG tablet, Take 500 mg by mouth every 6 (six) hours as needed for Pain., Disp: , Rfl:     ALPRAZolam (XANAX) 1 MG tablet, Take 1 tablet (1 mg total) by mouth 2 (two) times daily., Disp: 60 tablet, Rfl: 5    amLODIPine (NORVASC) 10 MG tablet, Take 1 tablet (10 mg total) by mouth once daily., Disp: 90 tablet, Rfl: 3    aspirin 325 MG tablet, Take 325 mg by mouth once daily., Disp: , Rfl:     atenoloL (TENORMIN) 50 MG tablet, Take 2 tablets (100 mg total) by mouth 2 (two) times a day., Disp: 360 tablet, Rfl: 3    BD ULTRA-FINE MINI PEN NEEDLE 31 gauge x 3/16" Ndle, 1 each by In Vitro route once daily., Disp: 100 each, Rfl: 1    blood sugar diagnostic Strp, To check BG 3-4 times daily, to use with insurance preferred meter, Disp: 360 each, Rfl: 3    blood-glucose meter kit, To check BG 3-4 times daily, to use with insurance preferred meter, Disp: 1 each, Rfl: 0    blood-glucose meter,continuous (DEXCOM ) Misc, 1 each by Misc.(Non-Drug; Combo Route) route Daily., Disp: 1 each, Rfl: 0    blood-glucose sensor (DEXCOM G7 SENSOR) Phuong, 1 each by Misc.(Non-Drug; Combo Route) route every 10 days., Disp: 3 each, Rfl: 11    doxycycline (MONODOX) 100 MG capsule, Take 1 capsule (100 mg total) by mouth 2 (two) times daily., Disp: 14 capsule, Rfl: 0    enoxaparin (LOVENOX) 40 mg/0.4 mL Syrg, Inject 0.4 mLs (40 mg total) into the skin once daily., Disp: 10 each, Rfl: 1    fenofibrate (TRICOR) 145 MG tablet, Take 1 tablet (145 mg total) by mouth once daily., Disp: 90 tablet, Rfl: 3    insulin NPH-insulin regular, 70/30, (HUMULIN 70/30 U-100 INSULIN) 100 unit/mL (70-30) " "injection, 35 units in AM and 35 units in PM- may titrate upwards as needed to max of 40 units BID, Disp: 7 each, Rfl: 3    insulin syringe-needle U-100 0.3 mL 31 gauge x 15/64" Syrg, , Disp: , Rfl:     insulin syringe-needle U-100 1/2 mL 31 gauge x 15/64" Syrg, BID, Disp: 180 Syringe, Rfl: 3    lancets Misc, To check BG 3-4 times daily, to use with insurance preferred meter, Disp: 360 each, Rfl: 3    lisinopriL (PRINIVIL,ZESTRIL) 20 MG tablet, Take 1 tablet (20 mg total) by mouth 2 (two) times daily., Disp: 180 tablet, Rfl: 3    metFORMIN (GLUCOPHAGE) 500 MG tablet, Take 2 tablets (1,000 mg total) by mouth 2 (two) times daily with meals., Disp: 360 tablet, Rfl: 3    mupirocin (BACTROBAN) 2 % ointment, Apply topically 2 (two) times daily., Disp: 22 g, Rfl: 2    pregabalin (LYRICA) 75 MG capsule, Take 1 capsule (75 mg total) by mouth 2 (two) times daily., Disp: 60 capsule, Rfl: 01    rosuvastatin (CRESTOR) 40 MG Tab, Take 1 tablet (40 mg total) by mouth once daily., Disp: 90 tablet, Rfl: 3    tamsulosin (FLOMAX) 0.4 mg Cap, Take 1 capsule (0.4 mg total) by mouth once daily. Take 30 minutes after evening meal, Disp: 30 capsule, Rfl: 11    tiZANidine (ZANAFLEX) 4 MG tablet, Take 8 mg by mouth 2 (two) times a day., Disp: , Rfl:     TRUEPLUS INSULIN 0.5 mL 29 gauge x 1/2" Syrg, , Disp: , Rfl: 5    ULTRA COMFORT INSULIN SYRINGE 1 mL 29 gauge x 1/2" Syrg, Inject 25 Units as directed 2 (two) times daily., Disp: 180 each, Rfl: 3    valACYclovir (VALTREX) 1000 MG tablet, Take 1 tablet (1,000 mg total) by mouth 3 (three) times daily., Disp: 21 tablet, Rfl: 0    warfarin (COUMADIN) 1 MG tablet, Take 1 tablet by mouth once daily, Disp: 90 tablet, Rfl: 3    warfarin (COUMADIN) 4 MG tablet, Take 1 tablet (4 mg total) by mouth Daily., Disp: 30 tablet, Rfl: 1    warfarin (COUMADIN) 5 MG tablet, Take as directed by your doctor., Disp: 45 tablet, Rfl: 3    zolpidem (AMBIEN) 10 mg Tab, Take 1 tablet (10 mg total) by mouth nightly as " "needed., Disp: 30 tablet, Rfl: 3  No current facility-administered medications for this visit.    Facility-Administered Medications Ordered in Other Visits:     LIDOcaine (PF) 10 mg/ml (1%) injection 10 mg, 1 mL, Other, Once, Rivera Duff MD    Review of Systems   Constitutional:  Negative for appetite change, chills, fever and unexpected weight change.   HENT:  Negative for sore throat and trouble swallowing.    Eyes:  Negative for visual disturbance.   Respiratory:  Negative for cough, shortness of breath and wheezing.    Cardiovascular:  Positive for chest pain (left anterior chest pain since fall). Negative for palpitations and leg swelling.   Gastrointestinal:  Negative for abdominal pain, blood in stool, constipation, diarrhea, nausea and vomiting.   Genitourinary:  Positive for frequency (nocturia 2-3 times). Negative for dysuria and hematuria.   Musculoskeletal:  Positive for back pain (chronic, takes tylenol BID) and neck pain. Negative for gait problem.   Skin:  Negative for rash.   Neurological:  Positive for numbness (left hand numbness intermittently). Negative for dizziness, syncope, speech difficulty and headaches.   Psychiatric/Behavioral:  Negative for dysphoric mood. The patient is not nervous/anxious (stable on xanax).           Objective:      Vitals:    05/21/24 1208   BP: 116/68   Pulse: 65   SpO2: 97%   Weight: 98.4 kg (217 lb)   Height: 5' 9" (1.753 m)     Physical Exam  Vitals and nursing note reviewed.   Constitutional:       General: He is not in acute distress.     Appearance: He is well-developed. He is obese.   HENT:      Head: Normocephalic and atraumatic.        Right Ear: Tympanic membrane and ear canal normal.      Left Ear: Tympanic membrane and ear canal normal.   Neck:      Vascular: No carotid bruit.   Cardiovascular:      Rate and Rhythm: Normal rate and regular rhythm.      Heart sounds: No murmur heard.     No friction rub. No gallop.      Comments: +spider and " varicose veins to bilat lower legs/ankles, chronic purplish discoloration to bilat feet, feet warm/dry  Pulmonary:      Effort: Pulmonary effort is normal. No respiratory distress.      Breath sounds: Normal breath sounds. No wheezing or rales.   Chest:       Abdominal:      General: There is no distension.      Palpations: Abdomen is soft.      Tenderness: There is no abdominal tenderness.   Musculoskeletal:      Cervical back: Neck supple.      Right lower leg: No edema.      Left lower leg: No edema.   Lymphadenopathy:      Cervical: No cervical adenopathy.   Skin:     General: Skin is warm and dry.      Findings: No rash.   Neurological:      General: No focal deficit present.      Mental Status: He is alert and oriented to person, place, and time.      Gait: Gait normal.           Assessment:       1. Herpes zoster without complication    2. Type 2 diabetes mellitus with hypoglycemia without coma, with long-term current use of insulin    3. DM type 2 with diabetic dyslipidemia    4. DDD (degenerative disc disease), lumbar    5. Primary hypertension    6. Stage 3a chronic kidney disease    7. Coronary artery disease involving native coronary artery of native heart without angina pectoris    8. Mixed hyperlipidemia           Plan:       1. Herpes zoster without complication  -pt with apparent new herpes zoster outbreak left neck. Will start treatment with valtrex- advised to call if pain worsens  -     valACYclovir (VALTREX) 1000 MG tablet; Take 1 tablet (1,000 mg total) by mouth 3 (three) times daily.  Dispense: 21 tablet; Refill: 0    2. Type 2 diabetes mellitus with hypoglycemia without coma, with long-term current use of insulin   -improved with less frequent hypoglycemia now off glimperide    3. DM type 2 with diabetic dyslipidemia    4. DDD (degenerative disc disease), lumbar   -discussed with pt his chronic neck/back c/o's I do not recommend chronic opioid medication ren with xanax use and discussed  concerns with oversedation, fall risk, dependence, tolerance and abuse. Advised we could increase lyrica dose some if he feels it's been helpful. Discussed duloxetine which he took a few years ago but didn't feel it was helpful. Encouraged to continue with PT, pt will call      5. Primary hypertension   -BP well controlled    6. Stage 3a chronic kidney disease   -reviewed recent labs with pt, renal fxn stable and microalbuminuria improved. Stressed imp of BP/glucose and lipid control    7. Coronary artery disease involving native coronary artery of native heart without angina pectoris   -stable, denies angina    8. Mixed hyperlipidemia   -reviewed recent labs with pt, well controlled    Follow up in about 3 months (around 8/21/2024) for Diabetes.          Counseled on age and gender appropriate medical preventative services, including cancer screenings, immunizations, overall nutritional health, need for a consistent exercise regimen and an overall push towards maintaining a vigorous and active lifestyle.      5/21/2024 Marianna Hdez NP

## 2024-05-22 ENCOUNTER — TELEPHONE (OUTPATIENT)
Facility: CLINIC | Age: 64
End: 2024-05-22
Payer: MEDICARE

## 2024-05-22 NOTE — TELEPHONE ENCOUNTER
----- Message from ANALI Weiss sent at 5/21/2024  4:41 PM CDT -----  Please let him know there are no interactions with these medications so he will not need any different INR checking.    Miri  ----- Message -----  From: Chelsie Reese LPN  Sent: 5/21/2024   4:12 PM CDT  To: ANALI Weiss    Recommendations?  ----- Message -----  From: Daisy Otero  Sent: 5/21/2024   4:10 PM CDT  To: Sarah Shen Staff    Pt is calling to let us know that he has Shingles. He is starting rx for Lyrica 75 mg and Valtrex 1000 mg. He is asking for a call back to let him know if we need to change checking his INR while he is on the is medication.     [phone]

## 2024-06-06 ENCOUNTER — TELEPHONE (OUTPATIENT)
Dept: FAMILY MEDICINE | Facility: CLINIC | Age: 64
End: 2024-06-06
Payer: MEDICARE

## 2024-06-06 DIAGNOSIS — L98.9 PAINFUL SKIN LESION: Primary | ICD-10-CM

## 2024-06-06 RX ORDER — PREDNISONE 10 MG/1
10 TABLET ORAL DAILY
Qty: 3 TABLET | Refills: 0 | Status: SHIPPED | OUTPATIENT
Start: 2024-06-06 | End: 2024-06-09

## 2024-06-06 NOTE — TELEPHONE ENCOUNTER
"Per Nurse Practitioner Shonna, "Sending 3 days of prednisone 10mg. This WILL increase his blood sugar temporarily, but it will go back to baseline. If this rash continues he may need to see dermatology. If his fasting glucose is greater than 300 he needs to call us."    Attempted call and unable to leave voice message due to mail box not being set up yet.   "

## 2024-06-06 NOTE — TELEPHONE ENCOUNTER
Patient has been notified of Nurse Practitioner Kilpatrick message and verbalized understanding. Patient states his glucose this morning was 41 upon and after eating and going back to bed and glucose went to 127. Patient will contact clinic if prescription does not help and if glucose goes above 300.

## 2024-06-06 NOTE — TELEPHONE ENCOUNTER
Patient had an appointment with Nurse Practitioner Lemuel 5/21/24 regarding the reason for this call. Patient states he completed the prescription of Valtrex.   Patient states the scabs are mostly gone that were on his head and hair line. States on the middle of his back between his shoulder blades the skin is raw and the spots on his head are raw.   Has used head and shoulders and has helped. Reports the spots are not painful as it was but are tender to the touch. Has been using bactroban on the spots but unable to place on his scalp.   Patient is asking if this is something he has to deal with or if anything can be done. Getting with provider for further review.     Preferred pharmacy [facility],  [address].

## 2024-06-06 NOTE — TELEPHONE ENCOUNTER
----- Message from Stephanie Shane MA sent at 6/6/2024  2:35 PM CDT -----  Patient is calling requesting more information on what is to be done next regarding his shingles.     [facility]   Pt: [phone]  -DN

## 2024-06-17 ENCOUNTER — TELEPHONE (OUTPATIENT)
Dept: FAMILY MEDICINE | Facility: CLINIC | Age: 64
End: 2024-06-17
Payer: MEDICARE

## 2024-06-17 NOTE — TELEPHONE ENCOUNTER
Spoke to pt and let him know they are contagious if they open sores and leaking fluid. If they are scabbed over then he is not contagious . Pt verbalized understanding

## 2024-06-17 NOTE — TELEPHONE ENCOUNTER
----- Message from Aaliyah Mendez sent at 6/17/2024  8:36 AM CDT -----  Pt states he was diagnosed with shingles. Would like to know if he is contagious?  [phone]

## 2024-06-18 ENCOUNTER — TELEPHONE (OUTPATIENT)
Facility: CLINIC | Age: 64
End: 2024-06-18
Payer: MEDICARE

## 2024-06-18 NOTE — TELEPHONE ENCOUNTER
Called patient regarding INR of 2.6, Per Dr. Smith, no coumadin dose changes and Recheck INR in 1 months, patient stated understanding.

## 2024-06-26 RX ORDER — TIZANIDINE 4 MG/1
8 TABLET ORAL 2 TIMES DAILY
Qty: 120 TABLET | Refills: 5 | Status: SHIPPED | OUTPATIENT
Start: 2024-06-26

## 2024-06-26 NOTE — TELEPHONE ENCOUNTER
----- Message from Aaliyah Mendez sent at 6/26/2024  2:50 PM CDT -----  Pt needs a refill on tizanidine   [address]   [phone]

## 2024-06-26 NOTE — TELEPHONE ENCOUNTER
Pt is needing a refill on his tizanidine. Last office visit  05/21/2024. Next office visit 08/26/2024.     Looks like that pt's pain management doctor, Dr. Desouza filled the Tizanidine last in 02/2024, but before that we were prescribing the medication for him. Are you okay to fill this medication?

## 2024-07-17 DIAGNOSIS — D68.51 HETEROZYGOUS FACTOR V LEIDEN MUTATION: Chronic | ICD-10-CM

## 2024-07-17 DIAGNOSIS — G47.00 INSOMNIA, UNSPECIFIED TYPE: Primary | ICD-10-CM

## 2024-07-17 RX ORDER — WARFARIN 4 MG/1
4 TABLET ORAL DAILY
Qty: 30 TABLET | Refills: 1 | Status: SHIPPED | OUTPATIENT
Start: 2024-07-17 | End: 2025-07-17

## 2024-07-17 RX ORDER — ZOLPIDEM TARTRATE 10 MG/1
10 TABLET ORAL NIGHTLY PRN
Qty: 30 TABLET | Refills: 3 | Status: SHIPPED | OUTPATIENT
Start: 2024-07-17

## 2024-07-17 RX ORDER — WARFARIN SODIUM 5 MG/1
TABLET ORAL
Qty: 45 TABLET | Refills: 3 | Status: SHIPPED | OUTPATIENT
Start: 2024-07-17

## 2024-07-19 ENCOUNTER — TELEPHONE (OUTPATIENT)
Facility: CLINIC | Age: 64
End: 2024-07-19
Payer: MEDICARE

## 2024-07-19 NOTE — TELEPHONE ENCOUNTER
----- Message from Ac Smith MD sent at 7/18/2024  5:11 PM CDT -----  Call patient , INR is good.  Recheck in one week.

## 2024-07-19 NOTE — TELEPHONE ENCOUNTER
Called patient regarding INR results, per dr Smith INR is adequate and repeat INR in one month. Patient stated he had a conjunctival hemorrhage and has stopped taking warfarin 07/18/2024. Dr. Smith advised to stop warfarin until Monday 07/22/2024, and repeat INR on 07/29/2024, patient stated understanding.

## 2024-07-29 DIAGNOSIS — R35.1 BENIGN PROSTATIC HYPERPLASIA WITH NOCTURIA: ICD-10-CM

## 2024-07-29 DIAGNOSIS — N40.1 BENIGN PROSTATIC HYPERPLASIA WITH NOCTURIA: ICD-10-CM

## 2024-07-29 RX ORDER — TAMSULOSIN HYDROCHLORIDE 0.4 MG/1
0.4 CAPSULE ORAL DAILY
Qty: 90 CAPSULE | Refills: 3 | Status: SHIPPED | OUTPATIENT
Start: 2024-07-29 | End: 2025-07-29

## 2024-07-29 NOTE — TELEPHONE ENCOUNTER
----- Message from Aaliyah Mendez sent at 7/29/2024 10:43 AM CDT -----  Refill on tamsulosin   [facility] [address]   [phone]

## 2024-08-02 ENCOUNTER — TELEPHONE (OUTPATIENT)
Facility: CLINIC | Age: 64
End: 2024-08-02
Payer: MEDICARE

## 2024-08-02 NOTE — TELEPHONE ENCOUNTER
Called patient regarding INR results 2.2, per Dr. Smith INR is therapeutic patient to repeat INR in 1 months. Patient stated understanding.

## 2024-08-26 ENCOUNTER — OFFICE VISIT (OUTPATIENT)
Dept: FAMILY MEDICINE | Facility: CLINIC | Age: 64
End: 2024-08-26
Payer: MEDICARE

## 2024-08-26 VITALS
WEIGHT: 212 LBS | HEART RATE: 70 BPM | HEIGHT: 69 IN | OXYGEN SATURATION: 97 % | DIASTOLIC BLOOD PRESSURE: 70 MMHG | BODY MASS INDEX: 31.4 KG/M2 | SYSTOLIC BLOOD PRESSURE: 138 MMHG

## 2024-08-26 DIAGNOSIS — M50.30 DDD (DEGENERATIVE DISC DISEASE), CERVICAL: ICD-10-CM

## 2024-08-26 DIAGNOSIS — E78.5 DM TYPE 2 WITH DIABETIC DYSLIPIDEMIA: ICD-10-CM

## 2024-08-26 DIAGNOSIS — F41.9 ANXIETY: ICD-10-CM

## 2024-08-26 DIAGNOSIS — E11.69 DM TYPE 2 WITH DIABETIC DYSLIPIDEMIA: ICD-10-CM

## 2024-08-26 DIAGNOSIS — Z79.899 ENCOUNTER FOR LONG-TERM (CURRENT) USE OF OTHER MEDICATIONS: ICD-10-CM

## 2024-08-26 DIAGNOSIS — I10 PRIMARY HYPERTENSION: ICD-10-CM

## 2024-08-26 DIAGNOSIS — E78.2 MIXED HYPERLIPIDEMIA: ICD-10-CM

## 2024-08-26 DIAGNOSIS — E11.649 TYPE 2 DIABETES MELLITUS WITH HYPOGLYCEMIA WITHOUT COMA, WITH LONG-TERM CURRENT USE OF INSULIN: Primary | ICD-10-CM

## 2024-08-26 DIAGNOSIS — M19.011 ARTHRITIS OF RIGHT ACROMIOCLAVICULAR JOINT: ICD-10-CM

## 2024-08-26 DIAGNOSIS — M51.36 DDD (DEGENERATIVE DISC DISEASE), LUMBAR: ICD-10-CM

## 2024-08-26 DIAGNOSIS — I25.10 CORONARY ARTERY DISEASE INVOLVING NATIVE CORONARY ARTERY OF NATIVE HEART WITHOUT ANGINA PECTORIS: ICD-10-CM

## 2024-08-26 DIAGNOSIS — Z51.81 ENCOUNTER FOR THERAPEUTIC DRUG MONITORING: ICD-10-CM

## 2024-08-26 DIAGNOSIS — I87.2 STASIS DERMATITIS: ICD-10-CM

## 2024-08-26 DIAGNOSIS — Z79.4 TYPE 2 DIABETES MELLITUS WITH HYPOGLYCEMIA WITHOUT COMA, WITH LONG-TERM CURRENT USE OF INSULIN: Primary | ICD-10-CM

## 2024-08-26 PROBLEM — E66.01 MORBID (SEVERE) OBESITY DUE TO EXCESS CALORIES: Status: RESOLVED | Noted: 2020-05-05 | Resolved: 2024-08-26

## 2024-08-26 PROBLEM — F33.41 RECURRENT MAJOR DEPRESSIVE DISORDER, IN PARTIAL REMISSION: Status: RESOLVED | Noted: 2020-05-05 | Resolved: 2024-08-26

## 2024-08-26 LAB — HBA1C MFR BLD: 5.7 %

## 2024-08-26 PROCEDURE — 3075F SYST BP GE 130 - 139MM HG: CPT | Mod: CPTII,S$GLB,, | Performed by: NURSE PRACTITIONER

## 2024-08-26 PROCEDURE — 1125F AMNT PAIN NOTED PAIN PRSNT: CPT | Mod: CPTII,S$GLB,, | Performed by: NURSE PRACTITIONER

## 2024-08-26 PROCEDURE — 3078F DIAST BP <80 MM HG: CPT | Mod: CPTII,S$GLB,, | Performed by: NURSE PRACTITIONER

## 2024-08-26 PROCEDURE — 3044F HG A1C LEVEL LT 7.0%: CPT | Mod: CPTII,S$GLB,, | Performed by: NURSE PRACTITIONER

## 2024-08-26 PROCEDURE — 1101F PT FALLS ASSESS-DOCD LE1/YR: CPT | Mod: CPTII,S$GLB,, | Performed by: NURSE PRACTITIONER

## 2024-08-26 PROCEDURE — 1160F RVW MEDS BY RX/DR IN RCRD: CPT | Mod: CPTII,S$GLB,, | Performed by: NURSE PRACTITIONER

## 2024-08-26 PROCEDURE — 1159F MED LIST DOCD IN RCRD: CPT | Mod: CPTII,S$GLB,, | Performed by: NURSE PRACTITIONER

## 2024-08-26 PROCEDURE — 3288F FALL RISK ASSESSMENT DOCD: CPT | Mod: CPTII,S$GLB,, | Performed by: NURSE PRACTITIONER

## 2024-08-26 PROCEDURE — 3008F BODY MASS INDEX DOCD: CPT | Mod: CPTII,S$GLB,, | Performed by: NURSE PRACTITIONER

## 2024-08-26 PROCEDURE — 3066F NEPHROPATHY DOC TX: CPT | Mod: CPTII,S$GLB,, | Performed by: NURSE PRACTITIONER

## 2024-08-26 PROCEDURE — 83036 HEMOGLOBIN GLYCOSYLATED A1C: CPT | Mod: QW,,, | Performed by: NURSE PRACTITIONER

## 2024-08-26 PROCEDURE — 95251 CONT GLUC MNTR ANALYSIS I&R: CPT | Mod: S$GLB,,, | Performed by: NURSE PRACTITIONER

## 2024-08-26 PROCEDURE — 3061F NEG MICROALBUMINURIA REV: CPT | Mod: CPTII,S$GLB,, | Performed by: NURSE PRACTITIONER

## 2024-08-26 PROCEDURE — 99214 OFFICE O/P EST MOD 30 MIN: CPT | Mod: S$GLB,,, | Performed by: NURSE PRACTITIONER

## 2024-08-26 PROCEDURE — 4010F ACE/ARB THERAPY RXD/TAKEN: CPT | Mod: CPTII,S$GLB,, | Performed by: NURSE PRACTITIONER

## 2024-08-26 RX ORDER — HYDROCODONE BITARTRATE AND ACETAMINOPHEN 5; 325 MG/1; MG/1
1 TABLET ORAL EVERY 12 HOURS PRN
Qty: 14 TABLET | Refills: 0 | Status: SHIPPED | OUTPATIENT
Start: 2024-08-26

## 2024-08-26 RX ORDER — TRIAMCINOLONE ACETONIDE 1 MG/G
CREAM TOPICAL 2 TIMES DAILY
Qty: 30 G | Refills: 2 | Status: SHIPPED | OUTPATIENT
Start: 2024-08-26

## 2024-08-26 NOTE — PROGRESS NOTES
"  SUBJECTIVE:    Patient ID: Luis Resendiz is a 63 y.o. male.    Chief Complaint: Diabetes (No bottles//Pt is here for a checkup and medication refills//Foot exam ordered today//Pt decline pna vaccine//ANA )    Pt here for regular DM, lipids, CAD, cervical/lumbar DDD f/u    Pt reports overall "doing about the same"    Pt reports in July woke up with severe shoulder pain- feels his right shoulder dislocated while sleeping and called 911- pt reports it reduced itself before arriving at the ER. Had f/u with ortho after ER visit- told he had mod to severe AC joint arthritis- was given exercises to do at home. Pt reports overall it's feeling a little better, will apply ice pack to AC joint    Reviewed with pt 30 days day data report from CGM Dexcom G7 via manual . Pt reports he's been skipping insulin doses at times based on his sugar. Taking 20-30 units of NPH.  Denies any recent hypoglycemic symptoms though CGM indicates he's having low readings. Glimepiride has been stopped previously though he admits he will occas take 1/2 tablet if his blood sugar gets above 160- reports he starts to worry it's too high  Average blood glucose:107  TIR:84%  Low:10%  Very Low:3%  High:3%  Very High:0%    Reports had scheduled appt with Dr. Resendez, pain mgmt for chronic neck/LBPbut had to reschedule so now scheduled next month    Hx of CAD/CABG- reports hasn't had to take amlodipine in months- was previously stopped after syncopal episode. saw Dr. Crow in December after having a syncopal episode in [place]. Dr. Crow had previously recommended taking only 1/2 tab of amlodipine if -160 and hold if less than 130.     Follows with Dr. Sofia for hypercoagulable- Factor V leiden on warfarin        Office Visit on 08/26/2024   Component Date Value Ref Range Status    Hemoglobin A1C, POC 08/26/2024 5.7  % Final   Ancillary Orders on 05/03/2024   Component Date Value Ref Range Status    INR 05/16/2024 2.4 (H)   " Final    Prothrombin Time 05/16/2024 23.6 (H)  9.0 - 11.5 sec Final   Ancillary Orders on 04/19/2024   Component Date Value Ref Range Status    INR 06/17/2024 2.6 (H)   Final    Prothrombin Time 06/17/2024 25.9 (H)  9.0 - 11.5 sec Final   Patient Outreach on 04/12/2024   Component Date Value Ref Range Status    Left Eye DM Retinopathy 12/05/2023 Negative   Final    Right Eye DM Retinopathy 12/05/2023 Negative   Final   Ancillary Orders on 04/05/2024   Component Date Value Ref Range Status    INR 04/08/2024 4.0 (H)   Final    Prothrombin Time 04/08/2024 38.5 (H)  9.0 - 11.5 sec Final   Ancillary Orders on 03/22/2024   Component Date Value Ref Range Status    INR 04/18/2024 1.7 (H)   Final    Prothrombin Time 04/18/2024 17.6 (H)  9.0 - 11.5 sec Final   Ancillary Orders on 03/08/2024   Component Date Value Ref Range Status    INR 07/17/2024 2.3 (H)   Final    Prothrombin Time 07/17/2024 23.9 (H)  9.0 - 11.5 sec Final       Past Medical History:   Diagnosis Date    Anticoagulant long-term use     Arthritis     Chronic back pain     Chronic neck pain     pain management    Coronary artery disease     s/p CABG    DDD (degenerative disc disease), cervical     Encounter for blood transfusion     Fatty liver     Hereditary factor VIII deficiency     [place], Coumadin    Hypertension     Insulin dependent type 2 diabetes mellitus     Mixed hyperlipidemia      Past Surgical History:   Procedure Laterality Date    BACK SURGERY      CORONARY ARTERY BYPASS GRAFT  2011    2 vessel    CORONARY ARTERY BYPASS GRAFT (CABG)  12/2023    FOOT SURGERY Right     FRACTURE SURGERY Right     tibia/fibula    ROTATOR CUFF REPAIR Left     TRANSFORAMINAL EPIDURAL INJECTION OF STEROID Bilateral 09/26/2023    Procedure: Injection,steroid,epidural,transforaminal approach L4/5 and L5/S1;  Surgeon: Rivera Duff MD;  Location: [facility];  Service: Pain Management;  Laterality: Bilateral;     Family History   Problem Relation Name Age of Onset     Heart disease Mother      Cancer Mother      Heart disease Father      Alcohol abuse Father      No Known Problems Sister      No Known Problems Brother      No Known Problems Daughter      No Known Problems Son      Cataracts Neg Hx      Glaucoma Neg Hx      Retinal detachment Neg Hx      Macular degeneration Neg Hx         All of your core healthy metrics are met.      The 10-year CVD risk score (Christiano, et al., 2008) is: 32.3%    Values used to calculate the score:      Age: 63 years      Sex: Male      Diabetic: Yes      Tobacco smoker: No      Systolic Blood Pressure: 138 mmHg      Is BP treated: Yes      HDL Cholesterol: 44 mg/dL      Total Cholesterol: 135 mg/dL     Marital Status:   Alcohol History:  reports current alcohol use.  Tobacco History:  reports that he quit smoking about 30 years ago. His smoking use included cigarettes. He started smoking about 30 years ago. He has a 0.2 pack-year smoking history. He has been exposed to tobacco smoke. He has never used smokeless tobacco.  Drug History:  reports no history of drug use.    Health Maintenance Topics with due status: Not Due       Topic Last Completion Date    Colorectal Cancer Screening 08/17/2021    TETANUS VACCINE 01/22/2022    Influenza Vaccine 11/15/2023    Eye Exam 12/05/2023    Diabetes Urine Screening 05/16/2024    Lipid Panel 05/16/2024    High Dose Statin 07/16/2024    Hemoglobin A1c 08/26/2024     Immunization History   Administered Date(s) Administered    COVID-19, MRNA, LN-S, PF (Pfizer) (Purple Cap) 03/25/2021, 04/14/2021    Influenza (FLUAD) - Quadrivalent - Adjuvanted - PF *Preferred* (65+) 11/15/2023    Influenza (FLUBLOK) - Quadrivalent - Recombinant - PF *Preferred* (egg allergy) 11/16/2020, 11/17/2021, 11/23/2022    Influenza - Quadrivalent - PF *Preferred* (6 months and older) 12/26/2019    Influenza - Trivalent (ADULT) 10/01/2014    Influenza - Trivalent - Recombinant - PF 09/25/2018    Tdap 01/22/2022  "      Review of patient's allergies indicates:   Allergen Reactions    Nsaids (non-steroidal anti-inflammatory drug) Nausea Only    Ancef [cefazolin] Anxiety       Current Outpatient Medications:     acetaminophen (TYLENOL) 325 MG tablet, Take 500 mg by mouth every 6 (six) hours as needed for Pain., Disp: , Rfl:     ALPRAZolam (XANAX) 1 MG tablet, Take 1 tablet (1 mg total) by mouth 2 (two) times daily., Disp: 60 tablet, Rfl: 5    amLODIPine (NORVASC) 10 MG tablet, Take 1 tablet (10 mg total) by mouth once daily. (Patient not taking: Reported on 8/26/2024), Disp: 90 tablet, Rfl: 3    aspirin 325 MG tablet, Take 325 mg by mouth once daily., Disp: , Rfl:     atenoloL (TENORMIN) 50 MG tablet, Take 2 tablets (100 mg total) by mouth 2 (two) times a day., Disp: 360 tablet, Rfl: 3    BD ULTRA-FINE MINI PEN NEEDLE 31 gauge x 3/16" Ndle, 1 each by In Vitro route once daily., Disp: 100 each, Rfl: 1    blood sugar diagnostic Strp, To check BG 3-4 times daily, to use with insurance preferred meter, Disp: 360 each, Rfl: 3    blood-glucose meter kit, To check BG 3-4 times daily, to use with insurance preferred meter, Disp: 1 each, Rfl: 0    blood-glucose meter,continuous (DEXCOM ) Misc, 1 each by Misc.(Non-Drug; Combo Route) route Daily., Disp: 1 each, Rfl: 0    blood-glucose sensor (DEXCOM G7 SENSOR) Phuong, 1 each by Misc.(Non-Drug; Combo Route) route every 10 days., Disp: 3 each, Rfl: 11    doxycycline (MONODOX) 100 MG capsule, Take 1 capsule (100 mg total) by mouth 2 (two) times daily., Disp: 14 capsule, Rfl: 0    enoxaparin (LOVENOX) 40 mg/0.4 mL Syrg, Inject 0.4 mLs (40 mg total) into the skin once daily. (Patient not taking: Reported on 7/16/2024), Disp: 10 each, Rfl: 1    fenofibrate (TRICOR) 145 MG tablet, Take 1 tablet (145 mg total) by mouth once daily., Disp: 90 tablet, Rfl: 3    HYDROcodone-acetaminophen (NORCO) 5-325 mg per tablet, Take 1 tablet by mouth every 12 (twelve) hours as needed for Pain., Disp: 14 " "tablet, Rfl: 0    insulin NPH-insulin regular, 70/30, (HUMULIN 70/30 U-100 INSULIN) 100 unit/mL (70-30) injection, 35 units in AM and 35 units in PM- may titrate upwards as needed to max of 40 units BID, Disp: 7 each, Rfl: 3    insulin syringe-needle U-100 0.3 mL 31 gauge x 15/64" Syrg, , Disp: , Rfl:     insulin syringe-needle U-100 1/2 mL 31 gauge x 15/64" Syrg, BID, Disp: 180 Syringe, Rfl: 3    lancets Misc, To check BG 3-4 times daily, to use with insurance preferred meter, Disp: 360 each, Rfl: 3    lisinopriL (PRINIVIL,ZESTRIL) 20 MG tablet, Take 1 tablet (20 mg total) by mouth 2 (two) times daily., Disp: 180 tablet, Rfl: 3    metFORMIN (GLUCOPHAGE) 500 MG tablet, Take 2 tablets (1,000 mg total) by mouth 2 (two) times daily with meals., Disp: 360 tablet, Rfl: 3    mupirocin (BACTROBAN) 2 % ointment, Apply topically 2 (two) times daily. (Patient not taking: Reported on 7/16/2024), Disp: 22 g, Rfl: 2    rosuvastatin (CRESTOR) 40 MG Tab, Take 1 tablet (40 mg total) by mouth once daily., Disp: 90 tablet, Rfl: 3    tamsulosin (FLOMAX) 0.4 mg Cap, Take 1 capsule (0.4 mg total) by mouth once daily. Take 30 minutes after evening meal, Disp: 90 capsule, Rfl: 3    tiZANidine (ZANAFLEX) 4 MG tablet, Take 2 tablets (8 mg total) by mouth 2 (two) times a day., Disp: 120 tablet, Rfl: 5    triamcinolone acetonide 0.1% (KENALOG) 0.1 % cream, Apply topically 2 (two) times daily. Mix 50/50 with aquaphor or eucerin lotion and apply to lower leg, Disp: 30 g, Rfl: 2    TRUEPLUS INSULIN 0.5 mL 29 gauge x 1/2" Syrg, , Disp: , Rfl: 5    ULTRA COMFORT INSULIN SYRINGE 1 mL 29 gauge x 1/2" Syrg, Inject 25 Units as directed 2 (two) times daily., Disp: 180 each, Rfl: 3    valACYclovir (VALTREX) 1000 MG tablet, Take 1 tablet (1,000 mg total) by mouth 3 (three) times daily. (Patient not taking: Reported on 7/16/2024), Disp: 21 tablet, Rfl: 0    warfarin (COUMADIN) 1 MG tablet, Take 1 tablet by mouth once daily, Disp: 90 tablet, Rfl: 3    " "warfarin (COUMADIN) 4 MG tablet, Take 1 tablet (4 mg total) by mouth Daily., Disp: 30 tablet, Rfl: 1    warfarin (COUMADIN) 5 MG tablet, Take as directed by your doctor., Disp: 45 tablet, Rfl: 3    zolpidem (AMBIEN) 10 mg Tab, Take 1 tablet (10 mg total) by mouth nightly as needed., Disp: 30 tablet, Rfl: 3  No current facility-administered medications for this visit.    Facility-Administered Medications Ordered in Other Visits:     LIDOcaine (PF) 10 mg/ml (1%) injection 10 mg, 1 mL, Other, Once, Rivera Duff MD    Review of Systems   Constitutional:  Negative for appetite change, chills, fever and unexpected weight change.   HENT:  Negative for sore throat and trouble swallowing.    Eyes:  Negative for visual disturbance.   Respiratory:  Negative for cough, shortness of breath and wheezing.    Cardiovascular:  Negative for chest pain, palpitations and leg swelling.   Gastrointestinal:  Negative for abdominal pain, blood in stool, constipation, diarrhea, nausea and vomiting.   Genitourinary:  Positive for frequency (nocturia 2-3 times). Negative for dysuria and hematuria.   Musculoskeletal:  Positive for arthralgias (right shoulder pain), back pain (chronic, takes tylenol BID) and neck pain. Negative for gait problem.   Skin:  Negative for rash.   Neurological:  Positive for numbness (left hand numbness intermittently). Negative for dizziness, syncope, speech difficulty and headaches.   Psychiatric/Behavioral:  Negative for dysphoric mood. The patient is not nervous/anxious (stable on xanax).           Objective:      Vitals:    08/26/24 1144   BP: 138/70   Pulse: 70   SpO2: 97%   Weight: 96.2 kg (212 lb)   Height: 5' 9" (1.753 m)     Physical Exam  Vitals and nursing note reviewed.   Constitutional:       General: He is not in acute distress.     Appearance: He is well-developed. He is obese.   HENT:      Head: Normocephalic and atraumatic.        Right Ear: Tympanic membrane and ear canal normal.      Left " Ear: Tympanic membrane and ear canal normal.   Neck:      Vascular: No carotid bruit.   Cardiovascular:      Rate and Rhythm: Normal rate and regular rhythm.      Heart sounds: No murmur heard.     No friction rub. No gallop.      Comments: +spider and varicose veins to bilat lower legs/ankles, chronic purplish discoloration to bilat feet, feet warm/dry  Pulmonary:      Effort: Pulmonary effort is normal. No respiratory distress.      Breath sounds: Normal breath sounds. No wheezing or rales.   Abdominal:      General: There is no distension.      Palpations: Abdomen is soft.      Tenderness: There is no abdominal tenderness.   Musculoskeletal:      Right shoulder: Bony tenderness present. No swelling or effusion. Decreased range of motion (abduction limited to 80-90 degrees).      Cervical back: Neck supple.      Right lower leg: No edema.      Left lower leg: No edema.   Lymphadenopathy:      Cervical: No cervical adenopathy.   Skin:     General: Skin is warm and dry.      Findings: No rash.   Neurological:      General: No focal deficit present.      Mental Status: He is alert and oriented to person, place, and time.      Gait: Gait normal.           Assessment:       1. Type 2 diabetes mellitus with hypoglycemia without coma, with long-term current use of insulin    2. DM type 2 with diabetic dyslipidemia    3. Mixed hyperlipidemia    4. Primary hypertension    5. Stasis dermatitis    6. Coronary artery disease involving native coronary artery of native heart without angina pectoris    7. DDD (degenerative disc disease), lumbar    8. Anxiety    9. Encounter for therapeutic drug monitoring    10. Encounter for long-term (current) use of other medications    11. DDD (degenerative disc disease), cervical    12. Arthritis of right acromioclavicular joint           Plan:       1. Type 2 diabetes mellitus with hypoglycemia without coma, with long-term current use of insulin   -A1C down to 5.7% with CGM reading lows-  pt cautioned on risks associated with hypoglycemia and again advised him to not take glimepiride. Also discussed again with him insulin dosing and offered referral to CDE to help with insulin dosing/carb counting however he declines    2. DM type 2 with diabetic dyslipidemia  -     Hemoglobin A1C, POCT    3. Mixed hyperlipidemia   -well controlled on last labs    4. Primary hypertension   -BP well controlled    5. Stasis dermatitis  -mild dermatitis left lower leg, treat with moisturizer and steroid  -     triamcinolone acetonide 0.1% (KENALOG) 0.1 % cream; Apply topically 2 (two) times daily. Mix 50/50 with aquaphor or eucerin lotion and apply to lower leg  Dispense: 30 g; Refill: 2    6. Coronary artery disease involving native coronary artery of native heart without angina pectoris   -stable, pt denies angina    7. DDD (degenerative disc disease), lumbar  -     Cancel: DRUG MONITOR, PANEL 4, W/CONF, URINE; Future; Expected date: 08/26/2024  -     Cancel: Drug Tox Monitoring 1, W/Conf, Oral Fluid  -     Drug Tox Monitoring 1, W/Conf, Oral Fluid; Future; Expected date: 08/26/2024    8. Anxiety  -     Cancel: DRUG MONITOR, PANEL 4, W/CONF, URINE; Future; Expected date: 08/26/2024  -     Cancel: Drug Tox Monitoring 1, W/Conf, Oral Fluid  -     Drug Tox Monitoring 1, W/Conf, Oral Fluid; Future; Expected date: 08/26/2024    9. Encounter for therapeutic drug monitoring  -     Cancel: DRUG MONITOR, PANEL 4, W/CONF, URINE; Future; Expected date: 08/26/2024  -     Cancel: Drug Tox Monitoring 1, W/Conf, Oral Fluid  -     Drug Tox Monitoring 1, W/Conf, Oral Fluid; Future; Expected date: 08/26/2024    10. Encounter for long-term (current) use of other medications  -     Cancel: DRUG MONITOR, PANEL 4, W/CONF, URINE; Future; Expected date: 08/26/2024  -     Cancel: Drug Tox Monitoring 1, W/Conf, Oral Fluid  -     Drug Tox Monitoring 1, W/Conf, Oral Fluid; Future; Expected date: 08/26/2024    11. DDD (degenerative disc disease),  cervical  -     HYDROcodone-acetaminophen (NORCO) 5-325 mg per tablet; Take 1 tablet by mouth every 12 (twelve) hours as needed for Pain.  Dispense: 14 tablet; Refill: 0    12. Arthritis of right acromioclavicular joint   -pt continues with right shoulder pain, advised I will give a small quantity of pain medication however will not be refilling this medication. He is scheduled to see Dr. Resendez next month. Cautioned him on risks of oversedation, falls and overdose with pain medication and alprazolam and cautioned to not combine medication    Follow up in about 3 months (around 11/26/2024) for UDS today, Diabetes.          Counseled on age and gender appropriate medical preventative services, including cancer screenings, immunizations, overall nutritional health, need for a consistent exercise regimen and an overall push towards maintaining a vigorous and active lifestyle.      8/26/2024 Marianna Hdez NP

## 2024-09-03 RX ORDER — PREGABALIN 75 MG/1
75 CAPSULE ORAL 2 TIMES DAILY
Qty: 60 CAPSULE | Refills: 2 | Status: SHIPPED | OUTPATIENT
Start: 2024-09-03

## 2024-09-03 NOTE — TELEPHONE ENCOUNTER
----- Message from Aaliyah Mendez sent at 9/3/2024 10:12 AM CDT -----  Pt needs to speak to the nurse about his shingles. Has questions and concerns about the sores and pt is in pain.   [phone]

## 2024-09-03 NOTE — TELEPHONE ENCOUNTER
----- Message from Aaliyah Mendez sent at 9/3/2024 12:26 PM CDT -----  Pt gave the wrong number this is the correct number to contact pt on [phone]

## 2024-09-03 NOTE — TELEPHONE ENCOUNTER
Spoke to pt and he stated he is having pain where his shingles was. Nothing is working for the pain. Pt has tried tylenol, and lyrica alda sent in. No sores on his head. The pain is only on the left side where he had shingles    normal...

## 2024-09-03 NOTE — TELEPHONE ENCOUNTER
----- Message from Daisha Sequeira sent at 9/3/2024  3:25 PM CDT -----  This is second call. Pt got an rx about 2 months ago for shingles. He would like to know if he can get antibiotics or something as he is having a pain on the back of his head where the sores were.   [phone]

## 2024-09-03 NOTE — TELEPHONE ENCOUNTER
Please let pt know the treatment for shingles pain is either lyrica or gabapentin- I can send him in refill of lyrica if he wants because I don't believe he tolerated gabapentin previously

## 2024-09-04 ENCOUNTER — TELEPHONE (OUTPATIENT)
Dept: FAMILY MEDICINE | Facility: CLINIC | Age: 64
End: 2024-09-04
Payer: MEDICARE

## 2024-09-04 NOTE — TELEPHONE ENCOUNTER
Spoke to pt and let him know as long as there are no open sores he is okay. Pt verbalized understanding and stated he does not have any sores

## 2024-09-04 NOTE — TELEPHONE ENCOUNTER
----- Message from Daisha Sequeira sent at 9/4/2024  9:05 AM CDT -----  Pt would like to talk to nurse about shingles being contagious   [phone]

## 2024-09-11 ENCOUNTER — TELEPHONE (OUTPATIENT)
Facility: CLINIC | Age: 64
End: 2024-09-11
Payer: MEDICARE

## 2024-09-11 DIAGNOSIS — D68.51 HETEROZYGOUS FACTOR V LEIDEN MUTATION: Primary | ICD-10-CM

## 2024-09-11 NOTE — TELEPHONE ENCOUNTER
----- Message from Ac Smith MD sent at 9/11/2024 10:16 AM CDT -----  Call patient, no change.  Recheck one month.

## 2024-09-23 DIAGNOSIS — F41.9 ANXIETY: ICD-10-CM

## 2024-09-23 RX ORDER — ALPRAZOLAM 1 MG/1
1 TABLET ORAL 2 TIMES DAILY
Qty: 60 TABLET | Refills: 5 | Status: SHIPPED | OUTPATIENT
Start: 2024-09-23

## 2024-09-23 NOTE — TELEPHONE ENCOUNTER
----- Message from Stephanie Shane MA sent at 9/23/2024  8:35 AM CDT -----  Patient is requesting refill on Alprazolam    [facility]   Pt: [phone]  -DN

## 2024-10-11 ENCOUNTER — TELEPHONE (OUTPATIENT)
Facility: CLINIC | Age: 64
End: 2024-10-11
Payer: MEDICARE

## 2024-10-11 NOTE — TELEPHONE ENCOUNTER
----- Message from Ac Smith MD sent at 10/11/2024  3:20 PM CDT -----  Call patient , INR good.  Recheck in one month

## 2024-10-11 NOTE — TELEPHONE ENCOUNTER
Called patient on regard to above instructions, patient stated he is alternating warfarin dosis 4.5 mg one day and 5 mg other day. Patient will repeat INR next month.

## 2024-10-29 ENCOUNTER — TELEPHONE (OUTPATIENT)
Dept: FAMILY MEDICINE | Facility: CLINIC | Age: 64
End: 2024-10-29
Payer: MEDICARE

## 2024-10-29 RX ORDER — GLIMEPIRIDE 4 MG/1
TABLET ORAL
Qty: 30 TABLET | Refills: 2 | Status: SHIPPED | OUTPATIENT
Start: 2024-10-29

## 2024-11-12 ENCOUNTER — TELEPHONE (OUTPATIENT)
Dept: FAMILY MEDICINE | Facility: CLINIC | Age: 64
End: 2024-11-12
Payer: MEDICARE

## 2024-11-12 DIAGNOSIS — Z79.899 ENCOUNTER FOR LONG-TERM (CURRENT) USE OF MEDICATIONS: ICD-10-CM

## 2024-11-12 DIAGNOSIS — E11.649 TYPE 2 DIABETES MELLITUS WITH HYPOGLYCEMIA WITHOUT COMA, WITH LONG-TERM CURRENT USE OF INSULIN: Primary | ICD-10-CM

## 2024-11-12 DIAGNOSIS — Z79.4 TYPE 2 DIABETES MELLITUS WITH HYPOGLYCEMIA WITHOUT COMA, WITH LONG-TERM CURRENT USE OF INSULIN: Primary | ICD-10-CM

## 2024-11-12 DIAGNOSIS — E78.2 MIXED HYPERLIPIDEMIA: ICD-10-CM

## 2024-11-12 NOTE — TELEPHONE ENCOUNTER
Spoke with pt in regards to pre-visit labs. Verbalized that we have placed lab order for you to have done before your upcoming appointment on 11/27/2024. Verbalized that the lab order are placed through [facility], so they can come into the office anytime, no appointment necessary. Just make sure that you are fasting for you labs. Pt acknowledge understanding.

## 2024-11-16 LAB
ALBUMIN SERPL-MCNC: 4.3 G/DL (ref 3.6–5.1)
ALBUMIN/GLOB SERPL: 1.5 (CALC) (ref 1–2.5)
ALP SERPL-CCNC: 49 U/L (ref 35–144)
ALT SERPL-CCNC: 16 U/L (ref 9–46)
AST SERPL-CCNC: 20 U/L (ref 10–35)
BILIRUB SERPL-MCNC: 0.6 MG/DL (ref 0.2–1.2)
BUN SERPL-MCNC: 32 MG/DL (ref 7–25)
BUN/CREAT SERPL: 28 (CALC) (ref 6–22)
CALCIUM SERPL-MCNC: 9 MG/DL (ref 8.6–10.3)
CHLORIDE SERPL-SCNC: 108 MMOL/L (ref 98–110)
CHOLEST SERPL-MCNC: 108 MG/DL
CHOLEST/HDLC SERPL: 2.5 (CALC)
CO2 SERPL-SCNC: 25 MMOL/L (ref 20–32)
CREAT SERPL-MCNC: 1.13 MG/DL (ref 0.7–1.35)
EGFR: 72 ML/MIN/1.73M2
GLOBULIN SER CALC-MCNC: 2.8 G/DL (CALC) (ref 1.9–3.7)
GLUCOSE SERPL-MCNC: 61 MG/DL (ref 65–99)
HBA1C MFR BLD: 5.9 % OF TOTAL HGB
HDLC SERPL-MCNC: 43 MG/DL
LDLC SERPL CALC-MCNC: 48 MG/DL (CALC)
NONHDLC SERPL-MCNC: 65 MG/DL (CALC)
POTASSIUM SERPL-SCNC: 4.8 MMOL/L (ref 3.5–5.3)
PROT SERPL-MCNC: 7.1 G/DL (ref 6.1–8.1)
SODIUM SERPL-SCNC: 140 MMOL/L (ref 135–146)
TRIGL SERPL-MCNC: 85 MG/DL

## 2024-11-18 DIAGNOSIS — D68.51 HETEROZYGOUS FACTOR V LEIDEN MUTATION: Chronic | ICD-10-CM

## 2024-11-18 DIAGNOSIS — G47.00 INSOMNIA, UNSPECIFIED TYPE: ICD-10-CM

## 2024-11-18 NOTE — TELEPHONE ENCOUNTER
----- Message from Ac Smith MD sent at 11/18/2024  2:41 PM CST -----  Call patient , no change in dose.  Recheck in one month    Called patient on regard to above indications, patient taking Warfarin 4.5 mg and 5 mg alternating daily, patient requested a refill for zolpidem and warfarin 4 mg to be sent to [facility]. Patient to repeat laboratory work in one month and stated understanding.   Marilee CHUNG RN.

## 2024-11-19 RX ORDER — WARFARIN 4 MG/1
4 TABLET ORAL DAILY
Qty: 30 TABLET | Refills: 1 | Status: SHIPPED | OUTPATIENT
Start: 2024-11-19 | End: 2025-11-19

## 2024-11-19 RX ORDER — ZOLPIDEM TARTRATE 10 MG/1
10 TABLET ORAL NIGHTLY PRN
Qty: 30 TABLET | Refills: 3 | Status: SHIPPED | OUTPATIENT
Start: 2024-11-19

## 2024-12-02 ENCOUNTER — TELEPHONE (OUTPATIENT)
Facility: CLINIC | Age: 64
End: 2024-12-02
Payer: MEDICARE

## 2024-12-02 NOTE — TELEPHONE ENCOUNTER
----- Message from Chelsie sent at 12/2/2024 12:11 PM CST -----  Pt was in the hospital last week at the ED at [facility]. Pt called the patient and said he just received a voice mail saying his INR was elevated.     # [phone]

## 2024-12-02 NOTE — TELEPHONE ENCOUNTER
Called patient on regard to INR results, per Dr. Smith INR is therapeutic continue with warfarin dosis. Patient stated understanding.

## 2024-12-03 ENCOUNTER — TELEPHONE (OUTPATIENT)
Dept: FAMILY MEDICINE | Facility: CLINIC | Age: 64
End: 2024-12-03
Payer: MEDICARE

## 2024-12-03 NOTE — TELEPHONE ENCOUNTER
"Spoke with patient who states he went to the ER on 11/19 states since then he has not been able to even take a shower. States he isn't able to get up and hardly even move around and doesn't know what to do. Refused to go back to the ER, states he doesn't feel like it's bad enough that he needs to call 911 but he is too weak to be able to come into the office. Offered him an appointment tomorrow and he refused. Then asked what he should do about black stools. I let him know his could be an indication of GI bleeding and again recommended either coming into the office or the ER. Patient again refused. States he "can only do" what he can do. Patient states he is not taking Glimperide, only taking Metformin and Humulin for his blood sugar and is checking it regularly but he isn't sure what is going on. I reiterated multiple times Marianna recommends the ER if he is feeling so badly that he is not even able to get up and shower or move around and he again refused. Marianna aware. States he will have to do something in the next few days and will call if he needs anything else from us.  "

## 2024-12-03 NOTE — TELEPHONE ENCOUNTER
----- Message from Lindsey sent at 12/3/2024  3:36 PM CST -----  Pt would something to be sent into the pharmacy due to hime having issues with walking and he would also like a call back.    [phone]

## 2024-12-05 PROBLEM — D68.59 THROMBOPHILIA: Status: ACTIVE | Noted: 2024-12-05

## 2024-12-05 PROBLEM — K92.2 UPPER GI BLEED: Status: ACTIVE | Noted: 2024-12-05

## 2024-12-05 PROBLEM — M21.70 ACQUIRED LEG LENGTH DISCREPANCY: Status: RESOLVED | Noted: 2023-11-15 | Resolved: 2024-12-05

## 2024-12-05 PROBLEM — R20.2 LEFT HAND PARESTHESIA: Status: RESOLVED | Noted: 2023-07-26 | Resolved: 2024-12-05

## 2024-12-05 PROBLEM — F13.20 SEDATIVE, HYPNOTIC OR ANXIOLYTIC DEPENDENCE, UNCOMPLICATED: Status: RESOLVED | Noted: 2020-05-05 | Resolved: 2024-12-05

## 2024-12-05 PROBLEM — M54.16 LUMBAR RADICULOPATHY: Status: RESOLVED | Noted: 2023-09-26 | Resolved: 2024-12-05

## 2024-12-05 PROBLEM — R35.1 BENIGN PROSTATIC HYPERPLASIA WITH NOCTURIA: Status: RESOLVED | Noted: 2023-07-26 | Resolved: 2024-12-05

## 2024-12-05 PROBLEM — E78.5 DM TYPE 2 WITH DIABETIC DYSLIPIDEMIA: Status: RESOLVED | Noted: 2023-07-26 | Resolved: 2024-12-05

## 2024-12-05 PROBLEM — N40.1 BENIGN PROSTATIC HYPERPLASIA WITH NOCTURIA: Status: RESOLVED | Noted: 2023-07-26 | Resolved: 2024-12-05

## 2024-12-05 PROBLEM — E11.69 DM TYPE 2 WITH DIABETIC DYSLIPIDEMIA: Status: RESOLVED | Noted: 2023-07-26 | Resolved: 2024-12-05

## 2024-12-05 PROBLEM — Z71.89 ACP (ADVANCE CARE PLANNING): Status: ACTIVE | Noted: 2024-12-05

## 2024-12-05 PROBLEM — D62 ACUTE BLOOD LOSS ANEMIA: Status: ACTIVE | Noted: 2024-12-05

## 2024-12-06 PROBLEM — D68.51 FACTOR 5 LEIDEN MUTATION, HETEROZYGOUS: Status: ACTIVE | Noted: 2024-12-05

## 2024-12-06 PROBLEM — D64.9 SYMPTOMATIC ANEMIA: Status: ACTIVE | Noted: 2024-12-06

## 2024-12-06 PROBLEM — Z79.01 ANTICOAGULATED ON COUMADIN: Status: ACTIVE | Noted: 2024-12-06

## 2024-12-06 PROBLEM — R53.1 WEAKNESS: Status: ACTIVE | Noted: 2024-12-06

## 2024-12-06 PROBLEM — D68.59 THROMBOPHILIA: Status: ACTIVE | Noted: 2024-12-06

## 2024-12-09 ENCOUNTER — TELEPHONE (OUTPATIENT)
Facility: CLINIC | Age: 64
End: 2024-12-09
Payer: MEDICARE

## 2024-12-09 DIAGNOSIS — I10 ESSENTIAL HYPERTENSION: ICD-10-CM

## 2024-12-09 DIAGNOSIS — K21.9 GASTROESOPHAGEAL REFLUX DISEASE, UNSPECIFIED WHETHER ESOPHAGITIS PRESENT: ICD-10-CM

## 2024-12-09 DIAGNOSIS — D68.51 HETEROZYGOUS FACTOR V LEIDEN MUTATION: Primary | ICD-10-CM

## 2024-12-09 DIAGNOSIS — D50.0 IRON DEFICIENCY ANEMIA DUE TO CHRONIC BLOOD LOSS: ICD-10-CM

## 2024-12-09 RX ORDER — FERROUS SULFATE 325(65) MG
325 TABLET ORAL 2 TIMES DAILY
Qty: 60 TABLET | Refills: 0 | Status: SHIPPED | OUTPATIENT
Start: 2024-12-09 | End: 2025-01-08

## 2024-12-09 RX ORDER — PANTOPRAZOLE SODIUM 40 MG/1
40 TABLET, DELAYED RELEASE ORAL 2 TIMES DAILY
Qty: 180 TABLET | Refills: 3 | Status: SHIPPED | OUTPATIENT
Start: 2024-12-09 | End: 2025-12-09

## 2024-12-09 NOTE — TELEPHONE ENCOUNTER
----- Message from Saba sent at 12/9/2024  1:21 PM CST -----  Pt requires a follow up from after a hospital stay.    Please call to schedule an appointment within the next 2 weeks    [facility] requesting to call the patient direct.    [phone]  
[phone]

## 2024-12-09 NOTE — TELEPHONE ENCOUNTER
----- Message from LIS Hunt sent at 12/9/2024  1:30 PM CST -----  Saba Delcid Staff  Caller: Unspecified (Today,  1:21 PM)  Pt requires a follow up from after a hospital stay.    Please call to schedule an appointment within the next 2 weeks    [facility] requesting to call the patient direct.    [phone]

## 2024-12-10 ENCOUNTER — TELEPHONE (OUTPATIENT)
Dept: FAMILY MEDICINE | Facility: CLINIC | Age: 64
End: 2024-12-10
Payer: MEDICARE

## 2024-12-10 NOTE — TELEPHONE ENCOUNTER
----- Message from Aaliyah sent at 12/10/2024 11:50 AM CST -----  Patient was discharged from [facility] yesterday and need to have a hospital f/u patient was admitted last Thursday. Asking to be seen tomorrow if possible.   [phone]

## 2024-12-11 ENCOUNTER — TELEPHONE (OUTPATIENT)
Facility: CLINIC | Age: 64
End: 2024-12-11

## 2024-12-11 ENCOUNTER — OFFICE VISIT (OUTPATIENT)
Facility: CLINIC | Age: 64
End: 2024-12-11
Payer: MEDICARE

## 2024-12-11 VITALS
RESPIRATION RATE: 17 BRPM | HEART RATE: 74 BPM | TEMPERATURE: 98 F | SYSTOLIC BLOOD PRESSURE: 132 MMHG | BODY MASS INDEX: 29.39 KG/M2 | WEIGHT: 204.81 LBS | DIASTOLIC BLOOD PRESSURE: 60 MMHG

## 2024-12-11 DIAGNOSIS — K21.9 GASTROESOPHAGEAL REFLUX DISEASE, UNSPECIFIED WHETHER ESOPHAGITIS PRESENT: ICD-10-CM

## 2024-12-11 DIAGNOSIS — D68.51 HETEROZYGOUS FACTOR V LEIDEN MUTATION: Primary | ICD-10-CM

## 2024-12-11 DIAGNOSIS — D68.51 HETEROZYGOUS FACTOR V LEIDEN MUTATION: Primary | Chronic | ICD-10-CM

## 2024-12-11 PROBLEM — Z79.01 ANTICOAGULATED ON COUMADIN: Status: RESOLVED | Noted: 2024-12-06 | Resolved: 2024-12-11

## 2024-12-11 PROCEDURE — 1126F AMNT PAIN NOTED NONE PRSNT: CPT | Mod: CPTII,S$GLB,, | Performed by: INTERNAL MEDICINE

## 2024-12-11 PROCEDURE — 1101F PT FALLS ASSESS-DOCD LE1/YR: CPT | Mod: CPTII,S$GLB,, | Performed by: INTERNAL MEDICINE

## 2024-12-11 PROCEDURE — 3066F NEPHROPATHY DOC TX: CPT | Mod: CPTII,S$GLB,, | Performed by: INTERNAL MEDICINE

## 2024-12-11 PROCEDURE — 3078F DIAST BP <80 MM HG: CPT | Mod: CPTII,S$GLB,, | Performed by: INTERNAL MEDICINE

## 2024-12-11 PROCEDURE — 3061F NEG MICROALBUMINURIA REV: CPT | Mod: CPTII,S$GLB,, | Performed by: INTERNAL MEDICINE

## 2024-12-11 PROCEDURE — 99214 OFFICE O/P EST MOD 30 MIN: CPT | Mod: S$GLB,,, | Performed by: INTERNAL MEDICINE

## 2024-12-11 PROCEDURE — 1111F DSCHRG MED/CURRENT MED MERGE: CPT | Mod: CPTII,S$GLB,, | Performed by: INTERNAL MEDICINE

## 2024-12-11 PROCEDURE — 3008F BODY MASS INDEX DOCD: CPT | Mod: CPTII,S$GLB,, | Performed by: INTERNAL MEDICINE

## 2024-12-11 PROCEDURE — 3044F HG A1C LEVEL LT 7.0%: CPT | Mod: CPTII,S$GLB,, | Performed by: INTERNAL MEDICINE

## 2024-12-11 PROCEDURE — 3075F SYST BP GE 130 - 139MM HG: CPT | Mod: CPTII,S$GLB,, | Performed by: INTERNAL MEDICINE

## 2024-12-11 PROCEDURE — 3288F FALL RISK ASSESSMENT DOCD: CPT | Mod: CPTII,S$GLB,, | Performed by: INTERNAL MEDICINE

## 2024-12-11 PROCEDURE — 1159F MED LIST DOCD IN RCRD: CPT | Mod: CPTII,S$GLB,, | Performed by: INTERNAL MEDICINE

## 2024-12-11 PROCEDURE — 99999 PR PBB SHADOW E&M-EST. PATIENT-LVL IV: CPT | Mod: PBBFAC,,, | Performed by: INTERNAL MEDICINE

## 2024-12-11 PROCEDURE — G2211 COMPLEX E/M VISIT ADD ON: HCPCS | Mod: S$GLB,,, | Performed by: INTERNAL MEDICINE

## 2024-12-11 PROCEDURE — 4010F ACE/ARB THERAPY RXD/TAKEN: CPT | Mod: CPTII,S$GLB,, | Performed by: INTERNAL MEDICINE

## 2024-12-11 RX ORDER — TIZANIDINE 4 MG/1
8 TABLET ORAL 2 TIMES DAILY
Qty: 120 TABLET | Refills: 5 | Status: SHIPPED | OUTPATIENT
Start: 2024-12-11

## 2024-12-11 NOTE — ASSESSMENT & PLAN NOTE
Patient is now on Eliquis and seems to be doing well with this.  No further bleeding/melena at this time.  Will check CBC now and check haptoglobin. He was also started on iron but his ferritin was normal.  He was treated with epo in the hospital and will see if this is a reasonable approach for him.  Will see him again in 2 months.     Will also have him see GI in [place].  No bleeding site was found.  May need camera pill endo.  Discussed this .

## 2024-12-11 NOTE — TELEPHONE ENCOUNTER
----- Message from Savanah sent at 12/11/2024  2:05 PM CST -----  Refill Tizanidine He changed  his Pharmacy [facility] [address] in [place]. Can you take the other pharmacy out of his chart.Pt's # [phone] GH

## 2024-12-11 NOTE — PROGRESS NOTES
"                                                         PROGRESS NOTE    Subjective:       Patient ID: Luis Resendiz is a 64 y.o. male.    Chief Complaint:  No chief complaint on file.  Hypercoagulable Syndrome, long term use of anticoagulants follow up.        History of Present Illness:   Luis Resendiz is a 64 y.o. male who presents for follow up of above.      Patient discharged 12/9/2024 from [facility] after 3 day stay for melana/GI bleeding.  He had negative EGD and his coumadin was changed to Apixiban 5mg bid    Family and Social history reviewed and is unchanged from previous           Current Outpatient Medications:     acetaminophen (TYLENOL) 325 MG tablet, Take 325-650 mg by mouth daily as needed for Pain., Disp: , Rfl:     ALPRAZolam (XANAX) 1 MG tablet, Take 1 tablet (1 mg total) by mouth 2 (two) times daily. (Patient taking differently: Take 1 mg by mouth 2 (two) times a day.), Disp: 60 tablet, Rfl: 5    apixaban (ELIQUIS) 5 mg Tab, Take 1 tablet (5 mg total) by mouth 2 (two) times daily., Disp: 60 tablet, Rfl: 0    aspirin 325 MG tablet, Take 325 mg by mouth every evening., Disp: , Rfl:     atenoloL (TENORMIN) 50 MG tablet, Take 2 tablets (100 mg total) by mouth 2 (two) times a day., Disp: 360 tablet, Rfl: 3    BD ULTRA-FINE MINI PEN NEEDLE 31 gauge x 3/16" Ndle, 1 each by In Vitro route once daily., Disp: 100 each, Rfl: 1    blood sugar diagnostic Strp, To check BG 3-4 times daily, to use with insurance preferred meter, Disp: 360 each, Rfl: 3    blood-glucose meter,continuous (DEXCOM ) Misc, 1 each by Misc.(Non-Drug; Combo Route) route Daily., Disp: 1 each, Rfl: 0    blood-glucose sensor (DEXCOM G7 SENSOR) Phuong, 1 each by Misc.(Non-Drug; Combo Route) route every 10 days., Disp: 3 each, Rfl: 11    fenofibrate (TRICOR) 145 MG tablet, Take 1 tablet (145 mg total) by mouth once daily. (Patient taking differently: Take 145 mg by mouth every evening.), Disp: 90 tablet, Rfl: 3    ferrous " "sulfate (IRON) 325 mg (65 mg iron) Tab tablet, Take 1 tablet (325 mg total) by mouth 2 (two) times daily., Disp: 60 tablet, Rfl: 0    glimepiride (AMARYL) 4 MG tablet, Take 1/2 tablet with breakfast prn blood sugar >160 (Patient taking differently: Take 4 mg by mouth daily as needed (for blood glucose 200 and above).), Disp: 30 tablet, Rfl: 2    HYDROcodone-acetaminophen (NORCO) 7.5-325 mg per tablet, Take 1 tablet by mouth 2 (two) times daily as needed for Pain., Disp: , Rfl:     insulin NPH-insulin regular, 70/30, (HUMULIN 70/30 U-100 INSULIN) 100 unit/mL (70-30) injection, 35 units in AM and 35 units in PM- may titrate upwards as needed to max of 40 units BID (Patient taking differently: Inject 20-35 Units into the skin 2 (two) times daily. Titrates according to what he eats.), Disp: 7 each, Rfl: 3    insulin syringe-needle U-100 0.3 mL 31 gauge x 15/64" Syrg, , Disp: , Rfl:     insulin syringe-needle U-100 1/2 mL 31 gauge x 15/64" Syrg, BID, Disp: 180 Syringe, Rfl: 3    lancets Misc, To check BG 3-4 times daily, to use with insurance preferred meter, Disp: 360 each, Rfl: 3    lisinopriL (PRINIVIL,ZESTRIL) 20 MG tablet, Take 1 tablet (20 mg total) by mouth 2 (two) times daily., Disp: 180 tablet, Rfl: 3    menthol (ICY HOT PAIN RELIEVING TOP), Apply 1 application  topically daily as needed (for back pain)., Disp: , Rfl:     metFORMIN (GLUCOPHAGE) 500 MG tablet, Take 2 tablets (1,000 mg total) by mouth 2 (two) times daily with meals., Disp: 360 tablet, Rfl: 3    METHYL SALICYLATE-MENTHOL TOP, Apply 1 patch topically daily as needed (for back pain)., Disp: , Rfl:     pantoprazole (PROTONIX) 40 MG tablet, Take 1 tablet (40 mg total) by mouth 2 (two) times daily., Disp: 180 tablet, Rfl: 3    pregabalin (LYRICA) 75 MG capsule, Take 75 mg by mouth 2 (two) times daily., Disp: , Rfl:     rosuvastatin (CRESTOR) 40 MG Tab, Take 1 tablet (40 mg total) by mouth once daily. (Patient taking differently: Take 40 mg by mouth " "every evening.), Disp: 90 tablet, Rfl: 3    tamsulosin (FLOMAX) 0.4 mg Cap, Take 1 capsule (0.4 mg total) by mouth once daily. Take 30 minutes after evening meal, Disp: 90 capsule, Rfl: 3    tiZANidine (ZANAFLEX) 4 MG tablet, Take 2 tablets (8 mg total) by mouth 2 (two) times a day., Disp: 120 tablet, Rfl: 5    triamcinolone acetonide 0.1% (KENALOG) 0.1 % cream, Apply topically 2 (two) times daily. Mix 50/50 with aquaphor or eucerin lotion and apply to lower leg, Disp: 30 g, Rfl: 2    TRUEPLUS INSULIN 0.5 mL 29 gauge x 1/2" Syrg, , Disp: , Rfl: 5    ULTRA COMFORT INSULIN SYRINGE 1 mL 29 gauge x 1/2" Syrg, Inject 25 Units as directed 2 (two) times daily., Disp: 180 each, Rfl: 3    zolpidem (AMBIEN) 10 mg Tab, Take 1 tablet (10 mg total) by mouth nightly as needed (insomnia)., Disp: 30 tablet, Rfl: 3  No current facility-administered medications for this visit.    Facility-Administered Medications Ordered in Other Visits:     LIDOcaine (PF) 10 mg/ml (1%) injection 10 mg, 1 mL, Other, Once, Rivera Duff MD        Objective:       Physical Examination:     /60   Pulse 74   Temp 98.3 °F (36.8 °C)   Resp 17   Wt 92.9 kg (204 lb 12.8 oz)   BMI 29.39 kg/m²     Physical Exam  Vitals reviewed.   Constitutional:       Appearance: Normal appearance. He is well-developed.   HENT:      Head: Normocephalic and atraumatic.      Right Ear: External ear normal.      Left Ear: External ear normal.   Eyes:      General: No scleral icterus.     Conjunctiva/sclera: Conjunctivae normal.   Cardiovascular:      Rate and Rhythm: Normal rate.   Pulmonary:      Effort: Pulmonary effort is normal.   Abdominal:      General: Abdomen is flat.   Neurological:      General: No focal deficit present.      Mental Status: He is alert and oriented to person, place, and time.   Psychiatric:         Mood and Affect: Mood normal.         Behavior: Behavior normal.         Thought Content: Thought content normal.         Judgment: " Judgment normal.         Labs:   Recent Results (from the past 2 weeks)   CBC Auto Differential    Collection Time: 12/09/24  5:39 AM   Result Value Ref Range    WBC 5.76 3.90 - 12.70 K/uL    Hemoglobin 7.5 (L) 14.0 - 18.0 g/dL    Hematocrit 24.0 (L) 40.0 - 54.0 %    Platelets 342 150 - 450 K/uL   CBC Auto Differential    Collection Time: 12/08/24  5:51 AM   Result Value Ref Range    WBC 7.09 3.90 - 12.70 K/uL    Hemoglobin 6.8 (L) 14.0 - 18.0 g/dL    Hematocrit 21.8 (L) 40.0 - 54.0 %    Platelets 341 150 - 450 K/uL   CBC Auto Differential    Collection Time: 12/07/24  3:26 AM   Result Value Ref Range    WBC 7.66 3.90 - 12.70 K/uL    Hemoglobin 7.2 (L) 14.0 - 18.0 g/dL    Hematocrit 22.7 (L) 40.0 - 54.0 %    Platelets 336 150 - 450 K/uL     CMP  Sodium   Date Value Ref Range Status   12/09/2024 138 136 - 145 mmol/L Final     Potassium   Date Value Ref Range Status   12/09/2024 3.7 3.5 - 5.1 mmol/L Final     Comment:     Anion Gap reference range revised on 4/28/2023     Chloride   Date Value Ref Range Status   12/09/2024 106 95 - 110 mmol/L Final     CO2   Date Value Ref Range Status   12/09/2024 24 23 - 29 mmol/L Final     Glucose   Date Value Ref Range Status   12/09/2024 173 (H) 70 - 110 mg/dL Final     Comment:     The ADA recommends the following guidelines for fasting glucose:    Normal:       less than 100 mg/dL    Prediabetes:  100 mg/dL to 125 mg/dL    Diabetes:     126 mg/dL or higher       BUN   Date Value Ref Range Status   12/09/2024 10 8 - 23 mg/dL Final     Creatinine   Date Value Ref Range Status   12/09/2024 0.76 0.50 - 1.40 mg/dL Final   06/21/2013 1.1 0.5 - 1.4 mg/dL Final     Calcium   Date Value Ref Range Status   12/09/2024 8.9 8.7 - 10.5 mg/dL Final   06/21/2013 9.3 8.7 - 10.5 mg/dL Final     Total Protein   Date Value Ref Range Status   12/05/2024 6.3 6.0 - 8.4 g/dL Final     Albumin   Date Value Ref Range Status   12/09/2024 3.5 3.5 - 5.2 g/dL Final     Total Bilirubin   Date Value Ref Range  "Status   12/05/2024 0.3 0.2 - 1.0 mg/dL Final     Alkaline Phosphatase   Date Value Ref Range Status   12/05/2024 51 40 - 150 U/L Final     AST   Date Value Ref Range Status   12/05/2024 24 10 - 40 U/L Final     ALT   Date Value Ref Range Status   12/05/2024 16 10 - 44 U/L Final     Anion Gap   Date Value Ref Range Status   12/09/2024 8 8 - 16 mmol/L Final     Comment:     Anion Gap reference range revised on 4/28/2023 06/21/2013 13 5 - 15 meq/L Final     eGFR if    Date Value Ref Range Status   03/28/2022 85 > OR = 60 mL/min/1.73m2 Final     eGFR if non    Date Value Ref Range Status   03/28/2022 73 > OR = 60 mL/min/1.73m2 Final     No results found for: "CEA"  No results found for: "PSA"        Assessment/Plan:     Problem List Items Addressed This Visit       Heterozygous factor V Leiden mutation - Primary (Chronic)     Patient is now on Eliquis and seems to be doing well with this.  No further bleeding/melena at this time.  Will check CBC now and check haptoglobin. He was also started on iron but his ferritin was normal.  He was treated with epo in the hospital and will see if this is a reasonable approach for him.  Will see him again in 2 months.     Will also have him see GI in [place].  No bleeding site was found.  May need camera pill endo.  Discussed this .          Relevant Orders    Erythropoietin    CBC Auto Differential    Haptoglobin       Discussion:     No follow-ups on file.      Electronically signed by Ac Sofia        "

## 2024-12-11 NOTE — TELEPHONE ENCOUNTER
Admitted to [facility] for Upper GI bleed, found ulcer opn scope, has picked up meds rx's @ d/c.    Since home from hospital sugars were elevated, now that he's home and on normal meds the sugar has improved. Was 300, this am was 151     Also an issue with left elbow pain.     Monday discharged    Insists on seeing [facility] this morning, explained no openings    Will see Dr. Smith today @ 2:45.   Booked HFU Tuesday @ 2:00

## 2024-12-11 NOTE — TELEPHONE ENCOUNTER
Called patient on regard to above request, INR is not required if patient is taking Eliquis, patient will get blood work, and follow up as scheduled.

## 2024-12-11 NOTE — TELEPHONE ENCOUNTER
----- Message from Gena sent at 12/11/2024  3:56 PM CST -----  Pt requests call back from RN regarding Eliquis and labs that have been ordered. Wants to know if INR needs to be included since he has been switched from Xarelto to Eliquis?    CB: [phone]   Problem: Potential for Falls  Goal: Patient will remain free of falls  Description: INTERVENTIONS:  - Educate patient/family on patient safety including physical limitations  - Instruct patient to call for assistance with activity   - Consult OT/PT to assist with strengthening/mobility   - Keep Call bell within reach  - Keep bed low and locked with side rails adjusted as appropriate  - Keep care items and personal belongings within reach  - Initiate and maintain comfort rounds  - Make Fall Risk Sign visible to staff  - Offer Toileting every  Hours, in advance of need  - Initiate/Maintain alarm  - Obtain necessary fall risk management equipment:   - Apply yellow socks and bracelet for high fall risk patients  - Consider moving patient to room near nurses station  Outcome: Progressing     Problem: MOBILITY - ADULT  Goal: Maintain or return to baseline ADL function  Description: INTERVENTIONS:  -  Assess patient's ability to carry out ADLs; assess patient's baseline for ADL function and identify physical deficits which impact ability to perform ADLs (bathing, care of mouth/teeth, toileting, grooming, dressing, etc )  - Assess/evaluate cause of self-care deficits   - Assess range of motion  - Assess patient's mobility; develop plan if impaired  - Assess patient's need for assistive devices and provide as appropriate  - Encourage maximum independence but intervene and supervise when necessary  - Involve family in performance of ADLs  - Assess for home care needs following discharge   - Consider OT consult to assist with ADL evaluation and planning for discharge  - Provide patient education as appropriate  Outcome: Progressing  Goal: Maintains/Returns to pre admission functional level  Description: INTERVENTIONS:  - Perform BMAT or MOVE assessment daily    - Set and communicate daily mobility goal to care team and patient/family/caregiver     - Collaborate with rehabilitation services on mobility goals if consulted  - Perform Range of Motion  times a day  - Reposition patient every  hours    - Dangle patient  times a day  - Stand patient  times a day  - Ambulate patient  times a day  - Out of bed to chair  times a day   - Out of bed for meals times a day  - Out of bed for toileting  - Record patient progress and toleration of activity level   Outcome: Progressing     Problem: PAIN - ADULT  Goal: Verbalizes/displays adequate comfort level or baseline comfort level  Description: Interventions:  - Encourage patient to monitor pain and request assistance  - Assess pain using appropriate pain scale  - Administer analgesics based on type and severity of pain and evaluate response  - Implement non-pharmacological measures as appropriate and evaluate response  - Consider cultural and social influences on pain and pain management  - Notify physician/advanced practitioner if interventions unsuccessful or patient reports new pain  Outcome: Progressing     Problem: INFECTION - ADULT  Goal: Absence or prevention of progression during hospitalization  Description: INTERVENTIONS:  - Assess and monitor for signs and symptoms of infection  - Monitor lab/diagnostic results  - Monitor all insertion sites, i e  indwelling lines, tubes, and drains  - Monitor endotracheal if appropriate and nasal secretions for changes in amount and color  - Gould appropriate cooling/warming therapies per order  - Administer medications as ordered  - Instruct and encourage patient and family to use good hand hygiene technique  - Identify and instruct in appropriate isolation precautions for identified infection/condition  Outcome: Progressing     Problem: SAFETY ADULT  Goal: Patient will remain free of falls  Description: INTERVENTIONS:  - Educate patient/family on patient safety including physical limitations  - Instruct patient to call for assistance with activity   - Consult OT/PT to assist with strengthening/mobility   - Keep Call bell within reach  - Keep bed low and locked with side rails adjusted as appropriate  - Keep care items and personal belongings within reach  - Initiate and maintain comfort rounds  - Make Fall Risk Sign visible to staff  - Offer Toileting every  Hours, in advance of need  - Initiate/Maintain alarm  - Obtain necessary fall risk management equipment:   - Apply yellow socks and bracelet for high fall risk patients  - Consider moving patient to room near nurses station  Outcome: Progressing  Goal: Maintain or return to baseline ADL function  Description: INTERVENTIONS:  -  Assess patient's ability to carry out ADLs; assess patient's baseline for ADL function and identify physical deficits which impact ability to perform ADLs (bathing, care of mouth/teeth, toileting, grooming, dressing, etc )  - Assess/evaluate cause of self-care deficits   - Assess range of motion  - Assess patient's mobility; develop plan if impaired  - Assess patient's need for assistive devices and provide as appropriate  - Encourage maximum independence but intervene and supervise when necessary  - Involve family in performance of ADLs  - Assess for home care needs following discharge   - Consider OT consult to assist with ADL evaluation and planning for discharge  - Provide patient education as appropriate  Outcome: Progressing  Goal: Maintains/Returns to pre admission functional level  Description: INTERVENTIONS:  - Perform BMAT or MOVE assessment daily    - Set and communicate daily mobility goal to care team and patient/family/caregiver  - Collaborate with rehabilitation services on mobility goals if consulted  - Perform Range of Motion  times a day  - Reposition patient every  hours    - Dangle patient  times a day  - Stand patient  times a day  - Ambulate patient  times a day  - Out of bed to chair  times a day   - Out of bed for meals  times a day  - Out of bed for toileting  - Record patient progress and toleration of activity level   Outcome: Progressing     Problem: DISCHARGE PLANNING  Goal: Discharge to home or other facility with appropriate resources  Description: INTERVENTIONS:  - Identify barriers to discharge w/patient and caregiver  - Arrange for needed discharge resources and transportation as appropriate  - Identify discharge learning needs (meds, wound care, etc )  - Arrange for interpretive services to assist at discharge as needed  - Refer to Case Management Department for coordinating discharge planning if the patient needs post-hospital services based on physician/advanced practitioner order or complex needs related to functional status, cognitive ability, or social support system  Outcome: Progressing     Problem: Knowledge Deficit  Goal: Patient/family/caregiver demonstrates understanding of disease process, treatment plan, medications, and discharge instructions  Description: Complete learning assessment and assess knowledge base    Interventions:  - Provide teaching at level of understanding  - Provide teaching via preferred learning methods  Outcome: Progressing     Problem: METABOLIC, FLUID AND ELECTROLYTES - ADULT  Goal: Electrolytes maintained within normal limits  Description: INTERVENTIONS:  - Monitor labs and assess patient for signs and symptoms of electrolyte imbalances  - Administer electrolyte replacement as ordered  - Monitor response to electrolyte replacements, including repeat lab results as appropriate  - Instruct patient on fluid and nutrition as appropriate  Outcome: Progressing  Goal: Fluid balance maintained  Description: INTERVENTIONS:  - Monitor labs   - Monitor I/O and WT  - Instruct patient on fluid and nutrition as appropriate  - Assess for signs & symptoms of volume excess or deficit  Outcome: Progressing  Goal: Glucose maintained within target range  Description: INTERVENTIONS:  - Monitor Blood Glucose as ordered  - Assess for signs and symptoms of hyperglycemia and hypoglycemia  - Administer ordered medications to maintain glucose within target range  - Assess nutritional intake and initiate nutrition service referral as needed  Outcome: Progressing     Problem: Prexisting or High Potential for Compromised Skin Integrity  Goal: Skin integrity is maintained or improved  Description: INTERVENTIONS:  - Identify patients at risk for skin breakdown  - Assess and monitor skin integrity  - Assess and monitor nutrition and hydration status  - Monitor labs   - Assess for incontinence   - Turn and reposition patient  - Assist with mobility/ambulation  - Relieve pressure over bony prominences  - Avoid friction and shearing  - Provide appropriate hygiene as needed including keeping skin clean and dry  - Evaluate need for skin moisturizer/barrier cream  - Collaborate with interdisciplinary team   - Patient/family teaching  - Consider wound care consult   Outcome: Progressing

## 2024-12-11 NOTE — TELEPHONE ENCOUNTER
----- Message from Daisha sent at 12/11/2024  7:02 AM CST -----  - 12/10-6:08 pm- pt is calling back and will be in [place] today if he can come   [phone]

## 2024-12-17 ENCOUNTER — TELEPHONE (OUTPATIENT)
Dept: FAMILY MEDICINE | Facility: CLINIC | Age: 64
End: 2024-12-17

## 2024-12-17 ENCOUNTER — OFFICE VISIT (OUTPATIENT)
Dept: FAMILY MEDICINE | Facility: CLINIC | Age: 64
End: 2024-12-17
Payer: MEDICARE

## 2024-12-17 VITALS
SYSTOLIC BLOOD PRESSURE: 134 MMHG | HEIGHT: 70 IN | WEIGHT: 209 LBS | OXYGEN SATURATION: 97 % | DIASTOLIC BLOOD PRESSURE: 62 MMHG | BODY MASS INDEX: 29.92 KG/M2 | HEART RATE: 70 BPM

## 2024-12-17 DIAGNOSIS — D64.9 ANEMIA, UNSPECIFIED TYPE: ICD-10-CM

## 2024-12-17 DIAGNOSIS — M70.22 OLECRANON BURSITIS OF LEFT ELBOW: ICD-10-CM

## 2024-12-17 DIAGNOSIS — I10 PRIMARY HYPERTENSION: ICD-10-CM

## 2024-12-17 DIAGNOSIS — E11.69 DM TYPE 2 WITH DIABETIC DYSLIPIDEMIA: ICD-10-CM

## 2024-12-17 DIAGNOSIS — K92.2 UPPER GI BLEED: Primary | ICD-10-CM

## 2024-12-17 DIAGNOSIS — E78.2 MIXED HYPERLIPIDEMIA: ICD-10-CM

## 2024-12-17 DIAGNOSIS — E78.5 DM TYPE 2 WITH DIABETIC DYSLIPIDEMIA: ICD-10-CM

## 2024-12-17 PROCEDURE — 3061F NEG MICROALBUMINURIA REV: CPT | Mod: CPTII,S$GLB,, | Performed by: NURSE PRACTITIONER

## 2024-12-17 PROCEDURE — 1101F PT FALLS ASSESS-DOCD LE1/YR: CPT | Mod: CPTII,S$GLB,, | Performed by: NURSE PRACTITIONER

## 2024-12-17 PROCEDURE — 3075F SYST BP GE 130 - 139MM HG: CPT | Mod: CPTII,S$GLB,, | Performed by: NURSE PRACTITIONER

## 2024-12-17 PROCEDURE — 99495 TRANSJ CARE MGMT MOD F2F 14D: CPT | Mod: S$GLB,,, | Performed by: NURSE PRACTITIONER

## 2024-12-17 PROCEDURE — 1159F MED LIST DOCD IN RCRD: CPT | Mod: CPTII,S$GLB,, | Performed by: NURSE PRACTITIONER

## 2024-12-17 PROCEDURE — 3044F HG A1C LEVEL LT 7.0%: CPT | Mod: CPTII,S$GLB,, | Performed by: NURSE PRACTITIONER

## 2024-12-17 PROCEDURE — 3066F NEPHROPATHY DOC TX: CPT | Mod: CPTII,S$GLB,, | Performed by: NURSE PRACTITIONER

## 2024-12-17 PROCEDURE — 1126F AMNT PAIN NOTED NONE PRSNT: CPT | Mod: CPTII,S$GLB,, | Performed by: NURSE PRACTITIONER

## 2024-12-17 PROCEDURE — 3288F FALL RISK ASSESSMENT DOCD: CPT | Mod: CPTII,S$GLB,, | Performed by: NURSE PRACTITIONER

## 2024-12-17 PROCEDURE — 1111F DSCHRG MED/CURRENT MED MERGE: CPT | Mod: CPTII,S$GLB,, | Performed by: NURSE PRACTITIONER

## 2024-12-17 PROCEDURE — 3078F DIAST BP <80 MM HG: CPT | Mod: CPTII,S$GLB,, | Performed by: NURSE PRACTITIONER

## 2024-12-17 PROCEDURE — 4010F ACE/ARB THERAPY RXD/TAKEN: CPT | Mod: CPTII,S$GLB,, | Performed by: NURSE PRACTITIONER

## 2024-12-17 PROCEDURE — 1160F RVW MEDS BY RX/DR IN RCRD: CPT | Mod: CPTII,S$GLB,, | Performed by: NURSE PRACTITIONER

## 2024-12-17 RX ORDER — ATENOLOL 50 MG/1
100 TABLET ORAL 2 TIMES DAILY
Qty: 360 TABLET | Refills: 3 | Status: SHIPPED | OUTPATIENT
Start: 2024-12-17

## 2024-12-17 RX ORDER — DOXYCYCLINE 100 MG/1
100 CAPSULE ORAL EVERY 12 HOURS
Qty: 14 CAPSULE | Refills: 0 | Status: SHIPPED | OUTPATIENT
Start: 2024-12-17

## 2024-12-17 NOTE — PROGRESS NOTES
Patient ID: Luis Resendiz is a 64 y.o. male.    Chief Complaint: Follow-up (No bottles//Pt is here for a HFU, GI bleed.//Pt stated that he is better.//Foot exam ordered today/Pt will scheduled eye exam later//ANA )      Admit Date: 12/5/24   Discharge Date: 12/9/24  Discharge Facility: Hospital    Medication Reconciliation:  Medications changed/added/deleted.  Pantoprazole b.i.d., ferrous sulfate b.i.d. Eliquis 5 mg b.i.d..  Warfarin stopped  New Prescriptions filled after discharge: yes  Discharge summary reviewed:  yes  Pending test results at discharge reviewed:   yes, pathology showed gastric antral mucosa with chronic active gastritis and focal intestinal metaplasia  Follow up appointments scheduled:  yes   Saw Dr. Sofia December 11th  Follow up labs/tests ordered:   no  Home Health ordered on discharge:   no  Home Health company name:   DME ordered at discharge:   no  Disease/illness education: GI bleed  Discussion with other health care providers: no  Establishment or re-establishment of referral orders for community resources: No other necessary community resources  Family and/or Caretaker present at visit?  no    Discharge Summary: 64-year-old male on chronic Coumadin therapy for history of bilateral lower extremity DVTs since 2005.  Patient is followed by Dr. Nick hematology for INR monitoring.  Patient is a diabetic and is followed by his PCP for diabetic management.  States over the last year he has been having hypoglycemic episodes but again this has been managed by primary care physician.  Today he presents to the emergency room with generalized weakness and black stools.  States he has never had previous GI bleeds.  He has had diverticulitis approximately a year and a half ago but has not had any issues since then.  Denies chest pain chest pressure shortness of breath abdominal pain nausea vomiting or diarrhea.  His only complaint is mild weakness with dark stools.    Place in  observation GI consult hold Coumadin patient is a Rastafarian and is refusing blood products.  We will monitor      Procedure(s) (LRB):  EGD (ESOPHAGOGASTRODUODENOSCOPY) (N/A)       Hospital Course:   Patient admitted due to suspected  bleed due to fatigue and dark stools. Hemoglobin of 7.2 at time of admission. Patient is Jehovah witness and refuses blood products. Hematology/oncology consulted for recommendations. GI consulted for EGD. Will do EGD when INR <2 due to long term coumadin therapy due to factor five liedan. EGD scheduled for 12/6/2024. EGD preformed- 2 nonbleeding cratered gastric ulcers with no stigmata of bleeding- recommend pantoprazole 40 mg b.i.d. for 8 weeks and repeat EGD in 8 weeks to check healing. Patient okay to resume normal diet. Hemoglobin of 6.8 will result in daily EPO injections. Continue to monitor H&H.  Hemoglobin up to 7.5 on 12/09/2024, patient states he feels much better and is able to ambulate without fatigue or shortness for breath.  Discussed with Hematology, patient needs to restart anticoagulation and after discussion with his primary hematologist we will switch to Eliquis on discharge.  Patient adamant about going home 12/09/2024.     How patient is feeling since discharge from the hospital?      Pt reports he is feeling a little better since hospital stay though has continued to feel weak since discharge-doesn't have any energy. Still feels a little lightheaded- denies any falls or syncope. No further black stools, having soft brown stools    Saw Dr. Sofia on December 11th after discharge.  He ordered repeat CBC with haptoglobin and erythropoietin- pt reports had labs drawn last week at [facility] but results not in computer.  Dr. Sofia recommended follow-up with GI as he may need C scope and/or pill endoscopy to identify source of bleed- was referred to  but hasn't heard anything yet.    Patient reports developed left elbow pain and swelling during the  recent hospital stay.  He is not aware of any trauma to the site but it was painful throughout the hospital stay.  He had x-ray of the elbow which did not show any fracture or dislocation though did show some swelling at the olecranon.  He reports overall the swelling has improved some though is still a little painful, especially with flexing elbow frequently or pushing up on his arm to get out of the chair.    Pt reports sugars were high in hospital but improving since discharge. Has been adjusting insulin between 15-30units based on what his sugar is running. Reports has taken glimeperide once over the past week d/t elevated blood sugar right after discharge    Patient follow up phone call documented on separate encounter.    No results displayed because visit has over 200 results.      Orders Only on 11/19/2024   Component Date Value Ref Range Status    QRS Duration 11/19/2024 104  ms Final    [facility] QTC Calculation 11/19/2024 425  ms Final   Admission on 11/19/2024, Discharged on 11/19/2024   Component Date Value Ref Range Status    WBC 11/19/2024 7.28  3.90 - 12.70 K/uL Final    RBC 11/19/2024 3.52 (L)  4.60 - 6.20 M/uL Final    Hemoglobin 11/19/2024 10.9 (L)  14.0 - 18.0 g/dL Final    Hematocrit 11/19/2024 34.4 (L)  40.0 - 54.0 % Final    MCV 11/19/2024 98  82 - 98 fL Final    MCH 11/19/2024 31.0  27.0 - 31.0 pg Final    MCHC 11/19/2024 31.7 (L)  32.0 - 36.0 g/dL Final    RDW 11/19/2024 14.4  11.5 - 14.5 % Final    Platelets 11/19/2024 185  150 - 450 K/uL Final    MPV 11/19/2024 9.9  9.2 - 12.9 fL Final    Immature Granulocytes 11/19/2024 0.3  0.0 - 0.5 % Final    Gran # (ANC) 11/19/2024 4.7  1.8 - 7.7 K/uL Final    Immature Grans (Abs) 11/19/2024 0.02  0.00 - 0.04 K/uL Final    Lymph # 11/19/2024 1.7  1.0 - 4.8 K/uL Final    Mono # 11/19/2024 0.6  0.3 - 1.0 K/uL Final    Eos # 11/19/2024 0.2  0.0 - 0.5 K/uL Final    Baso # 11/19/2024 0.05  0.00 - 0.20 K/uL Final    nRBC 11/19/2024 0  0 /100 WBC Final    Gran %  11/19/2024 65.1  38.0 - 73.0 % Final    Lymph % 11/19/2024 23.5  18.0 - 48.0 % Final    Mono % 11/19/2024 7.7  4.0 - 15.0 % Final    Eosinophil % 11/19/2024 2.7  0.0 - 8.0 % Final    Basophil % 11/19/2024 0.7  0.0 - 1.9 % Final    Differential Method 11/19/2024 Automated   Final    Sodium 11/19/2024 141  136 - 145 mmol/L Final    Potassium 11/19/2024 4.6  3.5 - 5.1 mmol/L Final    Chloride 11/19/2024 110  95 - 110 mmol/L Final    CO2 11/19/2024 21 (L)  22 - 31 mmol/L Final    Glucose 11/19/2024 144 (H)  70 - 110 mg/dL Final    BUN 11/19/2024 31 (H)  9 - 21 mg/dL Final    Creatinine 11/19/2024 1.18  0.50 - 1.40 mg/dL Final    Calcium 11/19/2024 8.7  8.4 - 10.2 mg/dL Final    Total Protein 11/19/2024 7.1  6.0 - 8.4 g/dL Final    Albumin 11/19/2024 4.1  3.5 - 5.2 g/dL Final    Total Bilirubin 11/19/2024 0.3  0.2 - 1.3 mg/dL Final    Alkaline Phosphatase 11/19/2024 49  38 - 145 U/L Final    AST 11/19/2024 37  17 - 59 U/L Final    ALT 11/19/2024 24  0 - 50 U/L Final    Anion Gap 11/19/2024 10  5 - 12 mmol/L Final    eGFR 11/19/2024 >60  >60 mL/min/1.73 m^2 Final    POCT Glucose 11/19/2024 138 (H)  70 - 110 mg/dL Final    PT 11/19/2024 28.1 (H)  11.8 - 14.7 sec Final    INR 11/19/2024 2.6   Final   Telephone on 11/12/2024   Component Date Value Ref Range Status    Glucose 11/15/2024 61 (L)  65 - 99 mg/dL Final    BUN 11/15/2024 32 (H)  7 - 25 mg/dL Final    Creatinine 11/15/2024 1.13  0.70 - 1.35 mg/dL Final    eGFR 11/15/2024 72  > OR = 60 mL/min/1.73m2 Final    BUN/Creatinine Ratio 11/15/2024 28 (H)  6 - 22 (calc) Final    Sodium 11/15/2024 140  135 - 146 mmol/L Final    Potassium 11/15/2024 4.8  3.5 - 5.3 mmol/L Final    Chloride 11/15/2024 108  98 - 110 mmol/L Final    CO2 11/15/2024 25  20 - 32 mmol/L Final    Calcium 11/15/2024 9.0  8.6 - 10.3 mg/dL Final    Total Protein 11/15/2024 7.1  6.1 - 8.1 g/dL Final    Albumin 11/15/2024 4.3  3.6 - 5.1 g/dL Final    Globulin, Total 11/15/2024 2.8  1.9 - 3.7 g/dL (calc)  Final    Albumin/Globulin Ratio 11/15/2024 1.5  1.0 - 2.5 (calc) Final    Total Bilirubin 11/15/2024 0.6  0.2 - 1.2 mg/dL Final    Alkaline Phosphatase 11/15/2024 49  35 - 144 U/L Final    AST 11/15/2024 20  10 - 35 U/L Final    ALT 11/15/2024 16  9 - 46 U/L Final    Cholesterol 11/15/2024 108  <200 mg/dL Final    HDL 11/15/2024 43  > OR = 40 mg/dL Final    Triglycerides 11/15/2024 85  <150 mg/dL Final    LDL Cholesterol 11/15/2024 48  mg/dL (calc) Final    HDL/Cholesterol Ratio 11/15/2024 2.5  <5.0 (calc) Final    Non HDL Chol. (LDL+VLDL) 11/15/2024 65  <130 mg/dL (calc) Final    Hemoglobin A1C 11/15/2024 5.9 (H)  <5.7 % of total Hgb Final   Telephone on 09/11/2024   Component Date Value Ref Range Status    INR 10/10/2024 2.4 (H)   Final    Prothrombin Time 10/10/2024 24.4 (H)  9.0 - 11.5 sec Final   Office Visit on 08/26/2024   Component Date Value Ref Range Status    Hemoglobin A1C, POC 08/26/2024 5.7  % Final    Amphetamines 08/26/2024 NEGATIVE  <10 ng/mL Final    Barbiturates 08/26/2024 NEGATIVE  <10 ng/mL Final    Benzodiazepines 08/26/2024 POSITIVE (A)  <0.50 ng/mL Final    Alprazolam 08/26/2024 2.98 (H)  <0.50 ng/mL Final    Chlordiazepoxide 08/26/2024 Negative  <0.50 ng/mL Final    CLONAZEPAM 08/26/2024 Negative  <0.50 ng/mL Final    DIAZEPAM 08/26/2024 Negative  <0.50 ng/mL Final    Flunitrazepam 08/26/2024 Negative  <0.50 ng/mL Final    Flurazepam 08/26/2024 Negative  <0.50 ng/mL Final    Lorazepam 08/26/2024 Negative  <0.50 ng/mL Final    MIDAZOLAM 08/26/2024 Negative  <0.50 ng/mL Final    Nordiazepam 08/26/2024 Negative  <0.50 ng/mL Final    Oxazepam 08/26/2024 Negative  <0.50 ng/mL Final    TEMAZEPAM 08/26/2024 Negative  <0.50 ng/mL Final    Triazolam 08/26/2024 Negative  <0.50 ng/mL Final    Buprenorphine 08/26/2024 NEGATIVE  <0.10 ng/mL Final    Cocaine 08/26/2024 NEGATIVE  <5.0 ng/mL Final    Fentanyl 08/26/2024 NEGATIVE  <0.10 ng/mL Final    Heroin Screen, Serum/Plasma 08/26/2024 NEGATIVE  <1.0  ng/mL Final    THC 08/26/2024 NEGATIVE  <2.5 ng/mL Final    MDMA 08/26/2024 NEGATIVE  <10 ng/mL Final    Meprobamate Lvl 08/26/2024 NEGATIVE  <2.5 ng/mL Final    Methadone 08/26/2024 NEGATIVE  <5.0 ng/mL Final    Nicotine Metabolite(s), Serum 08/26/2024 POSITIVE (A)  <5.0 ng/mL Final    Cotinine 08/26/2024 17.4 (H)  <5.0 ng/mL Final    Opiates 08/26/2024 NEGATIVE  <2.5 ng/mL Final    Phencyclidine 08/26/2024 NEGATIVE  <10 ng/mL Final    Tapentadol 08/26/2024 NEGATIVE  <5.0 ng/mL Final    TRAMADOL 08/26/2024 NEGATIVE  <5.0 ng/mL Final    ZOLPIDEM 08/26/2024 NEGATIVE  <5.0 ng/mL Final       Past Medical History:   Diagnosis Date    Anticoagulant long-term use     Arthritis     Chronic back pain     Chronic neck pain     pain management    Coronary artery disease     s/p CABG    Coumadin toxicity 10/13/2015    DDD (degenerative disc disease), cervical     DVT (deep venous thrombosis)     Encounter for blood transfusion     Fatty liver     Hereditary factor VIII deficiency     [place], Coumadin    Hypertension     Insulin dependent type 2 diabetes mellitus     Mixed hyperlipidemia      Past Surgical History:   Procedure Laterality Date    BACK SURGERY      CORONARY ARTERY BYPASS GRAFT  2011    2 vessel    CORONARY ARTERY BYPASS GRAFT (CABG)  12/2023    ESOPHAGOGASTRODUODENOSCOPY N/A 12/6/2024    Procedure: EGD (ESOPHAGOGASTRODUODENOSCOPY);  Surgeon: Waldemar Rueda MD;  Location: [facility];  Service: Endoscopy;  Laterality: N/A;    FOOT SURGERY Right     FRACTURE SURGERY Right     tibia/fibula    ROTATOR CUFF REPAIR Left     TRANSFORAMINAL EPIDURAL INJECTION OF STEROID Bilateral 09/26/2023    Procedure: Injection,steroid,epidural,transforaminal approach L4/5 and L5/S1;  Surgeon: Rivera Duff MD;  Location: [facility];  Service: Pain Management;  Laterality: Bilateral;     Family History   Problem Relation Name Age of Onset    Heart disease Mother      Cancer Mother      Heart disease Father      Alcohol abuse  "Father      No Known Problems Sister      No Known Problems Brother      No Known Problems Daughter      No Known Problems Son      Cataracts Neg Hx      Glaucoma Neg Hx      Retinal detachment Neg Hx      Macular degeneration Neg Hx         Tests to Keep You Healthy    Eye Exam: DUE  Colon Cancer Screening: Met on 8/17/2021  Last Blood Pressure <= 139/89 (12/17/2024): Yes  Last HbA1c < 8 (11/15/2024): Yes      Marital Status:   Alcohol History:  reports current alcohol use.  Tobacco History:  reports that he quit smoking about 30 years ago. His smoking use included cigarettes. He started smoking about 31 years ago. He has a 0.2 pack-year smoking history. He has been exposed to tobacco smoke. He has never used smokeless tobacco.  Drug History:  reports no history of drug use.    Review of patient's allergies indicates:   Allergen Reactions    Nsaids (non-steroidal anti-inflammatory drug) Nausea Only    Ancef [cefazolin] Anxiety       Current Outpatient Medications:     acetaminophen (TYLENOL) 325 MG tablet, Take 325-650 mg by mouth daily as needed for Pain., Disp: , Rfl:     ALPRAZolam (XANAX) 1 MG tablet, Take 1 tablet (1 mg total) by mouth 2 (two) times daily. (Patient taking differently: Take 1 mg by mouth 2 (two) times a day.), Disp: 60 tablet, Rfl: 5    apixaban (ELIQUIS) 5 mg Tab, Take 1 tablet (5 mg total) by mouth 2 (two) times daily., Disp: 60 tablet, Rfl: 0    aspirin 325 MG tablet, Take 325 mg by mouth every evening., Disp: , Rfl:     atenoloL (TENORMIN) 50 MG tablet, Take 2 tablets (100 mg total) by mouth 2 (two) times a day., Disp: 360 tablet, Rfl: 3    BD ULTRA-FINE MINI PEN NEEDLE 31 gauge x 3/16" Ndle, 1 each by In Vitro route once daily., Disp: 100 each, Rfl: 1    blood sugar diagnostic Strp, To check BG 3-4 times daily, to use with insurance preferred meter, Disp: 360 each, Rfl: 3    blood-glucose meter,continuous (DEXCOM ) Misc, 1 each by Misc.(Non-Drug; Combo Route) route Daily., " "Disp: 1 each, Rfl: 0    blood-glucose sensor (DEXCOM G7 SENSOR) Phuong, 1 each by Misc.(Non-Drug; Combo Route) route every 10 days., Disp: 3 each, Rfl: 11    doxycycline (VIBRAMYCIN) 100 MG Cap, Take 1 capsule (100 mg total) by mouth every 12 (twelve) hours., Disp: 14 capsule, Rfl: 0    fenofibrate (TRICOR) 145 MG tablet, Take 1 tablet (145 mg total) by mouth once daily. (Patient taking differently: Take 145 mg by mouth every evening.), Disp: 90 tablet, Rfl: 3    ferrous sulfate (IRON) 325 mg (65 mg iron) Tab tablet, Take 1 tablet (325 mg total) by mouth 2 (two) times daily., Disp: 60 tablet, Rfl: 0    glimepiride (AMARYL) 4 MG tablet, Take 1/2 tablet with breakfast prn blood sugar >160 (Patient taking differently: Take 4 mg by mouth daily as needed (for blood glucose 200 and above).), Disp: 30 tablet, Rfl: 2    HYDROcodone-acetaminophen (NORCO) 7.5-325 mg per tablet, Take 1 tablet by mouth 2 (two) times daily as needed for Pain., Disp: , Rfl:     insulin NPH-insulin regular, 70/30, (HUMULIN 70/30 U-100 INSULIN) 100 unit/mL (70-30) injection, 35 units in AM and 35 units in PM- may titrate upwards as needed to max of 40 units BID (Patient taking differently: Inject 20-35 Units into the skin 2 (two) times daily. Titrates according to what he eats.), Disp: 7 each, Rfl: 3    insulin syringe-needle U-100 0.3 mL 31 gauge x 15/64" Syrg, , Disp: , Rfl:     insulin syringe-needle U-100 1/2 mL 31 gauge x 15/64" Syrg, BID, Disp: 180 Syringe, Rfl: 3    lancets Misc, To check BG 3-4 times daily, to use with insurance preferred meter, Disp: 360 each, Rfl: 3    lisinopriL (PRINIVIL,ZESTRIL) 20 MG tablet, Take 1 tablet (20 mg total) by mouth 2 (two) times daily., Disp: 180 tablet, Rfl: 3    menthol (ICY HOT PAIN RELIEVING TOP), Apply 1 application  topically daily as needed (for back pain)., Disp: , Rfl:     metFORMIN (GLUCOPHAGE) 500 MG tablet, Take 2 tablets (1,000 mg total) by mouth 2 (two) times daily with meals., Disp: 360 " "tablet, Rfl: 3    METHYL SALICYLATE-MENTHOL TOP, Apply 1 patch topically daily as needed (for back pain)., Disp: , Rfl:     pantoprazole (PROTONIX) 40 MG tablet, Take 1 tablet (40 mg total) by mouth 2 (two) times daily., Disp: 180 tablet, Rfl: 3    pregabalin (LYRICA) 75 MG capsule, Take 75 mg by mouth 2 (two) times daily., Disp: , Rfl:     rosuvastatin (CRESTOR) 40 MG Tab, Take 1 tablet (40 mg total) by mouth once daily. (Patient taking differently: Take 40 mg by mouth every evening.), Disp: 90 tablet, Rfl: 3    tamsulosin (FLOMAX) 0.4 mg Cap, Take 1 capsule (0.4 mg total) by mouth once daily. Take 30 minutes after evening meal, Disp: 90 capsule, Rfl: 3    tiZANidine (ZANAFLEX) 4 MG tablet, Take 2 tablets (8 mg total) by mouth 2 (two) times a day., Disp: 120 tablet, Rfl: 5    triamcinolone acetonide 0.1% (KENALOG) 0.1 % cream, Apply topically 2 (two) times daily. Mix 50/50 with aquaphor or eucerin lotion and apply to lower leg, Disp: 30 g, Rfl: 2    TRUEPLUS INSULIN 0.5 mL 29 gauge x 1/2" Syrg, , Disp: , Rfl: 5    ULTRA COMFORT INSULIN SYRINGE 1 mL 29 gauge x 1/2" Syrg, Inject 25 Units as directed 2 (two) times daily., Disp: 180 each, Rfl: 3    zolpidem (AMBIEN) 10 mg Tab, Take 1 tablet (10 mg total) by mouth nightly as needed (insomnia)., Disp: 30 tablet, Rfl: 3  No current facility-administered medications for this visit.    Facility-Administered Medications Ordered in Other Visits:     LIDOcaine (PF) 10 mg/ml (1%) injection 10 mg, 1 mL, Other, Once, Rivera Duff MD    Review of Systems   Constitutional:  Positive for fatigue. Negative for appetite change, chills, fever and unexpected weight change.   HENT:  Negative for sore throat and trouble swallowing.    Eyes:  Negative for visual disturbance.   Respiratory:  Negative for cough, shortness of breath and wheezing.    Cardiovascular:  Negative for chest pain, palpitations and leg swelling.   Gastrointestinal:  Negative for abdominal pain, blood in stool " "(no black stools since discharge), constipation, diarrhea, nausea and vomiting.   Genitourinary:  Positive for frequency (nocturia 2-3 times). Negative for dysuria and hematuria.   Musculoskeletal:  Positive for arthralgias (left elbow pain/swelling), back pain (chronic, takes tylenol BID) and neck pain. Negative for gait problem.   Skin:  Negative for rash.   Neurological:  Positive for weakness and numbness (left hand numbness intermittently). Negative for dizziness, syncope, speech difficulty and headaches.   Psychiatric/Behavioral:  Negative for dysphoric mood. The patient is not nervous/anxious (stable on xanax).         Objective:      Vitals:    12/17/24 1358   BP: 134/62   Pulse: 70   SpO2: 97%   Weight: 94.8 kg (209 lb)   Height: 5' 10" (1.778 m)     Physical Exam  Vitals and nursing note reviewed.   Constitutional:       General: He is not in acute distress.     Appearance: He is well-developed. He is obese.   HENT:      Head: Normocephalic and atraumatic.      Right Ear: Tympanic membrane and ear canal normal.      Left Ear: Tympanic membrane and ear canal normal.   Neck:      Vascular: No carotid bruit.   Cardiovascular:      Rate and Rhythm: Normal rate and regular rhythm.      Heart sounds: No murmur heard.     No friction rub. No gallop.      Comments: +spider and varicose veins to bilat lower legs/ankles, chronic purplish discoloration to bilat feet, feet warm/dry  Pulmonary:      Effort: Pulmonary effort is normal. No respiratory distress.      Breath sounds: Normal breath sounds. No wheezing or rales.   Abdominal:      General: There is no distension.      Palpations: Abdomen is soft.      Tenderness: There is no abdominal tenderness.   Musculoskeletal:      Left elbow: Swelling and effusion (mild tenderness and erythema to olecranon bursal swelling, no wound) present. Normal range of motion. Tenderness present.      Cervical back: Neck supple.      Right lower leg: No edema.      Left lower leg: " No edema.   Lymphadenopathy:      Cervical: No cervical adenopathy.   Skin:     General: Skin is warm and dry.      Findings: No rash.   Neurological:      General: No focal deficit present.      Mental Status: He is alert and oriented to person, place, and time.      Gait: Gait normal.         Assessment:       1. Upper GI bleed    2. Anemia, unspecified type    3. Olecranon bursitis of left elbow    4. Primary hypertension    5. Mixed hyperlipidemia    6. DM type 2 with diabetic dyslipidemia         Plan:       Upper GI bleed  -discussed with patient importance of following up with GI- hospital GI had recommended follow-up EGD in 8 weeks to ensure healing of the gastric ulcers and may need further workup to identify source of bleeding/anemia    Anemia, unspecified type  -Dr. Sofia ordered follow-up CBC with haptoglobin last week, patient states he had labs drawn at [facility] though results are not available will try to request results.  Patient is Jehovah's witnessed so he declines blood transfusion    Olecranon bursitis of left elbow  -recommend ice and compression and will treat with antibiotic for mild erythema to the olecranon bursal swelling  -     doxycycline (VIBRAMYCIN) 100 MG Cap; Take 1 capsule (100 mg total) by mouth every 12 (twelve) hours.  Dispense: 14 capsule; Refill: 0    Primary hypertension  -BP well controlled  -     atenoloL (TENORMIN) 50 MG tablet; Take 2 tablets (100 mg total) by mouth 2 (two) times a day.  Dispense: 360 tablet; Refill: 3    Mixed hyperlipidemia  -well controlled on last labs    DM type 2 with diabetic dyslipidemia  -stable with last A1c 5.9% in November.  Patient again cautioned on risk of hypoglycemia and need to lower insulin dose.  Explained metformin is unlikely to cause hypoglycemia so if he is going to hold medication for low blood sugars he should be holding or reducing insulin instead of holding metformin which he's been doing    Follow up in about 3 months (around  3/17/2025) for Diabetes.

## 2024-12-17 NOTE — TELEPHONE ENCOUNTER
----- Message from Marianna Hdez NP sent at 12/17/2024  3:33 PM CST -----  I questioned pt on it because I saw the orders Dr. Sofia put in but pt insists he had them drawn at [facility]? Do you mind calling him and clarifying again because we can't find results and I would recommend he have redrawn if results aren't available- let him know Dr. Sofia ordered them for [facility].  Thanks,  ----- Message -----  From: Dahlia Miller,   Sent: 12/17/2024   2:48 PM CST  To: Marianna Hdez NP    There is nothing in [facility] for Dr Sofia last week. Most recent for him was INR from 11/15/24. Did patient say he went to [facility] for labs last week. I see Dr Sofia's orders in the system but they are to [facility].  ----- Message -----  From: Marianna Hdez NP  Sent: 12/17/2024   2:20 PM CST  To: Dahlia Miller, RT    Please see if we can pull labs drawn at [facility] last week- ordered by Dr. Sofia

## 2024-12-17 NOTE — TELEPHONE ENCOUNTER
Spoke to patient. Said he went to [facility] on [address] in [place] last week, but the phlebotomist seemed very confused. Said he brought paper orders. Spoke to Rose at [facility], she said that the only labs she sees is Factor 5 for Dr Sofia and the result is still pending. Said turn around time is 4-8 days and it was collected on the 13th. FYI to Marianna. Remind me created to follow-up on result.

## 2024-12-17 NOTE — PATIENT INSTRUCTIONS
Omar Velasco Jr., MD- GI- call to schedule appointment  [address] [place] [address]  Phone: [phone]

## 2024-12-18 ENCOUNTER — TELEPHONE (OUTPATIENT)
Dept: FAMILY MEDICINE | Facility: CLINIC | Age: 64
End: 2024-12-18
Payer: MEDICARE

## 2024-12-18 LAB
F5 GENE MUT ANL BLD/T: ABNORMAL
F5 P.R506Q BLD/T QL: POSITIVE

## 2024-12-18 NOTE — TELEPHONE ENCOUNTER
----- Message from Daisha sent at 12/18/2024  2:02 PM CST -----  - 1:44- pt is calling about his labs to see if we located them   [phone]

## 2024-12-19 ENCOUNTER — TELEPHONE (OUTPATIENT)
Facility: CLINIC | Age: 64
End: 2024-12-19
Payer: MEDICARE

## 2024-12-19 ENCOUNTER — TELEPHONE (OUTPATIENT)
Dept: FAMILY MEDICINE | Facility: CLINIC | Age: 64
End: 2024-12-19
Payer: MEDICARE

## 2024-12-19 DIAGNOSIS — D68.51 HETEROZYGOUS FACTOR V LEIDEN MUTATION: Primary | ICD-10-CM

## 2024-12-19 DIAGNOSIS — D62 ACUTE BLOOD LOSS ANEMIA: ICD-10-CM

## 2024-12-19 DIAGNOSIS — Z79.01 LONG TERM (CURRENT) USE OF ANTICOAGULANTS: ICD-10-CM

## 2024-12-19 DIAGNOSIS — D50.0 IRON DEFICIENCY ANEMIA DUE TO CHRONIC BLOOD LOSS: ICD-10-CM

## 2024-12-19 NOTE — TELEPHONE ENCOUNTER
----- Message from Saba sent at 12/19/2024  1:01 PM CST -----  Regarding: CB labs  Issue with lab work that has been done and not found.    Would like a CB, this involves this office the order was supposed to have been from Dr. Rosas.    He would like a CB     [phone]

## 2024-12-19 NOTE — TELEPHONE ENCOUNTER
Only factor V is back, others look like pending, advised to call Hem/Onc office, sent to Marianna for review

## 2024-12-19 NOTE — TELEPHONE ENCOUNTER
----- Message from Tech Dahlia sent at 12/17/2024  4:23 PM CST -----  Regarding: Did Factor 5 result?  Spoke to patient. Said he went to [facility] on [address] in [place] last week, but the phlebotomist seemed very confused. Said he brought paper orders. Spoke to Rose at [facility], she said that the only labs she sees is Factor 5 for Dr Sofia and the result is still pending. Said turn around time is 4-8 days and it was collected on the 13th. [place] to Marianna. Remind me created to follow-up on result.

## 2024-12-19 NOTE — TELEPHONE ENCOUNTER
Spoke to patient, advised to follow up with Dr. Nesbitt office about results and other labs not collected. Has an upcoming appt with Dianna Duff NP per pt

## 2024-12-19 NOTE — TELEPHONE ENCOUNTER
"Marianna was waiting for Factor 5 result from cc'd chart message. Patient would like a call. Said Dr Sofia is out of the office "for a while"    Marianna Hdez, Dahlia Chirinos, RT  Please see if we can pull labs drawn at [facility] last week- ordered by Dr. Sofia  "

## 2024-12-19 NOTE — TELEPHONE ENCOUNTER
Called patient on regard to above message, CBC Haptoglobin and Erythropoietin will be send to [facility]. Patient stated understanding.

## 2024-12-20 NOTE — TELEPHONE ENCOUNTER
Noted- I was looking for the CBC results  had ordered and pt had drawn but apparently [facility] richard the wrong orders

## 2025-01-03 NOTE — PROGRESS NOTES
"                                                         PROGRESS NOTE    Subjective:       Patient ID: Luis Resendiz is a 64 y.o. male.    Chief Complaint:  Hypercoagulable Syndrome, long term use of anticoagulants follow up.        History of Present Illness:   Luis Resendiz is a 64 y.o. male who presents for follow up of above.      Patient discharged 12/9/2024 from [facility] after 3 day stay for melana/GI bleeding.  He had negative EGD and his coumadin was changed to Apixiban 5mg bid. He denies any s/s of bleeding. He was supposed to have a short term EGD with GI but has not yet est with the outpatient. Will have him est care with Dr. Elizabeth. He is also due for colonoscopy and possible pill cam.    He continues on Ferrous sulfate BID and is tolerating it well. He has no up to date labs.     Family and Social history reviewed and is unchanged from previous       Current Outpatient Medications:     acetaminophen (TYLENOL) 325 MG tablet, Take 325-650 mg by mouth daily as needed for Pain., Disp: , Rfl:     ALPRAZolam (XANAX) 1 MG tablet, Take 1 tablet (1 mg total) by mouth 2 (two) times daily. (Patient taking differently: Take 1 mg by mouth 2 (two) times a day.), Disp: 60 tablet, Rfl: 5    aspirin 325 MG tablet, Take 325 mg by mouth every evening., Disp: , Rfl:     atenoloL (TENORMIN) 50 MG tablet, Take 2 tablets (100 mg total) by mouth 2 (two) times a day., Disp: 360 tablet, Rfl: 3    BD ULTRA-FINE MINI PEN NEEDLE 31 gauge x 3/16" Ndle, 1 each by In Vitro route once daily., Disp: 100 each, Rfl: 1    blood sugar diagnostic Strp, To check BG 3-4 times daily, to use with insurance preferred meter, Disp: 360 each, Rfl: 3    blood-glucose sensor (DEXCOM G7 SENSOR) Phuong, 1 each by Misc.(Non-Drug; Combo Route) route every 10 days., Disp: 3 each, Rfl: 11    doxycycline (VIBRAMYCIN) 100 MG Cap, Take 1 capsule (100 mg total) by mouth every 12 (twelve) hours., Disp: 14 capsule, Rfl: 0    fenofibrate (TRICOR) 145 MG " "tablet, Take 1 tablet (145 mg total) by mouth once daily. (Patient taking differently: Take 145 mg by mouth every evening.), Disp: 90 tablet, Rfl: 3    ferrous sulfate (IRON) 325 mg (65 mg iron) Tab tablet, Take 1 tablet (325 mg total) by mouth 2 (two) times daily., Disp: 60 tablet, Rfl: 0    glimepiride (AMARYL) 4 MG tablet, Take 1/2 tablet with breakfast prn blood sugar >160 (Patient taking differently: Take 4 mg by mouth daily as needed (for blood glucose 200 and above).), Disp: 30 tablet, Rfl: 2    HYDROcodone-acetaminophen (NORCO) 7.5-325 mg per tablet, Take 1 tablet by mouth 2 (two) times daily as needed for Pain., Disp: , Rfl:     insulin NPH-insulin regular, 70/30, (HUMULIN 70/30 U-100 INSULIN) 100 unit/mL (70-30) injection, 35 units in AM and 35 units in PM- may titrate upwards as needed to max of 40 units BID (Patient taking differently: Inject 20-35 Units into the skin 2 (two) times daily. Titrates according to what he eats.), Disp: 7 each, Rfl: 3    insulin syringe-needle U-100 0.3 mL 31 gauge x 15/64" Syrg, , Disp: , Rfl:     insulin syringe-needle U-100 1/2 mL 31 gauge x 15/64" Syrg, BID, Disp: 180 Syringe, Rfl: 3    lancets Misc, To check BG 3-4 times daily, to use with insurance preferred meter, Disp: 360 each, Rfl: 3    lisinopriL (PRINIVIL,ZESTRIL) 20 MG tablet, Take 1 tablet (20 mg total) by mouth 2 (two) times daily., Disp: 180 tablet, Rfl: 3    menthol (ICY HOT PAIN RELIEVING TOP), Apply 1 application  topically daily as needed (for back pain)., Disp: , Rfl:     metFORMIN (GLUCOPHAGE) 500 MG tablet, Take 2 tablets (1,000 mg total) by mouth 2 (two) times daily with meals., Disp: 360 tablet, Rfl: 3    METHYL SALICYLATE-MENTHOL TOP, Apply 1 patch topically daily as needed (for back pain)., Disp: , Rfl:     pregabalin (LYRICA) 75 MG capsule, Take 75 mg by mouth 2 (two) times daily., Disp: , Rfl:     rosuvastatin (CRESTOR) 40 MG Tab, Take 1 tablet (40 mg total) by mouth once daily. (Patient taking " "differently: Take 40 mg by mouth every evening.), Disp: 90 tablet, Rfl: 3    tamsulosin (FLOMAX) 0.4 mg Cap, Take 1 capsule (0.4 mg total) by mouth once daily. Take 30 minutes after evening meal, Disp: 90 capsule, Rfl: 3    tiZANidine (ZANAFLEX) 4 MG tablet, Take 2 tablets (8 mg total) by mouth 2 (two) times a day., Disp: 120 tablet, Rfl: 5    triamcinolone acetonide 0.1% (KENALOG) 0.1 % cream, Apply topically 2 (two) times daily. Mix 50/50 with aquaphor or eucerin lotion and apply to lower leg, Disp: 30 g, Rfl: 2    TRUEPLUS INSULIN 0.5 mL 29 gauge x 1/2" Syrg, , Disp: , Rfl: 5    ULTRA COMFORT INSULIN SYRINGE 1 mL 29 gauge x 1/2" Syrg, Inject 25 Units as directed 2 (two) times daily., Disp: 180 each, Rfl: 3    zolpidem (AMBIEN) 10 mg Tab, Take 1 tablet (10 mg total) by mouth nightly as needed (insomnia)., Disp: 30 tablet, Rfl: 3    apixaban (ELIQUIS) 5 mg Tab, Take 1 tablet (5 mg total) by mouth 2 (two) times daily., Disp: 60 tablet, Rfl: 11    blood-glucose meter,continuous (DEXCOM ) Misc, 1 each by Misc.(Non-Drug; Combo Route) route Daily., Disp: 1 each, Rfl: 0    ferrous sulfate (FEOSOL) 325 mg (65 mg iron) Tab tablet, Take 1 tablet (325 mg total) by mouth daily with breakfast., Disp: 60 tablet, Rfl: 3    pantoprazole (PROTONIX) 40 MG tablet, Take 1 tablet (40 mg total) by mouth 2 (two) times daily., Disp: 180 tablet, Rfl: 3  No current facility-administered medications for this visit.    Facility-Administered Medications Ordered in Other Visits:     LIDOcaine (PF) 10 mg/ml (1%) injection 10 mg, 1 mL, Other, Once, Rivera Duff MD    Review of Systems   Constitutional:  Positive for malaise/fatigue. Negative for fever.   HENT:  Negative for hearing loss.    Respiratory:  Negative for cough and shortness of breath.    Cardiovascular:  Negative for chest pain and leg swelling.   Gastrointestinal:  Negative for blood in stool, constipation and diarrhea.   Genitourinary:  Negative for dysuria. "   Musculoskeletal:  Positive for back pain and myalgias. Negative for joint pain.   Skin:  Negative for rash.   Neurological:  Negative for headaches.   Psychiatric/Behavioral:  Negative for depression.          Objective:       Physical Examination:     BP (!) 179/87 (BP Location: Right arm, Patient Position: Sitting)   Pulse 65   Temp 97.7 °F (36.5 °C)   Resp 18   Wt 94.3 kg (208 lb)   SpO2 (!) 94%   BMI 29.84 kg/m²     Physical Exam  Vitals reviewed.   Constitutional:       Appearance: Normal appearance. He is well-developed.   HENT:      Head: Normocephalic and atraumatic.      Right Ear: External ear normal.      Left Ear: External ear normal.   Eyes:      General: No scleral icterus.     Conjunctiva/sclera: Conjunctivae normal.   Cardiovascular:      Rate and Rhythm: Normal rate.   Pulmonary:      Effort: Pulmonary effort is normal.   Abdominal:      General: Abdomen is flat.   Neurological:      General: No focal deficit present.      Mental Status: He is alert and oriented to person, place, and time.   Psychiatric:         Mood and Affect: Mood normal.         Behavior: Behavior normal.         Thought Content: Thought content normal.         Judgment: Judgment normal.         Labs:   No results found for this or any previous visit (from the past 2 weeks).    CMP  Sodium   Date Value Ref Range Status   12/09/2024 138 136 - 145 mmol/L Final     Potassium   Date Value Ref Range Status   12/09/2024 3.7 3.5 - 5.1 mmol/L Final     Comment:     Anion Gap reference range revised on 4/28/2023     Chloride   Date Value Ref Range Status   12/09/2024 106 95 - 110 mmol/L Final     CO2   Date Value Ref Range Status   12/09/2024 24 23 - 29 mmol/L Final     Glucose   Date Value Ref Range Status   12/09/2024 173 (H) 70 - 110 mg/dL Final     Comment:     The ADA recommends the following guidelines for fasting glucose:    Normal:       less than 100 mg/dL    Prediabetes:  100 mg/dL to 125 mg/dL    Diabetes:     126  "mg/dL or higher       BUN   Date Value Ref Range Status   12/09/2024 10 8 - 23 mg/dL Final     Creatinine   Date Value Ref Range Status   12/09/2024 0.76 0.50 - 1.40 mg/dL Final   06/21/2013 1.1 0.5 - 1.4 mg/dL Final     Calcium   Date Value Ref Range Status   12/09/2024 8.9 8.7 - 10.5 mg/dL Final   06/21/2013 9.3 8.7 - 10.5 mg/dL Final     Total Protein   Date Value Ref Range Status   12/05/2024 6.3 6.0 - 8.4 g/dL Final     Albumin   Date Value Ref Range Status   12/09/2024 3.5 3.5 - 5.2 g/dL Final     Total Bilirubin   Date Value Ref Range Status   12/05/2024 0.3 0.2 - 1.0 mg/dL Final     Alkaline Phosphatase   Date Value Ref Range Status   12/05/2024 51 40 - 150 U/L Final     AST   Date Value Ref Range Status   12/05/2024 24 10 - 40 U/L Final     ALT   Date Value Ref Range Status   12/05/2024 16 10 - 44 U/L Final     Anion Gap   Date Value Ref Range Status   12/09/2024 8 8 - 16 mmol/L Final     Comment:     Anion Gap reference range revised on 4/28/2023 06/21/2013 13 5 - 15 meq/L Final     eGFR if    Date Value Ref Range Status   03/28/2022 85 > OR = 60 mL/min/1.73m2 Final     eGFR if non    Date Value Ref Range Status   03/28/2022 73 > OR = 60 mL/min/1.73m2 Final     No results found for: "CEA"  No results found for: "PSA"        Assessment/Plan:     Problem List Items Addressed This Visit          Hematology    Heterozygous factor V Leiden mutation (Chronic)     Patient continues on Eliquis BID and is tolerating it well. Denies any s/s of bleeding at this time.         Relevant Medications    apixaban (ELIQUIS) 5 mg Tab    ferrous sulfate (FEOSOL) 325 mg (65 mg iron) Tab tablet    pantoprazole (PROTONIX) 40 MG tablet    Other Relevant Orders    CBC W/ AUTO DIFFERENTIAL    HAPTOGLOBIN    Erythropoietin    FERRITIN    CMP       Oncology    Acute blood loss anemia - Primary    Relevant Medications    apixaban (ELIQUIS) 5 mg Tab    ferrous sulfate (FEOSOL) 325 mg (65 mg iron) Tab " tablet    pantoprazole (PROTONIX) 40 MG tablet    Other Relevant Orders    CBC W/ AUTO DIFFERENTIAL    HAPTOGLOBIN    Erythropoietin    FERRITIN    CMP    Iron deficiency anemia due to chronic blood loss     No recent iron studies. Will get up to date cbc and iron studies this week            GI    Upper GI bleed     Patient denies any of the tarry stools he was having previously. He has not followed up with GI. Will send referral to Dr. Elizabeth for colonoscopy and repeat EGD         Gastroesophageal reflux disease     Continue Protonix BID and refill today         Relevant Medications    pantoprazole (PROTONIX) 40 MG tablet       Other    Weakness     Patients hgb was down to 6.8 he continues on Ferrous Sulfate BID and is tolerating it well Will get cbc and iron studies this week          Other Visit Diagnoses       Gastrointestinal hemorrhage, unspecified gastrointestinal hemorrhage type        Relevant Medications    apixaban (ELIQUIS) 5 mg Tab    ferrous sulfate (FEOSOL) 325 mg (65 mg iron) Tab tablet    pantoprazole (PROTONIX) 40 MG tablet    Other Relevant Orders    Ambulatory referral/consult to Gastroenterology    CBC W/ AUTO DIFFERENTIAL    HAPTOGLOBIN    Erythropoietin    FERRITIN    CMP            Discussion:     Follow up in about 2 months (around 3/6/2025) for with Dr. Smith.      Electronically signed by Miri Duff, MSN, APRN, AGNP-C, OCN

## 2025-01-06 ENCOUNTER — OFFICE VISIT (OUTPATIENT)
Facility: CLINIC | Age: 65
End: 2025-01-06
Payer: MEDICARE

## 2025-01-06 VITALS
SYSTOLIC BLOOD PRESSURE: 179 MMHG | DIASTOLIC BLOOD PRESSURE: 87 MMHG | RESPIRATION RATE: 18 BRPM | OXYGEN SATURATION: 94 % | BODY MASS INDEX: 29.84 KG/M2 | HEART RATE: 65 BPM | TEMPERATURE: 98 F | WEIGHT: 208 LBS

## 2025-01-06 DIAGNOSIS — K92.2 UPPER GI BLEED: ICD-10-CM

## 2025-01-06 DIAGNOSIS — D62 ACUTE BLOOD LOSS ANEMIA: Primary | ICD-10-CM

## 2025-01-06 DIAGNOSIS — D68.51 HETEROZYGOUS FACTOR V LEIDEN MUTATION: ICD-10-CM

## 2025-01-06 DIAGNOSIS — K21.9 GASTROESOPHAGEAL REFLUX DISEASE, UNSPECIFIED WHETHER ESOPHAGITIS PRESENT: ICD-10-CM

## 2025-01-06 DIAGNOSIS — R53.1 WEAKNESS: ICD-10-CM

## 2025-01-06 DIAGNOSIS — D50.0 IRON DEFICIENCY ANEMIA DUE TO CHRONIC BLOOD LOSS: ICD-10-CM

## 2025-01-06 DIAGNOSIS — K92.2 GASTROINTESTINAL HEMORRHAGE, UNSPECIFIED GASTROINTESTINAL HEMORRHAGE TYPE: ICD-10-CM

## 2025-01-06 PROCEDURE — 99999 PR PBB SHADOW E&M-EST. PATIENT-LVL III: CPT | Mod: PBBFAC,,, | Performed by: NURSE PRACTITIONER

## 2025-01-06 RX ORDER — FERROUS SULFATE 325(65) MG
325 TABLET ORAL
Qty: 60 TABLET | Refills: 3 | Status: SHIPPED | OUTPATIENT
Start: 2025-01-06

## 2025-01-06 RX ORDER — PANTOPRAZOLE SODIUM 40 MG/1
40 TABLET, DELAYED RELEASE ORAL 2 TIMES DAILY
Qty: 180 TABLET | Refills: 3 | Status: SHIPPED | OUTPATIENT
Start: 2025-01-06 | End: 2026-01-06

## 2025-01-06 NOTE — ASSESSMENT & PLAN NOTE
Patients hgb was down to 6.8 he continues on Ferrous Sulfate BID and is tolerating it well Will get cbc and iron studies this week

## 2025-01-06 NOTE — ASSESSMENT & PLAN NOTE
Patient denies any of the tarry stools he was having previously. He has not followed up with GI. Will send referral to Dr. Elizabeth for colonoscopy and repeat EGD

## 2025-01-06 NOTE — ASSESSMENT & PLAN NOTE
Patient continues on Eliquis BID and is tolerating it well. Denies any s/s of bleeding at this time.

## 2025-01-13 ENCOUNTER — TELEPHONE (OUTPATIENT)
Dept: FAMILY MEDICINE | Facility: CLINIC | Age: 65
End: 2025-01-13
Payer: MEDICARE

## 2025-01-13 DIAGNOSIS — M70.22 OLECRANON BURSITIS OF LEFT ELBOW: ICD-10-CM

## 2025-01-13 RX ORDER — DOXYCYCLINE 100 MG/1
100 CAPSULE ORAL EVERY 12 HOURS
Qty: 20 CAPSULE | Refills: 0 | Status: SHIPPED | OUTPATIENT
Start: 2025-01-13

## 2025-01-13 NOTE — TELEPHONE ENCOUNTER
Spoke with pt in regards to recent message sent. Verbalized verbatim per Marianna. Pt acknowledged understanding.

## 2025-01-13 NOTE — TELEPHONE ENCOUNTER
----- Message from Daisha sent at 1/13/2025 12:56 PM CST -----  Pt would like antibiotics called in about his elbow. He got the a couple of weeks ago and it is not any better.   [phone]

## 2025-01-13 NOTE — TELEPHONE ENCOUNTER
Please let pt know I will send in abx but if it's not getting better may need to see ortho to have effusion drained

## 2025-01-13 NOTE — TELEPHONE ENCOUNTER
Said Marianna looked at the elbow when he was here, by the end of the antibiotic was getting better and could move it better, but came right back. Been doing compression sleeve and cold compress

## 2025-01-29 DIAGNOSIS — E78.5 DM TYPE 2 WITH DIABETIC DYSLIPIDEMIA: ICD-10-CM

## 2025-01-29 DIAGNOSIS — E11.69 DM TYPE 2 WITH DIABETIC DYSLIPIDEMIA: ICD-10-CM

## 2025-01-29 RX ORDER — BLOOD-GLUCOSE SENSOR
1 EACH MISCELLANEOUS
Qty: 3 EACH | Refills: 11 | Status: SHIPPED | OUTPATIENT
Start: 2025-01-29 | End: 2026-01-29

## 2025-01-29 NOTE — TELEPHONE ENCOUNTER
----- Message from Daisha sent at 1/29/2025 10:05 AM CST -----  Pt needs refill on Zapper sensors   [address]   [phone]

## 2025-01-29 NOTE — TELEPHONE ENCOUNTER
Pt is needing a refill on his dexcom g7 sensors. Last office visit 12/17/2024. Next office visit 03/27/2025

## 2025-02-12 ENCOUNTER — TELEPHONE (OUTPATIENT)
Facility: CLINIC | Age: 65
End: 2025-02-12
Payer: MEDICARE

## 2025-02-12 NOTE — TELEPHONE ENCOUNTER
Fax with application for patient assistance was sent to [employer] program with collaborating MD signature. Patient made aware and stated understanding.

## 2025-02-21 ENCOUNTER — TELEPHONE (OUTPATIENT)
Facility: CLINIC | Age: 65
End: 2025-02-21
Payer: MEDICARE

## 2025-02-21 NOTE — TELEPHONE ENCOUNTER
Called patient on regard to denial for Eliquis prescription. Patient stated he has received a denial letter, from insurance, for patient assistance, Patient has 3 bottles of Eliquis 5 mg at home, having appointment with Dr. Smith 03/11/2025, patient requested prescription for warfarin. I recommended patient to assist to appointment for further recommendations. Patient stated understanding.

## 2025-03-11 ENCOUNTER — OFFICE VISIT (OUTPATIENT)
Facility: CLINIC | Age: 65
End: 2025-03-11
Payer: MEDICARE

## 2025-03-11 VITALS
HEART RATE: 59 BPM | DIASTOLIC BLOOD PRESSURE: 81 MMHG | SYSTOLIC BLOOD PRESSURE: 149 MMHG | RESPIRATION RATE: 18 BRPM | BODY MASS INDEX: 29.82 KG/M2 | TEMPERATURE: 98 F | WEIGHT: 207.81 LBS

## 2025-03-11 DIAGNOSIS — D68.51 HETEROZYGOUS FACTOR V LEIDEN MUTATION: Primary | Chronic | ICD-10-CM

## 2025-03-11 DIAGNOSIS — D50.0 IRON DEFICIENCY ANEMIA DUE TO CHRONIC BLOOD LOSS: ICD-10-CM

## 2025-03-11 PROCEDURE — 1101F PT FALLS ASSESS-DOCD LE1/YR: CPT | Mod: CPTII,S$GLB,, | Performed by: INTERNAL MEDICINE

## 2025-03-11 PROCEDURE — 1126F AMNT PAIN NOTED NONE PRSNT: CPT | Mod: CPTII,S$GLB,, | Performed by: INTERNAL MEDICINE

## 2025-03-11 PROCEDURE — 3008F BODY MASS INDEX DOCD: CPT | Mod: CPTII,S$GLB,, | Performed by: INTERNAL MEDICINE

## 2025-03-11 PROCEDURE — 3288F FALL RISK ASSESSMENT DOCD: CPT | Mod: CPTII,S$GLB,, | Performed by: INTERNAL MEDICINE

## 2025-03-11 PROCEDURE — 1159F MED LIST DOCD IN RCRD: CPT | Mod: CPTII,S$GLB,, | Performed by: INTERNAL MEDICINE

## 2025-03-11 PROCEDURE — 3077F SYST BP >= 140 MM HG: CPT | Mod: CPTII,S$GLB,, | Performed by: INTERNAL MEDICINE

## 2025-03-11 PROCEDURE — 4010F ACE/ARB THERAPY RXD/TAKEN: CPT | Mod: CPTII,S$GLB,, | Performed by: INTERNAL MEDICINE

## 2025-03-11 PROCEDURE — G2211 COMPLEX E/M VISIT ADD ON: HCPCS | Mod: S$GLB,,, | Performed by: INTERNAL MEDICINE

## 2025-03-11 PROCEDURE — 99214 OFFICE O/P EST MOD 30 MIN: CPT | Mod: S$GLB,,, | Performed by: INTERNAL MEDICINE

## 2025-03-11 PROCEDURE — 99999 PR PBB SHADOW E&M-EST. PATIENT-LVL IV: CPT | Mod: PBBFAC,,, | Performed by: INTERNAL MEDICINE

## 2025-03-11 PROCEDURE — 3079F DIAST BP 80-89 MM HG: CPT | Mod: CPTII,S$GLB,, | Performed by: INTERNAL MEDICINE

## 2025-03-11 NOTE — PROGRESS NOTES
"                                                         PROGRESS NOTE    Subjective:       Patient ID: Luis Resenidz is a 64 y.o. male.    Chief Complaint:  No chief complaint on file.  Hypercoagulable Syndrome, long term use of anticoagulants follow up.        History of Present Illness:   Luis Resendiz is a 64 y.o. male who presents for follow up of above.      Manuel is doing ok today.  States he had another EGD that showed no bleeding.      Remains on Eliquis as of today, 3/11/2025    PMH:  Patient discharged 12/9/2024 from [facility] after 3 day stay for melana/GI bleeding.  He had negative EGD and his coumadin was changed to Apixiban 5mg bid    Family and Social history reviewed and is unchanged from previous           Current Outpatient Medications:     acetaminophen (TYLENOL) 325 MG tablet, Take 325-650 mg by mouth daily as needed for Pain., Disp: , Rfl:     ALPRAZolam (XANAX) 1 MG tablet, Take 1 tablet (1 mg total) by mouth 2 (two) times daily. (Patient taking differently: Take 1 mg by mouth 2 (two) times a day.), Disp: 60 tablet, Rfl: 5    aspirin 325 MG tablet, Take 325 mg by mouth every evening., Disp: , Rfl:     atenoloL (TENORMIN) 50 MG tablet, Take 2 tablets (100 mg total) by mouth 2 (two) times a day., Disp: 360 tablet, Rfl: 3    BD ULTRA-FINE MINI PEN NEEDLE 31 gauge x 3/16" Ndle, 1 each by In Vitro route once daily., Disp: 100 each, Rfl: 1    blood sugar diagnostic Strp, To check BG 3-4 times daily, to use with insurance preferred meter, Disp: 360 each, Rfl: 3    blood-glucose sensor (DEXCOM G7 SENSOR) Phuong, 1 each by Misc.(Non-Drug; Combo Route) route every 10 days., Disp: 3 each, Rfl: 11    doxycycline (VIBRAMYCIN) 100 MG Cap, Take 1 capsule (100 mg total) by mouth every 12 (twelve) hours., Disp: 20 capsule, Rfl: 0    fenofibrate (TRICOR) 145 MG tablet, Take 1 tablet (145 mg total) by mouth once daily. (Patient taking differently: Take 145 mg by mouth every evening.), Disp: 90 tablet, Rfl: " "3    ferrous sulfate (FEOSOL) 325 mg (65 mg iron) Tab tablet, Take 1 tablet (325 mg total) by mouth daily with breakfast., Disp: 60 tablet, Rfl: 3    glimepiride (AMARYL) 4 MG tablet, Take 1/2 tablet with breakfast prn blood sugar >160 (Patient taking differently: Take 4 mg by mouth daily as needed (for blood glucose 200 and above).), Disp: 30 tablet, Rfl: 2    HYDROcodone-acetaminophen (NORCO) 7.5-325 mg per tablet, Take 1 tablet by mouth 2 (two) times daily as needed for Pain., Disp: , Rfl:     insulin NPH-insulin regular, 70/30, (HUMULIN 70/30 U-100 INSULIN) 100 unit/mL (70-30) injection, 35 units in AM and 35 units in PM- may titrate upwards as needed to max of 40 units BID (Patient taking differently: Inject 20-35 Units into the skin 2 (two) times daily. Titrates according to what he eats.), Disp: 7 each, Rfl: 3    insulin syringe-needle U-100 0.3 mL 31 gauge x 15/64" Syrg, , Disp: , Rfl:     insulin syringe-needle U-100 1/2 mL 31 gauge x 15/64" Syrg, BID, Disp: 180 Syringe, Rfl: 3    lancets Misc, To check BG 3-4 times daily, to use with insurance preferred meter, Disp: 360 each, Rfl: 3    lisinopriL (PRINIVIL,ZESTRIL) 20 MG tablet, Take 1 tablet (20 mg total) by mouth 2 (two) times daily., Disp: 180 tablet, Rfl: 3    menthol (ICY HOT PAIN RELIEVING TOP), Apply 1 application  topically daily as needed (for back pain)., Disp: , Rfl:     metFORMIN (GLUCOPHAGE) 500 MG tablet, Take 2 tablets (1,000 mg total) by mouth 2 (two) times daily with meals., Disp: 360 tablet, Rfl: 3    METHYL SALICYLATE-MENTHOL TOP, Apply 1 patch topically daily as needed (for back pain)., Disp: , Rfl:     pantoprazole (PROTONIX) 40 MG tablet, Take 1 tablet (40 mg total) by mouth 2 (two) times daily., Disp: 180 tablet, Rfl: 3    pregabalin (LYRICA) 75 MG capsule, Take 75 mg by mouth 2 (two) times daily., Disp: , Rfl:     rosuvastatin (CRESTOR) 40 MG Tab, Take 1 tablet (40 mg total) by mouth once daily. (Patient taking differently: Take 40 " "mg by mouth every evening.), Disp: 90 tablet, Rfl: 3    tamsulosin (FLOMAX) 0.4 mg Cap, Take 1 capsule (0.4 mg total) by mouth once daily. Take 30 minutes after evening meal, Disp: 90 capsule, Rfl: 3    tiZANidine (ZANAFLEX) 4 MG tablet, Take 2 tablets (8 mg total) by mouth 2 (two) times a day., Disp: 120 tablet, Rfl: 5    triamcinolone acetonide 0.1% (KENALOG) 0.1 % cream, Apply topically 2 (two) times daily. Mix 50/50 with aquaphor or eucerin lotion and apply to lower leg, Disp: 30 g, Rfl: 2    TRUEPLUS INSULIN 0.5 mL 29 gauge x 1/2" Syrg, , Disp: , Rfl: 5    ULTRA COMFORT INSULIN SYRINGE 1 mL 29 gauge x 1/2" Syrg, Inject 25 Units as directed 2 (two) times daily., Disp: 180 each, Rfl: 3    zolpidem (AMBIEN) 10 mg Tab, Take 1 tablet (10 mg total) by mouth nightly as needed (insomnia)., Disp: 30 tablet, Rfl: 3    blood-glucose meter,continuous (DEXCOM ) Misc, 1 each by Misc.(Non-Drug; Combo Route) route Daily., Disp: 1 each, Rfl: 0  No current facility-administered medications for this visit.    Facility-Administered Medications Ordered in Other Visits:     LIDOcaine (PF) 10 mg/ml (1%) injection 10 mg, 1 mL, Other, Once, Rivera Duff MD        Objective:       Physical Examination:     BP (!) 149/81   Pulse (!) 59   Temp 98 °F (36.7 °C)   Resp 18   Wt 94.3 kg (207 lb 12.8 oz)   BMI 29.82 kg/m²     Physical Exam  Vitals reviewed.   Constitutional:       Appearance: Normal appearance. He is well-developed.   HENT:      Head: Normocephalic and atraumatic.      Right Ear: External ear normal.      Left Ear: External ear normal.   Eyes:      General: No scleral icterus.     Conjunctiva/sclera: Conjunctivae normal.   Cardiovascular:      Rate and Rhythm: Normal rate.   Pulmonary:      Effort: Pulmonary effort is normal.   Abdominal:      General: Abdomen is flat.   Neurological:      General: No focal deficit present.      Mental Status: He is alert and oriented to person, place, and time. "   Psychiatric:         Mood and Affect: Mood normal.         Behavior: Behavior normal.         Thought Content: Thought content normal.         Judgment: Judgment normal.         Labs:   No results found for this or any previous visit (from the past 2 weeks).    CMP  Sodium   Date Value Ref Range Status   12/09/2024 138 136 - 145 mmol/L Final     Potassium   Date Value Ref Range Status   12/09/2024 3.7 3.5 - 5.1 mmol/L Final     Comment:     Anion Gap reference range revised on 4/28/2023     Chloride   Date Value Ref Range Status   12/09/2024 106 95 - 110 mmol/L Final     CO2   Date Value Ref Range Status   12/09/2024 24 23 - 29 mmol/L Final     Glucose   Date Value Ref Range Status   12/09/2024 173 (H) 70 - 110 mg/dL Final     Comment:     The ADA recommends the following guidelines for fasting glucose:    Normal:       less than 100 mg/dL    Prediabetes:  100 mg/dL to 125 mg/dL    Diabetes:     126 mg/dL or higher       BUN   Date Value Ref Range Status   12/09/2024 10 8 - 23 mg/dL Final     Creatinine   Date Value Ref Range Status   12/09/2024 0.76 0.50 - 1.40 mg/dL Final   06/21/2013 1.1 0.5 - 1.4 mg/dL Final     Calcium   Date Value Ref Range Status   12/09/2024 8.9 8.7 - 10.5 mg/dL Final   06/21/2013 9.3 8.7 - 10.5 mg/dL Final     Total Protein   Date Value Ref Range Status   12/05/2024 6.3 6.0 - 8.4 g/dL Final     Albumin   Date Value Ref Range Status   12/09/2024 3.5 3.5 - 5.2 g/dL Final     Total Bilirubin   Date Value Ref Range Status   12/05/2024 0.3 0.2 - 1.0 mg/dL Final     Alkaline Phosphatase   Date Value Ref Range Status   12/05/2024 51 40 - 150 U/L Final     AST   Date Value Ref Range Status   12/05/2024 24 10 - 40 U/L Final     ALT   Date Value Ref Range Status   12/05/2024 16 10 - 44 U/L Final     Anion Gap   Date Value Ref Range Status   12/09/2024 8 8 - 16 mmol/L Final     Comment:     Anion Gap reference range revised on 4/28/2023   06/21/2013 13 5 - 15 meq/L Final     eGFR if   "  Date Value Ref Range Status   03/28/2022 85 > OR = 60 mL/min/1.73m2 Final     eGFR if non    Date Value Ref Range Status   03/28/2022 73 > OR = 60 mL/min/1.73m2 Final     No results found for: "CEA"  No results found for: "PSA"        Assessment/Plan:     Problem List Items Addressed This Visit       Heterozygous factor V Leiden mutation - Primary (Chronic)    Manuel is doing well today.  He remains on Eliquis and reports that his last EGD showed no bleeding nor does he see any melena or bleeding o/w.  He will remain on the Eliquis and discussed this today.  I will see him again in 3 months with labs.           Relevant Orders    CBC Auto Differential    Ferritin    Comprehensive Metabolic Panel    Iron deficiency anemia due to chronic blood loss    Relevant Orders    Ferritin         Discussion:     Follow up in about 3 months (around 6/11/2025).      Electronically signed by Ac Sofia        "

## 2025-03-11 NOTE — ASSESSMENT & PLAN NOTE
Manuel is doing well today.  He remains on Eliquis and reports that his last EGD showed no bleeding nor does he see any melena or bleeding o/w.  He will remain on the Eliquis and discussed this today.  I will see him again in 3 months with labs.

## 2025-03-13 ENCOUNTER — TELEPHONE (OUTPATIENT)
Dept: FAMILY MEDICINE | Facility: CLINIC | Age: 65
End: 2025-03-13
Payer: MEDICARE

## 2025-03-13 DIAGNOSIS — Z79.899 ENCOUNTER FOR LONG-TERM (CURRENT) USE OF MEDICATIONS: Primary | ICD-10-CM

## 2025-03-13 DIAGNOSIS — I10 ESSENTIAL HYPERTENSION: ICD-10-CM

## 2025-03-13 DIAGNOSIS — E11.69 DM TYPE 2 WITH DIABETIC DYSLIPIDEMIA: ICD-10-CM

## 2025-03-13 DIAGNOSIS — E78.5 DM TYPE 2 WITH DIABETIC DYSLIPIDEMIA: ICD-10-CM

## 2025-03-13 DIAGNOSIS — E78.2 MIXED HYPERLIPIDEMIA: ICD-10-CM

## 2025-03-13 NOTE — TELEPHONE ENCOUNTER
Left message on voice mail, verbalizing that we have placed labs for the pt to have done before pt's upcoming appointment on 03/27/2025. Lab order are with [facility]. So you can come into the office for your labs, no appointment necessary. Just make sure you are fasting for your labs. Verbalized that if they have any question or concerns, please give our office a call.

## 2025-03-17 DIAGNOSIS — F41.9 ANXIETY: ICD-10-CM

## 2025-03-17 RX ORDER — ALPRAZOLAM 1 MG/1
1 TABLET ORAL 2 TIMES DAILY
Qty: 60 TABLET | Refills: 5 | Status: SHIPPED | OUTPATIENT
Start: 2025-03-17

## 2025-03-17 NOTE — TELEPHONE ENCOUNTER
----- Message from Leonid Brown sent at 3/17/2025 10:22 AM CDT -----  Calling for refill on Alprazolam [facility]: [phone]

## 2025-03-24 ENCOUNTER — TELEPHONE (OUTPATIENT)
Dept: FAMILY MEDICINE | Facility: CLINIC | Age: 65
End: 2025-03-24
Payer: MEDICARE

## 2025-03-24 DIAGNOSIS — M70.22 OLECRANON BURSITIS OF LEFT ELBOW: Primary | ICD-10-CM

## 2025-03-24 DIAGNOSIS — Z00.00 ENCOUNTER FOR MEDICARE ANNUAL WELLNESS EXAM: ICD-10-CM

## 2025-03-24 NOTE — TELEPHONE ENCOUNTER
----- Message from Daisha sent at 3/24/2025  2:17 PM CDT -----  Pt has been having problems with his left elbow. Has been on a couple rounds of antibiotics and it is not going away. Has to have it drained now. Would like to know who we can refer to. There is [address] in [place] that [facility]. Would like to go there [phone]

## 2025-03-27 ENCOUNTER — OFFICE VISIT (OUTPATIENT)
Dept: FAMILY MEDICINE | Facility: CLINIC | Age: 65
End: 2025-03-27
Payer: MEDICARE

## 2025-03-27 VITALS
DIASTOLIC BLOOD PRESSURE: 86 MMHG | HEIGHT: 70 IN | HEART RATE: 70 BPM | OXYGEN SATURATION: 96 % | BODY MASS INDEX: 30.55 KG/M2 | WEIGHT: 213.38 LBS | SYSTOLIC BLOOD PRESSURE: 168 MMHG

## 2025-03-27 DIAGNOSIS — E11.69 DM TYPE 2 WITH DIABETIC DYSLIPIDEMIA: Primary | ICD-10-CM

## 2025-03-27 DIAGNOSIS — D68.51 HETEROZYGOUS FACTOR V LEIDEN MUTATION: ICD-10-CM

## 2025-03-27 DIAGNOSIS — Z87.19: ICD-10-CM

## 2025-03-27 DIAGNOSIS — F41.9 ANXIETY: ICD-10-CM

## 2025-03-27 DIAGNOSIS — E78.5 DM TYPE 2 WITH DIABETIC DYSLIPIDEMIA: Primary | ICD-10-CM

## 2025-03-27 DIAGNOSIS — L03.114 CELLULITIS OF LEFT ELBOW: ICD-10-CM

## 2025-03-27 DIAGNOSIS — I10 ESSENTIAL HYPERTENSION: ICD-10-CM

## 2025-03-27 DIAGNOSIS — E78.2 MIXED HYPERLIPIDEMIA: ICD-10-CM

## 2025-03-27 DIAGNOSIS — D64.9 ANEMIA, UNSPECIFIED TYPE: ICD-10-CM

## 2025-03-27 DIAGNOSIS — M51.360 DEGENERATION OF INTERVERTEBRAL DISC OF LUMBAR REGION WITH DISCOGENIC BACK PAIN: ICD-10-CM

## 2025-03-27 PROCEDURE — 1125F AMNT PAIN NOTED PAIN PRSNT: CPT | Mod: CPTII,S$GLB,, | Performed by: NURSE PRACTITIONER

## 2025-03-27 PROCEDURE — G2211 COMPLEX E/M VISIT ADD ON: HCPCS | Mod: S$GLB,,, | Performed by: NURSE PRACTITIONER

## 2025-03-27 PROCEDURE — 3008F BODY MASS INDEX DOCD: CPT | Mod: CPTII,S$GLB,, | Performed by: NURSE PRACTITIONER

## 2025-03-27 PROCEDURE — 3288F FALL RISK ASSESSMENT DOCD: CPT | Mod: CPTII,S$GLB,, | Performed by: NURSE PRACTITIONER

## 2025-03-27 PROCEDURE — 1101F PT FALLS ASSESS-DOCD LE1/YR: CPT | Mod: CPTII,S$GLB,, | Performed by: NURSE PRACTITIONER

## 2025-03-27 PROCEDURE — 3077F SYST BP >= 140 MM HG: CPT | Mod: CPTII,S$GLB,, | Performed by: NURSE PRACTITIONER

## 2025-03-27 PROCEDURE — 1160F RVW MEDS BY RX/DR IN RCRD: CPT | Mod: CPTII,S$GLB,, | Performed by: NURSE PRACTITIONER

## 2025-03-27 PROCEDURE — 1159F MED LIST DOCD IN RCRD: CPT | Mod: CPTII,S$GLB,, | Performed by: NURSE PRACTITIONER

## 2025-03-27 PROCEDURE — 3044F HG A1C LEVEL LT 7.0%: CPT | Mod: CPTII,S$GLB,, | Performed by: NURSE PRACTITIONER

## 2025-03-27 PROCEDURE — 99214 OFFICE O/P EST MOD 30 MIN: CPT | Mod: S$GLB,,, | Performed by: NURSE PRACTITIONER

## 2025-03-27 PROCEDURE — 3079F DIAST BP 80-89 MM HG: CPT | Mod: CPTII,S$GLB,, | Performed by: NURSE PRACTITIONER

## 2025-03-27 PROCEDURE — 4010F ACE/ARB THERAPY RXD/TAKEN: CPT | Mod: CPTII,S$GLB,, | Performed by: NURSE PRACTITIONER

## 2025-03-27 RX ORDER — PANTOPRAZOLE SODIUM 40 MG/1
40 TABLET, DELAYED RELEASE ORAL DAILY
Start: 2025-03-27 | End: 2026-03-27

## 2025-03-27 RX ORDER — VALSARTAN 160 MG/1
160 TABLET ORAL 2 TIMES DAILY
Qty: 180 TABLET | Refills: 3 | Status: SHIPPED | OUTPATIENT
Start: 2025-03-27 | End: 2026-03-27

## 2025-03-27 NOTE — PATIENT INSTRUCTIONS
Decrease pantoprazole to once a day    Stop lisinopril and start valsartan 160mg twice a day for blood pressure    Monitor blood pressure at home and send in log in 2 weeks

## 2025-03-27 NOTE — PROGRESS NOTES
SUBJECTIVE:    Patient ID: Luis Resendiz is a 64 y.o. male.    Chief Complaint: Diabetes (No bottles//Pt is here for a check up//Foot exam ordered today//Eye exam done last year//ANA )      History of Present Illness                   Telephone on 03/13/2025   Component Date Value Ref Range Status    WBC 03/21/2025 5.0  3.8 - 10.8 Thousand/uL Final    RBC 03/21/2025 3.89 (L)  4.20 - 5.80 Million/uL Final    Hemoglobin 03/21/2025 11.9 (L)  13.2 - 17.1 g/dL Final    Hematocrit 03/21/2025 37.9 (L)  38.5 - 50.0 % Final    MCV 03/21/2025 97.4  80.0 - 100.0 fL Final    MCH 03/21/2025 30.6  27.0 - 33.0 pg Final    MCHC 03/21/2025 31.4 (L)  32.0 - 36.0 g/dL Final    RDW 03/21/2025 13.1  11.0 - 15.0 % Final    Platelets 03/21/2025 202  140 - 400 Thousand/uL Final    MPV 03/21/2025 11.3  7.5 - 12.5 fL Final    Neutrophils, Abs 03/21/2025 3,145  1,500 - 7,800 cells/uL Final    Lymph # 03/21/2025 1,280  850 - 3,900 cells/uL Final    Mono # 03/21/2025 425  200 - 950 cells/uL Final    Eos # 03/21/2025 120  15 - 500 cells/uL Final    Baso # 03/21/2025 30  0 - 200 cells/uL Final    Neutrophils Relative 03/21/2025 62.9  % Final    Lymph % 03/21/2025 25.6  % Final    Mono % 03/21/2025 8.5  % Final    Eosinophil % 03/21/2025 2.4  % Final    Basophil % 03/21/2025 0.6  % Final    Glucose 03/21/2025 203 (H)  65 - 99 mg/dL Final    BUN 03/21/2025 20  7 - 25 mg/dL Final    Creatinine 03/21/2025 1.08  0.70 - 1.35 mg/dL Final    eGFR 03/21/2025 76  > OR = 60 mL/min/1.73m2 Final    BUN/Creatinine Ratio 03/21/2025 SEE NOTE:  6 - 22 (calc) Final    Sodium 03/21/2025 142  135 - 146 mmol/L Final    Potassium 03/21/2025 4.4  3.5 - 5.3 mmol/L Final    Chloride 03/21/2025 108  98 - 110 mmol/L Final    CO2 03/21/2025 22  20 - 32 mmol/L Final    Calcium 03/21/2025 9.1  8.6 - 10.3 mg/dL Final    Total Protein 03/21/2025 7.3  6.1 - 8.1 g/dL Final    Albumin 03/21/2025 4.3  3.6 - 5.1 g/dL Final    Globulin, Total 03/21/2025 3.0  1.9 - 3.7 g/dL  (calc) Final    Albumin/Globulin Ratio 03/21/2025 1.4  1.0 - 2.5 (calc) Final    Total Bilirubin 03/21/2025 0.3  0.2 - 1.2 mg/dL Final    Alkaline Phosphatase 03/21/2025 113  35 - 144 U/L Final    AST 03/21/2025 25  10 - 35 U/L Final    ALT 03/21/2025 21  9 - 46 U/L Final    Hemoglobin A1C 03/21/2025 6.5 (H)  <5.7 % of total Hgb Final    Cholesterol 03/21/2025 132  <200 mg/dL Final    HDL 03/21/2025 43  > OR = 40 mg/dL Final    Triglycerides 03/21/2025 177 (H)  <150 mg/dL Final    LDL Cholesterol 03/21/2025 64  mg/dL (calc) Final    HDL/Cholesterol Ratio 03/21/2025 3.1  <5.0 (calc) Final    Non HDL Chol. (LDL+VLDL) 03/21/2025 89  <130 mg/dL (calc) Final    TSH w/reflex to FT4 03/21/2025 1.46  0.40 - 4.50 mIU/L Final   Telephone on 12/19/2024   Component Date Value Ref Range Status    WBC 01/09/2025 7.1  3.8 - 10.8 Thousand/uL Final    RBC 01/09/2025 3.58 (L)  4.20 - 5.80 Million/uL Final    Hemoglobin 01/09/2025 11.3 (L)  13.2 - 17.1 g/dL Final    Hematocrit 01/09/2025 36.3 (L)  38.5 - 50.0 % Final    MCV 01/09/2025 101.4 (H)  80.0 - 100.0 fL Final    MCH 01/09/2025 31.6  27.0 - 33.0 pg Final    MCHC 01/09/2025 31.1 (L)  32.0 - 36.0 g/dL Final    RDW 01/09/2025 13.9  11.0 - 15.0 % Final    Platelets 01/09/2025 250  140 - 400 Thousand/uL Final    MPV 01/09/2025 10.9  7.5 - 12.5 fL Final    Neutrophils, Abs 01/09/2025 4,572  1,500 - 7,800 cells/uL Final    Lymph # 01/09/2025 1,640  850 - 3,900 cells/uL Final    Mono # 01/09/2025 710  200 - 950 cells/uL Final    Eos # 01/09/2025 128  15 - 500 cells/uL Final    Baso # 01/09/2025 50  0 - 200 cells/uL Final    Neutrophils Relative 01/09/2025 64.4  % Final    Lymph % 01/09/2025 23.1  % Final    Mono % 01/09/2025 10.0  % Final    Eosinophil % 01/09/2025 1.8  % Final    Basophil % 01/09/2025 0.7  % Final    Haptoglobin 01/09/2025 219 (H)  43 - 212 mg/dL Final    Erythropoietin 01/09/2025 25.1 (H)  2.6 - 18.5 mIU/mL Final   Orders Only on 12/13/2024   Component Date Value Ref  Range Status    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      Orders Only on 11/19/2024   Component Date Value Ref Range Status    QRS Duration 11/19/2024 104  ms Final    [facility] QTC Calculation 11/19/2024 425  ms Final   Admission on 11/19/2024, Discharged on 11/19/2024   Component Date Value Ref Range Status    WBC 11/19/2024 7.28  3.90 - 12.70 K/uL Final    RBC 11/19/2024 3.52 (L)  4.60 - 6.20 M/uL Final    Hemoglobin 11/19/2024 10.9 (L)  14.0 - 18.0 g/dL Final    Hematocrit 11/19/2024 34.4 (L)  40.0 - 54.0 % Final    MCV 11/19/2024 98  82 - 98 fL Final    MCH 11/19/2024 31.0  27.0 - 31.0 pg Final    MCHC 11/19/2024 31.7 (L)  32.0 - 36.0 g/dL Final    RDW 11/19/2024 14.4  11.5 - 14.5 % Final    Platelets 11/19/2024 185  150 - 450 K/uL Final    MPV 11/19/2024 9.9  9.2 - 12.9 fL Final    Immature Granulocytes 11/19/2024 0.3  0.0 - 0.5 % Final    Gran # (ANC) 11/19/2024 4.7  1.8 - 7.7 K/uL Final    Immature Grans (Abs) 11/19/2024 0.02  0.00 - 0.04 K/uL Final    Lymph # 11/19/2024 1.7  1.0 - 4.8 K/uL Final    Mono # 11/19/2024 0.6  0.3 - 1.0 K/uL Final    Eos # 11/19/2024 0.2  0.0 - 0.5 K/uL Final    Baso # 11/19/2024 0.05  0.00 - 0.20 K/uL Final    nRBC 11/19/2024 0  0 /100 WBC Final    Gran % 11/19/2024 65.1  38.0 - 73.0 % Final    Lymph % 11/19/2024 23.5  18.0 - 48.0 % Final    Mono % 11/19/2024 7.7  4.0 - 15.0 % Final    Eosinophil % 11/19/2024 2.7  0.0 - 8.0 % Final    Basophil % 11/19/2024 0.7  0.0 - 1.9 % Final    Differential Method 11/19/2024 Automated   Final    Sodium 11/19/2024 141  136 - 145 mmol/L Final    Potassium 11/19/2024 4.6  3.5 - 5.1 mmol/L Final    Chloride 11/19/2024 110  95 - 110 mmol/L Final    CO2 11/19/2024 21 (L)  22 - 31 mmol/L Final    Glucose 11/19/2024 144 (H)  70 - 110 mg/dL Final    BUN 11/19/2024 31 (H)  9 - 21 mg/dL Final    Creatinine 11/19/2024 1.18  0.50  - 1.40 mg/dL Final    Calcium 11/19/2024 8.7  8.4 - 10.2 mg/dL Final    Total Protein 11/19/2024 7.1  6.0 - 8.4 g/dL Final    Albumin 11/19/2024 4.1  3.5 - 5.2 g/dL Final    Total Bilirubin 11/19/2024 0.3  0.2 - 1.3 mg/dL Final    Alkaline Phosphatase 11/19/2024 49  38 - 145 U/L Final    AST 11/19/2024 37  17 - 59 U/L Final    ALT 11/19/2024 24  0 - 50 U/L Final    Anion Gap 11/19/2024 10  5 - 12 mmol/L Final    eGFR 11/19/2024 >60  >60 mL/min/1.73 m^2 Final    POCT Glucose 11/19/2024 138 (H)  70 - 110 mg/dL Final    PT 11/19/2024 28.1 (H)  11.8 - 14.7 sec Final    INR 11/19/2024 2.6   Final   Telephone on 11/12/2024   Component Date Value Ref Range Status    Glucose 11/15/2024 61 (L)  65 - 99 mg/dL Final    BUN 11/15/2024 32 (H)  7 - 25 mg/dL Final    Creatinine 11/15/2024 1.13  0.70 - 1.35 mg/dL Final    eGFR 11/15/2024 72  > OR = 60 mL/min/1.73m2 Final    BUN/Creatinine Ratio 11/15/2024 28 (H)  6 - 22 (calc) Final    Sodium 11/15/2024 140  135 - 146 mmol/L Final    Potassium 11/15/2024 4.8  3.5 - 5.3 mmol/L Final    Chloride 11/15/2024 108  98 - 110 mmol/L Final    CO2 11/15/2024 25  20 - 32 mmol/L Final    Calcium 11/15/2024 9.0  8.6 - 10.3 mg/dL Final    Total Protein 11/15/2024 7.1  6.1 - 8.1 g/dL Final    Albumin 11/15/2024 4.3  3.6 - 5.1 g/dL Final    Globulin, Total 11/15/2024 2.8  1.9 - 3.7 g/dL (calc) Final    Albumin/Globulin Ratio 11/15/2024 1.5  1.0 - 2.5 (calc) Final    Total Bilirubin 11/15/2024 0.6  0.2 - 1.2 mg/dL Final    Alkaline Phosphatase 11/15/2024 49  35 - 144 U/L Final    AST 11/15/2024 20  10 - 35 U/L Final    ALT 11/15/2024 16  9 - 46 U/L Final    Cholesterol 11/15/2024 108  <200 mg/dL Final    HDL 11/15/2024 43  > OR = 40 mg/dL Final    Triglycerides 11/15/2024 85  <150 mg/dL Final    LDL Cholesterol 11/15/2024 48  mg/dL (calc) Final    HDL/Cholesterol Ratio 11/15/2024 2.5  <5.0 (calc) Final    Non HDL Chol. (LDL+VLDL) 11/15/2024 65  <130 mg/dL (calc) Final    Hemoglobin A1C 11/15/2024  5.9 (H)  <5.7 % of total Hgb Final       Past Medical History:   Diagnosis Date    Anticoagulant long-term use     Arthritis     Chronic back pain     Chronic neck pain     pain management    Coronary artery disease     s/p CABG    Coumadin toxicity 10/13/2015    DDD (degenerative disc disease), cervical     DVT (deep venous thrombosis)     Encounter for blood transfusion     Fatty liver     Hereditary factor VIII deficiency     [place], Coumadin    Hypertension     Insulin dependent type 2 diabetes mellitus     Mixed hyperlipidemia      Past Surgical History:   Procedure Laterality Date    BACK SURGERY      CORONARY ARTERY BYPASS GRAFT  2011    2 vessel    CORONARY ARTERY BYPASS GRAFT (CABG)  12/2023    ESOPHAGOGASTRODUODENOSCOPY N/A 12/6/2024    Procedure: EGD (ESOPHAGOGASTRODUODENOSCOPY);  Surgeon: Waldemar Rueda MD;  Location: [facility] END[facility];  Service: Endoscopy;  Laterality: N/A;    FOOT SURGERY Right     FRACTURE SURGERY Right     tibia/fibula    ROTATOR CUFF REPAIR Left     TRANSFORAMINAL EPIDURAL INJECTION OF STEROID Bilateral 09/26/2023    Procedure: Injection,steroid,epidural,transforaminal approach L4/5 and L5/S1;  Surgeon: Rivera Duff MD;  Location: [facility];  Service: Pain Management;  Laterality: Bilateral;     Family History   Problem Relation Name Age of Onset    Heart disease Mother      Cancer Mother      Heart disease Father      Alcohol abuse Father      No Known Problems Sister      No Known Problems Brother      No Known Problems Daughter      No Known Problems Son      Cataracts Neg Hx      Glaucoma Neg Hx      Retinal detachment Neg Hx      Macular degeneration Neg Hx         Tests to Keep You Healthy    Eye Exam: DUE  Colon Cancer Screening: Met on 8/17/2021  Last Blood Pressure <= 139/89 (3/27/2025): Yes  Last HbA1c < 8 (03/21/2025): Yes      The 10-year CVD risk score (Shirinino et al., 2008) is: 42.2%    Values used to calculate the score:      Age: 64 years      Sex: Male       "Diabetic: Yes      Tobacco smoker: No      Systolic Blood Pressure: 160 mmHg      Is BP treated: Yes      HDL Cholesterol: 43 mg/dL      Total Cholesterol: 132 mg/dL     Marital Status:   Alcohol History:  reports current alcohol use.  Tobacco History:  reports that he quit smoking about 30 years ago. His smoking use included cigarettes. He started smoking about 31 years ago. He has a 0.2 pack-year smoking history. He has been exposed to tobacco smoke. He has never used smokeless tobacco.  Drug History:  reports no history of drug use.    Health Maintenance Topics with due status: Not Due       Topic Last Completion Date    Colorectal Cancer Screening 08/17/2021    TETANUS VACCINE 01/22/2022    Diabetes Urine Screening 05/16/2024    High Dose Statin 03/11/2025    Lipid Panel 03/21/2025    Hemoglobin A1c 03/21/2025     Immunization History   Administered Date(s) Administered    COVID-19, MRNA, LN-S, PF (Pfizer) (Purple Cap) 03/25/2021, 04/14/2021    Influenza (FLUAD) - Quadrivalent - Adjuvanted - PF *Preferred* (65+) 11/15/2023    Influenza (FLUBLOK) - Quadrivalent - Recombinant - PF *Preferred* (egg allergy) 11/16/2020, 11/17/2021, 11/23/2022    Influenza - Quadrivalent - PF *Preferred* (6 months and older) 12/26/2019    Influenza - Trivalent - Afluria, Fluzone MDV 10/01/2014    Influenza - Trivalent - Flublok - PF (18 years and older) 09/25/2018    Influenza - Trivalent - Fluzone High Dose - PF (65 years and older) 09/17/2024    Tdap 01/22/2022       Review of patient's allergies indicates:   Allergen Reactions    Nsaids (non-steroidal anti-inflammatory drug) Nausea Only    Ancef [cefazolin] Anxiety     Current Medications[1]        Objective:      Vitals:    03/27/25 1116 03/27/25 1123   BP: (!) 162/90 (!) 160/90   Pulse: 70    SpO2: 96%    Weight: 96.8 kg (213 lb 6.4 oz)    Height: 5' 10" (1.778 m)        Physical Exam     Assessment:       1. Anxiety    2. Encounter for therapeutic drug monitoring    3. " "Encounter for long-term (current) use of medications           Assessment & Plan             Plan:       1. Anxiety    2. Encounter for therapeutic drug monitoring    3. Encounter for long-term (current) use of medications      No follow-ups on file.          Counseled on age and gender appropriate medical preventative services, including cancer screenings, immunizations, overall nutritional health, need for a consistent exercise regimen and an overall push towards maintaining a vigorous and active lifestyle.      This note was generated with the assistance of ambient listening technology. Verbal consent was obtained by the patient and accompanying visitor(s) for the recording of patient appointment to facilitate this note. I attest to having reviewed and edited the generated note for accuracy, though some syntax or spelling errors may persist. Please contact the author of this note for any clarification.       3/27/2025 Marianna Hdez NP           [1]   Current Outpatient Medications:     acetaminophen (TYLENOL) 325 MG tablet, Take 325-650 mg by mouth daily as needed for Pain., Disp: , Rfl:     ALPRAZolam (XANAX) 1 MG tablet, Take 1 tablet (1 mg total) by mouth 2 (two) times daily., Disp: 60 tablet, Rfl: 5    aspirin 325 MG tablet, Take 325 mg by mouth every evening., Disp: , Rfl:     atenoloL (TENORMIN) 50 MG tablet, Take 2 tablets (100 mg total) by mouth 2 (two) times a day., Disp: 360 tablet, Rfl: 3    BD ULTRA-FINE MINI PEN NEEDLE 31 gauge x 3/16" Ndle, 1 each by In Vitro route once daily., Disp: 100 each, Rfl: 1    blood sugar diagnostic Strp, To check BG 3-4 times daily, to use with insurance preferred meter, Disp: 360 each, Rfl: 3    blood-glucose meter,continuous (DEXCOM ) Misc, 1 each by Misc.(Non-Drug; Combo Route) route Daily., Disp: 1 each, Rfl: 0    blood-glucose sensor (DEXCOM G7 SENSOR) Phuong, 1 each by Misc.(Non-Drug; Combo Route) route every 10 days., Disp: 3 each, Rfl: 11    clindamycin " "(CLEOCIN) 300 MG capsule, Take 1 capsule (300 mg total) by mouth 3 (three) times daily., Disp: 21 capsule, Rfl: 0    doxycycline (VIBRAMYCIN) 100 MG Cap, Take 1 capsule (100 mg total) by mouth every 12 (twelve) hours., Disp: 20 capsule, Rfl: 0    fenofibrate (TRICOR) 145 MG tablet, Take 1 tablet (145 mg total) by mouth once daily. (Patient taking differently: Take 145 mg by mouth every evening.), Disp: 90 tablet, Rfl: 3    ferrous sulfate (FEOSOL) 325 mg (65 mg iron) Tab tablet, Take 1 tablet (325 mg total) by mouth daily with breakfast., Disp: 60 tablet, Rfl: 3    glimepiride (AMARYL) 4 MG tablet, Take 1/2 tablet with breakfast prn blood sugar >160 (Patient taking differently: Take 4 mg by mouth daily as needed (for blood glucose 200 and above).), Disp: 30 tablet, Rfl: 2    HYDROcodone-acetaminophen (NORCO) 7.5-325 mg per tablet, Take 1 tablet by mouth 2 (two) times daily as needed for Pain., Disp: , Rfl:     insulin NPH-insulin regular, 70/30, (HUMULIN 70/30 U-100 INSULIN) 100 unit/mL (70-30) injection, 35 units in AM and 35 units in PM- may titrate upwards as needed to max of 40 units BID (Patient taking differently: Inject 20-35 Units into the skin 2 (two) times daily. Titrates according to what he eats.), Disp: 7 each, Rfl: 3    insulin syringe-needle U-100 0.3 mL 31 gauge x 15/64" Syrg, , Disp: , Rfl:     insulin syringe-needle U-100 1/2 mL 31 gauge x 15/64" Syrg, BID, Disp: 180 Syringe, Rfl: 3    lancets Misc, To check BG 3-4 times daily, to use with insurance preferred meter, Disp: 360 each, Rfl: 3    lisinopriL (PRINIVIL,ZESTRIL) 20 MG tablet, Take 1 tablet (20 mg total) by mouth 2 (two) times daily., Disp: 180 tablet, Rfl: 3    menthol (ICY HOT PAIN RELIEVING TOP), Apply 1 application  topically daily as needed (for back pain)., Disp: , Rfl:     metFORMIN (GLUCOPHAGE) 500 MG tablet, Take 2 tablets (1,000 mg total) by mouth 2 (two) times daily with meals., Disp: 360 tablet, Rfl: 3    METHYL " "SALICYLATE-MENTHOL TOP, Apply 1 patch topically daily as needed (for back pain)., Disp: , Rfl:     pantoprazole (PROTONIX) 40 MG tablet, Take 1 tablet (40 mg total) by mouth 2 (two) times daily., Disp: 180 tablet, Rfl: 3    pregabalin (LYRICA) 75 MG capsule, Take 75 mg by mouth 2 (two) times daily., Disp: , Rfl:     rosuvastatin (CRESTOR) 40 MG Tab, Take 1 tablet (40 mg total) by mouth once daily. (Patient taking differently: Take 40 mg by mouth every evening.), Disp: 90 tablet, Rfl: 3    tamsulosin (FLOMAX) 0.4 mg Cap, Take 1 capsule (0.4 mg total) by mouth once daily. Take 30 minutes after evening meal, Disp: 90 capsule, Rfl: 3    tiZANidine (ZANAFLEX) 4 MG tablet, Take 2 tablets (8 mg total) by mouth 2 (two) times a day., Disp: 120 tablet, Rfl: 5    triamcinolone acetonide 0.1% (KENALOG) 0.1 % cream, Apply topically 2 (two) times daily. Mix 50/50 with aquaphor or eucerin lotion and apply to lower leg, Disp: 30 g, Rfl: 2    TRUEPLUS INSULIN 0.5 mL 29 gauge x 1/2" Syrg, , Disp: , Rfl: 5    ULTRA COMFORT INSULIN SYRINGE 1 mL 29 gauge x 1/2" Syrg, Inject 25 Units as directed 2 (two) times daily., Disp: 180 each, Rfl: 3    zolpidem (AMBIEN) 10 mg Tab, Take 1 tablet (10 mg total) by mouth nightly as needed (insomnia)., Disp: 30 tablet, Rfl: 3  No current facility-administered medications for this visit.    Facility-Administered Medications Ordered in Other Visits:     LIDOcaine (PF) 10 mg/ml (1%) injection 10 mg, 1 mL, Other, Once, Rivera Duff MD    "

## 2025-03-28 NOTE — PROGRESS NOTES
SUBJECTIVE:    Patient ID: Luis Resendiz is a 64 y.o. male.    Chief Complaint: Diabetes (No bottles//Pt is here for a check up//Foot exam ordered today//Eye exam done last year//ANA )    Pt here for regular f/u- DM, HTN, lipids, gastric ulcer    Pt reports since last visit here he's continued to have issues with pain and swelling of left elbow. Left elbow swelling started during hospital stay in Dec. At last visit here was treated with doxycycline for mild erythema to site and he called after that visit requesting another round of abx. He reports when he was on abx the pain and swelling improved but seemed to return within a few days off abx. He just saw suri Flores 2 days ago and diagnosed with left elbow cellulitis and started on clinda- scheduled for f/u appt next week. He reports he continues to have pain to left elbow, has to flex elbow very slowly due to pain- having trouble washing his hair in the shower. Denies fever/chills. No wound or drainage from left elbow.    Pt reports has continued with hypoglycemic episodes periodically. Reports he reduced his insulin 70/30 to 20 units BID but tells me at times will take an extra dose if his blood sugar is still high (190-200 range) and then will develop hypoglycemia. He's continued to take glimeperide occasionally but not very often- this was previously stopped d/t hypoglycemia. Pt reports he's been eating more to manage/prevent hypoglycemia.    Reports since Dec hospital stay for anemia/GIB he had f/u EGD with Dr. Lo and 2 gastric ulcers have now healed.    He was switched to eliquis from warfarin during hospital stay in Dec- he reports he's been taking eliquis given to him by a friend but has concerns about cost and doesn't think he will be able to continue once he has to start paying for it. He had recent f/u with dr Sofia or hx of Factor V leiden and Dr. Sofia has recommended he stay on eliquis.    Has seen Dr. Resendez, pain mgmt for  chronic LBP- he had one trial nerve burn before his hospital stay in Dec but hasn't been able to schedule the next procedure. Currently taking hydrocodone BID        Telephone on 03/13/2025   Component Date Value Ref Range Status    WBC 03/21/2025 5.0  3.8 - 10.8 Thousand/uL Final    RBC 03/21/2025 3.89 (L)  4.20 - 5.80 Million/uL Final    Hemoglobin 03/21/2025 11.9 (L)  13.2 - 17.1 g/dL Final    Hematocrit 03/21/2025 37.9 (L)  38.5 - 50.0 % Final    MCV 03/21/2025 97.4  80.0 - 100.0 fL Final    MCH 03/21/2025 30.6  27.0 - 33.0 pg Final    MCHC 03/21/2025 31.4 (L)  32.0 - 36.0 g/dL Final    RDW 03/21/2025 13.1  11.0 - 15.0 % Final    Platelets 03/21/2025 202  140 - 400 Thousand/uL Final    MPV 03/21/2025 11.3  7.5 - 12.5 fL Final    Neutrophils, Abs 03/21/2025 3,145  1,500 - 7,800 cells/uL Final    Lymph # 03/21/2025 1,280  850 - 3,900 cells/uL Final    Mono # 03/21/2025 425  200 - 950 cells/uL Final    Eos # 03/21/2025 120  15 - 500 cells/uL Final    Baso # 03/21/2025 30  0 - 200 cells/uL Final    Neutrophils Relative 03/21/2025 62.9  % Final    Lymph % 03/21/2025 25.6  % Final    Mono % 03/21/2025 8.5  % Final    Eosinophil % 03/21/2025 2.4  % Final    Basophil % 03/21/2025 0.6  % Final    Glucose 03/21/2025 203 (H)  65 - 99 mg/dL Final    BUN 03/21/2025 20  7 - 25 mg/dL Final    Creatinine 03/21/2025 1.08  0.70 - 1.35 mg/dL Final    eGFR 03/21/2025 76  > OR = 60 mL/min/1.73m2 Final    BUN/Creatinine Ratio 03/21/2025 SEE NOTE:  6 - 22 (calc) Final    Sodium 03/21/2025 142  135 - 146 mmol/L Final    Potassium 03/21/2025 4.4  3.5 - 5.3 mmol/L Final    Chloride 03/21/2025 108  98 - 110 mmol/L Final    CO2 03/21/2025 22  20 - 32 mmol/L Final    Calcium 03/21/2025 9.1  8.6 - 10.3 mg/dL Final    Total Protein 03/21/2025 7.3  6.1 - 8.1 g/dL Final    Albumin 03/21/2025 4.3  3.6 - 5.1 g/dL Final    Globulin, Total 03/21/2025 3.0  1.9 - 3.7 g/dL (calc) Final    Albumin/Globulin Ratio 03/21/2025 1.4  1.0 - 2.5 (calc) Final     Total Bilirubin 03/21/2025 0.3  0.2 - 1.2 mg/dL Final    Alkaline Phosphatase 03/21/2025 113  35 - 144 U/L Final    AST 03/21/2025 25  10 - 35 U/L Final    ALT 03/21/2025 21  9 - 46 U/L Final    Hemoglobin A1C 03/21/2025 6.5 (H)  <5.7 % of total Hgb Final    Cholesterol 03/21/2025 132  <200 mg/dL Final    HDL 03/21/2025 43  > OR = 40 mg/dL Final    Triglycerides 03/21/2025 177 (H)  <150 mg/dL Final    LDL Cholesterol 03/21/2025 64  mg/dL (calc) Final    HDL/Cholesterol Ratio 03/21/2025 3.1  <5.0 (calc) Final    Non HDL Chol. (LDL+VLDL) 03/21/2025 89  <130 mg/dL (calc) Final    TSH w/reflex to FT4 03/21/2025 1.46  0.40 - 4.50 mIU/L Final   Telephone on 12/19/2024   Component Date Value Ref Range Status    WBC 01/09/2025 7.1  3.8 - 10.8 Thousand/uL Final    RBC 01/09/2025 3.58 (L)  4.20 - 5.80 Million/uL Final    Hemoglobin 01/09/2025 11.3 (L)  13.2 - 17.1 g/dL Final    Hematocrit 01/09/2025 36.3 (L)  38.5 - 50.0 % Final    MCV 01/09/2025 101.4 (H)  80.0 - 100.0 fL Final    MCH 01/09/2025 31.6  27.0 - 33.0 pg Final    MCHC 01/09/2025 31.1 (L)  32.0 - 36.0 g/dL Final    RDW 01/09/2025 13.9  11.0 - 15.0 % Final    Platelets 01/09/2025 250  140 - 400 Thousand/uL Final    MPV 01/09/2025 10.9  7.5 - 12.5 fL Final    Neutrophils, Abs 01/09/2025 4,572  1,500 - 7,800 cells/uL Final    Lymph # 01/09/2025 1,640  850 - 3,900 cells/uL Final    Mono # 01/09/2025 710  200 - 950 cells/uL Final    Eos # 01/09/2025 128  15 - 500 cells/uL Final    Baso # 01/09/2025 50  0 - 200 cells/uL Final    Neutrophils Relative 01/09/2025 64.4  % Final    Lymph % 01/09/2025 23.1  % Final    Mono % 01/09/2025 10.0  % Final    Eosinophil % 01/09/2025 1.8  % Final    Basophil % 01/09/2025 0.7  % Final    Haptoglobin 01/09/2025 219 (H)  43 - 212 mg/dL Final    Erythropoietin 01/09/2025 25.1 (H)  2.6 - 18.5 mIU/mL Final   Orders Only on 12/13/2024   Component Date Value Ref Range Status    Factor V (Leiden) Mutation 12/13/2024 POSITIVE (A)   Final     Interpretation 12/13/2024 See Below (A)   Final   No results displayed because visit has over 200 results.      Orders Only on 11/19/2024   Component Date Value Ref Range Status    QRS Duration 11/19/2024 104  ms Final    [facility] QTC Calculation 11/19/2024 425  ms Final   Admission on 11/19/2024, Discharged on 11/19/2024   Component Date Value Ref Range Status    WBC 11/19/2024 7.28  3.90 - 12.70 K/uL Final    RBC 11/19/2024 3.52 (L)  4.60 - 6.20 M/uL Final    Hemoglobin 11/19/2024 10.9 (L)  14.0 - 18.0 g/dL Final    Hematocrit 11/19/2024 34.4 (L)  40.0 - 54.0 % Final    MCV 11/19/2024 98  82 - 98 fL Final    MCH 11/19/2024 31.0  27.0 - 31.0 pg Final    MCHC 11/19/2024 31.7 (L)  32.0 - 36.0 g/dL Final    RDW 11/19/2024 14.4  11.5 - 14.5 % Final    Platelets 11/19/2024 185  150 - 450 K/uL Final    MPV 11/19/2024 9.9  9.2 - 12.9 fL Final    Immature Granulocytes 11/19/2024 0.3  0.0 - 0.5 % Final    Gran # (ANC) 11/19/2024 4.7  1.8 - 7.7 K/uL Final    Immature Grans (Abs) 11/19/2024 0.02  0.00 - 0.04 K/uL Final    Lymph # 11/19/2024 1.7  1.0 - 4.8 K/uL Final    Mono # 11/19/2024 0.6  0.3 - 1.0 K/uL Final    Eos # 11/19/2024 0.2  0.0 - 0.5 K/uL Final    Baso # 11/19/2024 0.05  0.00 - 0.20 K/uL Final    nRBC 11/19/2024 0  0 /100 WBC Final    Gran % 11/19/2024 65.1  38.0 - 73.0 % Final    Lymph % 11/19/2024 23.5  18.0 - 48.0 % Final    Mono % 11/19/2024 7.7  4.0 - 15.0 % Final    Eosinophil % 11/19/2024 2.7  0.0 - 8.0 % Final    Basophil % 11/19/2024 0.7  0.0 - 1.9 % Final    Differential Method 11/19/2024 Automated   Final    Sodium 11/19/2024 141  136 - 145 mmol/L Final    Potassium 11/19/2024 4.6  3.5 - 5.1 mmol/L Final    Chloride 11/19/2024 110  95 - 110 mmol/L Final    CO2 11/19/2024 21 (L)  22 - 31 mmol/L Final    Glucose 11/19/2024 144 (H)  70 - 110 mg/dL Final    BUN 11/19/2024 31 (H)  9 - 21 mg/dL Final    Creatinine 11/19/2024 1.18  0.50 - 1.40 mg/dL Final    Calcium 11/19/2024 8.7  8.4 - 10.2 mg/dL Final    Total  Protein 11/19/2024 7.1  6.0 - 8.4 g/dL Final    Albumin 11/19/2024 4.1  3.5 - 5.2 g/dL Final    Total Bilirubin 11/19/2024 0.3  0.2 - 1.3 mg/dL Final    Alkaline Phosphatase 11/19/2024 49  38 - 145 U/L Final    AST 11/19/2024 37  17 - 59 U/L Final    ALT 11/19/2024 24  0 - 50 U/L Final    Anion Gap 11/19/2024 10  5 - 12 mmol/L Final    eGFR 11/19/2024 >60  >60 mL/min/1.73 m^2 Final    POCT Glucose 11/19/2024 138 (H)  70 - 110 mg/dL Final    PT 11/19/2024 28.1 (H)  11.8 - 14.7 sec Final    INR 11/19/2024 2.6   Final   Telephone on 11/12/2024   Component Date Value Ref Range Status    Glucose 11/15/2024 61 (L)  65 - 99 mg/dL Final    BUN 11/15/2024 32 (H)  7 - 25 mg/dL Final    Creatinine 11/15/2024 1.13  0.70 - 1.35 mg/dL Final    eGFR 11/15/2024 72  > OR = 60 mL/min/1.73m2 Final    BUN/Creatinine Ratio 11/15/2024 28 (H)  6 - 22 (calc) Final    Sodium 11/15/2024 140  135 - 146 mmol/L Final    Potassium 11/15/2024 4.8  3.5 - 5.3 mmol/L Final    Chloride 11/15/2024 108  98 - 110 mmol/L Final    CO2 11/15/2024 25  20 - 32 mmol/L Final    Calcium 11/15/2024 9.0  8.6 - 10.3 mg/dL Final    Total Protein 11/15/2024 7.1  6.1 - 8.1 g/dL Final    Albumin 11/15/2024 4.3  3.6 - 5.1 g/dL Final    Globulin, Total 11/15/2024 2.8  1.9 - 3.7 g/dL (calc) Final    Albumin/Globulin Ratio 11/15/2024 1.5  1.0 - 2.5 (calc) Final    Total Bilirubin 11/15/2024 0.6  0.2 - 1.2 mg/dL Final    Alkaline Phosphatase 11/15/2024 49  35 - 144 U/L Final    AST 11/15/2024 20  10 - 35 U/L Final    ALT 11/15/2024 16  9 - 46 U/L Final    Cholesterol 11/15/2024 108  <200 mg/dL Final    HDL 11/15/2024 43  > OR = 40 mg/dL Final    Triglycerides 11/15/2024 85  <150 mg/dL Final    LDL Cholesterol 11/15/2024 48  mg/dL (calc) Final    HDL/Cholesterol Ratio 11/15/2024 2.5  <5.0 (calc) Final    Non HDL Chol. (LDL+VLDL) 11/15/2024 65  <130 mg/dL (calc) Final    Hemoglobin A1C 11/15/2024 5.9 (H)  <5.7 % of total Hgb Final       Past Medical History:   Diagnosis Date     Anticoagulant long-term use     Arthritis     Chronic back pain     Chronic neck pain     pain management    Coronary artery disease     s/p CABG    Coumadin toxicity 10/13/2015    DDD (degenerative disc disease), cervical     DVT (deep venous thrombosis)     Encounter for blood transfusion     Fatty liver     Hereditary factor VIII deficiency     [place], Coumadin    Hypertension     Insulin dependent type 2 diabetes mellitus     Mixed hyperlipidemia      Past Surgical History:   Procedure Laterality Date    BACK SURGERY      CORONARY ARTERY BYPASS GRAFT  2011    2 vessel    CORONARY ARTERY BYPASS GRAFT (CABG)  12/2023    ESOPHAGOGASTRODUODENOSCOPY N/A 12/6/2024    Procedure: EGD (ESOPHAGOGASTRODUODENOSCOPY);  Surgeon: Waldemar Rueda MD;  Location[facility] ENDO;  Service: Endoscopy;  Laterality: N/A;    FOOT SURGERY Right     FRACTURE SURGERY Right     tibia/fibula    ROTATOR CUFF REPAIR Left     TRANSFORAMINAL EPIDURAL INJECTION OF STEROID Bilateral 09/26/2023    Procedure: Injection,steroid,epidural,transforaminal approach L4/5 and L5/S1;  Surgeon: Rivera Duff MD;  Location: [facility];  Service: Pain Management;  Laterality: Bilateral;     Family History   Problem Relation Name Age of Onset    Heart disease Mother      Cancer Mother      Heart disease Father      Alcohol abuse Father      No Known Problems Sister      No Known Problems Brother      No Known Problems Daughter      No Known Problems Son      Cataracts Neg Hx      Glaucoma Neg Hx      Retinal detachment Neg Hx      Macular degeneration Neg Hx         Tests to Keep You Healthy    Eye Exam: DUE  Colon Cancer Screening: Met on 8/17/2021  Last Blood Pressure <= 139/89 (3/27/2025): NO  Last HbA1c < 8 (03/21/2025): Yes      The 10-year CVD risk score (ALEX'Agoino, et al., 2008) is: 45.3%    Values used to calculate the score:      Age: 64 years      Sex: Male      Diabetic: Yes      Tobacco smoker: No      Systolic Blood Pressure: 168 mmHg       Is BP treated: Yes      HDL Cholesterol: 43 mg/dL      Total Cholesterol: 132 mg/dL     Marital Status:   Alcohol History:  reports current alcohol use.  Tobacco History:  reports that he quit smoking about 30 years ago. His smoking use included cigarettes. He started smoking about 31 years ago. He has a 0.2 pack-year smoking history. He has been exposed to tobacco smoke. He has never used smokeless tobacco.  Drug History:  reports no history of drug use.    Health Maintenance Topics with due status: Not Due       Topic Last Completion Date    Colorectal Cancer Screening 08/17/2021    TETANUS VACCINE 01/22/2022    Diabetes Urine Screening 05/16/2024    High Dose Statin 03/11/2025    Lipid Panel 03/21/2025    Hemoglobin A1c 03/21/2025     Immunization History   Administered Date(s) Administered    COVID-19, MRNA, LN-S, PF (Pfizer) (Purple Cap) 03/25/2021, 04/14/2021    Influenza (FLUAD) - Quadrivalent - Adjuvanted - PF *Preferred* (65+) 11/15/2023    Influenza (FLUBLOK) - Quadrivalent - Recombinant - PF *Preferred* (egg allergy) 11/16/2020, 11/17/2021, 11/23/2022    Influenza - Quadrivalent - PF *Preferred* (6 months and older) 12/26/2019    Influenza - Trivalent - Afluria, Fluzone MDV 10/01/2014    Influenza - Trivalent - Flublok - PF (18 years and older) 09/25/2018    Influenza - Trivalent - Fluzone High Dose - PF (65 years and older) 09/17/2024    Tdap 01/22/2022       Review of patient's allergies indicates:   Allergen Reactions    Nsaids (non-steroidal anti-inflammatory drug) Nausea Only    Ancef [cefazolin] Anxiety     Current Medications[1]    Review of Systems   Constitutional:  Negative for appetite change, chills, fatigue, fever and unexpected weight change.   HENT:  Negative for sore throat and trouble swallowing.    Eyes:  Negative for visual disturbance.   Respiratory:  Negative for cough, shortness of breath and wheezing.    Cardiovascular:  Negative for chest pain, palpitations and leg  "swelling.   Gastrointestinal:  Negative for abdominal pain, blood in stool, constipation, diarrhea, nausea and vomiting.   Genitourinary:  Positive for frequency (nocturia 2-3 times). Negative for dysuria and hematuria.   Musculoskeletal:  Positive for arthralgias (left elbow pain/swelling), back pain (chronic) and neck pain. Negative for gait problem.   Skin:  Negative for rash.   Neurological:  Positive for numbness (left hand numbness intermittently). Negative for dizziness, syncope, speech difficulty, weakness and headaches.   Psychiatric/Behavioral:  Negative for dysphoric mood. The patient is not nervous/anxious (stable on xanax).           Objective:      Vitals:    03/27/25 1116 03/27/25 1123 03/27/25 1200   BP: (!) 162/90 (!) 160/90 (!) 168/86   Pulse: 70     SpO2: 96%     Weight: 96.8 kg (213 lb 6.4 oz)     Height: 5' 10" (1.778 m)       Physical Exam  Vitals and nursing note reviewed.   Constitutional:       General: He is not in acute distress.     Appearance: He is well-developed. He is obese.   HENT:      Head: Normocephalic and atraumatic.      Right Ear: Tympanic membrane and ear canal normal.      Left Ear: Tympanic membrane and ear canal normal.   Neck:      Vascular: No carotid bruit.   Cardiovascular:      Rate and Rhythm: Normal rate and regular rhythm.      Heart sounds: No murmur heard.     No friction rub. No gallop.      Comments: +spider and varicose veins to bilat lower legs/ankles, chronic purplish discoloration to bilat feet, feet warm/dry  Pulmonary:      Effort: Pulmonary effort is normal. No respiratory distress.      Breath sounds: Normal breath sounds. No wheezing or rales.   Abdominal:      General: There is no distension.      Palpations: Abdomen is soft.      Tenderness: There is no abdominal tenderness.   Musculoskeletal:      Left elbow: Swelling (swelling and mild induration to elbow, no distinct effusion palpable, no skin breakdown or wounds) present. No effusion. Decreased " range of motion (pain with flexion). Tenderness present.      Cervical back: Neck supple.      Right lower leg: No edema.      Left lower leg: No edema.   Lymphadenopathy:      Cervical: No cervical adenopathy.   Skin:     General: Skin is warm and dry.      Findings: No rash.   Neurological:      General: No focal deficit present.      Mental Status: He is alert and oriented to person, place, and time.      Gait: Gait normal.           Assessment:       1. DM type 2 with diabetic dyslipidemia    2. Essential hypertension    3. Cellulitis of left elbow    4. Mixed hyperlipidemia    5. Healed gastric ulcer    6. Anemia, unspecified type    7. Gastroesophageal reflux disease, unspecified whether esophagitis present    8. Anxiety    9. Degeneration of intervertebral disc of lumbar region with discogenic back pain    10. Heterozygous factor V Leiden mutation           Plan:       1. DM type 2 with diabetic dyslipidemia  -A1C improved to 6.5% though he continues to report hypoglycemia. He has poor understanding of how to adjust insulin and medication actions so sounds like he's overtreating highs and then dealing with lows. He declines CGM for closer monitoring d/t cost. Advised to reduce insulin 70/30 to 15units BID and just see how his sugars are for a few days before making further adjustments. Again advised to avoid glimeperide d/t hypoglycemia    2. Essential hypertension  -BP elevated today and on recent appts. He's previously been on amlodipine but had issues with leg swelling d/t varicosities. Will switch lisinopril to valsartan. Advised to monitor BP at home and send in log in 2 weeks  -     valsartan (DIOVAN) 160 MG tablet; Take 1 tablet (160 mg total) by mouth 2 (two) times daily.  Dispense: 180 tablet; Refill: 3    3. Cellulitis of left elbow  -started on clindamycin 2 days ago. Pt advised to have labs drawn as ordered by Dr. Flores and f/u next week as scheduled. Cautioned on concerns regarding deeper joint  "infection if not adequately treated    4. Mixed hyperlipidemia  -reviewed recent labs with pt, well controlled iwht LDL 64    5. Healed gastric ulcer  -repeat EGD shows healed gastric ulcer. Pt advised he can reduce PPI to daily  -     pantoprazole (PROTONIX) 40 MG tablet; Take 1 tablet (40 mg total) by mouth once daily.    6. Anemia, unspecified type  -improved on recent labs    7.  Anxiety  -stable on alprazolam. Reviewed high risk meds of alprazolam and hydrocodone and cautioned on risks including oversedation, falls and accidental overdose    8.  Degeneration of intervertebral disc of lumbar region with discogenic back pain  -had one trial nerve burn with Dr. Resendez but needs to schedule f/u    9. Heterozygous factor V Leiden mutation  -currently on eliquis. Advised he will need to discuss with Dr. Sofia regarding switch back to warfarin     Follow up in about 4 months (around 7/27/2025) for HTN, Diabetes.          Counseled on age and gender appropriate medical preventative services, including cancer screenings, immunizations, overall nutritional health, need for a consistent exercise regimen and an overall push towards maintaining a vigorous and active lifestyle.      3/27/2025 Marianna Hdez NP           [1]   Current Outpatient Medications:     acetaminophen (TYLENOL) 325 MG tablet, Take 325-650 mg by mouth daily as needed for Pain., Disp: , Rfl:     ALPRAZolam (XANAX) 1 MG tablet, Take 1 tablet (1 mg total) by mouth 2 (two) times daily., Disp: 60 tablet, Rfl: 5    apixaban (ELIQUIS) 5 mg Tab, Take 5 mg by mouth 2 (two) times daily., Disp: , Rfl:     aspirin 325 MG tablet, Take 325 mg by mouth every evening., Disp: , Rfl:     atenoloL (TENORMIN) 50 MG tablet, Take 2 tablets (100 mg total) by mouth 2 (two) times a day., Disp: 360 tablet, Rfl: 3    BD ULTRA-FINE MINI PEN NEEDLE 31 gauge x 3/16" Ndle, 1 each by In Vitro route once daily., Disp: 100 each, Rfl: 1    blood sugar diagnostic Strp, To check " "BG 3-4 times daily, to use with insurance preferred meter, Disp: 360 each, Rfl: 3    blood-glucose meter,continuous (DEXCOM ) Misc, 1 each by Misc.(Non-Drug; Combo Route) route Daily., Disp: 1 each, Rfl: 0    blood-glucose sensor (DEXCOM G7 SENSOR) Phuong, 1 each by Misc.(Non-Drug; Combo Route) route every 10 days., Disp: 3 each, Rfl: 11    clindamycin (CLEOCIN) 300 MG capsule, Take 1 capsule (300 mg total) by mouth 3 (three) times daily., Disp: 21 capsule, Rfl: 0    fenofibrate (TRICOR) 145 MG tablet, Take 1 tablet (145 mg total) by mouth once daily. (Patient taking differently: Take 145 mg by mouth every evening.), Disp: 90 tablet, Rfl: 3    ferrous sulfate (FEOSOL) 325 mg (65 mg iron) Tab tablet, Take 1 tablet (325 mg total) by mouth daily with breakfast., Disp: 60 tablet, Rfl: 3    glimepiride (AMARYL) 4 MG tablet, Take 1/2 tablet with breakfast prn blood sugar >160 (Patient taking differently: Take 4 mg by mouth daily as needed (for blood glucose 200 and above).), Disp: 30 tablet, Rfl: 2    HYDROcodone-acetaminophen (NORCO) 7.5-325 mg per tablet, Take 1 tablet by mouth 2 (two) times daily as needed for Pain., Disp: , Rfl:     insulin NPH-insulin regular, 70/30, (HUMULIN 70/30 U-100 INSULIN) 100 unit/mL (70-30) injection, 35 units in AM and 35 units in PM- may titrate upwards as needed to max of 40 units BID (Patient taking differently: Inject 20-35 Units into the skin 2 (two) times daily. Titrates according to what he eats.), Disp: 7 each, Rfl: 3    insulin syringe-needle U-100 0.3 mL 31 gauge x 15/64" Syrg, , Disp: , Rfl:     insulin syringe-needle U-100 1/2 mL 31 gauge x 15/64" Syrg, BID, Disp: 180 Syringe, Rfl: 3    lancets Misc, To check BG 3-4 times daily, to use with insurance preferred meter, Disp: 360 each, Rfl: 3    menthol (ICY HOT PAIN RELIEVING TOP), Apply 1 application  topically daily as needed (for back pain)., Disp: , Rfl:     metFORMIN (GLUCOPHAGE) 500 MG tablet, Take 2 tablets (1,000 mg " "total) by mouth 2 (two) times daily with meals., Disp: 360 tablet, Rfl: 3    METHYL SALICYLATE-MENTHOL TOP, Apply 1 patch topically daily as needed (for back pain)., Disp: , Rfl:     pantoprazole (PROTONIX) 40 MG tablet, Take 1 tablet (40 mg total) by mouth once daily., Disp: , Rfl:     pregabalin (LYRICA) 75 MG capsule, Take 75 mg by mouth 2 (two) times daily., Disp: , Rfl:     rosuvastatin (CRESTOR) 40 MG Tab, Take 1 tablet (40 mg total) by mouth once daily. (Patient taking differently: Take 40 mg by mouth every evening.), Disp: 90 tablet, Rfl: 3    tamsulosin (FLOMAX) 0.4 mg Cap, Take 1 capsule (0.4 mg total) by mouth once daily. Take 30 minutes after evening meal, Disp: 90 capsule, Rfl: 3    tiZANidine (ZANAFLEX) 4 MG tablet, Take 2 tablets (8 mg total) by mouth 2 (two) times a day., Disp: 120 tablet, Rfl: 5    triamcinolone acetonide 0.1% (KENALOG) 0.1 % cream, Apply topically 2 (two) times daily. Mix 50/50 with aquaphor or eucerin lotion and apply to lower leg, Disp: 30 g, Rfl: 2    TRUEPLUS INSULIN 0.5 mL 29 gauge x 1/2" Syrg, , Disp: , Rfl: 5    ULTRA COMFORT INSULIN SYRINGE 1 mL 29 gauge x 1/2" Syrg, Inject 25 Units as directed 2 (two) times daily., Disp: 180 each, Rfl: 3    valsartan (DIOVAN) 160 MG tablet, Take 1 tablet (160 mg total) by mouth 2 (two) times daily., Disp: 180 tablet, Rfl: 3    zolpidem (AMBIEN) 10 mg Tab, Take 1 tablet (10 mg total) by mouth nightly as needed (insomnia)., Disp: 30 tablet, Rfl: 3  No current facility-administered medications for this visit.    Facility-Administered Medications Ordered in Other Visits:     LIDOcaine (PF) 10 mg/ml (1%) injection 10 mg, 1 mL, Other, Once, Rivera Duff MD    "

## 2025-04-14 PROBLEM — M25.622 DECREASED RANGE OF MOTION OF LEFT ELBOW: Status: ACTIVE | Noted: 2025-04-14

## 2025-04-14 PROBLEM — R29.898 WEAKNESS OF LEFT UPPER EXTREMITY: Status: ACTIVE | Noted: 2025-04-14

## 2025-04-14 PROBLEM — M25.522 LEFT ELBOW PAIN: Status: ACTIVE | Noted: 2025-04-14

## 2025-04-15 ENCOUNTER — TELEPHONE (OUTPATIENT)
Dept: FAMILY MEDICINE | Facility: CLINIC | Age: 65
End: 2025-04-15
Payer: MEDICARE

## 2025-04-15 DIAGNOSIS — I10 ESSENTIAL HYPERTENSION: Primary | ICD-10-CM

## 2025-04-15 RX ORDER — AMLODIPINE BESYLATE 2.5 MG/1
2.5 TABLET ORAL DAILY
Qty: 90 TABLET | Refills: 3 | Status: SHIPPED | OUTPATIENT
Start: 2025-04-15 | End: 2026-04-15

## 2025-04-15 NOTE — TELEPHONE ENCOUNTER
Changed meds at last visit (Valsartan 160 twice daily)  Logging BP's, takes meds 4-5:00 am, & 4:00 pm    3/ 175/106; 1430 140/113  3//92, 6:00 pm 184/89  3/31/25- 1000, 173/103. pm 167/62  4/4- 1200  140/77, pulse 68  4/5- 1000  133/105, pulse 59;  6:00 pm 150/63  4/6- 1000 188/101  4/8- 142/67; pm 123/86  4/14/ 6:00 pm 123/79  148/93 this am  No readings are less than 140, he said that is normal for him, but seems like it's trying to come down

## 2025-04-15 NOTE — TELEPHONE ENCOUNTER
Let pt know I would recommend adding very low dose amlodipine 2.5mg daily- will need to monitor for worsening leg edema but this is a small dose

## 2025-04-15 NOTE — TELEPHONE ENCOUNTER
----- Message from Lindsey sent at 4/15/2025 11:03 AM CDT -----  Pt would like a call back from a nurse to speak about his bp logs.[phone]

## 2025-04-17 DIAGNOSIS — D62 ACUTE BLOOD LOSS ANEMIA: ICD-10-CM

## 2025-04-17 DIAGNOSIS — K92.2 GASTROINTESTINAL HEMORRHAGE, UNSPECIFIED GASTROINTESTINAL HEMORRHAGE TYPE: ICD-10-CM

## 2025-04-17 DIAGNOSIS — D68.51 HETEROZYGOUS FACTOR V LEIDEN MUTATION: ICD-10-CM

## 2025-04-17 RX ORDER — FERROUS SULFATE 325(65) MG
325 TABLET ORAL
Qty: 60 TABLET | Refills: 3 | Status: CANCELLED | OUTPATIENT
Start: 2025-04-17

## 2025-04-24 DIAGNOSIS — G47.00 INSOMNIA, UNSPECIFIED TYPE: ICD-10-CM

## 2025-04-24 RX ORDER — ZOLPIDEM TARTRATE 10 MG/1
10 TABLET ORAL NIGHTLY
Qty: 30 TABLET | Refills: 2 | Status: SHIPPED | OUTPATIENT
Start: 2025-04-24

## 2025-05-22 DIAGNOSIS — E78.5 DM TYPE 2 WITH DIABETIC DYSLIPIDEMIA: ICD-10-CM

## 2025-05-22 DIAGNOSIS — E11.69 DM TYPE 2 WITH DIABETIC DYSLIPIDEMIA: ICD-10-CM

## 2025-05-22 RX ORDER — METFORMIN HYDROCHLORIDE 500 MG/1
1000 TABLET ORAL 2 TIMES DAILY WITH MEALS
Qty: 360 TABLET | Refills: 3 | Status: SHIPPED | OUTPATIENT
Start: 2025-05-22

## 2025-05-22 RX ORDER — TIZANIDINE 4 MG/1
TABLET ORAL
Qty: 120 TABLET | Refills: 5 | Status: SHIPPED | OUTPATIENT
Start: 2025-05-22

## 2025-05-22 NOTE — TELEPHONE ENCOUNTER
----- Message from Daisha sent at 5/22/2025  3:20 PM CDT -----  Pt needs refill on metformin, [facility] [address] [phone]

## 2025-05-28 DIAGNOSIS — E78.2 MIXED HYPERLIPIDEMIA: ICD-10-CM

## 2025-05-28 DIAGNOSIS — E11.42 DIABETIC POLYNEUROPATHY ASSOCIATED WITH TYPE 2 DIABETES MELLITUS: ICD-10-CM

## 2025-05-28 RX ORDER — FENOFIBRATE 145 MG/1
145 TABLET, FILM COATED ORAL NIGHTLY
Qty: 90 TABLET | Refills: 3 | Status: SHIPPED | OUTPATIENT
Start: 2025-05-28

## 2025-05-28 RX ORDER — ROSUVASTATIN CALCIUM 40 MG/1
40 TABLET, COATED ORAL NIGHTLY
Qty: 90 TABLET | Refills: 3 | Status: SHIPPED | OUTPATIENT
Start: 2025-05-28

## 2025-05-28 NOTE — TELEPHONE ENCOUNTER
Last visit 3/27/25, labs 3/21/25. next appt 7/30/25  Last time these were sent was a year ago  Verified dose was 25-35 units that he titrates

## 2025-05-28 NOTE — TELEPHONE ENCOUNTER
----- Message from Marianna sent at 5/28/2025 10:22 AM CDT -----  Refill for fenofibrate, rosuvastatin, humulin 70/30. [facility] mail order. Pt #[phone]

## 2025-05-29 DIAGNOSIS — D68.51 HETEROZYGOUS FACTOR V LEIDEN MUTATION: ICD-10-CM

## 2025-05-29 NOTE — TELEPHONE ENCOUNTER
----- Message from Chelsie sent at 5/29/2025  3:30 PM CDT -----  Pt is requesting a refill of the apixaban (ELIQUIS) 5 mg Tab. [facility] is [facility] in [place] on [address] [phone]

## 2025-05-30 DIAGNOSIS — Z79.01 LONG TERM (CURRENT) USE OF ANTICOAGULANTS: ICD-10-CM

## 2025-05-30 DIAGNOSIS — D68.51 HETEROZYGOUS FACTOR V LEIDEN MUTATION: Primary | ICD-10-CM

## 2025-05-30 RX ORDER — WARFARIN 4 MG/1
TABLET ORAL
Qty: 30 TABLET | Refills: 3 | Status: SHIPPED | OUTPATIENT
Start: 2025-05-30

## 2025-05-30 NOTE — TELEPHONE ENCOUNTER
Called patient on regard to above request, per Dr. Smith patient can restart Warfarin 4 mg and 5 mg alternating daily, patient requested a refill for warfarin 4 mg to be sent to [facility]. Patient to repeat laboratory work in 2 weeks and stated understanding.   Marilee MEANS RN.

## 2025-05-30 NOTE — TELEPHONE ENCOUNTER
----- Message from Dona sent at 5/30/2025  1:05 PM CDT -----  The patient called to ask if he can go back to Warfarin because he cannot afford Eliquis. Please send prescription for Warfarin. He will not be able to get the Eliquis. # [phone]

## 2025-06-11 ENCOUNTER — OFFICE VISIT (OUTPATIENT)
Facility: CLINIC | Age: 65
End: 2025-06-11
Payer: MEDICARE

## 2025-06-11 VITALS
WEIGHT: 209.88 LBS | DIASTOLIC BLOOD PRESSURE: 81 MMHG | SYSTOLIC BLOOD PRESSURE: 157 MMHG | HEART RATE: 61 BPM | RESPIRATION RATE: 17 BRPM | TEMPERATURE: 98 F | BODY MASS INDEX: 30.12 KG/M2

## 2025-06-11 DIAGNOSIS — D68.51 HETEROZYGOUS FACTOR V LEIDEN MUTATION: Chronic | ICD-10-CM

## 2025-06-11 DIAGNOSIS — D66 HEREDITARY FACTOR VIII DEFICIENCY: Primary | ICD-10-CM

## 2025-06-11 DIAGNOSIS — D50.0 IRON DEFICIENCY ANEMIA DUE TO CHRONIC BLOOD LOSS: ICD-10-CM

## 2025-06-11 PROCEDURE — 3288F FALL RISK ASSESSMENT DOCD: CPT | Mod: CPTII,S$GLB,, | Performed by: INTERNAL MEDICINE

## 2025-06-11 PROCEDURE — 3044F HG A1C LEVEL LT 7.0%: CPT | Mod: CPTII,S$GLB,, | Performed by: INTERNAL MEDICINE

## 2025-06-11 PROCEDURE — 1101F PT FALLS ASSESS-DOCD LE1/YR: CPT | Mod: CPTII,S$GLB,, | Performed by: INTERNAL MEDICINE

## 2025-06-11 PROCEDURE — 3077F SYST BP >= 140 MM HG: CPT | Mod: CPTII,S$GLB,, | Performed by: INTERNAL MEDICINE

## 2025-06-11 PROCEDURE — 3008F BODY MASS INDEX DOCD: CPT | Mod: CPTII,S$GLB,, | Performed by: INTERNAL MEDICINE

## 2025-06-11 PROCEDURE — 99213 OFFICE O/P EST LOW 20 MIN: CPT | Mod: S$GLB,,, | Performed by: INTERNAL MEDICINE

## 2025-06-11 PROCEDURE — 1159F MED LIST DOCD IN RCRD: CPT | Mod: CPTII,S$GLB,, | Performed by: INTERNAL MEDICINE

## 2025-06-11 PROCEDURE — 99999 PR PBB SHADOW E&M-EST. PATIENT-LVL IV: CPT | Mod: PBBFAC,,, | Performed by: INTERNAL MEDICINE

## 2025-06-11 PROCEDURE — 4010F ACE/ARB THERAPY RXD/TAKEN: CPT | Mod: CPTII,S$GLB,, | Performed by: INTERNAL MEDICINE

## 2025-06-11 PROCEDURE — 1126F AMNT PAIN NOTED NONE PRSNT: CPT | Mod: CPTII,S$GLB,, | Performed by: INTERNAL MEDICINE

## 2025-06-11 PROCEDURE — 3079F DIAST BP 80-89 MM HG: CPT | Mod: CPTII,S$GLB,, | Performed by: INTERNAL MEDICINE

## 2025-06-11 PROCEDURE — G2211 COMPLEX E/M VISIT ADD ON: HCPCS | Mod: S$GLB,,, | Performed by: INTERNAL MEDICINE

## 2025-06-11 NOTE — PROGRESS NOTES
"                                                         PROGRESS NOTE    Subjective:       Patient ID: Luis Resendiz is a 64 y.o. male.    Chief Complaint:  No chief complaint on file.  Hypercoagulable Syndrome, long term use of anticoagulants follow up.        History of Present Illness:   Luis Resendiz is a 64 y.o. male who presents for follow up of above.      Manuel is doing ok today.  No new complaints today.     Patient on coumadin as of today, 6/11/2025.   Asked to go back to coumadin due to cost of Eliquis    PMH:  Patient discharged 12/9/2024 from [facility] after 3 day stay for melana/GI bleeding.  He had negative EGD and his coumadin was changed to Apixiban 5mg bid    Family and Social history reviewed and is unchanged from previous           Current Outpatient Medications:     acetaminophen (TYLENOL) 325 MG tablet, Take 325-650 mg by mouth daily as needed for Pain., Disp: , Rfl:     ALPRAZolam (XANAX) 1 MG tablet, Take 1 tablet (1 mg total) by mouth 2 (two) times daily., Disp: 60 tablet, Rfl: 5    amLODIPine (NORVASC) 2.5 MG tablet, Take 1 tablet (2.5 mg total) by mouth once daily., Disp: 90 tablet, Rfl: 3    aspirin 325 MG tablet, Take 325 mg by mouth every evening., Disp: , Rfl:     atenoloL (TENORMIN) 50 MG tablet, Take 2 tablets (100 mg total) by mouth 2 (two) times a day., Disp: 360 tablet, Rfl: 3    BD ULTRA-FINE MINI PEN NEEDLE 31 gauge x 3/16" Ndle, 1 each by In Vitro route once daily., Disp: 100 each, Rfl: 1    blood sugar diagnostic Strp, To check BG 3-4 times daily, to use with insurance preferred meter, Disp: 360 each, Rfl: 3    blood-glucose sensor (DEXCOM G7 SENSOR) Phuong, 1 each by Misc.(Non-Drug; Combo Route) route every 10 days., Disp: 3 each, Rfl: 11    clindamycin (CLEOCIN) 300 MG capsule, Take 1 capsule (300 mg total) by mouth 3 (three) times daily., Disp: 21 capsule, Rfl: 0    fenofibrate (TRICOR) 145 MG tablet, Take 1 tablet (145 mg total) by mouth every evening., Disp: 90 " "tablet, Rfl: 3    ferrous sulfate (FEOSOL) 325 mg (65 mg iron) Tab tablet, Take 1 tablet (325 mg total) by mouth daily with breakfast., Disp: 60 tablet, Rfl: 3    glimepiride (AMARYL) 4 MG tablet, Take 1/2 tablet with breakfast prn blood sugar >160 (Patient taking differently: Take 4 mg by mouth daily as needed (for blood glucose 200 and above).), Disp: 30 tablet, Rfl: 2    HYDROcodone-acetaminophen (NORCO) 7.5-325 mg per tablet, Take 1 tablet by mouth 2 (two) times daily as needed for Pain., Disp: , Rfl:     insulin NPH-insulin regular, 70/30, (HUMULIN 70/30 U-100 INSULIN) 100 unit/mL (70-30) injection, Inject 20-35 Units into the skin 2 (two) times daily. Titrates according to what he eats., Disp: 6 each, Rfl: 3    insulin syringe-needle U-100 0.3 mL 31 gauge x 15/64" Syrg, , Disp: , Rfl:     insulin syringe-needle U-100 1/2 mL 31 gauge x 15/64" Syrg, BID, Disp: 180 Syringe, Rfl: 3    lancets Misc, To check BG 3-4 times daily, to use with insurance preferred meter, Disp: 360 each, Rfl: 3    menthol (ICY HOT PAIN RELIEVING TOP), Apply 1 application  topically daily as needed (for back pain)., Disp: , Rfl:     metFORMIN (GLUCOPHAGE) 500 MG tablet, Take 2 tablets (1,000 mg total) by mouth 2 (two) times daily with meals., Disp: 360 tablet, Rfl: 3    pantoprazole (PROTONIX) 40 MG tablet, Take 1 tablet (40 mg total) by mouth once daily., Disp: , Rfl:     pregabalin (LYRICA) 75 MG capsule, Take 75 mg by mouth 2 (two) times daily., Disp: , Rfl:     rosuvastatin (CRESTOR) 40 MG Tab, Take 1 tablet (40 mg total) by mouth every evening., Disp: 90 tablet, Rfl: 3    tamsulosin (FLOMAX) 0.4 mg Cap, Take 1 capsule (0.4 mg total) by mouth once daily. Take 30 minutes after evening meal, Disp: 90 capsule, Rfl: 3    tiZANidine (ZANAFLEX) 4 MG tablet, TAKE 2 TABLETS(8 MG) BY MOUTH TWICE DAILY, Disp: 120 tablet, Rfl: 5    triamcinolone acetonide 0.1% (KENALOG) 0.1 % cream, Apply topically 2 (two) times daily. Mix 50/50 with aquaphor or " "eucerin lotion and apply to lower leg, Disp: 30 g, Rfl: 2    TRUEPLUS INSULIN 0.5 mL 29 gauge x 1/2" Syrg, , Disp: , Rfl: 5    ULTRA COMFORT INSULIN SYRINGE 1 mL 29 gauge x 1/2" Syrg, Inject 25 Units as directed 2 (two) times daily., Disp: 180 each, Rfl: 3    valsartan (DIOVAN) 160 MG tablet, Take 1 tablet (160 mg total) by mouth 2 (two) times daily., Disp: 180 tablet, Rfl: 3    warfarin (COUMADIN) 4 MG tablet, Take one tablet 4 mg every other day, Disp: 30 tablet, Rfl: 3    zolpidem (AMBIEN) 10 mg Tab, TAKE 1 TABLET BY MOUTH NIGHTLY AS NEEDED FOR INSOMNIA, Disp: 30 tablet, Rfl: 2    blood-glucose meter,continuous (DEXCOM ) Misc, 1 each by Misc.(Non-Drug; Combo Route) route Daily., Disp: 1 each, Rfl: 0  No current facility-administered medications for this visit.    Facility-Administered Medications Ordered in Other Visits:     LIDOcaine (PF) 10 mg/ml (1%) injection 10 mg, 1 mL, Other, Once, Rivera Duff MD        Objective:       Physical Examination:     BP (!) 157/81   Pulse 61   Temp 98 °F (36.7 °C)   Resp 17   Wt 95.2 kg (209 lb 14.4 oz)   BMI 30.12 kg/m²     Physical Exam  Vitals reviewed.   Constitutional:       Appearance: Normal appearance. He is well-developed.   HENT:      Head: Normocephalic and atraumatic.      Right Ear: External ear normal.      Left Ear: External ear normal.   Eyes:      General: No scleral icterus.     Conjunctiva/sclera: Conjunctivae normal.   Cardiovascular:      Rate and Rhythm: Normal rate.   Pulmonary:      Effort: Pulmonary effort is normal.   Abdominal:      General: Abdomen is flat.   Neurological:      General: No focal deficit present.      Mental Status: He is alert and oriented to person, place, and time.   Psychiatric:         Mood and Affect: Mood normal.         Behavior: Behavior normal.         Thought Content: Thought content normal.         Judgment: Judgment normal.         Labs:   No results found for this or any previous visit (from the " "past 2 weeks).    CMP  Sodium   Date Value Ref Range Status   03/21/2025 142 135 - 146 mmol/L Final     Potassium   Date Value Ref Range Status   03/21/2025 4.4 3.5 - 5.3 mmol/L Final     Chloride   Date Value Ref Range Status   03/21/2025 108 98 - 110 mmol/L Final     CO2   Date Value Ref Range Status   03/21/2025 22 20 - 32 mmol/L Final     Glucose   Date Value Ref Range Status   03/21/2025 203 (H) 65 - 99 mg/dL Final     Comment:                   Fasting reference interval     For someone without known diabetes, a glucose  value >125 mg/dL indicates that they may have  diabetes and this should be confirmed with a  follow-up test.          BUN   Date Value Ref Range Status   03/21/2025 20 7 - 25 mg/dL Final     Creatinine   Date Value Ref Range Status   03/21/2025 1.08 0.70 - 1.35 mg/dL Final   06/21/2013 1.1 0.5 - 1.4 mg/dL Final     Calcium   Date Value Ref Range Status   03/21/2025 9.1 8.6 - 10.3 mg/dL Final   06/21/2013 9.3 8.7 - 10.5 mg/dL Final     Total Protein   Date Value Ref Range Status   03/21/2025 7.3 6.1 - 8.1 g/dL Final     Albumin   Date Value Ref Range Status   03/21/2025 4.3 3.6 - 5.1 g/dL Final     Total Bilirubin   Date Value Ref Range Status   03/21/2025 0.3 0.2 - 1.2 mg/dL Final     Alkaline Phosphatase   Date Value Ref Range Status   12/05/2024 51 40 - 150 U/L Final     AST   Date Value Ref Range Status   03/21/2025 25 10 - 35 U/L Final     ALT   Date Value Ref Range Status   03/21/2025 21 9 - 46 U/L Final     Anion Gap   Date Value Ref Range Status   12/09/2024 8 8 - 16 mmol/L Final     Comment:     Anion Gap reference range revised on 4/28/2023 06/21/2013 13 5 - 15 meq/L Final     eGFR if    Date Value Ref Range Status   03/28/2022 85 > OR = 60 mL/min/1.73m2 Final     eGFR if non    Date Value Ref Range Status   03/28/2022 73 > OR = 60 mL/min/1.73m2 Final     No results found for: "CEA"  No results found for: "PSA"        Assessment/Plan:     Problem List " Items Addressed This Visit       Heterozygous factor V Leiden mutation (Chronic)    Patient is doing well.  No new issues at this time and he did go back to coumadin due to cost issues.  He is doing ok with this and will continue him on this medication.  No further signs of GI bleeding and overall he looks good.  Will see him every six months and continue to monitor INR monthly.           Relevant Orders    CBC Auto Differential    Comprehensive Metabolic Panel    Ferritin    Iron deficiency anemia due to chronic blood loss    Patient is doing well and is on oral iron.  Will check labs on follow up and discussed this today.          Relevant Orders    Ferritin     Other Visit Diagnoses         Hereditary factor VIII deficiency    -  Primary                Discussion:     Follow up in about 6 months (around 12/11/2025).      Electronically signed by Ac Sofia

## 2025-06-11 NOTE — ASSESSMENT & PLAN NOTE
Patient is doing well.  No new issues at this time and he did go back to coumadin due to cost issues.  He is doing ok with this and will continue him on this medication.  No further signs of GI bleeding and overall he looks good.  Will see him every six months and continue to monitor INR monthly.

## 2025-06-11 NOTE — ASSESSMENT & PLAN NOTE
Patient is doing well and is on oral iron.  Will check labs on follow up and discussed this today.

## 2025-07-07 ENCOUNTER — TELEPHONE (OUTPATIENT)
Dept: FAMILY MEDICINE | Facility: CLINIC | Age: 65
End: 2025-07-07
Payer: MEDICARE

## 2025-07-07 RX ORDER — GLIMEPIRIDE 2 MG/1
2 TABLET ORAL DAILY PRN
Qty: 30 TABLET | Refills: 5 | Status: SHIPPED | OUTPATIENT
Start: 2025-07-07

## 2025-07-07 NOTE — TELEPHONE ENCOUNTER
----- Message from Aaliyah sent at 7/7/2025  3:21 PM CDT -----  Patient calling in regarding to needing a refill on glimepiride   [facility]  [phone]

## 2025-07-07 NOTE — TELEPHONE ENCOUNTER
Let patient know I changed prescription to glimepiride 2 mg instead of him having to take half of the 4 mg.  He also should only be taking this if his morning blood sugars over 200

## 2025-07-10 DIAGNOSIS — D66 HEREDITARY FACTOR VIII DEFICIENCY: Primary | ICD-10-CM

## 2025-07-10 DIAGNOSIS — Z79.01 LONG TERM (CURRENT) USE OF ANTICOAGULANTS: ICD-10-CM

## 2025-07-10 DIAGNOSIS — D68.51 HETEROZYGOUS FACTOR V LEIDEN MUTATION: ICD-10-CM

## 2025-07-10 RX ORDER — WARFARIN SODIUM 5 MG/1
TABLET ORAL
Qty: 30 TABLET | Refills: 2 | Status: SHIPPED | OUTPATIENT
Start: 2025-07-10

## 2025-07-21 ENCOUNTER — TELEPHONE (OUTPATIENT)
Facility: CLINIC | Age: 65
End: 2025-07-21
Payer: MEDICARE

## 2025-07-21 DIAGNOSIS — D66 HEREDITARY FACTOR VIII DEFICIENCY: Primary | ICD-10-CM

## 2025-07-21 NOTE — TELEPHONE ENCOUNTER
----- Message from Dona sent at 7/18/2025  2:56 PM CDT -----  The patient was calling for his PT INR results from last week at [facility] in [place]. He would like a call back with the results. # [phone]

## 2025-07-21 NOTE — TELEPHONE ENCOUNTER
Called patient on regard to above request, per Dr. Smith no changes in Warfarin dosis until patient repeat INR, current INR 1.8 current patient's dosis 4.5 mg and 5 mg alternating every day. Patient did not answer, LVM.     Patient returned the call, patient will do INR ans stated understanding.

## 2025-07-25 ENCOUNTER — TELEPHONE (OUTPATIENT)
Facility: CLINIC | Age: 65
End: 2025-07-25
Payer: MEDICARE

## 2025-07-25 DIAGNOSIS — Z79.01 LONG TERM (CURRENT) USE OF ANTICOAGULANTS: ICD-10-CM

## 2025-07-25 DIAGNOSIS — D66 HEREDITARY FACTOR VIII DEFICIENCY: Primary | ICD-10-CM

## 2025-07-25 LAB
INR PPP: 2.9
PROTHROMBIN TIME: 29.6 SEC (ref 9–11.5)

## 2025-07-25 NOTE — TELEPHONE ENCOUNTER
----- Message from Ac Smith MD sent at 7/25/2025  5:20 AM CDT -----  Call patient, his inr is in range.  Continue same dose.  Recheck in one month  ----- Message -----  From: Anibal Vaughan  Sent: 7/25/2025   5:00 AM CDT  To: Ac Smith MD

## 2025-07-25 NOTE — TELEPHONE ENCOUNTER
Called patient on regard to above recommendations per Dr. Smith no changes in Warfarin dosis, current INR 2.9 current patient's dosis 4.5 mg and 5 mg alternating every day. Patient to repeat laboratory work in 4 weeks patient stated understanding.

## 2025-07-30 ENCOUNTER — OFFICE VISIT (OUTPATIENT)
Dept: FAMILY MEDICINE | Facility: CLINIC | Age: 65
End: 2025-07-30
Payer: MEDICARE

## 2025-07-30 VITALS
DIASTOLIC BLOOD PRESSURE: 80 MMHG | BODY MASS INDEX: 30.32 KG/M2 | HEIGHT: 70 IN | WEIGHT: 211.81 LBS | OXYGEN SATURATION: 98 % | HEART RATE: 65 BPM | SYSTOLIC BLOOD PRESSURE: 144 MMHG

## 2025-07-30 DIAGNOSIS — D68.51 HETEROZYGOUS FACTOR V LEIDEN MUTATION: Chronic | ICD-10-CM

## 2025-07-30 DIAGNOSIS — E78.2 MIXED HYPERLIPIDEMIA: ICD-10-CM

## 2025-07-30 DIAGNOSIS — I10 PRIMARY HYPERTENSION: ICD-10-CM

## 2025-07-30 DIAGNOSIS — F41.9 ANXIETY: ICD-10-CM

## 2025-07-30 DIAGNOSIS — E11.69 DM TYPE 2 WITH DIABETIC DYSLIPIDEMIA: Primary | ICD-10-CM

## 2025-07-30 DIAGNOSIS — E78.5 DM TYPE 2 WITH DIABETIC DYSLIPIDEMIA: Primary | ICD-10-CM

## 2025-07-30 DIAGNOSIS — I87.2 VENOUS STASIS DERMATITIS OF BOTH LOWER EXTREMITIES: ICD-10-CM

## 2025-07-30 DIAGNOSIS — Z87.19: ICD-10-CM

## 2025-07-30 DIAGNOSIS — N40.1 BENIGN PROSTATIC HYPERPLASIA WITH URINARY FREQUENCY: ICD-10-CM

## 2025-07-30 DIAGNOSIS — R35.0 BENIGN PROSTATIC HYPERPLASIA WITH URINARY FREQUENCY: ICD-10-CM

## 2025-07-30 DIAGNOSIS — M50.30 DDD (DEGENERATIVE DISC DISEASE), CERVICAL: ICD-10-CM

## 2025-07-30 LAB — HBA1C MFR BLD: 5.7 %

## 2025-07-30 PROCEDURE — 3077F SYST BP >= 140 MM HG: CPT | Mod: CPTII,S$GLB,, | Performed by: NURSE PRACTITIONER

## 2025-07-30 PROCEDURE — 1159F MED LIST DOCD IN RCRD: CPT | Mod: CPTII,S$GLB,, | Performed by: NURSE PRACTITIONER

## 2025-07-30 PROCEDURE — 4010F ACE/ARB THERAPY RXD/TAKEN: CPT | Mod: CPTII,S$GLB,, | Performed by: NURSE PRACTITIONER

## 2025-07-30 PROCEDURE — 99214 OFFICE O/P EST MOD 30 MIN: CPT | Mod: S$GLB,,, | Performed by: NURSE PRACTITIONER

## 2025-07-30 PROCEDURE — 3288F FALL RISK ASSESSMENT DOCD: CPT | Mod: CPTII,S$GLB,, | Performed by: NURSE PRACTITIONER

## 2025-07-30 PROCEDURE — 3008F BODY MASS INDEX DOCD: CPT | Mod: CPTII,S$GLB,, | Performed by: NURSE PRACTITIONER

## 2025-07-30 PROCEDURE — 83036 HEMOGLOBIN GLYCOSYLATED A1C: CPT | Mod: QW,,, | Performed by: NURSE PRACTITIONER

## 2025-07-30 PROCEDURE — 3079F DIAST BP 80-89 MM HG: CPT | Mod: CPTII,S$GLB,, | Performed by: NURSE PRACTITIONER

## 2025-07-30 PROCEDURE — 95251 CONT GLUC MNTR ANALYSIS I&R: CPT | Mod: S$GLB,,, | Performed by: NURSE PRACTITIONER

## 2025-07-30 PROCEDURE — 3044F HG A1C LEVEL LT 7.0%: CPT | Mod: CPTII,S$GLB,, | Performed by: NURSE PRACTITIONER

## 2025-07-30 PROCEDURE — G2211 COMPLEX E/M VISIT ADD ON: HCPCS | Mod: S$GLB,,, | Performed by: NURSE PRACTITIONER

## 2025-07-30 PROCEDURE — 1101F PT FALLS ASSESS-DOCD LE1/YR: CPT | Mod: CPTII,S$GLB,, | Performed by: NURSE PRACTITIONER

## 2025-07-30 PROCEDURE — 1160F RVW MEDS BY RX/DR IN RCRD: CPT | Mod: CPTII,S$GLB,, | Performed by: NURSE PRACTITIONER

## 2025-07-30 NOTE — PATIENT INSTRUCTIONS
Hold insulin for the next 1-2 weeks. Check your morning sugar and if sugar is >140 then take glimeperide one tablet with breakfast    Apply triamcinolone and Mix 50/50 with aquaphor or eucerin lotion and apply to lower legs           Edwardo Smith,     If you are due for any health screening(s) below please notify me so we can arrange them to be ordered and scheduled. Most healthy patients at your age complete them, but you are free to accept or refuse.     If you can't do it, I'll definitely understand. If you can, I'd certainly appreciate it!    Tests to Keep You Healthy    Eye Exam: DUE  Colon Cancer Screening: Met on 8/17/2021  Last Blood Pressure <= 139/89 (7/30/2025): NO  Last HbA1c < 8 (03/21/2025): Yes      Lets manage your high blood pressure     Your blood pressure was above 140/90 today during your visit. We recommend that you schedule a nurse visit in two weeks to check your blood pressure and discuss ways to support your health goals.     You can also manage your health and record your blood pressure from the comfort of home by keeping a daily blood pressure log. These results are shared with and reviewed by your provider. Please print this form (Daily Blood Pressure Log) to assist you in keeping track of your blood pressure at home.     Schedule your nurse visit in two weeks to learn more about how to track and manage high blood pressure.    Daily Blood Pressure Log    Name:__________________________________                  Date of Birth:_________    Average Blood Pressure:  __________      Date: Time  (a.m.) Blood  Pressure: Pulse  Rate: Time  (p.m.) Blood  Pressure : Pulse  Rate:   Sample 8:37 127/83 84                                                                                                                                                                                 Your diabetic retinal eye exam is due     Diabetes is the #1 cause of blindness in the [place] - early detection before signs or  symptoms develop can prevent debilitating blindness.     Our records indicate that you may be overdue for your annual diabetic eye exam. Eye screening can help identify patients at risk for developing vision loss which is common in diabetes. This simple screening is an important step to keeping you healthy and preventing complications from diabetes.     This recommended diabetic eye exam should take place once per year and can prevent and treat diabetes complications in the eye before developing symptoms. This can be done with a special camera is used to take photographs of the back of your eye without having to dilate them, or you can see an eye doctor for a full dilated exam.     If you recently had your yearly diabetic eye exam performed outside of [facility], please let your Health care team know so that they can update your health record.

## 2025-08-07 DIAGNOSIS — G47.00 INSOMNIA, UNSPECIFIED TYPE: ICD-10-CM

## 2025-08-07 DIAGNOSIS — N40.1 BENIGN PROSTATIC HYPERPLASIA WITH NOCTURIA: ICD-10-CM

## 2025-08-07 DIAGNOSIS — R35.1 BENIGN PROSTATIC HYPERPLASIA WITH NOCTURIA: ICD-10-CM

## 2025-08-07 RX ORDER — TAMSULOSIN HYDROCHLORIDE 0.4 MG/1
0.4 CAPSULE ORAL DAILY
Qty: 90 CAPSULE | Refills: 3 | Status: SHIPPED | OUTPATIENT
Start: 2025-08-07 | End: 2026-08-07

## 2025-08-07 RX ORDER — ZOLPIDEM TARTRATE 10 MG/1
TABLET ORAL
Qty: 30 TABLET | Refills: 2 | Status: SHIPPED | OUTPATIENT
Start: 2025-08-07

## 2025-08-07 NOTE — TELEPHONE ENCOUNTER
Pt is needing a refill on his Tamsulosin. Last office visit 07/30/2025. Next office visit 10/30/2025

## 2025-08-07 NOTE — TELEPHONE ENCOUNTER
----- Message from Savanah sent at 8/7/2025 12:05 PM CDT -----  Refill Tamsulosin [facility] [address] [place]. The patient needs a nurse to call him about his Insulin  Pt's # [phone] GH

## 2025-08-11 ENCOUNTER — TELEPHONE (OUTPATIENT)
Facility: CLINIC | Age: 65
End: 2025-08-11
Payer: MEDICARE

## 2025-08-13 LAB
LEFT EYE DM RETINOPATHY: NEGATIVE
RIGHT EYE DM RETINOPATHY: NEGATIVE

## 2025-08-18 ENCOUNTER — TELEPHONE (OUTPATIENT)
Dept: FAMILY MEDICINE | Facility: CLINIC | Age: 65
End: 2025-08-18
Payer: MEDICARE

## 2025-08-18 DIAGNOSIS — M50.30 DDD (DEGENERATIVE DISC DISEASE), CERVICAL: Primary | ICD-10-CM

## 2025-08-18 RX ORDER — PREGABALIN 75 MG/1
75 CAPSULE ORAL 2 TIMES DAILY
Qty: 60 CAPSULE | Refills: 2 | Status: SHIPPED | OUTPATIENT
Start: 2025-08-18

## 2025-08-19 ENCOUNTER — TELEPHONE (OUTPATIENT)
Facility: CLINIC | Age: 65
End: 2025-08-19
Payer: MEDICARE

## 2025-08-19 DIAGNOSIS — D66 HEREDITARY FACTOR VIII DEFICIENCY: Primary | ICD-10-CM

## 2025-08-19 DIAGNOSIS — Z79.01 LONG TERM (CURRENT) USE OF ANTICOAGULANTS: ICD-10-CM

## 2025-08-21 ENCOUNTER — PATIENT OUTREACH (OUTPATIENT)
Dept: ADMINISTRATIVE | Facility: HOSPITAL | Age: 65
End: 2025-08-21
Payer: MEDICARE

## 2025-08-21 ENCOUNTER — NURSE TRIAGE (OUTPATIENT)
Dept: ADMINISTRATIVE | Facility: CLINIC | Age: 65
End: 2025-08-21
Payer: MEDICARE

## 2025-08-22 ENCOUNTER — TELEPHONE (OUTPATIENT)
Dept: FAMILY MEDICINE | Facility: CLINIC | Age: 65
End: 2025-08-22
Payer: MEDICARE

## 2025-08-22 ENCOUNTER — TELEPHONE (OUTPATIENT)
Facility: CLINIC | Age: 65
End: 2025-08-22
Payer: MEDICARE

## 2025-08-25 ENCOUNTER — TELEPHONE (OUTPATIENT)
Facility: CLINIC | Age: 65
End: 2025-08-25
Payer: MEDICARE

## 2025-08-25 ENCOUNTER — TELEPHONE (OUTPATIENT)
Dept: FAMILY MEDICINE | Facility: CLINIC | Age: 65
End: 2025-08-25
Payer: MEDICARE

## 2025-08-25 DIAGNOSIS — S22.49XA CLOSED FRACTURE OF MULTIPLE RIBS, UNSPECIFIED LATERALITY, INITIAL ENCOUNTER: Primary | ICD-10-CM

## 2025-08-25 DIAGNOSIS — Z79.01 LONG TERM (CURRENT) USE OF ANTICOAGULANTS: Primary | ICD-10-CM

## 2025-08-25 RX ORDER — OXYCODONE AND ACETAMINOPHEN 10; 325 MG/1; MG/1
1 TABLET ORAL EVERY 6 HOURS PRN
Qty: 20 TABLET | Refills: 0 | Status: SHIPPED | OUTPATIENT
Start: 2025-08-25

## 2025-08-26 ENCOUNTER — TELEPHONE (OUTPATIENT)
Facility: CLINIC | Age: 65
End: 2025-08-26
Payer: MEDICARE

## 2025-08-26 DIAGNOSIS — D68.51 HETEROZYGOUS FACTOR V LEIDEN MUTATION: ICD-10-CM

## 2025-08-26 DIAGNOSIS — Z79.01 LONG TERM (CURRENT) USE OF ANTICOAGULANTS: Primary | ICD-10-CM

## 2025-08-27 ENCOUNTER — TELEPHONE (OUTPATIENT)
Dept: FAMILY MEDICINE | Facility: CLINIC | Age: 65
End: 2025-08-27
Payer: MEDICARE

## 2025-08-28 DIAGNOSIS — D66 HEREDITARY FACTOR VIII DEFICIENCY: ICD-10-CM

## 2025-08-28 DIAGNOSIS — K92.2 GASTROINTESTINAL HEMORRHAGE, UNSPECIFIED GASTROINTESTINAL HEMORRHAGE TYPE: ICD-10-CM

## 2025-08-28 DIAGNOSIS — Z79.01 LONG TERM (CURRENT) USE OF ANTICOAGULANTS: Primary | ICD-10-CM

## 2025-08-28 DIAGNOSIS — D62 ACUTE BLOOD LOSS ANEMIA: ICD-10-CM

## 2025-08-28 DIAGNOSIS — D68.51 HETEROZYGOUS FACTOR V LEIDEN MUTATION: ICD-10-CM

## 2025-08-28 RX ORDER — WARFARIN 3 MG/1
3 TABLET ORAL DAILY
Qty: 30 TABLET | Refills: 3 | Status: SHIPPED | OUTPATIENT
Start: 2025-08-28 | End: 2026-08-28

## 2025-08-28 RX ORDER — FERROUS SULFATE 325(65) MG
325 TABLET ORAL
Qty: 60 TABLET | Refills: 3 | Status: SHIPPED | OUTPATIENT
Start: 2025-08-28